# Patient Record
Sex: MALE | Race: WHITE | NOT HISPANIC OR LATINO | Employment: OTHER | URBAN - METROPOLITAN AREA
[De-identification: names, ages, dates, MRNs, and addresses within clinical notes are randomized per-mention and may not be internally consistent; named-entity substitution may affect disease eponyms.]

---

## 2017-05-11 ENCOUNTER — HOSPITAL ENCOUNTER (OUTPATIENT)
Dept: FAMILY MEDICINE CLINIC | Facility: CLINIC | Age: 72
Setting detail: SPECIMEN
Discharge: HOME | End: 2017-05-11
Attending: FAMILY MEDICINE | Admitting: FAMILY MEDICINE

## 2017-05-11 LAB
ALBUMIN SERPL-MCNC: 4.1 G/DL (ref 3.5–4.8)
ALBUMIN/GLOB SERPL: 1.3 {RATIO} (ref 1–1.7)
ALP SERPL-CCNC: 55 IU/L (ref 32–91)
ALT SERPL-CCNC: 20 IU/L (ref 17–63)
ANION GAP SERPL CALC-SCNC: 14.4 MMOL/L (ref 10–20)
AST SERPL-CCNC: 20 IU/L (ref 15–41)
BASOPHILS # BLD AUTO: 0 10*3/UL (ref 0–0.2)
BASOPHILS NFR BLD AUTO: 0 % (ref 0–2)
BILIRUB SERPL-MCNC: 0.8 MG/DL (ref 0.3–1.2)
BUN SERPL-MCNC: 23 MG/DL (ref 8–20)
BUN/CREAT SERPL: 15.3 (ref 6.2–20.3)
CALCIUM SERPL-MCNC: 9.8 MG/DL (ref 8.9–10.3)
CHLORIDE SERPL-SCNC: 101 MMOL/L (ref 101–111)
CHOLEST SERPL-MCNC: 112 MG/DL
CHOLEST/HDLC SERPL: 4.1 {RATIO}
CONV CO2: 26 MMOL/L (ref 22–32)
CONV LDL CHOLESTEROL DIRECT: 67 MG/DL (ref 0–100)
CONV TOTAL PROTEIN: 7.3 G/DL (ref 6.1–7.9)
CREAT UR-MCNC: 1.5 MG/DL (ref 0.7–1.2)
DIFFERENTIAL METHOD BLD: (no result)
EOSINOPHIL # BLD AUTO: 0.1 10*3/UL (ref 0–0.3)
EOSINOPHIL # BLD AUTO: 2 % (ref 0–3)
ERYTHROCYTE [DISTWIDTH] IN BLOOD BY AUTOMATED COUNT: 13.6 % (ref 11.5–14.5)
GLOBULIN UR ELPH-MCNC: 3.2 G/DL (ref 2.5–3.8)
GLUCOSE SERPL-MCNC: 66 MG/DL (ref 65–99)
HCT VFR BLD AUTO: 45.1 % (ref 40–54)
HDLC SERPL-MCNC: 27 MG/DL
HGB BLD-MCNC: 15.2 G/DL (ref 14–18)
LDLC/HDLC SERPL: 2.5 {RATIO}
LIPID INTERPRETATION: ABNORMAL
LYMPHOCYTES # BLD AUTO: 2.6 10*3/UL (ref 0.8–4.8)
LYMPHOCYTES NFR BLD AUTO: 37 % (ref 18–42)
MCH RBC QN AUTO: 29.7 PG (ref 26–32)
MCHC RBC AUTO-ENTMCNC: 33.7 G/DL (ref 32–36)
MCV RBC AUTO: 88.3 FL (ref 80–94)
MONOCYTES # BLD AUTO: 0.9 10*3/UL (ref 0.1–1.3)
MONOCYTES NFR BLD AUTO: 13 % (ref 2–11)
NEUTROPHILS # BLD AUTO: 3.4 10*3/UL (ref 2.3–8.6)
NEUTROPHILS NFR BLD AUTO: 48 % (ref 50–75)
NRBC BLD AUTO-RTO: 0 /100{WBCS}
NRBC/RBC NFR BLD MANUAL: 0 10*3/UL
PLATELET # BLD AUTO: 263 10*3/UL (ref 150–450)
PMV BLD AUTO: 9.1 FL (ref 7.4–10.4)
POTASSIUM SERPL-SCNC: 4.4 MMOL/L (ref 3.6–5.1)
RBC # BLD AUTO: 5.11 10*6/UL (ref 4.6–6)
SODIUM SERPL-SCNC: 137 MMOL/L (ref 136–144)
TRIGL SERPL-MCNC: 112 MG/DL
VLDLC SERPL CALC-MCNC: 18.1 MG/DL
WBC # BLD AUTO: 7.1 10*3/UL (ref 4.5–11.5)

## 2017-11-09 ENCOUNTER — HOSPITAL ENCOUNTER (OUTPATIENT)
Dept: FAMILY MEDICINE CLINIC | Facility: CLINIC | Age: 72
Setting detail: SPECIMEN
Discharge: HOME | End: 2017-11-09
Attending: FAMILY MEDICINE | Admitting: FAMILY MEDICINE

## 2017-11-09 LAB
ALBUMIN SERPL-MCNC: 4.3 G/DL (ref 3.5–4.8)
ALBUMIN/GLOB SERPL: 1.3 {RATIO} (ref 1–1.7)
ALP SERPL-CCNC: 65 IU/L (ref 32–91)
ALT SERPL-CCNC: 19 IU/L (ref 17–63)
AMPICILLIN SUSC ISLT: ABNORMAL
ANION GAP SERPL CALC-SCNC: 13.8 MMOL/L (ref 10–20)
AST SERPL-CCNC: 20 IU/L (ref 15–41)
BACTERIA ISLT: ABNORMAL
BACTERIA SPEC AEROBE CULT: ABNORMAL
BASOPHILS # BLD AUTO: 0 10*3/UL (ref 0–0.2)
BASOPHILS NFR BLD AUTO: 0 % (ref 0–2)
BILIRUB SERPL-MCNC: 1.2 MG/DL (ref 0.3–1.2)
BUN SERPL-MCNC: 25 MG/DL (ref 8–20)
BUN/CREAT SERPL: 17.9 (ref 6.2–20.3)
CALCIUM SERPL-MCNC: 9.7 MG/DL (ref 8.9–10.3)
CHLORIDE SERPL-SCNC: 99 MMOL/L (ref 101–111)
CHOLEST SERPL-MCNC: 127 MG/DL
CHOLEST/HDLC SERPL: 4.6 {RATIO}
COLONY COUNT: ABNORMAL
CONV CO2: 26 MMOL/L (ref 22–32)
CONV LDL CHOLESTEROL DIRECT: 78 MG/DL (ref 0–100)
CONV TOTAL PROTEIN: 7.7 G/DL (ref 6.1–7.9)
CREAT UR-MCNC: 1.4 MG/DL (ref 0.7–1.2)
DIFFERENTIAL METHOD BLD: (no result)
EOSINOPHIL # BLD AUTO: 0.1 10*3/UL (ref 0–0.3)
EOSINOPHIL # BLD AUTO: 2 % (ref 0–3)
ERYTHROCYTE [DISTWIDTH] IN BLOOD BY AUTOMATED COUNT: 13.8 % (ref 11.5–14.5)
GLOBULIN UR ELPH-MCNC: 3.4 G/DL (ref 2.5–3.8)
GLUCOSE SERPL-MCNC: 99 MG/DL (ref 65–99)
HCT VFR BLD AUTO: 45.9 % (ref 40–54)
HDLC SERPL-MCNC: 28 MG/DL
HGB BLD-MCNC: 15.5 G/DL (ref 14–18)
LDLC/HDLC SERPL: 2.8 {RATIO}
LEVOFLOXACIN SUSC ISLT: ABNORMAL
LIPID INTERPRETATION: ABNORMAL
LYMPHOCYTES # BLD AUTO: 2.2 10*3/UL (ref 0.8–4.8)
LYMPHOCYTES NFR BLD AUTO: 31 % (ref 18–42)
Lab: ABNORMAL
MCH RBC QN AUTO: 29.7 PG (ref 26–32)
MCHC RBC AUTO-ENTMCNC: 33.8 G/DL (ref 32–36)
MCV RBC AUTO: 87.6 FL (ref 80–94)
MICRO REPORT STATUS: ABNORMAL
MONOCYTES # BLD AUTO: 0.8 10*3/UL (ref 0.1–1.3)
MONOCYTES NFR BLD AUTO: 11 % (ref 2–11)
NEUTROPHILS # BLD AUTO: 4 10*3/UL (ref 2.3–8.6)
NEUTROPHILS NFR BLD AUTO: 56 % (ref 50–75)
NITROFURANTOIN SUSC ISLT: ABNORMAL
NRBC BLD AUTO-RTO: 0 /100{WBCS}
NRBC/RBC NFR BLD MANUAL: 0 10*3/UL
PLATELET # BLD AUTO: 284 10*3/UL (ref 150–450)
PMV BLD AUTO: 9.1 FL (ref 7.4–10.4)
POTASSIUM SERPL-SCNC: 3.8 MMOL/L (ref 3.6–5.1)
RBC # BLD AUTO: 5.23 10*6/UL (ref 4.6–6)
SODIUM SERPL-SCNC: 135 MMOL/L (ref 136–144)
SPECIMEN SOURCE: ABNORMAL
SUSC METH SPEC: ABNORMAL
T3FREE SERPL-MCNC: 4.55 PG/ML (ref 2.39–6.79)
T4 FREE SERPL-MCNC: 1.18 NG/DL (ref 0.58–1.64)
TETRACYCLINE SUSC ISLT: ABNORMAL
TRIGL SERPL-MCNC: 115 MG/DL
TSH SERPL-ACNC: 0.91 UIU/ML (ref 0.34–5.6)
VANCOMYCIN SUSC ISLT: ABNORMAL
VLDLC SERPL CALC-MCNC: 21.1 MG/DL
WBC # BLD AUTO: 7.1 10*3/UL (ref 4.5–11.5)

## 2018-05-10 ENCOUNTER — HOSPITAL ENCOUNTER (OUTPATIENT)
Dept: FAMILY MEDICINE CLINIC | Facility: CLINIC | Age: 73
Setting detail: SPECIMEN
Discharge: HOME | End: 2018-05-10
Attending: FAMILY MEDICINE | Admitting: FAMILY MEDICINE

## 2018-05-10 LAB
ALBUMIN SERPL-MCNC: 4.2 G/DL (ref 3.5–4.8)
ALBUMIN/GLOB SERPL: 1.2 {RATIO} (ref 1–1.7)
ALP SERPL-CCNC: 58 IU/L (ref 32–91)
ALT SERPL-CCNC: 22 IU/L (ref 17–63)
ANION GAP SERPL CALC-SCNC: 12.9 MMOL/L (ref 10–20)
AST SERPL-CCNC: 22 IU/L (ref 15–41)
BASOPHILS # BLD AUTO: 0 10*3/UL (ref 0–0.2)
BASOPHILS NFR BLD AUTO: 0 % (ref 0–2)
BILIRUB SERPL-MCNC: 0.8 MG/DL (ref 0.3–1.2)
BUN SERPL-MCNC: 23 MG/DL (ref 8–20)
BUN/CREAT SERPL: 16.4 (ref 6.2–20.3)
CALCIUM SERPL-MCNC: 9.6 MG/DL (ref 8.9–10.3)
CHLORIDE SERPL-SCNC: 103 MMOL/L (ref 101–111)
CHOLEST SERPL-MCNC: 118 MG/DL
CHOLEST/HDLC SERPL: 4.4 {RATIO}
CONV CO2: 27 MMOL/L (ref 22–32)
CONV LDL CHOLESTEROL DIRECT: 73 MG/DL (ref 0–100)
CONV TOTAL PROTEIN: 7.7 G/DL (ref 6.1–7.9)
CREAT UR-MCNC: 1.4 MG/DL (ref 0.7–1.2)
DIFFERENTIAL METHOD BLD: (no result)
EOSINOPHIL # BLD AUTO: 0.2 10*3/UL (ref 0–0.3)
EOSINOPHIL # BLD AUTO: 2 % (ref 0–3)
ERYTHROCYTE [DISTWIDTH] IN BLOOD BY AUTOMATED COUNT: 13.9 % (ref 11.5–14.5)
GLOBULIN UR ELPH-MCNC: 3.5 G/DL (ref 2.5–3.8)
GLUCOSE SERPL-MCNC: 87 MG/DL (ref 65–99)
HCT VFR BLD AUTO: 46.4 % (ref 40–54)
HDLC SERPL-MCNC: 27 MG/DL
HGB BLD-MCNC: 15.5 G/DL (ref 14–18)
LDLC/HDLC SERPL: 2.7 {RATIO}
LIPID INTERPRETATION: ABNORMAL
LYMPHOCYTES # BLD AUTO: 2.9 10*3/UL (ref 0.8–4.8)
LYMPHOCYTES NFR BLD AUTO: 39 % (ref 18–42)
MCH RBC QN AUTO: 28.9 PG (ref 26–32)
MCHC RBC AUTO-ENTMCNC: 33.4 G/DL (ref 32–36)
MCV RBC AUTO: 86.6 FL (ref 80–94)
MONOCYTES # BLD AUTO: 0.9 10*3/UL (ref 0.1–1.3)
MONOCYTES NFR BLD AUTO: 12 % (ref 2–11)
NEUTROPHILS # BLD AUTO: 3.4 10*3/UL (ref 2.3–8.6)
NEUTROPHILS NFR BLD AUTO: 47 % (ref 50–75)
NRBC BLD AUTO-RTO: 0 /100{WBCS}
NRBC/RBC NFR BLD MANUAL: 0 10*3/UL
PLATELET # BLD AUTO: 283 10*3/UL (ref 150–450)
PMV BLD AUTO: 9.1 FL (ref 7.4–10.4)
POTASSIUM SERPL-SCNC: 3.9 MMOL/L (ref 3.6–5.1)
RBC # BLD AUTO: 5.36 10*6/UL (ref 4.6–6)
SODIUM SERPL-SCNC: 139 MMOL/L (ref 136–144)
TRIGL SERPL-MCNC: 133 MG/DL
VLDLC SERPL CALC-MCNC: 17.8 MG/DL
WBC # BLD AUTO: 7.4 10*3/UL (ref 4.5–11.5)

## 2018-11-08 ENCOUNTER — HOSPITAL ENCOUNTER (OUTPATIENT)
Dept: FAMILY MEDICINE CLINIC | Facility: CLINIC | Age: 73
Setting detail: SPECIMEN
Discharge: HOME | End: 2018-11-08
Attending: FAMILY MEDICINE | Admitting: FAMILY MEDICINE

## 2018-11-08 LAB
ALBUMIN SERPL-MCNC: 4 G/DL (ref 3.5–4.8)
ALBUMIN/GLOB SERPL: 1.2 {RATIO} (ref 1–1.7)
ALP SERPL-CCNC: 67 IU/L (ref 32–91)
ALT SERPL-CCNC: 19 IU/L (ref 17–63)
ANION GAP SERPL CALC-SCNC: 14.2 MMOL/L (ref 10–20)
AST SERPL-CCNC: 19 IU/L (ref 15–41)
BASOPHILS # BLD AUTO: 0 10*3/UL (ref 0–0.2)
BASOPHILS NFR BLD AUTO: 0 % (ref 0–2)
BILIRUB SERPL-MCNC: 1 MG/DL (ref 0.3–1.2)
BUN SERPL-MCNC: 28 MG/DL (ref 8–20)
BUN/CREAT SERPL: 17.5 (ref 6.2–20.3)
CALCIUM SERPL-MCNC: 9.5 MG/DL (ref 8.9–10.3)
CHLORIDE SERPL-SCNC: 104 MMOL/L (ref 101–111)
CHOLEST SERPL-MCNC: 131 MG/DL
CHOLEST/HDLC SERPL: 4.2 {RATIO}
CONV CO2: 26 MMOL/L (ref 22–32)
CONV LDL CHOLESTEROL DIRECT: 87 MG/DL (ref 0–100)
CONV TOTAL PROTEIN: 7.3 G/DL (ref 6.1–7.9)
CREAT UR-MCNC: 1.6 MG/DL (ref 0.7–1.2)
DIFFERENTIAL METHOD BLD: (no result)
EOSINOPHIL # BLD AUTO: 0.2 10*3/UL (ref 0–0.3)
EOSINOPHIL # BLD AUTO: 3 % (ref 0–3)
ERYTHROCYTE [DISTWIDTH] IN BLOOD BY AUTOMATED COUNT: 14.3 % (ref 11.5–14.5)
GLOBULIN UR ELPH-MCNC: 3.3 G/DL (ref 2.5–3.8)
GLUCOSE SERPL-MCNC: 92 MG/DL (ref 65–99)
HCT VFR BLD AUTO: 46.3 % (ref 40–54)
HDLC SERPL-MCNC: 31 MG/DL
HGB BLD-MCNC: 15.6 G/DL (ref 14–18)
LDLC/HDLC SERPL: 2.8 {RATIO}
LIPID INTERPRETATION: ABNORMAL
LYMPHOCYTES # BLD AUTO: 2.9 10*3/UL (ref 0.8–4.8)
LYMPHOCYTES NFR BLD AUTO: 36 % (ref 18–42)
MCH RBC QN AUTO: 29.1 PG (ref 26–32)
MCHC RBC AUTO-ENTMCNC: 33.6 G/DL (ref 32–36)
MCV RBC AUTO: 86.6 FL (ref 80–94)
MONOCYTES # BLD AUTO: 0.9 10*3/UL (ref 0.1–1.3)
MONOCYTES NFR BLD AUTO: 12 % (ref 2–11)
NEUTROPHILS # BLD AUTO: 3.9 10*3/UL (ref 2.3–8.6)
NEUTROPHILS NFR BLD AUTO: 49 % (ref 50–75)
NRBC BLD AUTO-RTO: 0 /100{WBCS}
NRBC/RBC NFR BLD MANUAL: 0 10*3/UL
PLATELET # BLD AUTO: 270 10*3/UL (ref 150–450)
PMV BLD AUTO: 8.5 FL (ref 7.4–10.4)
POTASSIUM SERPL-SCNC: 4.2 MMOL/L (ref 3.6–5.1)
RBC # BLD AUTO: 5.35 10*6/UL (ref 4.6–6)
SODIUM SERPL-SCNC: 140 MMOL/L (ref 136–144)
TRIGL SERPL-MCNC: 114 MG/DL
VLDLC SERPL CALC-MCNC: 12.3 MG/DL
WBC # BLD AUTO: 7.9 10*3/UL (ref 4.5–11.5)

## 2019-01-23 ENCOUNTER — HOSPITAL ENCOUNTER (OUTPATIENT)
Dept: GENERAL RADIOLOGY | Facility: HOSPITAL | Age: 74
Discharge: HOME | End: 2019-01-23
Attending: FAMILY MEDICINE | Admitting: FAMILY MEDICINE

## 2019-05-09 ENCOUNTER — HOSPITAL ENCOUNTER (OUTPATIENT)
Dept: FAMILY MEDICINE CLINIC | Facility: CLINIC | Age: 74
Setting detail: SPECIMEN
Discharge: HOME | End: 2019-05-09
Attending: FAMILY MEDICINE | Admitting: FAMILY MEDICINE

## 2019-05-09 LAB
ALBUMIN SERPL-MCNC: 3.9 G/DL (ref 3.5–4.8)
ALBUMIN/GLOB SERPL: 1.2 {RATIO} (ref 1–1.7)
ALP SERPL-CCNC: 59 IU/L (ref 32–91)
ALT SERPL-CCNC: 20 IU/L (ref 17–63)
ANION GAP SERPL CALC-SCNC: 15 MMOL/L (ref 10–20)
AST SERPL-CCNC: 20 IU/L (ref 15–41)
BASOPHILS # BLD AUTO: 0.1 10*3/UL (ref 0–0.2)
BASOPHILS NFR BLD AUTO: 1 % (ref 0–2)
BILIRUB SERPL-MCNC: 0.9 MG/DL (ref 0.3–1.2)
BUN SERPL-MCNC: 22 MG/DL (ref 8–20)
BUN/CREAT SERPL: 18.3 (ref 6.2–20.3)
CALCIUM SERPL-MCNC: 9.6 MG/DL (ref 8.9–10.3)
CHLORIDE SERPL-SCNC: 104 MMOL/L (ref 101–111)
CHOLEST SERPL-MCNC: 114 MG/DL
CHOLEST/HDLC SERPL: 4 {RATIO}
CONV CO2: 25 MMOL/L (ref 22–32)
CONV LDL CHOLESTEROL DIRECT: 71 MG/DL (ref 0–100)
CONV TOTAL PROTEIN: 7.2 G/DL (ref 6.1–7.9)
CREAT UR-MCNC: 1.2 MG/DL (ref 0.7–1.2)
DIFFERENTIAL METHOD BLD: (no result)
EOSINOPHIL # BLD AUTO: 0.2 10*3/UL (ref 0–0.3)
EOSINOPHIL # BLD AUTO: 3 % (ref 0–3)
ERYTHROCYTE [DISTWIDTH] IN BLOOD BY AUTOMATED COUNT: 14.3 % (ref 11.5–14.5)
GLOBULIN UR ELPH-MCNC: 3.3 G/DL (ref 2.5–3.8)
GLUCOSE SERPL-MCNC: 64 MG/DL (ref 65–99)
HBA1C MFR BLD: 6.1 % (ref 0–5.6)
HCT VFR BLD AUTO: 43.6 % (ref 40–54)
HDLC SERPL-MCNC: 29 MG/DL
HGB BLD-MCNC: 14.7 G/DL (ref 14–18)
LDLC/HDLC SERPL: 2.5 {RATIO}
LIPID INTERPRETATION: ABNORMAL
LYMPHOCYTES # BLD AUTO: 2.7 10*3/UL (ref 0.8–4.8)
LYMPHOCYTES NFR BLD AUTO: 39 % (ref 18–42)
MCH RBC QN AUTO: 29.7 PG (ref 26–32)
MCHC RBC AUTO-ENTMCNC: 33.8 G/DL (ref 32–36)
MCV RBC AUTO: 88 FL (ref 80–94)
MONOCYTES # BLD AUTO: 0.8 10*3/UL (ref 0.1–1.3)
MONOCYTES NFR BLD AUTO: 11 % (ref 2–11)
NEUTROPHILS # BLD AUTO: 3.2 10*3/UL (ref 2.3–8.6)
NEUTROPHILS NFR BLD AUTO: 46 % (ref 50–75)
NRBC BLD AUTO-RTO: 0 /100{WBCS}
NRBC/RBC NFR BLD MANUAL: 0 10*3/UL
PLATELET # BLD AUTO: 276 10*3/UL (ref 150–450)
PMV BLD AUTO: 9.1 FL (ref 7.4–10.4)
POTASSIUM SERPL-SCNC: 4 MMOL/L (ref 3.6–5.1)
PSA SERPL-MCNC: 1.22 NG/ML (ref 0–4)
RBC # BLD AUTO: 4.95 10*6/UL (ref 4.6–6)
SODIUM SERPL-SCNC: 140 MMOL/L (ref 136–144)
TRIGL SERPL-MCNC: 113 MG/DL
VLDLC SERPL CALC-MCNC: 14.2 MG/DL
WBC # BLD AUTO: 7 10*3/UL (ref 4.5–11.5)

## 2019-08-01 RX ORDER — HYDROCHLOROTHIAZIDE 50 MG/1
TABLET ORAL
Qty: 90 TABLET | Refills: 1 | Status: SHIPPED | OUTPATIENT
Start: 2019-08-01 | End: 2019-11-03 | Stop reason: SDUPTHER

## 2019-08-01 RX ORDER — ATENOLOL 50 MG/1
TABLET ORAL
Qty: 180 TABLET | Refills: 1 | Status: SHIPPED | OUTPATIENT
Start: 2019-08-01 | End: 2019-11-03 | Stop reason: SDUPTHER

## 2019-08-01 RX ORDER — POTASSIUM CHLORIDE 20 MEQ/1
TABLET, EXTENDED RELEASE ORAL
Qty: 90 TABLET | Refills: 1 | Status: SHIPPED | OUTPATIENT
Start: 2019-08-01 | End: 2019-11-12 | Stop reason: SDUPTHER

## 2019-08-01 RX ORDER — ENALAPRIL MALEATE 20 MG/1
TABLET ORAL
Qty: 90 TABLET | Refills: 1 | Status: SHIPPED | OUTPATIENT
Start: 2019-08-01 | End: 2019-08-26 | Stop reason: SDUPTHER

## 2019-08-22 PROBLEM — I10 HYPERTENSION: Status: ACTIVE | Noted: 2019-08-22

## 2019-08-22 RX ORDER — BLOOD-GLUCOSE METER
EACH MISCELLANEOUS
COMMUNITY
Start: 2016-08-08 | End: 2022-07-06

## 2019-08-22 RX ORDER — SIMVASTATIN 20 MG
TABLET ORAL EVERY 24 HOURS
COMMUNITY
Start: 2017-11-10 | End: 2019-11-27 | Stop reason: SDUPTHER

## 2019-08-22 RX ORDER — NIACIN 500 MG
TABLET ORAL
COMMUNITY
Start: 2015-04-17 | End: 2022-07-06

## 2019-08-22 RX ORDER — GLIMEPIRIDE 2 MG/1
TABLET ORAL EVERY 12 HOURS
COMMUNITY
Start: 2018-09-17 | End: 2019-10-09 | Stop reason: SDUPTHER

## 2019-08-22 RX ORDER — AMLODIPINE BESYLATE 5 MG/1
TABLET ORAL EVERY 24 HOURS
COMMUNITY
Start: 2017-11-10 | End: 2019-10-09 | Stop reason: SDUPTHER

## 2019-08-22 RX ORDER — BETAMETHASONE DIPROPIONATE 0.5 MG/G
CREAM TOPICAL
COMMUNITY
Start: 2017-11-09 | End: 2021-06-28 | Stop reason: SDUPTHER

## 2019-08-26 RX ORDER — ENALAPRIL MALEATE 20 MG/1
TABLET ORAL
Qty: 180 TABLET | Refills: 1 | Status: SHIPPED | OUTPATIENT
Start: 2019-08-26 | End: 2020-02-13

## 2019-09-06 ENCOUNTER — OFFICE VISIT (OUTPATIENT)
Dept: CARDIOLOGY | Facility: CLINIC | Age: 74
End: 2019-09-06

## 2019-09-06 VITALS
HEART RATE: 60 BPM | BODY MASS INDEX: 29.65 KG/M2 | HEIGHT: 74 IN | OXYGEN SATURATION: 99 % | DIASTOLIC BLOOD PRESSURE: 76 MMHG | SYSTOLIC BLOOD PRESSURE: 124 MMHG | WEIGHT: 231 LBS

## 2019-09-06 DIAGNOSIS — E11.59 TYPE 2 DIABETES MELLITUS WITH OTHER CIRCULATORY COMPLICATION, WITHOUT LONG-TERM CURRENT USE OF INSULIN (HCC): ICD-10-CM

## 2019-09-06 DIAGNOSIS — E11.42 TYPE 2 DIABETES MELLITUS WITH PERIPHERAL NEUROPATHY (HCC): ICD-10-CM

## 2019-09-06 DIAGNOSIS — I10 HTN, GOAL BELOW 130/80: ICD-10-CM

## 2019-09-06 DIAGNOSIS — I25.10 CORONARY ARTERY DISEASE INVOLVING NATIVE CORONARY ARTERY OF NATIVE HEART WITHOUT ANGINA PECTORIS: Primary | ICD-10-CM

## 2019-09-06 DIAGNOSIS — E66.3 OVERWEIGHT (BMI 25.0-29.9): ICD-10-CM

## 2019-09-06 DIAGNOSIS — E78.5 DYSLIPIDEMIA: ICD-10-CM

## 2019-09-06 PROCEDURE — 99214 OFFICE O/P EST MOD 30 MIN: CPT | Performed by: INTERNAL MEDICINE

## 2019-09-06 PROCEDURE — 93000 ELECTROCARDIOGRAM COMPLETE: CPT | Performed by: INTERNAL MEDICINE

## 2019-09-06 NOTE — PROGRESS NOTES
Subjective:     Encounter Date:09/06/2019      Patient ID: Gabriel De Souza is a 74 y.o. male.    Chief Complaint:  1 yr follow up  CAD heart catheterization 2001. Overweight.  DM.  Dyslipidemia.  HTN. CRI .  Peripheral neuropathy.    No SOB or chest tightness with activity.  No orthopnea or PND.  Mild swelling lef leg.  No transient focal neurologic loss.  No discomfort in legs with walking.  Tingling discomfort in feet and felt odd in his mind while on pregabalin.    Past Medical History:     DM A1c 5.9 5/11/2017, A1c 6.0 11/09/2017      Peripheral neuropathy     HTN     Dyslipidemia  chol   114  HDL 29 LDL 71 ALT 25/9/2019     Past cigarette use     CRI creat 1.3  4/12/2016, 1.5 K 4.4 5/11/2017, 1.4 ( 0.7- 1.2)  K 3.9 05/10/2018, 1.2 (0.7-1.2) K 4.0 5/9/2019     CAD     Nuclear MPI with TMT 3/14/2008  Nuclear images OK EF 62%.     Nuclear MPI with TMT 12/4/2003      Nuclear MPI with TMT 2/23/2001     Heart Cath 2/23/2001  Unremarkable LV. 75-80% proximal narrowing 1st diagonal branch LAD.  75%  proximal narrowing  2nd diagonal branch LAD. 60% narrowing distal RCA.     Hx DVT LLE and anticoagulation     Goiter found on PE  7/7/2017     TSH   0.91 11/09/2017     Hgb15.5 05/10/2018, 14.7 5/9/2019        ECG 12 Lead  Date/Time: 9/6/2019 11:34 AM  Performed by: Karan Burgos MD  Authorized by: Karan Burgos MD   Comments:   Sinus rhythm  PAC  Otherwise unremarkable EKG  Compared to EKG 9/4/2018 PAC on present EKG and otherwise EKGs similar                 Objective:     Physical Exam   Constitutional:   Weight 231 pounds with 1 pound loss in the past year and 5 pound gain in the past 4 years  BMI 29  /74 RA= LA lying and standing  HR 60  O2 sat 99%   Neck:   Thyroid unremarkable to palpation   Cardiovascular:   No carotid bruits  No JVD  No palpable precordial impulses  No murmur, gallop, rub   Pulmonary/Chest:   Unremarkable to percussion and auscultation  No wheezing, rhonchi, rales   Abdominal:    Liver and spleen not palpable   Musculoskeletal:   No edema lower extremities  Right and left dorsalis pedis pulses +1 and posterior tibial pulses +2       Lab Review:     CBC and CMP and lipid panel 5/9/2019 reviewed and pertinent findings recorded PMH  Assessment:     No SOB or angina pectoris with present activity.  BP and fluid status satisfactory  Creatinine and K satisfactory    Plan:     Encouraged and discussed weight reduction and 3 g sodium restriction and regular mild exercise 30 minutes 5 days a week  Continue present medication  Blood work regularly through Dr. Johnson  Return 1 year with EKG

## 2019-09-27 ENCOUNTER — FLU SHOT (OUTPATIENT)
Dept: FAMILY MEDICINE CLINIC | Facility: CLINIC | Age: 74
End: 2019-09-27

## 2019-09-27 DIAGNOSIS — Z23 IMMUNIZATION DUE: Primary | ICD-10-CM

## 2019-09-27 PROCEDURE — 90674 CCIIV4 VAC NO PRSV 0.5 ML IM: CPT | Performed by: FAMILY MEDICINE

## 2019-09-27 PROCEDURE — G0008 ADMIN INFLUENZA VIRUS VAC: HCPCS | Performed by: FAMILY MEDICINE

## 2019-10-09 RX ORDER — AMLODIPINE BESYLATE 5 MG/1
TABLET ORAL
Qty: 90 TABLET | Refills: 1 | Status: SHIPPED | OUTPATIENT
Start: 2019-10-09 | End: 2020-02-13

## 2019-10-09 RX ORDER — GLIMEPIRIDE 2 MG/1
TABLET ORAL
Qty: 180 TABLET | Refills: 1 | Status: SHIPPED | OUTPATIENT
Start: 2019-10-09 | End: 2020-02-13

## 2019-11-03 RX ORDER — ATENOLOL 50 MG/1
TABLET ORAL
Qty: 180 TABLET | Refills: 1 | Status: SHIPPED | OUTPATIENT
Start: 2019-11-03 | End: 2020-03-19

## 2019-11-03 RX ORDER — HYDROCHLOROTHIAZIDE 50 MG/1
TABLET ORAL
Qty: 90 TABLET | Refills: 1 | Status: SHIPPED | OUTPATIENT
Start: 2019-11-03 | End: 2020-03-19

## 2019-11-06 ENCOUNTER — OFFICE VISIT (OUTPATIENT)
Dept: FAMILY MEDICINE CLINIC | Facility: CLINIC | Age: 74
End: 2019-11-06

## 2019-11-06 VITALS
TEMPERATURE: 97.6 F | WEIGHT: 234.6 LBS | OXYGEN SATURATION: 99 % | HEIGHT: 74 IN | DIASTOLIC BLOOD PRESSURE: 82 MMHG | SYSTOLIC BLOOD PRESSURE: 153 MMHG | HEART RATE: 61 BPM | BODY MASS INDEX: 30.11 KG/M2

## 2019-11-06 DIAGNOSIS — I10 ESSENTIAL HYPERTENSION: ICD-10-CM

## 2019-11-06 DIAGNOSIS — E11.9 CONTROLLED TYPE 2 DIABETES MELLITUS WITHOUT COMPLICATION, WITHOUT LONG-TERM CURRENT USE OF INSULIN (HCC): Primary | ICD-10-CM

## 2019-11-06 DIAGNOSIS — M25.551 HIP PAIN, RIGHT: ICD-10-CM

## 2019-11-06 LAB
ALBUMIN SERPL-MCNC: 4 G/DL (ref 3.5–5.2)
ALBUMIN/GLOB SERPL: 1.1 G/DL
ALP SERPL-CCNC: 57 U/L (ref 39–117)
ALT SERPL W P-5'-P-CCNC: 19 U/L (ref 1–41)
ANION GAP SERPL CALCULATED.3IONS-SCNC: 9 MMOL/L (ref 5–15)
AST SERPL-CCNC: 17 U/L (ref 1–40)
BASOPHILS # BLD AUTO: 0.02 10*3/MM3 (ref 0–0.2)
BASOPHILS NFR BLD AUTO: 0.3 % (ref 0–1.5)
BILIRUB SERPL-MCNC: 0.5 MG/DL (ref 0.2–1.2)
BUN BLD-MCNC: 25 MG/DL (ref 8–23)
BUN/CREAT SERPL: 17.5 (ref 7–25)
CALCIUM SPEC-SCNC: 9.8 MG/DL (ref 8.6–10.5)
CHLORIDE SERPL-SCNC: 101 MMOL/L (ref 98–107)
CHOLEST SERPL-MCNC: 125 MG/DL (ref 0–200)
CO2 SERPL-SCNC: 31 MMOL/L (ref 22–29)
CREAT BLD-MCNC: 1.43 MG/DL (ref 0.76–1.27)
DEPRECATED RDW RBC AUTO: 45 FL (ref 37–54)
EOSINOPHIL # BLD AUTO: 0.17 10*3/MM3 (ref 0–0.4)
EOSINOPHIL NFR BLD AUTO: 2.2 % (ref 0.3–6.2)
ERYTHROCYTE [DISTWIDTH] IN BLOOD BY AUTOMATED COUNT: 13.6 % (ref 12.3–15.4)
GFR SERPL CREATININE-BSD FRML MDRD: 48 ML/MIN/1.73
GLOBULIN UR ELPH-MCNC: 3.5 GM/DL
GLUCOSE BLD-MCNC: 70 MG/DL (ref 65–99)
HBA1C MFR BLD: 5.7 % (ref 3.5–5.6)
HCT VFR BLD AUTO: 43.9 % (ref 37.5–51)
HDLC SERPL-MCNC: 31 MG/DL (ref 40–60)
HGB BLD-MCNC: 14.7 G/DL (ref 13–17.7)
IMM GRANULOCYTES # BLD AUTO: 0.04 10*3/MM3 (ref 0–0.05)
IMM GRANULOCYTES NFR BLD AUTO: 0.5 % (ref 0–0.5)
LDLC SERPL CALC-MCNC: 69 MG/DL (ref 0–100)
LDLC/HDLC SERPL: 2.23 {RATIO}
LYMPHOCYTES # BLD AUTO: 2.38 10*3/MM3 (ref 0.7–3.1)
LYMPHOCYTES NFR BLD AUTO: 31.3 % (ref 19.6–45.3)
MCH RBC QN AUTO: 30.2 PG (ref 26.6–33)
MCHC RBC AUTO-ENTMCNC: 33.5 G/DL (ref 31.5–35.7)
MCV RBC AUTO: 90.1 FL (ref 79–97)
MONOCYTES # BLD AUTO: 0.93 10*3/MM3 (ref 0.1–0.9)
MONOCYTES NFR BLD AUTO: 12.2 % (ref 5–12)
NEUTROPHILS # BLD AUTO: 4.07 10*3/MM3 (ref 1.7–7)
NEUTROPHILS NFR BLD AUTO: 53.5 % (ref 42.7–76)
NRBC BLD AUTO-RTO: 0 /100 WBC (ref 0–0.2)
PLATELET # BLD AUTO: 275 10*3/MM3 (ref 140–450)
PMV BLD AUTO: 11 FL (ref 6–12)
POTASSIUM BLD-SCNC: 3.6 MMOL/L (ref 3.5–5.2)
PROT SERPL-MCNC: 7.5 G/DL (ref 6–8.5)
RBC # BLD AUTO: 4.87 10*6/MM3 (ref 4.14–5.8)
SODIUM BLD-SCNC: 141 MMOL/L (ref 136–145)
TRIGL SERPL-MCNC: 124 MG/DL (ref 0–150)
VLDLC SERPL-MCNC: 24.8 MG/DL (ref 5–40)
WBC NRBC COR # BLD: 7.61 10*3/MM3 (ref 3.4–10.8)

## 2019-11-06 PROCEDURE — 83036 HEMOGLOBIN GLYCOSYLATED A1C: CPT | Performed by: FAMILY MEDICINE

## 2019-11-06 PROCEDURE — 99214 OFFICE O/P EST MOD 30 MIN: CPT | Performed by: FAMILY MEDICINE

## 2019-11-06 PROCEDURE — 85025 COMPLETE CBC W/AUTO DIFF WBC: CPT | Performed by: FAMILY MEDICINE

## 2019-11-06 PROCEDURE — 80061 LIPID PANEL: CPT | Performed by: FAMILY MEDICINE

## 2019-11-06 PROCEDURE — 36415 COLL VENOUS BLD VENIPUNCTURE: CPT | Performed by: FAMILY MEDICINE

## 2019-11-06 PROCEDURE — 80053 COMPREHEN METABOLIC PANEL: CPT | Performed by: FAMILY MEDICINE

## 2019-11-06 RX ORDER — MELOXICAM 15 MG/1
15 TABLET ORAL DAILY
Qty: 30 TABLET | Refills: 1 | Status: SHIPPED | OUTPATIENT
Start: 2019-11-06 | End: 2021-09-17

## 2019-11-06 NOTE — PROGRESS NOTES
"   Subjective   Gabriel De Souza is a 74 y.o. male.     He is in for follow-up on his dm2 and high blood pressure, which has been trending higher of late. He has had some episodic discomfort in his R hip but it is not predictable , though mornings bother him more of late. He has not had any trauma. He has been able to do his ADLs but Tylenol has not helped his pain.           /82 (BP Location: Left arm, Patient Position: Sitting, Cuff Size: Adult)   Pulse 61   Temp 97.6 °F (36.4 °C) (Oral)   Ht 188 cm (74\")   Wt 106 kg (234 lb 9.6 oz)   SpO2 99%   BMI 30.12 kg/m²       Chief Complaint   Patient presents with   • Diabetes     six month f/u    • Hypertension           Current Outpatient Medications:   •  amLODIPine (NORVASC) 5 MG tablet, TAKE 1 TABLET BY MOUTH  DAILY, Disp: 90 tablet, Rfl: 1  •  aspirin 81 MG tablet, ASPIRIN 81 MG ORAL TABLET, Disp: , Rfl:   •  atenolol (TENORMIN) 50 MG tablet, TAKE 1 TABLET BY MOUTH  TWICE A DAY, Disp: 180 tablet, Rfl: 1  •  betamethasone dipropionate (DIPROLENE) 0.05 % cream, BETAMETHASONE DIPROPIONATE 0.05 % CREA, Disp: , Rfl:   •  Blood Glucose Monitoring Suppl (ONE TOUCH ULTRA 2) w/Device kit, ONETOUCH ULTRA 2 w/Device KIT, Disp: , Rfl:   •  enalapril (VASOTEC) 20 MG tablet, TAKE 1 TABLET BY MOUTH  TWICE A DAY, Disp: 180 tablet, Rfl: 1  •  glimepiride (AMARYL) 2 MG tablet, TAKE 1 TABLET BY MOUTH  TWICE A DAY, Disp: 180 tablet, Rfl: 1  •  glucose blood (ONE TOUCH ULTRA TEST) test strip, ONETOUCH ULTRA BLUE STRP, Disp: , Rfl:   •  hydroCHLOROthiazide (HYDRODIURIL) 50 MG tablet, TAKE 1 TABLET BY MOUTH  DAILY, Disp: 90 tablet, Rfl: 1  •  metFORMIN (GLUCOPHAGE) 500 MG tablet, TAKE 1 TABLET BY MOUTH 4  TIMES DAILY WITH MEALS, Disp: 360 tablet, Rfl: 1  •  niacin 500 MG tablet, NIACIN 500 MG TABS, Disp: , Rfl:   •  potassium chloride (K-DUR,KLOR-CON) 20 MEQ CR tablet, TAKE 1 TABLET BY MOUTH  DAILY, Disp: 90 tablet, Rfl: 1  •  simvastatin (ZOCOR) 20 MG tablet, Daily., Disp: , " Rfl:         The following portions of the patient's history were reviewed and updated as appropriate: allergies, current medications, past family history, past medical history, past social history, past surgical history and problem list.    Review of Systems   Constitutional: Negative for activity change, fatigue and fever.   HENT: Negative for congestion, sinus pressure, sinus pain, sore throat and trouble swallowing.    Eyes: Negative for visual disturbance.   Respiratory: Negative for chest tightness, shortness of breath and wheezing.    Cardiovascular: Negative for chest pain.   Gastrointestinal: Negative for abdominal distention, abdominal pain, constipation, diarrhea, nausea and vomiting.   Genitourinary: Negative for difficulty urinating, dysuria, frequency and urgency.   Musculoskeletal: Positive for arthralgias. Negative for back pain and neck pain.   Neurological: Negative for dizziness, weakness, light-headedness, numbness and headaches.   Psychiatric/Behavioral: Positive for sleep disturbance. Negative for agitation, hallucinations and suicidal ideas.       Objective   Physical Exam   Constitutional: He is oriented to person, place, and time. No distress.   HENT:   Nose: Nose normal.   Mouth/Throat: Oropharynx is clear and moist.   Neck: Normal range of motion. Neck supple. No thyromegaly present.   Cardiovascular: Normal rate, regular rhythm and normal heart sounds.   Pulmonary/Chest: Effort normal and breath sounds normal. He has no wheezes.   Abdominal: Soft. Bowel sounds are normal. There is no guarding.   Musculoskeletal:   Soreness in R hip but no defect noted    Gabriel had a diabetic foot exam performed today.   During the foot exam he had a monofilament test performed (mild numbness but no wounds or ulcers).  Lymphadenopathy:     He has no cervical adenopathy.   Neurological: He is alert and oriented to person, place, and time. He displays normal reflexes. A sensory deficit is present.   Nursing  note and vitals reviewed.        Assessment/Plan   Problems Addressed this Visit        Cardiovascular and Mediastinum    Hypertension       Endocrine    Diabetes mellitus type II, controlled (CMS/HCC) - Primary       Nervous and Auditory    Hip pain, right          I will try some meloxicam for the hip pain  I will update his labs  I advised he be more active  I will see back in 6 mos, sooner if the hip worsens or if new issues arise  I did authorize new diabetic shoes due to the numbness in his feet

## 2019-11-12 RX ORDER — POTASSIUM CHLORIDE 20 MEQ/1
TABLET, EXTENDED RELEASE ORAL
Qty: 90 TABLET | Refills: 1 | Status: SHIPPED | OUTPATIENT
Start: 2019-11-12 | End: 2020-06-10

## 2019-11-15 ENCOUNTER — TELEPHONE (OUTPATIENT)
Dept: FAMILY MEDICINE CLINIC | Facility: CLINIC | Age: 74
End: 2019-11-15

## 2019-11-15 NOTE — TELEPHONE ENCOUNTER
Patient's prescription insurance is not compatible with [facility].  Can you resend his test strips to [facility] in [place]?  Thank you.

## 2019-11-27 RX ORDER — SIMVASTATIN 20 MG
TABLET ORAL
Qty: 90 TABLET | Refills: 1 | Status: SHIPPED | OUTPATIENT
Start: 2019-11-27 | End: 2020-02-27

## 2020-01-14 ENCOUNTER — TELEPHONE (OUTPATIENT)
Dept: FAMILY MEDICINE CLINIC | Facility: CLINIC | Age: 75
End: 2020-01-14

## 2020-02-13 RX ORDER — ENALAPRIL MALEATE 20 MG/1
TABLET ORAL
Qty: 180 TABLET | Refills: 1 | Status: SHIPPED | OUTPATIENT
Start: 2020-02-13 | End: 2020-06-24

## 2020-02-13 RX ORDER — GLIMEPIRIDE 2 MG/1
TABLET ORAL
Qty: 180 TABLET | Refills: 1 | Status: SHIPPED | OUTPATIENT
Start: 2020-02-13 | End: 2020-06-24

## 2020-02-13 RX ORDER — AMLODIPINE BESYLATE 5 MG/1
TABLET ORAL
Qty: 90 TABLET | Refills: 1 | Status: SHIPPED | OUTPATIENT
Start: 2020-02-13 | End: 2020-06-24

## 2020-02-27 RX ORDER — SIMVASTATIN 20 MG
TABLET ORAL
Qty: 90 TABLET | Refills: 1 | Status: SHIPPED | OUTPATIENT
Start: 2020-02-27 | End: 2020-09-27

## 2020-03-19 RX ORDER — ATENOLOL 50 MG/1
TABLET ORAL
Qty: 180 TABLET | Refills: 1 | Status: SHIPPED | OUTPATIENT
Start: 2020-03-19 | End: 2020-10-11

## 2020-03-19 RX ORDER — HYDROCHLOROTHIAZIDE 50 MG/1
TABLET ORAL
Qty: 90 TABLET | Refills: 1 | Status: SHIPPED | OUTPATIENT
Start: 2020-03-19 | End: 2020-10-11

## 2020-05-05 ENCOUNTER — TELEPHONE (OUTPATIENT)
Dept: FAMILY MEDICINE CLINIC | Facility: CLINIC | Age: 75
End: 2020-05-05

## 2020-06-10 RX ORDER — POTASSIUM CHLORIDE 20 MEQ/1
TABLET, EXTENDED RELEASE ORAL
Qty: 90 TABLET | Refills: 1 | Status: SHIPPED | OUTPATIENT
Start: 2020-06-10 | End: 2020-11-23

## 2020-06-24 RX ORDER — ENALAPRIL MALEATE 20 MG/1
TABLET ORAL
Qty: 180 TABLET | Refills: 1 | Status: SHIPPED | OUTPATIENT
Start: 2020-06-24 | End: 2020-12-06

## 2020-06-24 RX ORDER — AMLODIPINE BESYLATE 5 MG/1
TABLET ORAL
Qty: 90 TABLET | Refills: 1 | Status: SHIPPED | OUTPATIENT
Start: 2020-06-24 | End: 2020-12-06

## 2020-06-24 RX ORDER — GLIMEPIRIDE 2 MG/1
TABLET ORAL
Qty: 180 TABLET | Refills: 1 | Status: SHIPPED | OUTPATIENT
Start: 2020-06-24 | End: 2020-12-06

## 2020-06-25 ENCOUNTER — LAB (OUTPATIENT)
Dept: FAMILY MEDICINE CLINIC | Facility: CLINIC | Age: 75
End: 2020-06-25

## 2020-06-25 ENCOUNTER — OFFICE VISIT (OUTPATIENT)
Dept: FAMILY MEDICINE CLINIC | Facility: CLINIC | Age: 75
End: 2020-06-25

## 2020-06-25 VITALS
SYSTOLIC BLOOD PRESSURE: 117 MMHG | OXYGEN SATURATION: 98 % | DIASTOLIC BLOOD PRESSURE: 72 MMHG | HEART RATE: 65 BPM | TEMPERATURE: 98 F | BODY MASS INDEX: 29.4 KG/M2 | WEIGHT: 229 LBS

## 2020-06-25 DIAGNOSIS — E11.9 CONTROLLED TYPE 2 DIABETES MELLITUS WITHOUT COMPLICATION, WITHOUT LONG-TERM CURRENT USE OF INSULIN (HCC): Primary | ICD-10-CM

## 2020-06-25 DIAGNOSIS — I10 ESSENTIAL HYPERTENSION: ICD-10-CM

## 2020-06-25 DIAGNOSIS — Z00.00 PREVENTATIVE HEALTH CARE: ICD-10-CM

## 2020-06-25 LAB
ALBUMIN SERPL-MCNC: 4.5 G/DL (ref 3.5–5.2)
ALBUMIN UR-MCNC: 2 MG/DL
ALBUMIN/GLOB SERPL: 1.4 G/DL
ALP SERPL-CCNC: 62 U/L (ref 39–117)
ALT SERPL W P-5'-P-CCNC: 14 U/L (ref 1–41)
ANION GAP SERPL CALCULATED.3IONS-SCNC: 13.3 MMOL/L (ref 5–15)
AST SERPL-CCNC: 12 U/L (ref 1–40)
BASOPHILS # BLD AUTO: 0.03 10*3/MM3 (ref 0–0.2)
BASOPHILS NFR BLD AUTO: 0.4 % (ref 0–1.5)
BILIRUB SERPL-MCNC: 0.6 MG/DL (ref 0.2–1.2)
BUN BLD-MCNC: 34 MG/DL (ref 8–23)
BUN/CREAT SERPL: 19.5 (ref 7–25)
CALCIUM SPEC-SCNC: 10 MG/DL (ref 8.6–10.5)
CHLORIDE SERPL-SCNC: 102 MMOL/L (ref 98–107)
CHOLEST SERPL-MCNC: 122 MG/DL (ref 0–200)
CO2 SERPL-SCNC: 24.7 MMOL/L (ref 22–29)
CREAT BLD-MCNC: 1.74 MG/DL (ref 0.76–1.27)
DEPRECATED RDW RBC AUTO: 40.9 FL (ref 37–54)
EOSINOPHIL # BLD AUTO: 0.2 10*3/MM3 (ref 0–0.4)
EOSINOPHIL NFR BLD AUTO: 2.9 % (ref 0.3–6.2)
ERYTHROCYTE [DISTWIDTH] IN BLOOD BY AUTOMATED COUNT: 12.9 % (ref 12.3–15.4)
GFR SERPL CREATININE-BSD FRML MDRD: 39 ML/MIN/1.73
GLOBULIN UR ELPH-MCNC: 3.3 GM/DL
GLUCOSE BLD-MCNC: 112 MG/DL (ref 65–99)
HCT VFR BLD AUTO: 42.2 % (ref 37.5–51)
HCV AB SER DONR QL: NORMAL
HDLC SERPL-MCNC: 29 MG/DL (ref 40–60)
HGB BLD-MCNC: 14.4 G/DL (ref 13–17.7)
IMM GRANULOCYTES # BLD AUTO: 0.03 10*3/MM3 (ref 0–0.05)
IMM GRANULOCYTES NFR BLD AUTO: 0.4 % (ref 0–0.5)
LDLC SERPL CALC-MCNC: 62 MG/DL (ref 0–100)
LDLC/HDLC SERPL: 2.15 {RATIO}
LYMPHOCYTES # BLD AUTO: 2.56 10*3/MM3 (ref 0.7–3.1)
LYMPHOCYTES NFR BLD AUTO: 37.1 % (ref 19.6–45.3)
MCH RBC QN AUTO: 29.8 PG (ref 26.6–33)
MCHC RBC AUTO-ENTMCNC: 34.1 G/DL (ref 31.5–35.7)
MCV RBC AUTO: 87.2 FL (ref 79–97)
MONOCYTES # BLD AUTO: 0.84 10*3/MM3 (ref 0.1–0.9)
MONOCYTES NFR BLD AUTO: 12.2 % (ref 5–12)
NEUTROPHILS # BLD AUTO: 3.24 10*3/MM3 (ref 1.7–7)
NEUTROPHILS NFR BLD AUTO: 47 % (ref 42.7–76)
NRBC BLD AUTO-RTO: 0 /100 WBC (ref 0–0.2)
PLATELET # BLD AUTO: 293 10*3/MM3 (ref 140–450)
PMV BLD AUTO: 10.4 FL (ref 6–12)
POTASSIUM BLD-SCNC: 4.5 MMOL/L (ref 3.5–5.2)
PROT SERPL-MCNC: 7.8 G/DL (ref 6–8.5)
RBC # BLD AUTO: 4.84 10*6/MM3 (ref 4.14–5.8)
SODIUM BLD-SCNC: 140 MMOL/L (ref 136–145)
TRIGL SERPL-MCNC: 153 MG/DL (ref 0–150)
VLDLC SERPL-MCNC: 30.6 MG/DL (ref 5–40)
WBC NRBC COR # BLD: 6.9 10*3/MM3 (ref 3.4–10.8)

## 2020-06-25 PROCEDURE — 80053 COMPREHEN METABOLIC PANEL: CPT | Performed by: FAMILY MEDICINE

## 2020-06-25 PROCEDURE — 86803 HEPATITIS C AB TEST: CPT | Performed by: FAMILY MEDICINE

## 2020-06-25 PROCEDURE — 83036 HEMOGLOBIN GLYCOSYLATED A1C: CPT | Performed by: FAMILY MEDICINE

## 2020-06-25 PROCEDURE — 85025 COMPLETE CBC W/AUTO DIFF WBC: CPT | Performed by: FAMILY MEDICINE

## 2020-06-25 PROCEDURE — 82043 UR ALBUMIN QUANTITATIVE: CPT | Performed by: FAMILY MEDICINE

## 2020-06-25 PROCEDURE — 99213 OFFICE O/P EST LOW 20 MIN: CPT | Performed by: FAMILY MEDICINE

## 2020-06-25 PROCEDURE — 80061 LIPID PANEL: CPT | Performed by: FAMILY MEDICINE

## 2020-06-25 PROCEDURE — 36415 COLL VENOUS BLD VENIPUNCTURE: CPT | Performed by: FAMILY MEDICINE

## 2020-06-25 NOTE — PROGRESS NOTES
Subjective   Gabriel De Souza is a 75 y.o. male.     He is in today for follow-up on his diabetes and high blood pressure.  He is fasting for labs.  He is asking for a new pair of diabetic foot wear.  He has had some numbness and tingling in his feet but has not seen any lesions.  He has had some low sugar readings, but not alarming, and he has not seen anything higher than 170-180.  He feels he has been good with diet and tries to stay very active with exercise.  He denies any chest pain or difficulty breathing.  He denies any issue with bowel or bladder function.  He does see urology for prostate issues.  He denies any dizziness or lightheadedness.  He denies any issue with vision or hearing, and he had a vision exam in March that showed mild retinopathy in the right eye but no retinopathy of the left eye.  He denies any issue with sleep or mood.  He denies any issue with fever or illness.    Diabetes   He has type 2 diabetes mellitus. No MedicAlert identification noted. The initial diagnosis of diabetes was made 30 years ago. Hypoglycemia symptoms include nervousness/anxiousness and sweats. Pertinent negatives for hypoglycemia include no confusion, dizziness, headaches, hunger, mood changes, pallor, seizures, sleepiness, speech difficulty or tremors. Associated symptoms include foot paresthesias. Pertinent negatives for diabetes include no blurred vision, no chest pain, no fatigue, no foot ulcerations, no polydipsia, no polyphagia, no polyuria, no visual change, no weakness and no weight loss. Hypoglycemia complications include nocturnal hypoglycemia. Pertinent negatives for hypoglycemia complications include no blackouts, no hospitalization, no required assistance and no required glucagon injection. Symptoms are stable. Diabetic complications include impotence and peripheral neuropathy. Pertinent negatives for diabetic complications include no CVA, heart disease, nephropathy, PVD or retinopathy. Risk factors for  coronary artery disease include dyslipidemia, family history and hypertension. Current diabetic treatment includes oral agent (dual therapy). He is compliant with treatment all of the time. His weight is stable. He is following a diabetic diet. Meal planning includes calorie counting. He has not had a previous visit with a dietitian. He participates in exercise daily. He monitors blood glucose at home 1-2 x per day. Blood glucose monitoring compliance is good. His home blood glucose trend is fluctuating minimally. His breakfast blood glucose is taken between 6-7 am. His breakfast blood glucose range is generally  mg/dl. His lunch blood glucose is taken after 3 pm. His lunch blood glucose range is generally 110-130 mg/dl. His dinner blood glucose is taken between 7-8 pm. His dinner blood glucose range is generally 130-140 mg/dl. His highest blood glucose is 130-140 mg/dl. His overall blood glucose range is  mg/dl. He does not see a podiatrist.Eye exam is current.          /72 (BP Location: Left arm, Patient Position: Sitting, Cuff Size: Adult)   Pulse 65   Temp 98 °F (36.7 °C) (Temporal)   Wt 104 kg (229 lb)   SpO2 98%   BMI 29.40 kg/m²       Chief Complaint   Patient presents with   • Diabetes     6 month f/u           Current Outpatient Medications:   •  amLODIPine (NORVASC) 5 MG tablet, TAKE 1 TABLET BY MOUTH  DAILY, Disp: 90 tablet, Rfl: 1  •  aspirin 81 MG tablet, ASPIRIN 81 MG ORAL TABLET, Disp: , Rfl:   •  atenolol (TENORMIN) 50 MG tablet, TAKE 1 TABLET BY MOUTH  TWICE A DAY, Disp: 180 tablet, Rfl: 1  •  betamethasone dipropionate (DIPROLENE) 0.05 % cream, BETAMETHASONE DIPROPIONATE 0.05 % CREA, Disp: , Rfl:   •  Blood Glucose Monitoring Suppl (ONE TOUCH ULTRA 2) w/Device kit, ONETOUCH ULTRA 2 w/Device KIT, Disp: , Rfl:   •  enalapril (VASOTEC) 20 MG tablet, TAKE 1 TABLET BY MOUTH  TWICE A DAY, Disp: 180 tablet, Rfl: 1  •  glimepiride (AMARYL) 2 MG tablet, TAKE 1 TABLET BY MOUTH  TWICE A  DAY, Disp: 180 tablet, Rfl: 1  •  glucose blood (ONE TOUCH ULTRA TEST) test strip, Test twice  daily E11.9, Disp: 300 each, Rfl: 3  •  hydroCHLOROthiazide (HYDRODIURIL) 50 MG tablet, TAKE 1 TABLET BY MOUTH  DAILY, Disp: 90 tablet, Rfl: 1  •  meloxicam (MOBIC) 15 MG tablet, Take 1 tablet by mouth Daily., Disp: 30 tablet, Rfl: 1  •  metFORMIN (GLUCOPHAGE) 500 MG tablet, TAKE 1 TABLET BY MOUTH 4  TIMES DAILY WITH MEALS, Disp: 360 tablet, Rfl: 1  •  niacin 500 MG tablet, NIACIN 500 MG TABS, Disp: , Rfl:   •  potassium chloride (K-DUR,KLOR-CON) 20 MEQ CR tablet, TAKE 1 TABLET BY MOUTH  DAILY, Disp: 90 tablet, Rfl: 1  •  simvastatin (ZOCOR) 20 MG tablet, TAKE 1 TABLET BY MOUTH  DAILY, Disp: 90 tablet, Rfl: 1    Lab Results   Component Value Date    HGBA1C 5.7 (H) 11/06/2019    HGBA1C 6.1 (H) 05/09/2019     Lab Results   Component Value Date    CREATININE 1.43 (H) 11/06/2019         Wt Readings from Last 3 Encounters:   06/25/20 104 kg (229 lb)   11/06/19 106 kg (234 lb 9.6 oz)   09/06/19 105 kg (231 lb)       The following portions of the patient's history were reviewed and updated as appropriate: allergies, current medications, past family history, past medical history, past social history, past surgical history and problem list.    Review of Systems   Constitutional: Negative for activity change, fatigue, fever and weight loss.   HENT: Negative for congestion, sinus pressure, sinus pain, sore throat and trouble swallowing.    Eyes: Negative for blurred vision and visual disturbance.   Respiratory: Negative for chest tightness, shortness of breath and wheezing.    Cardiovascular: Negative for chest pain.   Gastrointestinal: Negative for abdominal distention, abdominal pain, constipation, diarrhea, nausea and vomiting.   Endocrine: Negative for polydipsia, polyphagia and polyuria.   Genitourinary: Positive for impotence. Negative for difficulty urinating and dysuria.   Musculoskeletal: Negative for back pain and neck  pain.   Skin: Negative for pallor.   Neurological: Positive for numbness. Negative for dizziness, tremors, seizures, speech difficulty, weakness and headaches.   Psychiatric/Behavioral: Negative for agitation, confusion, hallucinations and suicidal ideas. The patient is nervous/anxious.        Objective   Physical Exam   Constitutional: He is oriented to person, place, and time. No distress.   Neck: No thyromegaly present.   Cardiovascular: Normal rate, regular rhythm and normal heart sounds.   No murmur heard.  Pulmonary/Chest: Effort normal and breath sounds normal. He has no wheezes.   Abdominal: Soft. Bowel sounds are normal. There is no guarding.   Musculoskeletal: He exhibits no edema or deformity.       Neurological Sensory Findings - Unaltered hot/cold right ankle/foot discrimination and unaltered hot/cold left ankle/foot discrimination. Altered sharp/dull right ankle/foot discrimination and altered sharp/dull left ankle/foot discrimination. No right ankle/foot altered proprioception and no left ankle/foot altered proprioception  Vascular Status -  His right foot exhibits normal foot vasculature  and no edema. His left foot exhibits normal foot vasculature  and no edema.  Lymphadenopathy:     He has no cervical adenopathy.   Neurological: He is alert and oriented to person, place, and time. He displays normal reflexes.   Skin: No rash noted.   Psychiatric: He has a normal mood and affect.   Nursing note and vitals reviewed.        Assessment/Plan   Problems Addressed this Visit        Cardiovascular and Mediastinum    Hypertension       Endocrine    Diabetes mellitus type II, controlled (CMS/HCC) - Primary    Relevant Orders    Comprehensive metabolic panel    Hemoglobin A1c    Lipid panel    CBC w AUTO Differential    MicroAlbumin, Urine, Random - Urine, Clean Catch      Other Visit Diagnoses     Preventative health care        Relevant Orders    Hepatitis C antibody        I will update appropriate labs  I  will send in a new order for diabetic foot wear  At this time I do not anticipate any medication changes but he is to let me know if the low blood sugars become worse  He is to contact me for follow-up in 6 months, sooner if needed

## 2020-06-26 LAB — HBA1C MFR BLD: 5.5 % (ref 3.5–5.6)

## 2020-06-29 DIAGNOSIS — N18.30 CKD (CHRONIC KIDNEY DISEASE) STAGE 3, GFR 30-59 ML/MIN (HCC): Primary | ICD-10-CM

## 2020-09-14 ENCOUNTER — OFFICE VISIT (OUTPATIENT)
Dept: CARDIOLOGY | Facility: CLINIC | Age: 75
End: 2020-09-14

## 2020-09-14 VITALS
SYSTOLIC BLOOD PRESSURE: 158 MMHG | HEART RATE: 67 BPM | DIASTOLIC BLOOD PRESSURE: 83 MMHG | WEIGHT: 229 LBS | BODY MASS INDEX: 29.39 KG/M2 | HEIGHT: 74 IN | OXYGEN SATURATION: 96 %

## 2020-09-14 DIAGNOSIS — E78.5 DYSLIPIDEMIA: ICD-10-CM

## 2020-09-14 DIAGNOSIS — I25.10 CHRONIC CORONARY ARTERY DISEASE: Primary | ICD-10-CM

## 2020-09-14 DIAGNOSIS — I10 ESSENTIAL HYPERTENSION: ICD-10-CM

## 2020-09-14 DIAGNOSIS — M25.551 HIP PAIN, RIGHT: ICD-10-CM

## 2020-09-14 DIAGNOSIS — E11.9 CONTROLLED TYPE 2 DIABETES MELLITUS WITHOUT COMPLICATION, WITHOUT LONG-TERM CURRENT USE OF INSULIN (HCC): ICD-10-CM

## 2020-09-14 PROCEDURE — 99214 OFFICE O/P EST MOD 30 MIN: CPT | Performed by: INTERNAL MEDICINE

## 2020-09-14 PROCEDURE — 93000 ELECTROCARDIOGRAM COMPLETE: CPT | Performed by: INTERNAL MEDICINE

## 2020-09-14 NOTE — PROGRESS NOTES
"    Subjective:     Encounter Date:09/14/2020      Patient ID: Gabriel De Souza is a 75 y.o. male.    Chief Complaint: CAD f/u w EKG, former [facility] pt    History of Present Illness     75-year-old white male patient with known history of coronary artery disease last cardiac cath 2001 showed diagonal artery disease nonobstructive LAD disease ,hypertension diabetes mellitus dyslipidemia chronic renal insufficiency comes to see me to establish his cardiac care  EKG today normal sinus rhythm early precordial RS transition no significant ST abnormalities borderline inferior axis no significant changes compared to the last EKG  Patient is having some hip pain which is increasing his blood pressure otherwise blood pressure runs 130 over 70s at home  Advised him to decrease the salt intake  Patient denies of any chest pain shortness of breath  The following portions of the patient's history were reviewed and updated as appropriate: Allergies current medications past family history past medical history past social history past surgical history problem list and review of systems  Past Medical History:   Diagnosis Date   • Coronary artery disease    • Diabetes mellitus (CMS/HCC)    • Dyslipidemia    • Hypertension      Past Surgical History:   Procedure Laterality Date   • BACK SURGERY     • BASAL CELL CARCINOMA EXCISION  01/2019   • CARDIAC CATHETERIZATION     • TURP / TRANSURETHRAL INCISION / DRAINAGE PROSTATE       /83 (BP Location: Left arm, Patient Position: Sitting, Cuff Size: Adult)   Pulse 67   Ht 188 cm (74\")   Wt 104 kg (229 lb)   SpO2 96%   BMI 29.40 kg/m²   Family History   Problem Relation Age of Onset   • Heart disease Mother    • Heart disease Brother    • Heart attack Maternal Grandfather        Current Outpatient Medications:   •  amLODIPine (NORVASC) 5 MG tablet, TAKE 1 TABLET BY MOUTH  DAILY, Disp: 90 tablet, Rfl: 1  •  aspirin 81 MG tablet, ASPIRIN 81 MG ORAL TABLET, Disp: , Rfl:   •  atenolol " (TENORMIN) 50 MG tablet, TAKE 1 TABLET BY MOUTH  TWICE A DAY, Disp: 180 tablet, Rfl: 1  •  betamethasone dipropionate (DIPROLENE) 0.05 % cream, BETAMETHASONE DIPROPIONATE 0.05 % CREA, Disp: , Rfl:   •  Blood Glucose Monitoring Suppl (ONE TOUCH ULTRA 2) w/Device kit, ONETOUCH ULTRA 2 w/Device KIT, Disp: , Rfl:   •  enalapril (VASOTEC) 20 MG tablet, TAKE 1 TABLET BY MOUTH  TWICE A DAY, Disp: 180 tablet, Rfl: 1  •  glimepiride (AMARYL) 2 MG tablet, TAKE 1 TABLET BY MOUTH  TWICE A DAY (Patient taking differently: Take 1.5 mg by mouth Daily.), Disp: 180 tablet, Rfl: 1  •  glucose blood (ONE TOUCH ULTRA TEST) test strip, Test twice  daily E11.9, Disp: 300 each, Rfl: 3  •  hydroCHLOROthiazide (HYDRODIURIL) 50 MG tablet, TAKE 1 TABLET BY MOUTH  DAILY, Disp: 90 tablet, Rfl: 1  •  metFORMIN (GLUCOPHAGE) 500 MG tablet, TAKE 1 TABLET BY MOUTH 4  TIMES DAILY WITH MEALS, Disp: 360 tablet, Rfl: 1  •  niacin 500 MG tablet, NIACIN 500 MG TABS, Disp: , Rfl:   •  potassium chloride (K-DUR,KLOR-CON) 20 MEQ CR tablet, TAKE 1 TABLET BY MOUTH  DAILY, Disp: 90 tablet, Rfl: 1  •  simvastatin (ZOCOR) 20 MG tablet, TAKE 1 TABLET BY MOUTH  DAILY, Disp: 90 tablet, Rfl: 1  •  meloxicam (MOBIC) 15 MG tablet, Take 1 tablet by mouth Daily., Disp: 30 tablet, Rfl: 1  Social History     Socioeconomic History   • Marital status:      Spouse name: Not on file   • Number of children: Not on file   • Years of education: Not on file   • Highest education level: Not on file   Tobacco Use   • Smoking status: Former Smoker     Quit date: 1967     Years since quittin.7   • Smokeless tobacco: Never Used   Substance and Sexual Activity   • Alcohol use: No     Frequency: Never   • Drug use: No   • Sexual activity: Defer     No Known Allergies  Review of Systems   Constitution: Negative for chills, fever and malaise/fatigue.   HENT: Negative for congestion and hearing loss.    Eyes: Negative for double vision and visual disturbance.   Cardiovascular:  Negative for chest pain, claudication, dyspnea on exertion, leg swelling, palpitations and syncope.   Respiratory: Negative for cough and shortness of breath.    Endocrine: Negative for cold intolerance.   Skin: Negative for color change and rash.   Musculoskeletal: Positive for joint pain. Negative for arthritis.   Gastrointestinal: Negative for abdominal pain and heartburn.   Genitourinary: Negative for hematuria.   Neurological: Negative for excessive daytime sleepiness, dizziness, light-headedness and numbness.   Psychiatric/Behavioral: Negative for depression. The patient is not nervous/anxious.    All other systems reviewed and are negative.             Objective:     Constitutional:       Appearance: Well-developed.   Eyes:      General: No scleral icterus.     Conjunctiva/sclera: Conjunctivae normal.   HENT:      Head: Normocephalic and atraumatic.    Mouth/Throat:      Mouth: No oral lesions.      Pharynx: Uvula midline.   Neck:      Musculoskeletal: Neck supple.      Thyroid: No thyromegaly.      Vascular: No carotid bruit or JVD.      Trachea: Trachea normal.   Pulmonary:      Effort: Pulmonary effort is normal.      Breath sounds: Normal breath sounds.   Cardiovascular:      Normal rate. Regular rhythm.      No gallop.   Pulses:     Intact distal pulses.   Abdominal:      General: Bowel sounds are normal.      Palpations: Abdomen is soft.   Musculoskeletal: Normal range of motion.   Skin:     General: Skin is warm. There is no cyanosis.   Neurological:      Mental Status: Alert and oriented to person, place, and time.      Comments: No focal deficits   Psychiatric:         Behavior: Behavior is cooperative.           ECG 12 Lead    Date/Time: 9/14/2020 8:47 AM  Performed by: Eliot Gama MD  Authorized by: Eliot Gama MD   Comments: EKG today normal sinus rhythm early precordial RS transition no significant ST abnormalities borderline inferior axis no significant changes  compared to the last EKG            Lab Review:       Assessment:          Diagnosis Plan   1. Chronic coronary artery disease     2. Essential hypertension     3. Controlled type 2 diabetes mellitus without complication, without long-term current use of insulin (CMS/[facility])     4. Dyslipidemia     5. Hip pain, right            Plan:       CAD on medical treatment stable  Needs aggressive control of diabetes dyslipidemia  Hypertension mildly elevated especially with hip pain patient is followed by PCP regarding the hip pain  Monitor blood pressure closely decrease the salt intake may adjust the medicines in the future if needed

## 2020-09-18 ENCOUNTER — LAB (OUTPATIENT)
Dept: LAB | Facility: HOSPITAL | Age: 75
End: 2020-09-18

## 2020-09-18 ENCOUNTER — TRANSCRIBE ORDERS (OUTPATIENT)
Dept: LAB | Facility: HOSPITAL | Age: 75
End: 2020-09-18

## 2020-09-18 DIAGNOSIS — N18.30 CHRONIC KIDNEY DISEASE, STAGE III (MODERATE) (HCC): Primary | ICD-10-CM

## 2020-09-18 DIAGNOSIS — N18.30 CHRONIC KIDNEY DISEASE, STAGE III (MODERATE) (HCC): ICD-10-CM

## 2020-09-18 LAB
ANION GAP SERPL CALCULATED.3IONS-SCNC: 9.1 MMOL/L (ref 5–15)
BACTERIA UR QL AUTO: NORMAL /HPF
BILIRUB UR QL STRIP: NEGATIVE
BUN SERPL-MCNC: 29 MG/DL (ref 8–23)
BUN/CREAT SERPL: 17.5 (ref 7–25)
CALCIUM SPEC-SCNC: 9.6 MG/DL (ref 8.6–10.5)
CHLORIDE SERPL-SCNC: 103 MMOL/L (ref 98–107)
CLARITY UR: CLEAR
CO2 SERPL-SCNC: 25.9 MMOL/L (ref 22–29)
COLOR UR: YELLOW
CREAT SERPL-MCNC: 1.66 MG/DL (ref 0.76–1.27)
CREAT UR-MCNC: 72 MG/DL
GFR SERPL CREATININE-BSD FRML MDRD: 41 ML/MIN/1.73
GLUCOSE SERPL-MCNC: 167 MG/DL (ref 65–99)
GLUCOSE UR STRIP-MCNC: NEGATIVE MG/DL
HGB UR QL STRIP.AUTO: NEGATIVE
HYALINE CASTS UR QL AUTO: NORMAL /LPF
KETONES UR QL STRIP: NEGATIVE
LEUKOCYTE ESTERASE UR QL STRIP.AUTO: NEGATIVE
NITRITE UR QL STRIP: NEGATIVE
PH UR STRIP.AUTO: 6 [PH] (ref 5–8)
POTASSIUM SERPL-SCNC: 4.6 MMOL/L (ref 3.5–5.2)
PROT UR QL STRIP: ABNORMAL
PROT UR-MCNC: 36 MG/DL
PROT/CREAT UR: 500 MG/G CREA (ref 0–200)
RBC # UR: NORMAL /HPF
REF LAB TEST METHOD: NORMAL
SODIUM SERPL-SCNC: 138 MMOL/L (ref 136–145)
SP GR UR STRIP: 1.01 (ref 1–1.03)
SQUAMOUS #/AREA URNS HPF: NORMAL /HPF
UROBILINOGEN UR QL STRIP: ABNORMAL
WBC UR QL AUTO: NORMAL /HPF

## 2020-09-18 PROCEDURE — 82784 ASSAY IGA/IGD/IGG/IGM EACH: CPT

## 2020-09-18 PROCEDURE — 36415 COLL VENOUS BLD VENIPUNCTURE: CPT

## 2020-09-18 PROCEDURE — 82570 ASSAY OF URINE CREATININE: CPT

## 2020-09-18 PROCEDURE — 84156 ASSAY OF PROTEIN URINE: CPT

## 2020-09-18 PROCEDURE — 81001 URINALYSIS AUTO W/SCOPE: CPT

## 2020-09-18 PROCEDURE — 86334 IMMUNOFIX E-PHORESIS SERUM: CPT

## 2020-09-18 PROCEDURE — 80048 BASIC METABOLIC PNL TOTAL CA: CPT

## 2020-09-21 ENCOUNTER — TRANSCRIBE ORDERS (OUTPATIENT)
Dept: ADMINISTRATIVE | Facility: HOSPITAL | Age: 75
End: 2020-09-21

## 2020-09-21 DIAGNOSIS — I10 ESSENTIAL HYPERTENSION: Primary | ICD-10-CM

## 2020-09-21 DIAGNOSIS — N18.30 CHRONIC KIDNEY DISEASE (CKD), STAGE III (MODERATE) (HCC): ICD-10-CM

## 2020-09-21 LAB
IGA SERPL-MCNC: 189 MG/DL (ref 61–437)
IGG SERPL-MCNC: 1238 MG/DL (ref 603–1613)
IGM SERPL-MCNC: 100 MG/DL (ref 15–143)
PROT PATTERN SERPL IFE-IMP: NORMAL

## 2020-09-25 ENCOUNTER — HOSPITAL ENCOUNTER (OUTPATIENT)
Dept: ULTRASOUND IMAGING | Facility: HOSPITAL | Age: 75
Discharge: HOME | End: 2020-09-25
Admitting: INTERNAL MEDICINE

## 2020-09-25 DIAGNOSIS — I10 ESSENTIAL HYPERTENSION: ICD-10-CM

## 2020-09-25 DIAGNOSIS — N18.30 CHRONIC KIDNEY DISEASE (CKD), STAGE III (MODERATE) (HCC): ICD-10-CM

## 2020-09-25 PROCEDURE — 76775 US EXAM ABDO BACK WALL LIM: CPT

## 2020-09-27 RX ORDER — SIMVASTATIN 20 MG
TABLET ORAL
Qty: 90 TABLET | Refills: 3 | Status: SHIPPED | OUTPATIENT
Start: 2020-09-27 | End: 2021-07-15

## 2020-10-11 RX ORDER — ATENOLOL 50 MG/1
TABLET ORAL
Qty: 180 TABLET | Refills: 3 | Status: SHIPPED | OUTPATIENT
Start: 2020-10-11 | End: 2021-07-15

## 2020-10-11 RX ORDER — HYDROCHLOROTHIAZIDE 50 MG/1
TABLET ORAL
Qty: 90 TABLET | Refills: 3 | Status: SHIPPED | OUTPATIENT
Start: 2020-10-11 | End: 2021-07-15

## 2020-10-12 ENCOUNTER — FLU SHOT (OUTPATIENT)
Dept: FAMILY MEDICINE CLINIC | Facility: CLINIC | Age: 75
End: 2020-10-12

## 2020-10-12 DIAGNOSIS — Z23 IMMUNIZATION DUE: Primary | ICD-10-CM

## 2020-10-12 PROCEDURE — 90694 VACC AIIV4 NO PRSRV 0.5ML IM: CPT | Performed by: FAMILY MEDICINE

## 2020-10-12 PROCEDURE — G0008 ADMIN INFLUENZA VIRUS VAC: HCPCS | Performed by: FAMILY MEDICINE

## 2020-11-23 RX ORDER — POTASSIUM CHLORIDE 20 MEQ/1
TABLET, EXTENDED RELEASE ORAL
Qty: 90 TABLET | Refills: 3 | Status: SHIPPED | OUTPATIENT
Start: 2020-11-23 | End: 2021-10-10

## 2020-12-06 RX ORDER — AMLODIPINE BESYLATE 5 MG/1
TABLET ORAL
Qty: 90 TABLET | Refills: 3 | Status: SHIPPED | OUTPATIENT
Start: 2020-12-06 | End: 2021-10-23

## 2020-12-06 RX ORDER — ENALAPRIL MALEATE 20 MG/1
TABLET ORAL
Qty: 180 TABLET | Refills: 3 | Status: SHIPPED | OUTPATIENT
Start: 2020-12-06 | End: 2021-10-23

## 2020-12-06 RX ORDER — GLIMEPIRIDE 2 MG/1
TABLET ORAL
Qty: 180 TABLET | Refills: 3 | Status: SHIPPED | OUTPATIENT
Start: 2020-12-06 | End: 2021-10-23

## 2020-12-21 ENCOUNTER — TRANSCRIBE ORDERS (OUTPATIENT)
Dept: LAB | Facility: HOSPITAL | Age: 75
End: 2020-12-21

## 2020-12-21 ENCOUNTER — LAB (OUTPATIENT)
Dept: LAB | Facility: HOSPITAL | Age: 75
End: 2020-12-21

## 2020-12-21 DIAGNOSIS — N18.30 STAGE 3 CHRONIC KIDNEY DISEASE, UNSPECIFIED WHETHER STAGE 3A OR 3B CKD (HCC): ICD-10-CM

## 2020-12-21 DIAGNOSIS — N18.30 STAGE 3 CHRONIC KIDNEY DISEASE, UNSPECIFIED WHETHER STAGE 3A OR 3B CKD (HCC): Primary | ICD-10-CM

## 2020-12-21 LAB
25(OH)D3 SERPL-MCNC: 28.3 NG/ML (ref 30–100)
ALBUMIN UR-MCNC: <1.2 MG/DL
ANION GAP SERPL CALCULATED.3IONS-SCNC: 13.4 MMOL/L (ref 5–15)
BILIRUB UR QL STRIP: NEGATIVE
BUN SERPL-MCNC: 28 MG/DL (ref 8–23)
BUN/CREAT SERPL: 16.9 (ref 7–25)
CALCIUM SPEC-SCNC: 9.8 MG/DL (ref 8.6–10.5)
CHLORIDE SERPL-SCNC: 102 MMOL/L (ref 98–107)
CLARITY UR: CLEAR
CO2 SERPL-SCNC: 26.6 MMOL/L (ref 22–29)
COLOR UR: YELLOW
CREAT SERPL-MCNC: 1.66 MG/DL (ref 0.76–1.27)
CREAT UR-MCNC: 65.3 MG/DL
GFR SERPL CREATININE-BSD FRML MDRD: 41 ML/MIN/1.73
GLUCOSE SERPL-MCNC: 77 MG/DL (ref 65–99)
GLUCOSE UR STRIP-MCNC: NEGATIVE MG/DL
HGB UR QL STRIP.AUTO: NEGATIVE
KETONES UR QL STRIP: NEGATIVE
LEUKOCYTE ESTERASE UR QL STRIP.AUTO: NEGATIVE
MICROALBUMIN/CREAT UR: NORMAL MG/G{CREAT}
NITRITE UR QL STRIP: NEGATIVE
PH UR STRIP.AUTO: 6.5 [PH] (ref 5–8)
POTASSIUM SERPL-SCNC: 4.4 MMOL/L (ref 3.5–5.2)
PROT UR QL STRIP: NEGATIVE
PTH-INTACT SERPL-MCNC: 57.7 PG/ML (ref 15–65)
SODIUM SERPL-SCNC: 142 MMOL/L (ref 136–145)
SP GR UR STRIP: 1.01 (ref 1–1.03)
UROBILINOGEN UR QL STRIP: NORMAL

## 2020-12-21 PROCEDURE — 82043 UR ALBUMIN QUANTITATIVE: CPT

## 2020-12-21 PROCEDURE — 83970 ASSAY OF PARATHORMONE: CPT

## 2020-12-21 PROCEDURE — 82570 ASSAY OF URINE CREATININE: CPT

## 2020-12-21 PROCEDURE — 81003 URINALYSIS AUTO W/O SCOPE: CPT

## 2020-12-21 PROCEDURE — 82306 VITAMIN D 25 HYDROXY: CPT

## 2020-12-21 PROCEDURE — 80048 BASIC METABOLIC PNL TOTAL CA: CPT

## 2020-12-21 PROCEDURE — 36415 COLL VENOUS BLD VENIPUNCTURE: CPT

## 2020-12-28 ENCOUNTER — LAB (OUTPATIENT)
Dept: FAMILY MEDICINE CLINIC | Facility: CLINIC | Age: 75
End: 2020-12-28

## 2020-12-28 ENCOUNTER — OFFICE VISIT (OUTPATIENT)
Dept: FAMILY MEDICINE CLINIC | Facility: CLINIC | Age: 75
End: 2020-12-28

## 2020-12-28 VITALS
TEMPERATURE: 96.4 F | BODY MASS INDEX: 29.02 KG/M2 | WEIGHT: 226 LBS | SYSTOLIC BLOOD PRESSURE: 144 MMHG | HEART RATE: 66 BPM | OXYGEN SATURATION: 99 % | DIASTOLIC BLOOD PRESSURE: 72 MMHG

## 2020-12-28 DIAGNOSIS — N18.30 STAGE 3 CHRONIC KIDNEY DISEASE, UNSPECIFIED WHETHER STAGE 3A OR 3B CKD (HCC): ICD-10-CM

## 2020-12-28 DIAGNOSIS — E11.9 CONTROLLED TYPE 2 DIABETES MELLITUS WITHOUT COMPLICATION, WITHOUT LONG-TERM CURRENT USE OF INSULIN (HCC): Primary | ICD-10-CM

## 2020-12-28 DIAGNOSIS — I10 ESSENTIAL HYPERTENSION: ICD-10-CM

## 2020-12-28 LAB
ALBUMIN SERPL-MCNC: 4.6 G/DL (ref 3.5–5.2)
ALBUMIN/GLOB SERPL: 1.5 G/DL
ALP SERPL-CCNC: 71 U/L (ref 39–117)
ALT SERPL W P-5'-P-CCNC: 14 U/L (ref 1–41)
ANION GAP SERPL CALCULATED.3IONS-SCNC: 14.2 MMOL/L (ref 5–15)
AST SERPL-CCNC: 16 U/L (ref 1–40)
BILIRUB SERPL-MCNC: 0.7 MG/DL (ref 0–1.2)
BUN SERPL-MCNC: 31 MG/DL (ref 8–23)
BUN/CREAT SERPL: 17.1 (ref 7–25)
CALCIUM SPEC-SCNC: 10.4 MG/DL (ref 8.6–10.5)
CHLORIDE SERPL-SCNC: 103 MMOL/L (ref 98–107)
CHOLEST SERPL-MCNC: 121 MG/DL (ref 0–200)
CO2 SERPL-SCNC: 24.8 MMOL/L (ref 22–29)
CREAT SERPL-MCNC: 1.81 MG/DL (ref 0.76–1.27)
GFR SERPL CREATININE-BSD FRML MDRD: 37 ML/MIN/1.73
GLOBULIN UR ELPH-MCNC: 3.1 GM/DL
GLUCOSE SERPL-MCNC: 96 MG/DL (ref 65–99)
HBA1C MFR BLD: 5.7 % (ref 3.5–5.6)
HDLC SERPL-MCNC: 30 MG/DL (ref 40–60)
LDLC SERPL CALC-MCNC: 66 MG/DL (ref 0–100)
LDLC/HDLC SERPL: 2.09 {RATIO}
POTASSIUM SERPL-SCNC: 4.6 MMOL/L (ref 3.5–5.2)
PROT SERPL-MCNC: 7.7 G/DL (ref 6–8.5)
SODIUM SERPL-SCNC: 142 MMOL/L (ref 136–145)
TRIGL SERPL-MCNC: 141 MG/DL (ref 0–150)
VLDLC SERPL-MCNC: 25 MG/DL (ref 5–40)

## 2020-12-28 PROCEDURE — 36415 COLL VENOUS BLD VENIPUNCTURE: CPT | Performed by: FAMILY MEDICINE

## 2020-12-28 PROCEDURE — 83036 HEMOGLOBIN GLYCOSYLATED A1C: CPT | Performed by: FAMILY MEDICINE

## 2020-12-28 PROCEDURE — 80061 LIPID PANEL: CPT | Performed by: FAMILY MEDICINE

## 2020-12-28 PROCEDURE — 80053 COMPREHEN METABOLIC PANEL: CPT | Performed by: FAMILY MEDICINE

## 2020-12-28 PROCEDURE — 99213 OFFICE O/P EST LOW 20 MIN: CPT | Performed by: FAMILY MEDICINE

## 2020-12-28 NOTE — PROGRESS NOTES
Subjective   Gabriel De Souza is a 75 y.o. male.     He is in today for follow-up on his diabetes and high blood pressure.  He also has chronic renal issues.  He had some labs done for cardiology last week that looked fine but they did not do a lipid or an A1c.  He says his blood sugar this morning was 85.  He denies any issue with bowel or bladder function of late.  He denies any dizziness or lightheadedness.  He denies any chest pain or difficulty breathing.  He denies any issue with sleep or mood.    Diabetes  He has type 2 diabetes mellitus. No MedicAlert identification noted. The initial diagnosis of diabetes was made 1970 days ago. Pertinent negatives for hypoglycemia include no confusion, dizziness, headaches, hunger, mood changes, nervousness/anxiousness, pallor, seizures, sleepiness, speech difficulty, sweats or tremors. Associated symptoms include foot paresthesias. Pertinent negatives for diabetes include no blurred vision, no chest pain, no fatigue, no foot ulcerations, no polydipsia, no polyphagia, no polyuria, no visual change, no weakness and no weight loss. Hypoglycemia complications include nocturnal hypoglycemia. Pertinent negatives for hypoglycemia complications include no blackouts, no hospitalization, no required assistance and no required glucagon injection. Symptoms are stable. Diabetic complications include impotence, nephropathy and peripheral neuropathy. Pertinent negatives for diabetic complications include no CVA, heart disease, PVD or retinopathy. Risk factors for coronary artery disease include dyslipidemia, family history, hypertension and obesity. Current diabetic treatment includes diet and oral agent (triple therapy). He is compliant with treatment all of the time. His weight is fluctuating minimally. He is following a generally healthy diet. Meal planning includes avoidance of concentrated sweets. He has not had a previous visit with a dietitian. He participates in exercise daily. He  monitors blood glucose at home 1-2 x per day. Blood glucose monitoring compliance is good. His home blood glucose trend is fluctuating minimally. His breakfast blood glucose is taken between 6-7 am. His breakfast blood glucose range is generally 70-90 mg/dl. His lunch blood glucose is taken between 1-2 pm. His lunch blood glucose range is generally  mg/dl. His dinner blood glucose is taken between 7-8 pm. His dinner blood glucose range is generally 110-130 mg/dl. His highest blood glucose is 130-140 mg/dl. His overall blood glucose range is  mg/dl. He does not see a podiatrist.Eye exam is current.          /72 (BP Location: Left arm, Patient Position: Sitting, Cuff Size: Large Adult)   Pulse 66   Temp 96.4 °F (35.8 °C) (Temporal)   Wt 103 kg (226 lb)   SpO2 99%   BMI 29.02 kg/m²       Chief Complaint   Patient presents with   • Diabetes     6 month f/u           Current Outpatient Medications:   •  amLODIPine (NORVASC) 5 MG tablet, TAKE 1 TABLET BY MOUTH  DAILY, Disp: 90 tablet, Rfl: 3  •  aspirin 81 MG tablet, ASPIRIN 81 MG ORAL TABLET, Disp: , Rfl:   •  atenolol (TENORMIN) 50 MG tablet, TAKE 1 TABLET BY MOUTH  TWICE DAILY, Disp: 180 tablet, Rfl: 3  •  betamethasone dipropionate (DIPROLENE) 0.05 % cream, BETAMETHASONE DIPROPIONATE 0.05 % CREA, Disp: , Rfl:   •  Blood Glucose Monitoring Suppl (ONE TOUCH ULTRA 2) w/Device kit, ONETOUCH ULTRA 2 w/Device KIT, Disp: , Rfl:   •  enalapril (VASOTEC) 20 MG tablet, TAKE 1 TABLET BY MOUTH  TWICE DAILY, Disp: 180 tablet, Rfl: 3  •  glimepiride (AMARYL) 2 MG tablet, TAKE 1 TABLET BY MOUTH  TWICE DAILY, Disp: 180 tablet, Rfl: 3  •  glucose blood (ONE TOUCH ULTRA TEST) test strip, Test twice  daily E11.9, Disp: 300 each, Rfl: 3  •  hydroCHLOROthiazide (HYDRODIURIL) 50 MG tablet, TAKE 1 TABLET BY MOUTH  DAILY, Disp: 90 tablet, Rfl: 3  •  meloxicam (MOBIC) 15 MG tablet, Take 1 tablet by mouth Daily., Disp: 30 tablet, Rfl: 1  •  metFORMIN (GLUCOPHAGE) 500 MG  tablet, TAKE 1 TABLET BY MOUTH 4  TIMES DAILY WITH MEALS, Disp: 360 tablet, Rfl: 3  •  niacin 500 MG tablet, NIACIN 500 MG TABS, Disp: , Rfl:   •  potassium chloride (K-DUR,KLOR-CON) 20 MEQ CR tablet, TAKE 1 TABLET BY MOUTH  DAILY, Disp: 90 tablet, Rfl: 3  •  simvastatin (ZOCOR) 20 MG tablet, TAKE 1 TABLET BY MOUTH  DAILY, Disp: 90 tablet, Rfl: 3    Lab Results   Component Value Date    HGBA1C 5.5 06/25/2020    HGBA1C 5.7 (H) 11/06/2019    HGBA1C 6.1 (H) 05/09/2019     Lab Results   Component Value Date    MICROALBUR <1.2 12/21/2020    CREATININE 1.66 (H) 12/21/2020         Wt Readings from Last 3 Encounters:   12/28/20 103 kg (226 lb)   09/14/20 104 kg (229 lb)   06/25/20 104 kg (229 lb)       The following portions of the patient's history were reviewed and updated as appropriate: allergies, current medications, past family history, past medical history, past social history, past surgical history and problem list.    Review of Systems   Constitutional: Negative for activity change, fatigue, fever and weight loss.   HENT: Negative for congestion, sinus pressure, sinus pain, sore throat and trouble swallowing.    Eyes: Negative for blurred vision and visual disturbance.   Respiratory: Negative for chest tightness, shortness of breath and wheezing.    Cardiovascular: Negative for chest pain.   Gastrointestinal: Negative for abdominal distention, abdominal pain, constipation, diarrhea, nausea and vomiting.   Endocrine: Negative for polydipsia, polyphagia and polyuria.   Genitourinary: Positive for impotence. Negative for difficulty urinating and dysuria.   Musculoskeletal: Negative for back pain and neck pain.   Skin: Negative for pallor.   Neurological: Negative for dizziness, tremors, seizures, speech difficulty, weakness and headaches.   Psychiatric/Behavioral: Negative for agitation, confusion, hallucinations and suicidal ideas. The patient is not nervous/anxious.        Objective   Physical Exam  Vitals signs and  nursing note reviewed.   HENT:      Right Ear: Tympanic membrane, ear canal and external ear normal.      Left Ear: Tympanic membrane, ear canal and external ear normal.   Eyes:      Conjunctiva/sclera: Conjunctivae normal.      Pupils: Pupils are equal, round, and reactive to light.   Neck:      Musculoskeletal: Neck supple.   Cardiovascular:      Rate and Rhythm: Normal rate and regular rhythm.      Heart sounds: Normal heart sounds. No murmur.   Pulmonary:      Effort: Pulmonary effort is normal.      Breath sounds: No wheezing or rales.   Abdominal:      General: Bowel sounds are normal.      Palpations: Abdomen is soft.      Tenderness: There is no abdominal tenderness. There is no guarding.   Musculoskeletal: Normal range of motion.      Right lower leg: No edema.      Left lower leg: No edema.   Lymphadenopathy:      Cervical: No cervical adenopathy.   Skin:     Findings: No rash.   Neurological:      General: No focal deficit present.      Mental Status: He is alert and oriented to person, place, and time.   Psychiatric:         Mood and Affect: Mood normal.         Thought Content: Thought content normal.           Assessment/Plan   Problems Addressed this Visit        Cardiovascular and Mediastinum    Hypertension       Endocrine    Diabetes mellitus type II, controlled (CMS/[facility]) - Primary    Relevant Orders    Hemoglobin A1c    Lipid panel    Comprehensive metabolic panel      Other Visit Diagnoses     Stage 3 chronic kidney disease, unspecified whether stage 3a or 3b CKD          Diagnoses       Codes Comments    Controlled type 2 diabetes mellitus without complication, without long-term current use of insulin (CMS/[facility])    -  Primary ICD-10-CM: E11.9  ICD-9-CM: 250.00     Essential hypertension     ICD-10-CM: I10  ICD-9-CM: 401.9     Stage 3 chronic kidney disease, unspecified whether stage 3a or 3b CKD     ICD-10-CM: N18.30  ICD-9-CM: 585.3         I will update appropriate labs today  I have encouraged  him to remain on current medication plan  I will see him back in 6 months  I will follow up on cardiology visit that is upcoming  I did encourage him to get the Covid vaccine when offered, hopefully in the next month or so           Answers for HPI/ROS submitted by the patient on 12/21/2020   Diabetes problem  What is the primary reason for your visit?: Diabetes

## 2021-02-24 RX ORDER — BLOOD SUGAR DIAGNOSTIC
STRIP MISCELLANEOUS
Qty: 300 EACH | Refills: 3 | Status: SHIPPED | OUTPATIENT
Start: 2021-02-24 | End: 2021-06-28

## 2021-04-30 ENCOUNTER — TELEPHONE (OUTPATIENT)
Dept: FAMILY MEDICINE CLINIC | Facility: CLINIC | Age: 76
End: 2021-04-30

## 2021-06-08 ENCOUNTER — LAB (OUTPATIENT)
Dept: LAB | Facility: HOSPITAL | Age: 76
End: 2021-06-08

## 2021-06-08 ENCOUNTER — TRANSCRIBE ORDERS (OUTPATIENT)
Dept: LAB | Facility: HOSPITAL | Age: 76
End: 2021-06-08

## 2021-06-08 DIAGNOSIS — N18.30 STAGE 3 CHRONIC KIDNEY DISEASE, UNSPECIFIED WHETHER STAGE 3A OR 3B CKD (HCC): Primary | ICD-10-CM

## 2021-06-08 DIAGNOSIS — N18.30 STAGE 3 CHRONIC KIDNEY DISEASE, UNSPECIFIED WHETHER STAGE 3A OR 3B CKD (HCC): ICD-10-CM

## 2021-06-08 LAB
25(OH)D3 SERPL-MCNC: 47.6 NG/ML
ALBUMIN UR-MCNC: <1.2 MG/DL
ANION GAP SERPL CALCULATED.3IONS-SCNC: 10.7 MMOL/L (ref 5–15)
BUN SERPL-MCNC: 33 MG/DL (ref 8–23)
BUN/CREAT SERPL: 16.6 (ref 7–25)
CALCIUM SPEC-SCNC: 9.6 MG/DL (ref 8.6–10.5)
CHLORIDE SERPL-SCNC: 103 MMOL/L (ref 98–107)
CO2 SERPL-SCNC: 25.3 MMOL/L (ref 22–29)
CREAT SERPL-MCNC: 1.99 MG/DL (ref 0.76–1.27)
CREAT UR-MCNC: 68.4 MG/DL
GFR SERPL CREATININE-BSD FRML MDRD: 33 ML/MIN/1.73
GLUCOSE SERPL-MCNC: 71 MG/DL (ref 65–99)
MICROALBUMIN/CREAT UR: NORMAL MG/G{CREAT}
POTASSIUM SERPL-SCNC: 4.5 MMOL/L (ref 3.5–5.2)
SODIUM SERPL-SCNC: 139 MMOL/L (ref 136–145)

## 2021-06-08 PROCEDURE — 82306 VITAMIN D 25 HYDROXY: CPT

## 2021-06-08 PROCEDURE — 82570 ASSAY OF URINE CREATININE: CPT

## 2021-06-08 PROCEDURE — 80048 BASIC METABOLIC PNL TOTAL CA: CPT

## 2021-06-08 PROCEDURE — 82043 UR ALBUMIN QUANTITATIVE: CPT

## 2021-06-08 PROCEDURE — 36415 COLL VENOUS BLD VENIPUNCTURE: CPT

## 2021-06-09 ENCOUNTER — TELEPHONE (OUTPATIENT)
Dept: FAMILY MEDICINE CLINIC | Facility: CLINIC | Age: 76
End: 2021-06-09

## 2021-06-09 NOTE — TELEPHONE ENCOUNTER
Diabetic shoe paperwork was filled out in May for [facility]. [facility] just called me colten isaac the face to face encounter for that paperwork. I told him that pt has not been seen in the office since dec 2020. He questioned why the paperwork was signed without one and I told him I could not answer that. He says the process has to start all over again since a face to face and exam is required to be same day. They are sending over blank paperwork. Pt is already scheduled to come in on 6.28.21 for a 6 month follow up, so I just added diabetic foot exam to schedule. Once the exam and paperwork is finished, please given to me and I will send it with the records.

## 2021-06-09 NOTE — TELEPHONE ENCOUNTER
Spoke to Gabriel and he uses [employer] for his DM shoes, he has never heard of [facility]. Forms are for [employer].    Called [facility] and they have no records of the patient.     Called [employer]- transferred to [facility]. He says foot exam has to be done same day paperwork is filled out. He is faxing over blank forms.

## 2021-06-22 ENCOUNTER — LAB (OUTPATIENT)
Dept: LAB | Facility: HOSPITAL | Age: 76
End: 2021-06-22

## 2021-06-22 ENCOUNTER — TRANSCRIBE ORDERS (OUTPATIENT)
Dept: LAB | Facility: HOSPITAL | Age: 76
End: 2021-06-22

## 2021-06-22 DIAGNOSIS — N18.30 STAGE 3 CHRONIC KIDNEY DISEASE, UNSPECIFIED WHETHER STAGE 3A OR 3B CKD (HCC): ICD-10-CM

## 2021-06-22 DIAGNOSIS — N18.30 STAGE 3 CHRONIC KIDNEY DISEASE, UNSPECIFIED WHETHER STAGE 3A OR 3B CKD (HCC): Primary | ICD-10-CM

## 2021-06-22 LAB
25(OH)D3 SERPL-MCNC: 51.4 NG/ML (ref 30–100)
ALBUMIN SERPL-MCNC: 4.2 G/DL (ref 3.5–5.2)
ALBUMIN/GLOB SERPL: 1.5 G/DL
ALP SERPL-CCNC: 66 U/L (ref 39–117)
ALT SERPL W P-5'-P-CCNC: 9 U/L (ref 1–41)
ANION GAP SERPL CALCULATED.3IONS-SCNC: 12.2 MMOL/L (ref 5–15)
AST SERPL-CCNC: 12 U/L (ref 1–40)
BILIRUB SERPL-MCNC: 0.4 MG/DL (ref 0–1.2)
BILIRUB UR QL STRIP: NEGATIVE
BUN SERPL-MCNC: 30 MG/DL (ref 8–23)
BUN/CREAT SERPL: 16.8 (ref 7–25)
CALCIUM SPEC-SCNC: 9.3 MG/DL (ref 8.6–10.5)
CHLORIDE SERPL-SCNC: 102 MMOL/L (ref 98–107)
CLARITY UR: CLEAR
CO2 SERPL-SCNC: 25.8 MMOL/L (ref 22–29)
COLOR UR: YELLOW
CREAT SERPL-MCNC: 1.79 MG/DL (ref 0.76–1.27)
CREAT UR-MCNC: 49.1 MG/DL
DEPRECATED RDW RBC AUTO: 43.4 FL (ref 37–54)
ERYTHROCYTE [DISTWIDTH] IN BLOOD BY AUTOMATED COUNT: 13.4 % (ref 12.3–15.4)
GFR SERPL CREATININE-BSD FRML MDRD: 37 ML/MIN/1.73
GLOBULIN UR ELPH-MCNC: 2.8 GM/DL
GLUCOSE SERPL-MCNC: 88 MG/DL (ref 65–99)
GLUCOSE UR STRIP-MCNC: NEGATIVE MG/DL
HCT VFR BLD AUTO: 42.8 % (ref 37.5–51)
HGB BLD-MCNC: 14.3 G/DL (ref 13–17.7)
HGB UR QL STRIP.AUTO: NEGATIVE
KETONES UR QL STRIP: NEGATIVE
LEUKOCYTE ESTERASE UR QL STRIP.AUTO: NEGATIVE
MCH RBC QN AUTO: 29.5 PG (ref 26.6–33)
MCHC RBC AUTO-ENTMCNC: 33.4 G/DL (ref 31.5–35.7)
MCV RBC AUTO: 88.4 FL (ref 79–97)
NITRITE UR QL STRIP: NEGATIVE
PH UR STRIP.AUTO: 6 [PH] (ref 5–8)
PHOSPHATE SERPL-MCNC: 3 MG/DL (ref 2.5–4.5)
PLATELET # BLD AUTO: 299 10*3/MM3 (ref 140–450)
PMV BLD AUTO: 10.4 FL (ref 6–12)
POTASSIUM SERPL-SCNC: 4.5 MMOL/L (ref 3.5–5.2)
PROT SERPL-MCNC: 7 G/DL (ref 6–8.5)
PROT UR QL STRIP: NEGATIVE
PROT UR-MCNC: 5 MG/DL
PROT/CREAT UR: 101.8 MG/G CREA (ref 0–200)
PTH-INTACT SERPL-MCNC: 71.9 PG/ML (ref 15–65)
RBC # BLD AUTO: 4.84 10*6/MM3 (ref 4.14–5.8)
SODIUM SERPL-SCNC: 140 MMOL/L (ref 136–145)
SP GR UR STRIP: 1.01 (ref 1–1.03)
UROBILINOGEN UR QL STRIP: NORMAL
WBC # BLD AUTO: 8.53 10*3/MM3 (ref 3.4–10.8)

## 2021-06-22 PROCEDURE — 84156 ASSAY OF PROTEIN URINE: CPT

## 2021-06-22 PROCEDURE — 83970 ASSAY OF PARATHORMONE: CPT

## 2021-06-22 PROCEDURE — 84100 ASSAY OF PHOSPHORUS: CPT

## 2021-06-22 PROCEDURE — 82306 VITAMIN D 25 HYDROXY: CPT

## 2021-06-22 PROCEDURE — 82570 ASSAY OF URINE CREATININE: CPT

## 2021-06-22 PROCEDURE — 81003 URINALYSIS AUTO W/O SCOPE: CPT

## 2021-06-22 PROCEDURE — 36415 COLL VENOUS BLD VENIPUNCTURE: CPT

## 2021-06-22 PROCEDURE — 80053 COMPREHEN METABOLIC PANEL: CPT

## 2021-06-22 PROCEDURE — 85027 COMPLETE CBC AUTOMATED: CPT

## 2021-06-24 ENCOUNTER — TRANSCRIBE ORDERS (OUTPATIENT)
Dept: ADMINISTRATIVE | Facility: HOSPITAL | Age: 76
End: 2021-06-24

## 2021-06-24 DIAGNOSIS — N28.1 RENAL CYST, LEFT: Primary | ICD-10-CM

## 2021-06-28 ENCOUNTER — PATIENT MESSAGE (OUTPATIENT)
Dept: FAMILY MEDICINE CLINIC | Facility: CLINIC | Age: 76
End: 2021-06-28

## 2021-06-28 ENCOUNTER — LAB (OUTPATIENT)
Dept: FAMILY MEDICINE CLINIC | Facility: CLINIC | Age: 76
End: 2021-06-28

## 2021-06-28 ENCOUNTER — OFFICE VISIT (OUTPATIENT)
Dept: FAMILY MEDICINE CLINIC | Facility: CLINIC | Age: 76
End: 2021-06-28

## 2021-06-28 VITALS
BODY MASS INDEX: 28.76 KG/M2 | WEIGHT: 224 LBS | DIASTOLIC BLOOD PRESSURE: 69 MMHG | HEART RATE: 67 BPM | OXYGEN SATURATION: 100 % | SYSTOLIC BLOOD PRESSURE: 128 MMHG | TEMPERATURE: 99.8 F

## 2021-06-28 DIAGNOSIS — N18.30 CONTROLLED TYPE 2 DIABETES MELLITUS WITH STAGE 3 CHRONIC KIDNEY DISEASE, WITHOUT LONG-TERM CURRENT USE OF INSULIN (HCC): Primary | ICD-10-CM

## 2021-06-28 DIAGNOSIS — E11.22 CONTROLLED TYPE 2 DIABETES MELLITUS WITH STAGE 3 CHRONIC KIDNEY DISEASE, WITHOUT LONG-TERM CURRENT USE OF INSULIN (HCC): Primary | ICD-10-CM

## 2021-06-28 LAB
CHOLEST SERPL-MCNC: 111 MG/DL (ref 0–200)
HBA1C MFR BLD: 5.6 % (ref 3.5–5.6)
HDLC SERPL-MCNC: 28 MG/DL (ref 40–60)
LDLC SERPL CALC-MCNC: 64 MG/DL (ref 0–100)
LDLC/HDLC SERPL: 2.26 {RATIO}
TRIGL SERPL-MCNC: 99 MG/DL (ref 0–150)
VLDLC SERPL-MCNC: 19 MG/DL (ref 5–40)

## 2021-06-28 PROCEDURE — 36415 COLL VENOUS BLD VENIPUNCTURE: CPT | Performed by: FAMILY MEDICINE

## 2021-06-28 PROCEDURE — 83036 HEMOGLOBIN GLYCOSYLATED A1C: CPT | Performed by: FAMILY MEDICINE

## 2021-06-28 PROCEDURE — 99213 OFFICE O/P EST LOW 20 MIN: CPT | Performed by: FAMILY MEDICINE

## 2021-06-28 PROCEDURE — 80061 LIPID PANEL: CPT | Performed by: FAMILY MEDICINE

## 2021-06-28 RX ORDER — BETAMETHASONE DIPROPIONATE 0.5 MG/G
CREAM TOPICAL 2 TIMES DAILY
Qty: 45 G | Refills: 1 | Status: SHIPPED | OUTPATIENT
Start: 2021-06-28 | End: 2022-07-06

## 2021-06-28 NOTE — PROGRESS NOTES
Subjective   Gabriel De Souza is a 76 y.o. male.     Gabriel De Souza is in for follow up on his diabetes and he is due for labs. There is no history of chest pain or dyspnea of late. There is no history of issue with bowel or bladder function. There is no history of vision or hearing problems. There is no history of dizziness or lightheadedness. There is no history of issue with sleep or mood. As for checking blood sugar readings, he has not been checking recently. There is no issue with medication compliance. Diet and exercise compliance has been fair.    Diabetes  He has type 2 diabetes mellitus. No MedicAlert identification noted. The initial diagnosis of diabetes was made 1970 years ago. Pertinent negatives for hypoglycemia include no confusion, dizziness, headaches, hunger, mood changes, nervousness/anxiousness, pallor, seizures, sleepiness, speech difficulty, sweats or tremors. Associated symptoms include foot paresthesias. Pertinent negatives for diabetes include no blurred vision, no chest pain, no fatigue, no foot ulcerations, no polydipsia, no polyphagia, no polyuria, no visual change, no weakness and no weight loss. Hypoglycemia complications include nocturnal hypoglycemia. Pertinent negatives for hypoglycemia complications include no blackouts, no hospitalization, no required assistance and no required glucagon injection. Symptoms are stable. Diabetic complications include impotence, nephropathy and peripheral neuropathy. Pertinent negatives for diabetic complications include no CVA, heart disease, PVD or retinopathy. Risk factors for coronary artery disease include dyslipidemia, family history and hypertension. Current diabetic treatment includes oral agent (dual therapy). He is compliant with treatment all of the time. His weight is stable. He is following a generally healthy diet. When asked about meal planning, he reported none. He has not had a previous visit with a dietitian. He participates in  exercise daily. He monitors blood glucose at home 1-2 x per day. Blood glucose monitoring compliance is excellent. There is no change in his home blood glucose trend. His breakfast blood glucose is taken between 6-7 am. His breakfast blood glucose range is generally 70-90 mg/dl. His highest blood glucose is 130-140 mg/dl. His overall blood glucose range is  mg/dl. He does not see a podiatrist.Eye exam is current.          /69 (BP Location: Left arm, Patient Position: Sitting, Cuff Size: Large Adult)   Pulse 67   Temp 99.8 °F (37.7 °C) (Temporal)   Wt 102 kg (224 lb)   SpO2 100%   BMI 28.76 kg/m²       Chief Complaint   Patient presents with   • Diabetes     6 month f/u    • Med Refill     betamethasone valerate cream 0.1%           Current Outpatient Medications:   •  amLODIPine (NORVASC) 5 MG tablet, TAKE 1 TABLET BY MOUTH  DAILY, Disp: 90 tablet, Rfl: 3  •  aspirin 81 MG tablet, ASPIRIN 81 MG ORAL TABLET, Disp: , Rfl:   •  atenolol (TENORMIN) 50 MG tablet, TAKE 1 TABLET BY MOUTH  TWICE DAILY, Disp: 180 tablet, Rfl: 3  •  betamethasone dipropionate 0.05 % cream, Apply  topically to the appropriate area as directed 2 (Two) Times a Day., Disp: 45 g, Rfl: 1  •  Blood Glucose Monitoring Suppl (ONE TOUCH ULTRA 2) w/Device kit, ONETOUCH ULTRA 2 w/Device KIT, Disp: , Rfl:   •  enalapril (VASOTEC) 20 MG tablet, TAKE 1 TABLET BY MOUTH  TWICE DAILY, Disp: 180 tablet, Rfl: 3  •  glimepiride (AMARYL) 2 MG tablet, TAKE 1 TABLET BY MOUTH  TWICE DAILY, Disp: 180 tablet, Rfl: 3  •  glucose blood (ONE TOUCH ULTRA TEST) test strip, Test daily E11.9, Disp: 200 each, Rfl: 3  •  hydroCHLOROthiazide (HYDRODIURIL) 50 MG tablet, TAKE 1 TABLET BY MOUTH  DAILY, Disp: 90 tablet, Rfl: 3  •  metFORMIN (GLUCOPHAGE) 500 MG tablet, TAKE 1 TABLET BY MOUTH 4  TIMES DAILY WITH MEALS, Disp: 360 tablet, Rfl: 3  •  niacin 500 MG tablet, NIACIN 500 MG TABS, Disp: , Rfl:   •  potassium chloride (K-DUR,KLOR-CON) 20 MEQ CR tablet, TAKE 1  TABLET BY MOUTH  DAILY, Disp: 90 tablet, Rfl: 3  •  simvastatin (ZOCOR) 20 MG tablet, TAKE 1 TABLET BY MOUTH  DAILY, Disp: 90 tablet, Rfl: 3  •  glucose blood (ONE TOUCH ULTRA TEST) test strip, Test twice  daily E11.9, Disp: 300 each, Rfl: 3  •  glucose blood (OneTouch Ultra) test strip, Test twice daily dxE11.9, Disp: 300 each, Rfl: 3  •  meloxicam (MOBIC) 15 MG tablet, Take 1 tablet by mouth Daily., Disp: 30 tablet, Rfl: 1    Lab Results   Component Value Date    HGBA1C 5.7 (H) 12/28/2020    HGBA1C 5.5 06/25/2020    HGBA1C 5.7 (H) 11/06/2019     Lab Results   Component Value Date    MICROALBUR <1.2 06/08/2021    CREATININE 1.79 (H) 06/22/2021         Wt Readings from Last 3 Encounters:   06/28/21 102 kg (224 lb)   12/28/20 103 kg (226 lb)   09/14/20 104 kg (229 lb)       The following portions of the patient's history were reviewed and updated as appropriate: allergies, current medications, past family history, past medical history, past social history, past surgical history, and problem list.    Review of Systems   Constitutional: Negative for activity change, fatigue, fever and weight loss.   HENT: Negative for congestion, sinus pressure, sinus pain, sore throat and trouble swallowing.    Eyes: Negative for blurred vision and visual disturbance.   Respiratory: Negative for chest tightness, shortness of breath and wheezing.    Cardiovascular: Negative for chest pain.   Gastrointestinal: Negative for abdominal distention, abdominal pain, constipation, diarrhea, nausea and vomiting.   Endocrine: Negative for polydipsia, polyphagia and polyuria.   Genitourinary: Positive for impotence. Negative for difficulty urinating and dysuria.   Musculoskeletal: Negative for back pain and neck pain.   Skin: Negative for pallor.   Neurological: Negative for dizziness, tremors, seizures, speech difficulty, weakness and headaches.   Psychiatric/Behavioral: Negative for agitation, confusion, hallucinations and suicidal ideas. The  patient is not nervous/anxious.        Objective   Physical Exam  Vitals and nursing note reviewed.   HENT:      Right Ear: Tympanic membrane, ear canal and external ear normal.      Left Ear: Tympanic membrane, ear canal and external ear normal.   Eyes:      Conjunctiva/sclera: Conjunctivae normal.      Pupils: Pupils are equal, round, and reactive to light.   Cardiovascular:      Rate and Rhythm: Normal rate and regular rhythm.      Heart sounds: Normal heart sounds. No murmur heard.     Pulmonary:      Effort: Pulmonary effort is normal.      Breath sounds: No wheezing or rales.   Abdominal:      General: Bowel sounds are normal.      Palpations: Abdomen is soft.      Tenderness: There is no abdominal tenderness. There is no guarding.   Musculoskeletal:         General: Normal range of motion.      Cervical back: Neck supple.      Right lower leg: No edema.      Left lower leg: No edema.   Feet:      Comments: Diabetic Foot Exam Performed     Burning in both feet but no ulcerations or skin breakdown  Lymphadenopathy:      Cervical: No cervical adenopathy.   Skin:     Findings: No rash.   Neurological:      General: No focal deficit present.      Mental Status: He is alert and oriented to person, place, and time.   Psychiatric:         Mood and Affect: Mood normal.         Thought Content: Thought content normal.           Assessment/Plan   Problems Addressed this Visit        Endocrine and Metabolic    Diabetes mellitus type II, controlled (CMS/[facility]) - Primary    Relevant Orders    Hemoglobin A1c    Lipid panel      Diagnoses       Codes Comments    Controlled type 2 diabetes mellitus with stage 3 chronic kidney disease, without long-term current use of insulin (CMS/[facility])    -  Primary ICD-10-CM: E11.22, N18.30  ICD-9-CM: 250.40, 585.3         I will monitor his labs along with nephrology  I will continue his current medication plan for now since GFR is still above 30  I will check an A1c and a lipid panel  today  His other labs were just done last week and were reasonable other than the elevated creatinine and GFR of 37  I have asked him to continue hydrating well and remaining active  He has completed his Covid series  I will see him back in 6 months, sooner if needed         Answers for HPI/ROS submitted by the patient on 6/21/2021  What is the primary reason for your visit?: Diabetes

## 2021-07-15 RX ORDER — ATENOLOL 50 MG/1
TABLET ORAL
Qty: 180 TABLET | Refills: 3 | Status: SHIPPED | OUTPATIENT
Start: 2021-07-15 | End: 2022-07-11

## 2021-07-15 RX ORDER — SIMVASTATIN 20 MG
TABLET ORAL
Qty: 90 TABLET | Refills: 3 | Status: SHIPPED | OUTPATIENT
Start: 2021-07-15 | End: 2022-05-13

## 2021-07-15 RX ORDER — HYDROCHLOROTHIAZIDE 50 MG/1
TABLET ORAL
Qty: 90 TABLET | Refills: 3 | Status: SHIPPED | OUTPATIENT
Start: 2021-07-15 | End: 2022-07-06

## 2021-07-19 ENCOUNTER — HOSPITAL ENCOUNTER (OUTPATIENT)
Dept: ULTRASOUND IMAGING | Facility: HOSPITAL | Age: 76
Discharge: HOME | End: 2021-07-19
Admitting: INTERNAL MEDICINE

## 2021-07-19 DIAGNOSIS — N28.1 RENAL CYST, LEFT: ICD-10-CM

## 2021-07-19 PROCEDURE — 76775 US EXAM ABDO BACK WALL LIM: CPT

## 2021-09-07 ENCOUNTER — LAB (OUTPATIENT)
Dept: LAB | Facility: HOSPITAL | Age: 76
End: 2021-09-07

## 2021-09-07 ENCOUNTER — TRANSCRIBE ORDERS (OUTPATIENT)
Dept: ADMINISTRATIVE | Facility: HOSPITAL | Age: 76
End: 2021-09-07

## 2021-09-07 DIAGNOSIS — N18.30 MALIGNANT HYPERTENSIVE HEART AND CHRONIC KIDNEY DISEASE STAGE III (HCC): ICD-10-CM

## 2021-09-07 DIAGNOSIS — N18.30 MALIGNANT HYPERTENSIVE HEART AND CHRONIC KIDNEY DISEASE STAGE III (HCC): Primary | ICD-10-CM

## 2021-09-07 DIAGNOSIS — I13.10 MALIGNANT HYPERTENSIVE HEART AND CHRONIC KIDNEY DISEASE STAGE III (HCC): ICD-10-CM

## 2021-09-07 DIAGNOSIS — I13.10 MALIGNANT HYPERTENSIVE HEART AND CHRONIC KIDNEY DISEASE STAGE III (HCC): Primary | ICD-10-CM

## 2021-09-07 LAB
25(OH)D3 SERPL-MCNC: 39.9 NG/ML (ref 30–100)
ALBUMIN SERPL-MCNC: 4.3 G/DL (ref 3.5–5.2)
ALBUMIN/GLOB SERPL: 1.5 G/DL
ALP SERPL-CCNC: 63 U/L (ref 39–117)
ALT SERPL W P-5'-P-CCNC: 12 U/L (ref 1–41)
ANION GAP SERPL CALCULATED.3IONS-SCNC: 12.6 MMOL/L (ref 5–15)
AST SERPL-CCNC: 14 U/L (ref 1–40)
BACTERIA UR QL AUTO: NORMAL /HPF
BILIRUB SERPL-MCNC: 0.4 MG/DL (ref 0–1.2)
BILIRUB UR QL STRIP: NEGATIVE
BUN SERPL-MCNC: 22 MG/DL (ref 8–23)
BUN/CREAT SERPL: 11.7 (ref 7–25)
CALCIUM SPEC-SCNC: 9.7 MG/DL (ref 8.6–10.5)
CHLORIDE SERPL-SCNC: 105 MMOL/L (ref 98–107)
CLARITY UR: CLEAR
CO2 SERPL-SCNC: 25.4 MMOL/L (ref 22–29)
COLOR UR: YELLOW
CREAT SERPL-MCNC: 1.88 MG/DL (ref 0.76–1.27)
CREAT UR-MCNC: 62.1 MG/DL
DEPRECATED RDW RBC AUTO: 41.1 FL (ref 37–54)
ERYTHROCYTE [DISTWIDTH] IN BLOOD BY AUTOMATED COUNT: 13.3 % (ref 12.3–15.4)
GFR SERPL CREATININE-BSD FRML MDRD: 35 ML/MIN/1.73
GLOBULIN UR ELPH-MCNC: 2.9 GM/DL
GLUCOSE SERPL-MCNC: 83 MG/DL (ref 65–99)
GLUCOSE UR STRIP-MCNC: NEGATIVE MG/DL
HCT VFR BLD AUTO: 40.2 % (ref 37.5–51)
HGB BLD-MCNC: 13.6 G/DL (ref 13–17.7)
HGB UR QL STRIP.AUTO: NEGATIVE
HYALINE CASTS UR QL AUTO: NORMAL /LPF
KETONES UR QL STRIP: NEGATIVE
LEUKOCYTE ESTERASE UR QL STRIP.AUTO: ABNORMAL
MCH RBC QN AUTO: 28.8 PG (ref 26.6–33)
MCHC RBC AUTO-ENTMCNC: 33.8 G/DL (ref 31.5–35.7)
MCV RBC AUTO: 85.2 FL (ref 79–97)
NITRITE UR QL STRIP: NEGATIVE
PH UR STRIP.AUTO: 7 [PH] (ref 5–8)
PHOSPHATE SERPL-MCNC: 2.5 MG/DL (ref 2.5–4.5)
PLATELET # BLD AUTO: 300 10*3/MM3 (ref 140–450)
PMV BLD AUTO: 10.3 FL (ref 6–12)
POTASSIUM SERPL-SCNC: 4.6 MMOL/L (ref 3.5–5.2)
PROT SERPL-MCNC: 7.2 G/DL (ref 6–8.5)
PROT UR QL STRIP: ABNORMAL
PROT UR-MCNC: 13.5 MG/DL
PROT/CREAT UR: 217.4 MG/G CREA (ref 0–200)
PTH-INTACT SERPL-MCNC: 37.3 PG/ML (ref 15–65)
RBC # BLD AUTO: 4.72 10*6/MM3 (ref 4.14–5.8)
RBC # UR: NORMAL /HPF
REF LAB TEST METHOD: NORMAL
SODIUM SERPL-SCNC: 143 MMOL/L (ref 136–145)
SP GR UR STRIP: 1.01 (ref 1–1.03)
SQUAMOUS #/AREA URNS HPF: NORMAL /HPF
UROBILINOGEN UR QL STRIP: ABNORMAL
WBC # BLD AUTO: 7.25 10*3/MM3 (ref 3.4–10.8)
WBC UR QL AUTO: NORMAL /HPF

## 2021-09-07 PROCEDURE — 84100 ASSAY OF PHOSPHORUS: CPT

## 2021-09-07 PROCEDURE — 83970 ASSAY OF PARATHORMONE: CPT

## 2021-09-07 PROCEDURE — 82306 VITAMIN D 25 HYDROXY: CPT

## 2021-09-07 PROCEDURE — 80053 COMPREHEN METABOLIC PANEL: CPT

## 2021-09-07 PROCEDURE — 36415 COLL VENOUS BLD VENIPUNCTURE: CPT

## 2021-09-07 PROCEDURE — 84156 ASSAY OF PROTEIN URINE: CPT

## 2021-09-07 PROCEDURE — 82570 ASSAY OF URINE CREATININE: CPT

## 2021-09-07 PROCEDURE — 81001 URINALYSIS AUTO W/SCOPE: CPT

## 2021-09-07 PROCEDURE — 85027 COMPLETE CBC AUTOMATED: CPT

## 2021-09-17 ENCOUNTER — OFFICE VISIT (OUTPATIENT)
Dept: CARDIOLOGY | Facility: CLINIC | Age: 76
End: 2021-09-17

## 2021-09-17 VITALS
BODY MASS INDEX: 29.42 KG/M2 | HEIGHT: 73 IN | SYSTOLIC BLOOD PRESSURE: 143 MMHG | HEART RATE: 67 BPM | WEIGHT: 222 LBS | OXYGEN SATURATION: 97 % | DIASTOLIC BLOOD PRESSURE: 78 MMHG

## 2021-09-17 DIAGNOSIS — I10 ESSENTIAL HYPERTENSION: ICD-10-CM

## 2021-09-17 DIAGNOSIS — N18.30 CONTROLLED TYPE 2 DIABETES MELLITUS WITH STAGE 3 CHRONIC KIDNEY DISEASE, WITHOUT LONG-TERM CURRENT USE OF INSULIN (HCC): ICD-10-CM

## 2021-09-17 DIAGNOSIS — I20.9 ANGINA PECTORIS (HCC): Primary | ICD-10-CM

## 2021-09-17 DIAGNOSIS — E78.5 DYSLIPIDEMIA: ICD-10-CM

## 2021-09-17 DIAGNOSIS — E11.22 CONTROLLED TYPE 2 DIABETES MELLITUS WITH STAGE 3 CHRONIC KIDNEY DISEASE, WITHOUT LONG-TERM CURRENT USE OF INSULIN (HCC): ICD-10-CM

## 2021-09-17 DIAGNOSIS — I25.10 CHRONIC CORONARY ARTERY DISEASE: ICD-10-CM

## 2021-09-17 PROCEDURE — 99213 OFFICE O/P EST LOW 20 MIN: CPT | Performed by: INTERNAL MEDICINE

## 2021-09-17 PROCEDURE — 93000 ELECTROCARDIOGRAM COMPLETE: CPT | Performed by: INTERNAL MEDICINE

## 2021-09-17 NOTE — PROGRESS NOTES
"    Subjective:     Encounter Date:09/17/2021      Patient ID: Gabriel De Souza is a 76 y.o. male.    Chief Complaint:  History of Present Illness     76-year-old white male patient with known history of coronary artery disease last cardiac cath 2001 showed diagonal artery disease nonobstructive LAD disease ,hypertension diabetes mellitus dyslipidemia chronic renal insufficiency comes to see me to establish his cardiac care  EKG today normal sinus rhythm early precordial RS transition no significant ST abnormalities borderline inferior axis no significant changes compared to the last EKG  Patient is having some hip pain which is increasing his blood pressure otherwise blood pressure runs 130 over 70s at home  Advised him to decrease the salt intake  Patient denies of any chest pain shortness of breath    Patient comes back for follow-up denies of any symptoms of chest pain shortness of breath recent creatinine 1.88 so advised him to follow-up with PCP/nephrology      The following portions of the patient's history were reviewed and updated as appropriate: Allergies current medications past family history past medical history past social history past surgical history problem list and review of systems  Past Medical History:   Diagnosis Date   • Cancer (CMS/HCC) january 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   • Coronary artery disease    • Deep vein thrombosis (CMS/HCC) april 1990   • Diabetes mellitus (CMS/HCC)    • Dyslipidemia    • Hypertension    • Renal insufficiency 2020     Past Surgical History:   Procedure Laterality Date   • BACK SURGERY     • BASAL CELL CARCINOMA EXCISION  01/2019   • CARDIAC CATHETERIZATION     • TURP / TRANSURETHRAL INCISION / DRAINAGE PROSTATE       /78 (BP Location: Left arm, Patient Position: Sitting)   Pulse 67   Ht 185.4 cm (73\")   Wt 101 kg (222 lb)   SpO2 97%   BMI 29.29 kg/m²   Family History   Problem Relation Age of Onset   • Heart disease Mother    • Heart disease Brother    • Heart " attack Maternal Grandfather        Current Outpatient Medications:   •  amLODIPine (NORVASC) 5 MG tablet, TAKE 1 TABLET BY MOUTH  DAILY, Disp: 90 tablet, Rfl: 3  •  aspirin 81 MG tablet, ASPIRIN 81 MG ORAL TABLET, Disp: , Rfl:   •  atenolol (TENORMIN) 50 MG tablet, TAKE 1 TABLET BY MOUTH  TWICE DAILY, Disp: 180 tablet, Rfl: 3  •  betamethasone dipropionate 0.05 % cream, Apply  topically to the appropriate area as directed 2 (Two) Times a Day., Disp: 45 g, Rfl: 1  •  Blood Glucose Monitoring Suppl (ONE TOUCH ULTRA 2) w/Device kit, ONETOUCH ULTRA 2 w/Device KIT, Disp: , Rfl:   •  enalapril (VASOTEC) 20 MG tablet, TAKE 1 TABLET BY MOUTH  TWICE DAILY, Disp: 180 tablet, Rfl: 3  •  glimepiride (AMARYL) 2 MG tablet, TAKE 1 TABLET BY MOUTH  TWICE DAILY, Disp: 180 tablet, Rfl: 3  •  glucose blood (ONE TOUCH ULTRA TEST) test strip, Test daily E11.9, Disp: 200 each, Rfl: 3  •  hydroCHLOROthiazide (HYDRODIURIL) 50 MG tablet, TAKE 1 TABLET BY MOUTH  DAILY, Disp: 90 tablet, Rfl: 3  •  metFORMIN (GLUCOPHAGE) 500 MG tablet, TAKE 1 TABLET BY MOUTH 4  TIMES DAILY WITH MEALS, Disp: 360 tablet, Rfl: 3  •  niacin 500 MG tablet, NIACIN 500 MG TABS, Disp: , Rfl:   •  potassium chloride (K-DUR,KLOR-CON) 20 MEQ CR tablet, TAKE 1 TABLET BY MOUTH  DAILY, Disp: 90 tablet, Rfl: 3  •  simvastatin (ZOCOR) 20 MG tablet, TAKE 1 TABLET BY MOUTH  DAILY, Disp: 90 tablet, Rfl: 3  Social History     Socioeconomic History   • Marital status:      Spouse name: Not on file   • Number of children: Not on file   • Years of education: Not on file   • Highest education level: Not on file   Tobacco Use   • Smoking status: Former Smoker     Quit date:      Years since quittin.7   • Smokeless tobacco: Never Used   Substance and Sexual Activity   • Alcohol use: No   • Drug use: No   • Sexual activity: Not Currently     Partners: Female     Birth control/protection: None     No Known Allergies  Review of Systems   Constitutional: Negative for fever  and malaise/fatigue.   Cardiovascular: Negative for chest pain, dyspnea on exertion and palpitations.   Respiratory: Negative for cough and shortness of breath.    Skin: Negative for rash.   Gastrointestinal: Negative for abdominal pain, nausea and vomiting.   Neurological: Negative for focal weakness and headaches.   All other systems reviewed and are negative.             Objective:     Constitutional:       Appearance: Well-developed.   Eyes:      General: No scleral icterus.     Conjunctiva/sclera: Conjunctivae normal.   HENT:      Head: Normocephalic and atraumatic.    Mouth/Throat:      Mouth: No oral lesions.      Pharynx: Uvula midline.   Neck:      Thyroid: No thyromegaly.      Vascular: No carotid bruit or JVD.      Trachea: Trachea normal.   Pulmonary:      Effort: Pulmonary effort is normal.      Breath sounds: Normal breath sounds.   Cardiovascular:      Normal rate. Regular rhythm.      No gallop.   Pulses:     Intact distal pulses.   Abdominal:      General: Bowel sounds are normal.      Palpations: Abdomen is soft.   Musculoskeletal: Normal range of motion.      Cervical back: Neck supple. Skin:     General: Skin is warm. There is no cyanosis.   Neurological:      Mental Status: Alert and oriented to person, place, and time.      Comments: No focal deficits   Psychiatric:         Behavior: Behavior is cooperative.           ECG 12 Lead    Date/Time: 9/17/2021 5:26 PM  Performed by: Eliot Gama MD  Authorized by: Eliot Gama MD   Comments: EKG normal sinus rhythm early precordial RS transition noted normal axis no changes compared to the last EKG            Lab Review:       Assessment:          Diagnosis Plan   1. Angina pectoris (CMS/HCC)  ECG 12 Lead   2. Chronic coronary artery disease  ECG 12 Lead   3. Dyslipidemia  ECG 12 Lead   4. Essential hypertension  ECG 12 Lead   5. Controlled type 2 diabetes mellitus with stage 3 chronic kidney disease, without long-term  current use of insulin (CMS/[facility])  ECG 12 Lead          Plan:       MDM

## 2021-09-20 RX ORDER — BLOOD SUGAR DIAGNOSTIC
STRIP MISCELLANEOUS
Qty: 200 EACH | Refills: 12 | Status: SHIPPED | OUTPATIENT
Start: 2021-09-20 | End: 2021-10-20 | Stop reason: SDUPTHER

## 2021-10-10 RX ORDER — POTASSIUM CHLORIDE 20 MEQ/1
TABLET, EXTENDED RELEASE ORAL
Qty: 90 TABLET | Refills: 3 | Status: SHIPPED | OUTPATIENT
Start: 2021-10-10 | End: 2022-07-06

## 2021-10-20 RX ORDER — BLOOD SUGAR DIAGNOSTIC
STRIP MISCELLANEOUS
Qty: 200 EACH | Refills: 5 | Status: SHIPPED | OUTPATIENT
Start: 2021-10-20 | End: 2021-10-26 | Stop reason: SDUPTHER

## 2021-10-23 RX ORDER — ENALAPRIL MALEATE 20 MG/1
TABLET ORAL
Qty: 180 TABLET | Refills: 3 | Status: SHIPPED | OUTPATIENT
Start: 2021-10-23 | End: 2022-07-06

## 2021-10-23 RX ORDER — AMLODIPINE BESYLATE 5 MG/1
TABLET ORAL
Qty: 90 TABLET | Refills: 3 | Status: SHIPPED | OUTPATIENT
Start: 2021-10-23 | End: 2022-07-06 | Stop reason: SDUPTHER

## 2021-10-23 RX ORDER — GLIMEPIRIDE 2 MG/1
TABLET ORAL
Qty: 180 TABLET | Refills: 3 | Status: SHIPPED | OUTPATIENT
Start: 2021-10-23 | End: 2022-09-12

## 2021-10-26 RX ORDER — BLOOD SUGAR DIAGNOSTIC
STRIP MISCELLANEOUS
Qty: 200 EACH | Refills: 5 | Status: SHIPPED | OUTPATIENT
Start: 2021-10-26

## 2021-12-28 ENCOUNTER — LAB (OUTPATIENT)
Dept: LAB | Facility: HOSPITAL | Age: 76
End: 2021-12-28

## 2021-12-28 ENCOUNTER — OFFICE VISIT (OUTPATIENT)
Dept: FAMILY MEDICINE CLINIC | Facility: CLINIC | Age: 76
End: 2021-12-28

## 2021-12-28 VITALS
SYSTOLIC BLOOD PRESSURE: 160 MMHG | WEIGHT: 219 LBS | TEMPERATURE: 97.1 F | OXYGEN SATURATION: 99 % | DIASTOLIC BLOOD PRESSURE: 71 MMHG | BODY MASS INDEX: 28.89 KG/M2 | HEART RATE: 67 BPM

## 2021-12-28 DIAGNOSIS — E11.22 CONTROLLED TYPE 2 DIABETES MELLITUS WITH STAGE 3 CHRONIC KIDNEY DISEASE, WITHOUT LONG-TERM CURRENT USE OF INSULIN (HCC): ICD-10-CM

## 2021-12-28 DIAGNOSIS — N18.30 STAGE 3 CHRONIC KIDNEY DISEASE, UNSPECIFIED WHETHER STAGE 3A OR 3B CKD (HCC): ICD-10-CM

## 2021-12-28 DIAGNOSIS — Z00.00 MEDICARE ANNUAL WELLNESS VISIT, SUBSEQUENT: Primary | ICD-10-CM

## 2021-12-28 DIAGNOSIS — I10 ESSENTIAL HYPERTENSION: ICD-10-CM

## 2021-12-28 DIAGNOSIS — N18.30 CONTROLLED TYPE 2 DIABETES MELLITUS WITH STAGE 3 CHRONIC KIDNEY DISEASE, WITHOUT LONG-TERM CURRENT USE OF INSULIN (HCC): ICD-10-CM

## 2021-12-28 LAB
ALBUMIN SERPL-MCNC: 4.4 G/DL (ref 3.5–5.2)
ALBUMIN/GLOB SERPL: 1.3 G/DL
ALP SERPL-CCNC: 81 U/L (ref 39–117)
ALT SERPL W P-5'-P-CCNC: 6 U/L (ref 1–41)
ANION GAP SERPL CALCULATED.3IONS-SCNC: 11.9 MMOL/L (ref 5–15)
AST SERPL-CCNC: 12 U/L (ref 1–40)
BASOPHILS # BLD AUTO: 0.03 10*3/MM3 (ref 0–0.2)
BASOPHILS NFR BLD AUTO: 0.3 % (ref 0–1.5)
BILIRUB SERPL-MCNC: 0.4 MG/DL (ref 0–1.2)
BUN SERPL-MCNC: 51 MG/DL (ref 8–23)
BUN/CREAT SERPL: 13.2 (ref 7–25)
CALCIUM SPEC-SCNC: 9.7 MG/DL (ref 8.6–10.5)
CHLORIDE SERPL-SCNC: 107 MMOL/L (ref 98–107)
CHOLEST SERPL-MCNC: 121 MG/DL (ref 0–200)
CO2 SERPL-SCNC: 25.1 MMOL/L (ref 22–29)
CREAT SERPL-MCNC: 3.86 MG/DL (ref 0.76–1.27)
DEPRECATED RDW RBC AUTO: 47.3 FL (ref 37–54)
EOSINOPHIL # BLD AUTO: 0.21 10*3/MM3 (ref 0–0.4)
EOSINOPHIL NFR BLD AUTO: 2.4 % (ref 0.3–6.2)
ERYTHROCYTE [DISTWIDTH] IN BLOOD BY AUTOMATED COUNT: 14.5 % (ref 12.3–15.4)
GFR SERPL CREATININE-BSD FRML MDRD: 15 ML/MIN/1.73
GLOBULIN UR ELPH-MCNC: 3.3 GM/DL
GLUCOSE SERPL-MCNC: 88 MG/DL (ref 65–99)
HBA1C MFR BLD: 5.8 % (ref 3.5–5.6)
HCT VFR BLD AUTO: 35.9 % (ref 37.5–51)
HDLC SERPL-MCNC: 31 MG/DL (ref 40–60)
HGB BLD-MCNC: 11.9 G/DL (ref 13–17.7)
IMM GRANULOCYTES # BLD AUTO: 0.05 10*3/MM3 (ref 0–0.05)
IMM GRANULOCYTES NFR BLD AUTO: 0.6 % (ref 0–0.5)
LDLC SERPL CALC-MCNC: 68 MG/DL (ref 0–100)
LDLC/HDLC SERPL: 2.11 {RATIO}
LYMPHOCYTES # BLD AUTO: 2.98 10*3/MM3 (ref 0.7–3.1)
LYMPHOCYTES NFR BLD AUTO: 34 % (ref 19.6–45.3)
MCH RBC QN AUTO: 29.8 PG (ref 26.6–33)
MCHC RBC AUTO-ENTMCNC: 33.1 G/DL (ref 31.5–35.7)
MCV RBC AUTO: 90 FL (ref 79–97)
MONOCYTES # BLD AUTO: 0.97 10*3/MM3 (ref 0.1–0.9)
MONOCYTES NFR BLD AUTO: 11.1 % (ref 5–12)
NEUTROPHILS NFR BLD AUTO: 4.53 10*3/MM3 (ref 1.7–7)
NEUTROPHILS NFR BLD AUTO: 51.6 % (ref 42.7–76)
NRBC BLD AUTO-RTO: 0 /100 WBC (ref 0–0.2)
PLATELET # BLD AUTO: 316 10*3/MM3 (ref 140–450)
PMV BLD AUTO: 10.3 FL (ref 6–12)
POTASSIUM SERPL-SCNC: 4.7 MMOL/L (ref 3.5–5.2)
PROT SERPL-MCNC: 7.7 G/DL (ref 6–8.5)
RBC # BLD AUTO: 3.99 10*6/MM3 (ref 4.14–5.8)
SODIUM SERPL-SCNC: 144 MMOL/L (ref 136–145)
TRIGL SERPL-MCNC: 123 MG/DL (ref 0–150)
VLDLC SERPL-MCNC: 22 MG/DL (ref 5–40)
WBC NRBC COR # BLD: 8.77 10*3/MM3 (ref 3.4–10.8)

## 2021-12-28 PROCEDURE — G0439 PPPS, SUBSEQ VISIT: HCPCS | Performed by: FAMILY MEDICINE

## 2021-12-28 PROCEDURE — 80053 COMPREHEN METABOLIC PANEL: CPT | Performed by: FAMILY MEDICINE

## 2021-12-28 PROCEDURE — 85025 COMPLETE CBC W/AUTO DIFF WBC: CPT | Performed by: FAMILY MEDICINE

## 2021-12-28 PROCEDURE — 36415 COLL VENOUS BLD VENIPUNCTURE: CPT | Performed by: FAMILY MEDICINE

## 2021-12-28 PROCEDURE — 83036 HEMOGLOBIN GLYCOSYLATED A1C: CPT | Performed by: FAMILY MEDICINE

## 2021-12-28 PROCEDURE — 1160F RVW MEDS BY RX/DR IN RCRD: CPT | Performed by: FAMILY MEDICINE

## 2021-12-28 PROCEDURE — 99213 OFFICE O/P EST LOW 20 MIN: CPT | Performed by: FAMILY MEDICINE

## 2021-12-28 PROCEDURE — 80061 LIPID PANEL: CPT | Performed by: FAMILY MEDICINE

## 2021-12-28 PROCEDURE — 1170F FXNL STATUS ASSESSED: CPT | Performed by: FAMILY MEDICINE

## 2021-12-28 NOTE — PROGRESS NOTES
Subjective   Gabriel De Souza is a 76 y.o. male.     Gabriel De Souza is in for follow up on his diabetes and high blood pressure.  He also has chronic renal failure and he is due for his Medicare wellness this year. There is no history of chest pain or dyspnea of late. There is no history of issue with bowel or bladder function. There is no history of vision or hearing problems. There is no history of dizziness or lightheadedness. There is no history of issue with sleep or mood. As for checking blood sugar readings, he says his morning readings generally are between 90 and 100. There is no issue with medication compliance. Diet and exercise compliance has been mixed with relatively good diet compliance, but he is terrible at getting any exercise despite having equipment at home.         /71 (BP Location: Left arm, Patient Position: Sitting, Cuff Size: Large Adult)   Pulse 67   Temp 97.1 °F (36.2 °C) (Temporal)   Wt 99.3 kg (219 lb)   SpO2 99%   BMI 28.89 kg/m²       Chief Complaint   Patient presents with   • Medicare Wellness-subsequent     has all 3covid injecitons            Current Outpatient Medications:   •  amLODIPine (NORVASC) 5 MG tablet, TAKE 1 TABLET BY MOUTH  DAILY, Disp: 90 tablet, Rfl: 3  •  aspirin 81 MG tablet, ASPIRIN 81 MG ORAL TABLET, Disp: , Rfl:   •  atenolol (TENORMIN) 50 MG tablet, TAKE 1 TABLET BY MOUTH  TWICE DAILY, Disp: 180 tablet, Rfl: 3  •  betamethasone dipropionate 0.05 % cream, Apply  topically to the appropriate area as directed 2 (Two) Times a Day., Disp: 45 g, Rfl: 1  •  Blood Glucose Monitoring Suppl (ONE TOUCH ULTRA 2) w/Device kit, ONETOUCH ULTRA 2 w/Device KIT, Disp: , Rfl:   •  enalapril (VASOTEC) 20 MG tablet, TAKE 1 TABLET BY MOUTH  TWICE DAILY, Disp: 180 tablet, Rfl: 3  •  glimepiride (AMARYL) 2 MG tablet, TAKE 1 TABLET BY MOUTH  TWICE DAILY, Disp: 180 tablet, Rfl: 3  •  glucose blood (OneTouch Ultra) test strip, Test once daily DX:  e11.65, Disp: 200 each, Rfl:  5  •  hydroCHLOROthiazide (HYDRODIURIL) 50 MG tablet, TAKE 1 TABLET BY MOUTH  DAILY, Disp: 90 tablet, Rfl: 3  •  metFORMIN (GLUCOPHAGE) 500 MG tablet, TAKE 1 TABLET BY MOUTH 4  TIMES DAILY WITH MEALS, Disp: 360 tablet, Rfl: 3  •  niacin 500 MG tablet, NIACIN 500 MG TABS, Disp: , Rfl:   •  potassium chloride (K-DUR,KLOR-CON) 20 MEQ CR tablet, TAKE 1 TABLET BY MOUTH  DAILY, Disp: 90 tablet, Rfl: 3  •  simvastatin (ZOCOR) 20 MG tablet, TAKE 1 TABLET BY MOUTH  DAILY, Disp: 90 tablet, Rfl: 3    Lab Results   Component Value Date    HGBA1C 5.6 06/28/2021    HGBA1C 5.7 (H) 12/28/2020    HGBA1C 5.5 06/25/2020     Lab Results   Component Value Date    MICROALBUR <1.2 06/08/2021    CREATININE 1.88 (H) 09/07/2021         Wt Readings from Last 3 Encounters:   12/28/21 99.3 kg (219 lb)   09/17/21 101 kg (222 lb)   06/28/21 102 kg (224 lb)       The following portions of the patient's history were reviewed and updated as appropriate: allergies, current medications, past family history, past medical history, past social history, past surgical history, and problem list.    Review of Systems   Constitutional: Negative for activity change, fatigue and fever.   HENT: Negative for congestion, sinus pressure, sinus pain, sore throat and trouble swallowing.    Eyes: Negative for visual disturbance.   Respiratory: Negative for chest tightness, shortness of breath and wheezing.    Cardiovascular: Negative for chest pain.   Gastrointestinal: Negative for abdominal distention, abdominal pain, constipation, diarrhea, nausea and vomiting.   Endocrine: Negative for polydipsia, polyphagia and polyuria.   Genitourinary: Negative for difficulty urinating and dysuria.   Musculoskeletal: Negative for back pain and neck pain.   Skin: Negative for pallor.   Neurological: Negative for dizziness, tremors, seizures, speech difficulty, weakness and headaches.   Psychiatric/Behavioral: Negative for agitation, confusion, hallucinations and suicidal ideas.  The patient is not nervous/anxious.        Objective   Physical Exam  Vitals reviewed.   Constitutional:       Appearance: Normal appearance.   Eyes:      Conjunctiva/sclera: Conjunctivae normal.      Pupils: Pupils are equal, round, and reactive to light.   Cardiovascular:      Rate and Rhythm: Normal rate and regular rhythm.      Heart sounds: Normal heart sounds. No murmur heard.      Pulmonary:      Effort: Pulmonary effort is normal.      Breath sounds: No wheezing or rales.   Abdominal:      General: Bowel sounds are normal.      Palpations: Abdomen is soft.      Tenderness: There is no abdominal tenderness. There is no guarding.   Musculoskeletal:      Cervical back: Neck supple.      Right lower leg: No edema.      Left lower leg: No edema.   Feet:      Comments: Diabetic Foot Exam Performed  He does have some sensory loss in both feet but that is not new  No ulcerations or calluses of note on either foot  Pulses are present bilaterally  Lymphadenopathy:      Cervical: No cervical adenopathy.   Skin:     Findings: No bruising or rash.   Neurological:      General: No focal deficit present.      Mental Status: He is alert and oriented to person, place, and time.   Psychiatric:         Mood and Affect: Mood normal.           Assessment/Plan   Problems Addressed this Visit        Endocrine and Metabolic    Diabetes mellitus type II, controlled ([facility])    Relevant Orders    Comprehensive metabolic panel    Hemoglobin A1c    Lipid panel    CBC w AUTO Differential      Other Visit Diagnoses     Medicare annual wellness visit, subsequent    -  Primary    Essential hypertension        Stage 3 chronic kidney disease, unspecified whether stage 3a or 3b CKD ([facility])          Diagnoses       Codes Comments    Medicare annual wellness visit, subsequent    -  Primary ICD-10-CM: Z00.00  ICD-9-CM: V70.0     Controlled type 2 diabetes mellitus with stage 3 chronic kidney disease, without long-term current use of insulin ([facility])      ICD-10-CM: E11.22, N18.30  ICD-9-CM: 250.40, 585.3     Essential hypertension     ICD-10-CM: I10  ICD-9-CM: 401.9     Stage 3 chronic kidney disease, unspecified whether stage 3a or 3b CKD (HCC)     ICD-10-CM: N18.30  ICD-9-CM: 585.3         I am not sure why his blood pressure is a little higher of late, given the weight loss and likely good diabetes control  I will check some labs and see if we can identify the source and I will adjust his medication plan as indicated  I will see him back no later than 6 months  He is to call for new concerns in the interim  He has taken all 3 shots for Covid and was advised he is safe to exercise publicly if he so chooses  We also did discuss salt reduction in his diet, though he is not a huge salt user

## 2021-12-28 NOTE — PROGRESS NOTES
The ABCs of the Annual Wellness Visit  Subsequent Medicare Wellness Visit    Chief Complaint   Patient presents with   • Medicare Wellness-subsequent     has all 3covid injecitons       Subjective    History of Present Illness:  Gabriel De Souza is a 76 y.o. male who presents for a Subsequent Medicare Wellness Visit.    The following portions of the patient's history were reviewed and   updated as appropriate: allergies, current medications, past family history, past medical history, past social history, past surgical history and problem list.    Compared to one year ago, the patient feels his physical   health is the same.    Compared to one year ago, the patient feels his mental   health is the same.    Recent Hospitalizations:  He was not admitted to the hospital during the last year.       Current Medical Providers:  Patient Care Team:  Waylon Johnson MD as PCP - General  ChemchiriLali monson MD as Consulting Physician (Cardiology)    Outpatient Medications Prior to Visit   Medication Sig Dispense Refill   • amLODIPine (NORVASC) 5 MG tablet TAKE 1 TABLET BY MOUTH  DAILY 90 tablet 3   • aspirin 81 MG tablet ASPIRIN 81 MG ORAL TABLET     • atenolol (TENORMIN) 50 MG tablet TAKE 1 TABLET BY MOUTH  TWICE DAILY 180 tablet 3   • betamethasone dipropionate 0.05 % cream Apply  topically to the appropriate area as directed 2 (Two) Times a Day. 45 g 1   • Blood Glucose Monitoring Suppl (ONE TOUCH ULTRA 2) w/Device kit ONETOUCH ULTRA 2 w/Device KIT     • enalapril (VASOTEC) 20 MG tablet TAKE 1 TABLET BY MOUTH  TWICE DAILY 180 tablet 3   • glimepiride (AMARYL) 2 MG tablet TAKE 1 TABLET BY MOUTH  TWICE DAILY 180 tablet 3   • glucose blood (OneTouch Ultra) test strip Test once daily DX:  e11.65 200 each 5   • hydroCHLOROthiazide (HYDRODIURIL) 50 MG tablet TAKE 1 TABLET BY MOUTH  DAILY 90 tablet 3   • metFORMIN (GLUCOPHAGE) 500 MG tablet TAKE 1 TABLET BY MOUTH 4  TIMES DAILY WITH MEALS 360 tablet 3   • niacin 500 MG  tablet NIACIN 500 MG TABS     • potassium chloride (K-DUR,KLOR-CON) 20 MEQ CR tablet TAKE 1 TABLET BY MOUTH  DAILY 90 tablet 3   • simvastatin (ZOCOR) 20 MG tablet TAKE 1 TABLET BY MOUTH  DAILY 90 tablet 3     No facility-administered medications prior to visit.       No opioid medication identified on active medication list. I have reviewed chart for other potential  high risk medication/s and harmful drug interactions in the elderly.          Aspirin is on active medication list. Aspirin use is indicated based on review of current medical condition/s. Pros and cons of this therapy have been discussed today. Benefits of this medication outweigh potential harm.  Patient has been encouraged to continue taking this medication.  .      Patient Active Problem List   Diagnosis   • Angina pectoris (HCC)   • Chronic coronary artery disease   • Dyslipidemia   • Hypertension   • Sinus bradycardia   • Diabetes mellitus type II, controlled (HCC)   • Encounter for screening for malignant neoplasm of prostate   • Hip pain, right     Advance Care Planning  Advance Directive is on file.  ACP discussion was held with the patient during this visit. Patient has an advance directive in EMR which is still valid.           Objective    Vitals:    21 0822   BP: 160/71   BP Location: Left arm   Patient Position: Sitting   Cuff Size: Large Adult   Pulse: 67   Temp: 97.1 °F (36.2 °C)   TempSrc: Temporal   SpO2: 99%   Weight: 99.3 kg (219 lb)     BMI Readings from Last 1 Encounters:   21 28.89 kg/m²   BMI is above normal parameters. Recommendations include: exercise counseling and nutrition counseling    Does the patient have evidence of cognitive impairment? No    Physical Exam            HEALTH RISK ASSESSMENT    Smoking Status:  Social History     Tobacco Use   Smoking Status Former Smoker   • Quit date:    • Years since quittin.0   Smokeless Tobacco Never Used     Alcohol Consumption:  Social History     Substance  and Sexual Activity   Alcohol Use No     Fall Risk Screen:    ANUJ Fall Risk Assessment has not been completed.    Depression Screening:  PHQ-2/PHQ-9 Depression Screening 12/28/2021   Little interest or pleasure in doing things 0   Feeling down, depressed, or hopeless 0   Total Score 0       Health Habits and Functional and Cognitive Screening:  Functional & Cognitive Status 12/28/2021   Do you have difficulty preparing food and eating? No   Do you have difficulty bathing yourself, getting dressed or grooming yourself? No   Do you have difficulty using the toilet? No   Do you have difficulty moving around from place to place? No   Do you have trouble with steps or getting out of a bed or a chair? No   Current Diet Well Balanced Diet   Dental Exam Not up to date   Eye Exam Not up to date   Exercise (times per week) 7 times per week   Current Exercises Include Weightlifting;Home Fitness Gym   Do you need help using the phone?  No   Are you deaf or do you have serious difficulty hearing?  Yes   Do you need help with transportation? No   Do you need help shopping? No   Do you need help preparing meals?  No   Do you need help with housework?  No   Do you need help with laundry? No   Do you need help taking your medications? No   Do you need help managing money? No   Do you ever drive or ride in a car without wearing a seat belt? No   Have you felt unusual stress, anger or loneliness in the last month? No   Who do you live with? Spouse   If you need help, do you have trouble finding someone available to you? No   Have you been bothered in the last four weeks by sexual problems? No   Do you have difficulty concentrating, remembering or making decisions? No       Age-appropriate Screening Schedule:  Refer to the list below for future screening recommendations based on patient's age, sex and/or medical conditions. Orders for these recommended tests are listed in the plan section. The patient has been provided with a  written plan.    Health Maintenance   Topic Date Due   • TDAP/TD VACCINES (1 - Tdap) Never done   • ZOSTER VACCINE (1 of 2) Never done   • HEMOGLOBIN A1C  12/28/2021   • DIABETIC EYE EXAM  03/23/2022   • DIABETIC FOOT EXAM  06/28/2022   • URINE MICROALBUMIN  09/07/2022   • INFLUENZA VACCINE  Completed              Assessment/Plan   CMS Preventative Services Quick Reference  Risk Factors Identified During Encounter  Cardiovascular Disease  Inadequate Social Support, Isolation, Loneliness, Lack of Transportation, Financial Difficulties, or Caregiver Stress   Inactivity/Sedentary  The above risks/problems have been discussed with the patient.  Follow up actions/plans if indicated are seen below in the Assessment/Plan Section.  Pertinent information has been shared with the patient in the After Visit Summary.    Diagnoses and all orders for this visit:    1. Medicare annual wellness visit, subsequent (Primary)    2. Controlled type 2 diabetes mellitus with stage 3 chronic kidney disease, without long-term current use of insulin (HCC)    3. Essential hypertension    4. Stage 3 chronic kidney disease, unspecified whether stage 3a or 3b CKD (HCC)        Follow Up:   No follow-ups on file.     An After Visit Summary and PPPS were made available to the patient.        I spent 40 minutes caring for Gabriel on this date of service. This time includes time spent by me in the following activities:obtaining and/or reviewing a separately obtained history, performing a medically appropriate examination and/or evaluation , counseling and educating the patient/family/caregiver, ordering medications, tests, or procedures, documenting information in the medical record and independently interpreting results and communicating that information with the patient/family/caregiver

## 2022-02-18 ENCOUNTER — LAB (OUTPATIENT)
Dept: LAB | Facility: HOSPITAL | Age: 77
End: 2022-02-18

## 2022-02-18 ENCOUNTER — TRANSCRIBE ORDERS (OUTPATIENT)
Dept: ADMINISTRATIVE | Facility: HOSPITAL | Age: 77
End: 2022-02-18

## 2022-02-18 DIAGNOSIS — N18.32 CHRONIC KIDNEY DISEASE (CKD) STAGE G3B/A1, MODERATELY DECREASED GLOMERULAR FILTRATION RATE (GFR) BETWEEN 30-44 ML/MIN/1.73 SQUARE METER AND ALBUMINURIA CREATININE RATIO LESS THAN 30 MG/G (CMS/H*: Primary | ICD-10-CM

## 2022-02-18 DIAGNOSIS — N18.32 CHRONIC KIDNEY DISEASE (CKD) STAGE G3B/A1, MODERATELY DECREASED GLOMERULAR FILTRATION RATE (GFR) BETWEEN 30-44 ML/MIN/1.73 SQUARE METER AND ALBUMINURIA CREATININE RATIO LESS THAN 30 MG/G (CMS/H*: ICD-10-CM

## 2022-02-18 LAB
ALBUMIN UR-MCNC: 5.4 MG/DL
ANION GAP SERPL CALCULATED.3IONS-SCNC: 14.2 MMOL/L (ref 5–15)
BUN SERPL-MCNC: 50 MG/DL (ref 8–23)
BUN/CREAT SERPL: 15.7 (ref 7–25)
CALCIUM SPEC-SCNC: 9.6 MG/DL (ref 8.6–10.5)
CHLORIDE SERPL-SCNC: 103 MMOL/L (ref 98–107)
CO2 SERPL-SCNC: 22.8 MMOL/L (ref 22–29)
CREAT SERPL-MCNC: 3.18 MG/DL (ref 0.76–1.27)
CREAT UR-MCNC: 52.5 MG/DL
GFR SERPL CREATININE-BSD FRML MDRD: 19 ML/MIN/1.73
GLUCOSE SERPL-MCNC: 89 MG/DL (ref 65–99)
MICROALBUMIN/CREAT UR: 102.9 MG/G
POTASSIUM SERPL-SCNC: 4.1 MMOL/L (ref 3.5–5.2)
SODIUM SERPL-SCNC: 140 MMOL/L (ref 136–145)

## 2022-02-18 PROCEDURE — 82043 UR ALBUMIN QUANTITATIVE: CPT

## 2022-02-18 PROCEDURE — 36415 COLL VENOUS BLD VENIPUNCTURE: CPT

## 2022-02-18 PROCEDURE — 80048 BASIC METABOLIC PNL TOTAL CA: CPT

## 2022-02-18 PROCEDURE — 82570 ASSAY OF URINE CREATININE: CPT

## 2022-03-08 ENCOUNTER — TRANSCRIBE ORDERS (OUTPATIENT)
Dept: ADMINISTRATIVE | Facility: HOSPITAL | Age: 77
End: 2022-03-08

## 2022-03-08 DIAGNOSIS — N18.32 CHRONIC KIDNEY DISEASE (CKD) STAGE G3B/A1, MODERATELY DECREASED GLOMERULAR FILTRATION RATE (GFR) BETWEEN 30-44 ML/MIN/1.73 SQUARE METER AND ALBUMINURIA CREATININE RATIO LESS THAN 30 MG/G (CMS/H*: Primary | ICD-10-CM

## 2022-03-21 ENCOUNTER — HOSPITAL ENCOUNTER (OUTPATIENT)
Dept: ULTRASOUND IMAGING | Facility: HOSPITAL | Age: 77
Discharge: HOME | End: 2022-03-21

## 2022-03-21 ENCOUNTER — TRANSCRIBE ORDERS (OUTPATIENT)
Dept: ADMINISTRATIVE | Facility: HOSPITAL | Age: 77
End: 2022-03-21

## 2022-03-21 ENCOUNTER — LAB (OUTPATIENT)
Dept: LAB | Facility: HOSPITAL | Age: 77
End: 2022-03-21

## 2022-03-21 DIAGNOSIS — N17.8 ACUTE RENAL FAILURE WITH PATHOLOGICAL LESION IN KIDNEY: Primary | ICD-10-CM

## 2022-03-21 DIAGNOSIS — N18.32 CHRONIC KIDNEY DISEASE (CKD) STAGE G3B/A1, MODERATELY DECREASED GLOMERULAR FILTRATION RATE (GFR) BETWEEN 30-44 ML/MIN/1.73 SQUARE METER AND ALBUMINURIA CREATININE RATIO LESS THAN 30 MG/G (CMS/H*: ICD-10-CM

## 2022-03-21 DIAGNOSIS — N17.8 ACUTE RENAL FAILURE WITH PATHOLOGICAL LESION IN KIDNEY: ICD-10-CM

## 2022-03-21 LAB
ANION GAP SERPL CALCULATED.3IONS-SCNC: 15.1 MMOL/L (ref 5–15)
BACTERIA UR QL AUTO: NORMAL /HPF
BASOPHILS # BLD AUTO: 0.04 10*3/MM3 (ref 0–0.2)
BASOPHILS NFR BLD AUTO: 0.4 % (ref 0–1.5)
BILIRUB UR QL STRIP: NEGATIVE
BUN SERPL-MCNC: 44 MG/DL (ref 8–23)
BUN/CREAT SERPL: 12.4 (ref 7–25)
CALCIUM SPEC-SCNC: 9.9 MG/DL (ref 8.6–10.5)
CHLORIDE SERPL-SCNC: 103 MMOL/L (ref 98–107)
CLARITY UR: CLEAR
CO2 SERPL-SCNC: 22.9 MMOL/L (ref 22–29)
COLOR UR: YELLOW
CREAT SERPL-MCNC: 3.54 MG/DL (ref 0.76–1.27)
CREAT UR-MCNC: 51.9 MG/DL
DEPRECATED RDW RBC AUTO: 43.9 FL (ref 37–54)
EGFRCR SERPLBLD CKD-EPI 2021: 17.2 ML/MIN/1.73
EOSINOPHIL # BLD AUTO: 0.19 10*3/MM3 (ref 0–0.4)
EOSINOPHIL NFR BLD AUTO: 2 % (ref 0.3–6.2)
ERYTHROCYTE [DISTWIDTH] IN BLOOD BY AUTOMATED COUNT: 13.1 % (ref 12.3–15.4)
GLUCOSE SERPL-MCNC: 82 MG/DL (ref 65–99)
GLUCOSE UR STRIP-MCNC: NEGATIVE MG/DL
HCT VFR BLD AUTO: 34.2 % (ref 37.5–51)
HGB BLD-MCNC: 11.6 G/DL (ref 13–17.7)
HGB UR QL STRIP.AUTO: NEGATIVE
HYALINE CASTS UR QL AUTO: NORMAL /LPF
IMM GRANULOCYTES # BLD AUTO: 0.03 10*3/MM3 (ref 0–0.05)
IMM GRANULOCYTES NFR BLD AUTO: 0.3 % (ref 0–0.5)
KETONES UR QL STRIP: NEGATIVE
LEUKOCYTE ESTERASE UR QL STRIP.AUTO: NEGATIVE
LYMPHOCYTES # BLD AUTO: 3.03 10*3/MM3 (ref 0.7–3.1)
LYMPHOCYTES NFR BLD AUTO: 31.3 % (ref 19.6–45.3)
MCH RBC QN AUTO: 31.2 PG (ref 26.6–33)
MCHC RBC AUTO-ENTMCNC: 33.9 G/DL (ref 31.5–35.7)
MCV RBC AUTO: 91.9 FL (ref 79–97)
MONOCYTES # BLD AUTO: 1.19 10*3/MM3 (ref 0.1–0.9)
MONOCYTES NFR BLD AUTO: 12.3 % (ref 5–12)
NEUTROPHILS NFR BLD AUTO: 5.21 10*3/MM3 (ref 1.7–7)
NEUTROPHILS NFR BLD AUTO: 53.7 % (ref 42.7–76)
NITRITE UR QL STRIP: NEGATIVE
NRBC BLD AUTO-RTO: 0 /100 WBC (ref 0–0.2)
PH UR STRIP.AUTO: 6.5 [PH] (ref 5–8)
PLATELET # BLD AUTO: 334 10*3/MM3 (ref 140–450)
PMV BLD AUTO: 10.4 FL (ref 6–12)
POTASSIUM SERPL-SCNC: 4.5 MMOL/L (ref 3.5–5.2)
PROT ?TM UR-MCNC: 25.9 MG/DL
PROT UR QL STRIP: ABNORMAL
PROT/CREAT UR: 499 MG/G CREA (ref 0–200)
RBC # BLD AUTO: 3.72 10*6/MM3 (ref 4.14–5.8)
RBC # UR STRIP: NORMAL /HPF
REF LAB TEST METHOD: NORMAL
SODIUM SERPL-SCNC: 141 MMOL/L (ref 136–145)
SP GR UR STRIP: 1.01 (ref 1–1.03)
SQUAMOUS #/AREA URNS HPF: NORMAL /HPF
UROBILINOGEN UR QL STRIP: ABNORMAL
WBC # UR STRIP: NORMAL /HPF
WBC NRBC COR # BLD: 9.69 10*3/MM3 (ref 3.4–10.8)

## 2022-03-21 PROCEDURE — 80048 BASIC METABOLIC PNL TOTAL CA: CPT

## 2022-03-21 PROCEDURE — 36415 COLL VENOUS BLD VENIPUNCTURE: CPT

## 2022-03-21 PROCEDURE — 81001 URINALYSIS AUTO W/SCOPE: CPT

## 2022-03-21 PROCEDURE — 84155 ASSAY OF PROTEIN SERUM: CPT

## 2022-03-21 PROCEDURE — 84156 ASSAY OF PROTEIN URINE: CPT

## 2022-03-21 PROCEDURE — 86334 IMMUNOFIX E-PHORESIS SERUM: CPT

## 2022-03-21 PROCEDURE — 76775 US EXAM ABDO BACK WALL LIM: CPT

## 2022-03-21 PROCEDURE — 82570 ASSAY OF URINE CREATININE: CPT

## 2022-03-21 PROCEDURE — 85025 COMPLETE CBC W/AUTO DIFF WBC: CPT

## 2022-03-21 PROCEDURE — 84165 PROTEIN E-PHORESIS SERUM: CPT

## 2022-03-21 PROCEDURE — 82784 ASSAY IGA/IGD/IGG/IGM EACH: CPT

## 2022-03-22 LAB
ALBUMIN SERPL ELPH-MCNC: 3.6 G/DL (ref 2.9–4.4)
ALBUMIN/GLOB SERPL: 1 {RATIO} (ref 0.7–1.7)
ALPHA1 GLOB SERPL ELPH-MCNC: 0.2 G/DL (ref 0–0.4)
ALPHA2 GLOB SERPL ELPH-MCNC: 0.9 G/DL (ref 0.4–1)
B-GLOBULIN SERPL ELPH-MCNC: 1.1 G/DL (ref 0.7–1.3)
GAMMA GLOB SERPL ELPH-MCNC: 1.3 G/DL (ref 0.4–1.8)
GLOBULIN SER CALC-MCNC: 3.5 G/DL (ref 2.2–3.9)
IGA SERPL-MCNC: 202 MG/DL (ref 61–437)
IGG SERPL-MCNC: 1253 MG/DL (ref 603–1613)
IGM SERPL-MCNC: 99 MG/DL (ref 15–143)
LABORATORY COMMENT REPORT: NORMAL
M PROTEIN SERPL ELPH-MCNC: NORMAL G/DL
PROT PATTERN SERPL IFE-IMP: NORMAL
PROT SERPL-MCNC: 7.1 G/DL (ref 6–8.5)

## 2022-04-26 ENCOUNTER — TRANSCRIBE ORDERS (OUTPATIENT)
Dept: ADMINISTRATIVE | Facility: HOSPITAL | Age: 77
End: 2022-04-26

## 2022-04-26 ENCOUNTER — LAB (OUTPATIENT)
Dept: LAB | Facility: HOSPITAL | Age: 77
End: 2022-04-26

## 2022-04-26 DIAGNOSIS — N18.4 CHRONIC KIDNEY DISEASE, STAGE IV (SEVERE): ICD-10-CM

## 2022-04-26 DIAGNOSIS — R80.9 PROTEINURIA, UNSPECIFIED TYPE: ICD-10-CM

## 2022-04-26 DIAGNOSIS — D63.1 ANEMIA SECONDARY TO RENAL FAILURE: ICD-10-CM

## 2022-04-26 DIAGNOSIS — E11.29 TYPE II DIABETES MELLITUS WITH RENAL MANIFESTATIONS NOT AT GOAL: ICD-10-CM

## 2022-04-26 DIAGNOSIS — I12.9 HYPERTENSIVE NEPHROPATHY: ICD-10-CM

## 2022-04-26 DIAGNOSIS — N18.9 ANEMIA SECONDARY TO RENAL FAILURE: ICD-10-CM

## 2022-04-26 DIAGNOSIS — N18.4 CHRONIC KIDNEY DISEASE, STAGE IV (SEVERE): Primary | ICD-10-CM

## 2022-04-26 LAB
25(OH)D3 SERPL-MCNC: 29.8 NG/ML (ref 30–100)
ALBUMIN SERPL-MCNC: 4.1 G/DL (ref 3.5–5.2)
ANION GAP SERPL CALCULATED.3IONS-SCNC: 16.9 MMOL/L (ref 5–15)
BUN SERPL-MCNC: 49 MG/DL (ref 8–23)
BUN/CREAT SERPL: 12.2 (ref 7–25)
CALCIUM SPEC-SCNC: 9.6 MG/DL (ref 8.6–10.5)
CHLORIDE SERPL-SCNC: 100 MMOL/L (ref 98–107)
CO2 SERPL-SCNC: 21.1 MMOL/L (ref 22–29)
CREAT SERPL-MCNC: 4.02 MG/DL (ref 0.76–1.27)
CREAT UR-MCNC: 37.7 MG/DL
DEPRECATED RDW RBC AUTO: 40.6 FL (ref 37–54)
EGFRCR SERPLBLD CKD-EPI 2021: 14.8 ML/MIN/1.73
ERYTHROCYTE [DISTWIDTH] IN BLOOD BY AUTOMATED COUNT: 12.1 % (ref 12.3–15.4)
GLUCOSE SERPL-MCNC: 120 MG/DL (ref 65–99)
HCT VFR BLD AUTO: 33.7 % (ref 37.5–51)
HGB BLD-MCNC: 11.2 G/DL (ref 13–17.7)
MCH RBC QN AUTO: 30.5 PG (ref 26.6–33)
MCHC RBC AUTO-ENTMCNC: 33.2 G/DL (ref 31.5–35.7)
MCV RBC AUTO: 91.8 FL (ref 79–97)
PHOSPHATE SERPL-MCNC: 5 MG/DL (ref 2.5–4.5)
PLATELET # BLD AUTO: 290 10*3/MM3 (ref 140–450)
PMV BLD AUTO: 10.5 FL (ref 6–12)
POTASSIUM SERPL-SCNC: 4.4 MMOL/L (ref 3.5–5.2)
PROT ?TM UR-MCNC: 18.7 MG/DL
PROT/CREAT UR: 496 MG/G CREA (ref 0–200)
PTH-INTACT SERPL-MCNC: 167 PG/ML (ref 15–65)
RBC # BLD AUTO: 3.67 10*6/MM3 (ref 4.14–5.8)
SODIUM SERPL-SCNC: 138 MMOL/L (ref 136–145)
WBC NRBC COR # BLD: 10 10*3/MM3 (ref 3.4–10.8)

## 2022-04-26 PROCEDURE — 80069 RENAL FUNCTION PANEL: CPT

## 2022-04-26 PROCEDURE — 83970 ASSAY OF PARATHORMONE: CPT

## 2022-04-26 PROCEDURE — 82570 ASSAY OF URINE CREATININE: CPT

## 2022-04-26 PROCEDURE — 85027 COMPLETE CBC AUTOMATED: CPT

## 2022-04-26 PROCEDURE — 36415 COLL VENOUS BLD VENIPUNCTURE: CPT

## 2022-04-26 PROCEDURE — 82306 VITAMIN D 25 HYDROXY: CPT

## 2022-04-26 PROCEDURE — 84156 ASSAY OF PROTEIN URINE: CPT

## 2022-05-13 RX ORDER — SIMVASTATIN 20 MG
TABLET ORAL
Qty: 90 TABLET | Refills: 3 | Status: SHIPPED | OUTPATIENT
Start: 2022-05-13

## 2022-05-31 ENCOUNTER — LAB (OUTPATIENT)
Dept: LAB | Facility: HOSPITAL | Age: 77
End: 2022-05-31

## 2022-05-31 ENCOUNTER — TRANSCRIBE ORDERS (OUTPATIENT)
Dept: ADMINISTRATIVE | Facility: HOSPITAL | Age: 77
End: 2022-05-31

## 2022-05-31 DIAGNOSIS — D63.1 ANEMIA IN END-STAGE RENAL DISEASE: ICD-10-CM

## 2022-05-31 DIAGNOSIS — N18.6 ANEMIA IN END-STAGE RENAL DISEASE: ICD-10-CM

## 2022-05-31 DIAGNOSIS — N25.81 SECONDARY HYPERPARATHYROIDISM OF RENAL ORIGIN: ICD-10-CM

## 2022-05-31 DIAGNOSIS — I10 BENIGN HYPERTENSION: ICD-10-CM

## 2022-05-31 DIAGNOSIS — N18.4 CHRONIC KIDNEY DISEASE, STAGE IV (SEVERE): Primary | ICD-10-CM

## 2022-05-31 DIAGNOSIS — N18.4 CHRONIC KIDNEY DISEASE, STAGE IV (SEVERE): ICD-10-CM

## 2022-05-31 LAB
ALBUMIN SERPL-MCNC: 4.5 G/DL (ref 3.5–5.2)
ALBUMIN UR-MCNC: 2.3 MG/DL
ANION GAP SERPL CALCULATED.3IONS-SCNC: 14.6 MMOL/L (ref 5–15)
BUN SERPL-MCNC: 59 MG/DL (ref 8–23)
BUN/CREAT SERPL: 14.6 (ref 7–25)
CALCIUM SPEC-SCNC: 10 MG/DL (ref 8.6–10.5)
CHLORIDE SERPL-SCNC: 102 MMOL/L (ref 98–107)
CO2 SERPL-SCNC: 24.4 MMOL/L (ref 22–29)
CREAT SERPL-MCNC: 4.03 MG/DL (ref 0.76–1.27)
CREAT UR-MCNC: 30 MG/DL
DEPRECATED RDW RBC AUTO: 37.3 FL (ref 37–54)
EGFRCR SERPLBLD CKD-EPI 2021: 14.7 ML/MIN/1.73
ERYTHROCYTE [DISTWIDTH] IN BLOOD BY AUTOMATED COUNT: 11.7 % (ref 12.3–15.4)
GLUCOSE SERPL-MCNC: 119 MG/DL (ref 65–99)
HCT VFR BLD AUTO: 36 % (ref 37.5–51)
HGB BLD-MCNC: 12 G/DL (ref 13–17.7)
MCH RBC QN AUTO: 29.2 PG (ref 26.6–33)
MCHC RBC AUTO-ENTMCNC: 33.3 G/DL (ref 31.5–35.7)
MCV RBC AUTO: 87.6 FL (ref 79–97)
MICROALBUMIN/CREAT UR: 76.7 MG/G
PHOSPHATE SERPL-MCNC: 5.1 MG/DL (ref 2.5–4.5)
PLATELET # BLD AUTO: 388 10*3/MM3 (ref 140–450)
PMV BLD AUTO: 10.4 FL (ref 6–12)
POTASSIUM SERPL-SCNC: 3.9 MMOL/L (ref 3.5–5.2)
RBC # BLD AUTO: 4.11 10*6/MM3 (ref 4.14–5.8)
SODIUM SERPL-SCNC: 141 MMOL/L (ref 136–145)
WBC NRBC COR # BLD: 9.46 10*3/MM3 (ref 3.4–10.8)

## 2022-05-31 PROCEDURE — 85027 COMPLETE CBC AUTOMATED: CPT

## 2022-05-31 PROCEDURE — 82570 ASSAY OF URINE CREATININE: CPT

## 2022-05-31 PROCEDURE — 80069 RENAL FUNCTION PANEL: CPT

## 2022-05-31 PROCEDURE — 82043 UR ALBUMIN QUANTITATIVE: CPT

## 2022-05-31 PROCEDURE — 36415 COLL VENOUS BLD VENIPUNCTURE: CPT

## 2022-07-06 ENCOUNTER — LAB (OUTPATIENT)
Dept: FAMILY MEDICINE CLINIC | Facility: CLINIC | Age: 77
End: 2022-07-06

## 2022-07-06 ENCOUNTER — OFFICE VISIT (OUTPATIENT)
Dept: FAMILY MEDICINE CLINIC | Facility: CLINIC | Age: 77
End: 2022-07-06

## 2022-07-06 VITALS
WEIGHT: 223 LBS | RESPIRATION RATE: 16 BRPM | HEART RATE: 93 BPM | BODY MASS INDEX: 29.55 KG/M2 | TEMPERATURE: 98 F | DIASTOLIC BLOOD PRESSURE: 65 MMHG | OXYGEN SATURATION: 99 % | SYSTOLIC BLOOD PRESSURE: 126 MMHG | HEIGHT: 73 IN

## 2022-07-06 DIAGNOSIS — E11.22 CONTROLLED TYPE 2 DIABETES MELLITUS WITH STAGE 3 CHRONIC KIDNEY DISEASE, WITHOUT LONG-TERM CURRENT USE OF INSULIN: Primary | ICD-10-CM

## 2022-07-06 DIAGNOSIS — N18.30 CONTROLLED TYPE 2 DIABETES MELLITUS WITH STAGE 3 CHRONIC KIDNEY DISEASE, WITHOUT LONG-TERM CURRENT USE OF INSULIN: Primary | ICD-10-CM

## 2022-07-06 DIAGNOSIS — N18.30 STAGE 3 CHRONIC KIDNEY DISEASE, UNSPECIFIED WHETHER STAGE 3A OR 3B CKD: ICD-10-CM

## 2022-07-06 DIAGNOSIS — I10 ESSENTIAL HYPERTENSION: ICD-10-CM

## 2022-07-06 LAB
ALBUMIN SERPL-MCNC: 4.4 G/DL (ref 3.5–5.2)
ALBUMIN/GLOB SERPL: 1.3 G/DL
ALP SERPL-CCNC: 103 U/L (ref 39–117)
ALT SERPL W P-5'-P-CCNC: 9 U/L (ref 1–41)
ANION GAP SERPL CALCULATED.3IONS-SCNC: 14.6 MMOL/L (ref 5–15)
AST SERPL-CCNC: 9 U/L (ref 1–40)
BASOPHILS # BLD AUTO: 0.03 10*3/MM3 (ref 0–0.2)
BASOPHILS NFR BLD AUTO: 0.3 % (ref 0–1.5)
BILIRUB SERPL-MCNC: 0.4 MG/DL (ref 0–1.2)
BUN SERPL-MCNC: 51 MG/DL (ref 8–23)
BUN/CREAT SERPL: 13 (ref 7–25)
CALCIUM SPEC-SCNC: 10.6 MG/DL (ref 8.6–10.5)
CHLORIDE SERPL-SCNC: 102 MMOL/L (ref 98–107)
CHOLEST SERPL-MCNC: 124 MG/DL (ref 0–200)
CO2 SERPL-SCNC: 25.4 MMOL/L (ref 22–29)
CREAT SERPL-MCNC: 3.92 MG/DL (ref 0.76–1.27)
DEPRECATED RDW RBC AUTO: 38.9 FL (ref 37–54)
EGFRCR SERPLBLD CKD-EPI 2021: 15.1 ML/MIN/1.73
EOSINOPHIL # BLD AUTO: 0.21 10*3/MM3 (ref 0–0.4)
EOSINOPHIL NFR BLD AUTO: 1.9 % (ref 0.3–6.2)
ERYTHROCYTE [DISTWIDTH] IN BLOOD BY AUTOMATED COUNT: 12.2 % (ref 12.3–15.4)
GLOBULIN UR ELPH-MCNC: 3.5 GM/DL
GLUCOSE SERPL-MCNC: 73 MG/DL (ref 65–99)
HCT VFR BLD AUTO: 35.7 % (ref 37.5–51)
HDLC SERPL-MCNC: 28 MG/DL (ref 40–60)
HGB BLD-MCNC: 11.6 G/DL (ref 13–17.7)
IMM GRANULOCYTES # BLD AUTO: 0.03 10*3/MM3 (ref 0–0.05)
IMM GRANULOCYTES NFR BLD AUTO: 0.3 % (ref 0–0.5)
LDLC SERPL CALC-MCNC: 77 MG/DL (ref 0–100)
LDLC/HDLC SERPL: 2.71 {RATIO}
LYMPHOCYTES # BLD AUTO: 3.33 10*3/MM3 (ref 0.7–3.1)
LYMPHOCYTES NFR BLD AUTO: 30.1 % (ref 19.6–45.3)
MCH RBC QN AUTO: 28.5 PG (ref 26.6–33)
MCHC RBC AUTO-ENTMCNC: 32.5 G/DL (ref 31.5–35.7)
MCV RBC AUTO: 87.7 FL (ref 79–97)
MONOCYTES # BLD AUTO: 1.02 10*3/MM3 (ref 0.1–0.9)
MONOCYTES NFR BLD AUTO: 9.2 % (ref 5–12)
NEUTROPHILS NFR BLD AUTO: 58.2 % (ref 42.7–76)
NEUTROPHILS NFR BLD AUTO: 6.45 10*3/MM3 (ref 1.7–7)
NRBC BLD AUTO-RTO: 0 /100 WBC (ref 0–0.2)
PLATELET # BLD AUTO: 309 10*3/MM3 (ref 140–450)
PMV BLD AUTO: 10.3 FL (ref 6–12)
POTASSIUM SERPL-SCNC: 4.1 MMOL/L (ref 3.5–5.2)
PROT SERPL-MCNC: 7.9 G/DL (ref 6–8.5)
RBC # BLD AUTO: 4.07 10*6/MM3 (ref 4.14–5.8)
SODIUM SERPL-SCNC: 142 MMOL/L (ref 136–145)
TRIGL SERPL-MCNC: 100 MG/DL (ref 0–150)
VLDLC SERPL-MCNC: 19 MG/DL (ref 5–40)
WBC NRBC COR # BLD: 11.07 10*3/MM3 (ref 3.4–10.8)

## 2022-07-06 PROCEDURE — 36415 COLL VENOUS BLD VENIPUNCTURE: CPT | Performed by: FAMILY MEDICINE

## 2022-07-06 PROCEDURE — 85025 COMPLETE CBC W/AUTO DIFF WBC: CPT | Performed by: FAMILY MEDICINE

## 2022-07-06 PROCEDURE — 80053 COMPREHEN METABOLIC PANEL: CPT | Performed by: FAMILY MEDICINE

## 2022-07-06 PROCEDURE — 99214 OFFICE O/P EST MOD 30 MIN: CPT | Performed by: FAMILY MEDICINE

## 2022-07-06 PROCEDURE — 83036 HEMOGLOBIN GLYCOSYLATED A1C: CPT | Performed by: FAMILY MEDICINE

## 2022-07-06 PROCEDURE — 80061 LIPID PANEL: CPT | Performed by: FAMILY MEDICINE

## 2022-07-06 RX ORDER — AMLODIPINE BESYLATE 10 MG/1
TABLET ORAL
COMMUNITY
Start: 2022-06-21

## 2022-07-06 RX ORDER — DULOXETIN HYDROCHLORIDE 30 MG/1
CAPSULE, DELAYED RELEASE ORAL
COMMUNITY
Start: 2022-06-27 | End: 2022-07-06

## 2022-07-06 RX ORDER — TORSEMIDE 20 MG/1
20 TABLET ORAL DAILY
COMMUNITY
Start: 2022-05-05

## 2022-07-06 RX ORDER — CALCITRIOL 0.25 UG/1
0.25 CAPSULE, LIQUID FILLED ORAL EVERY OTHER DAY
COMMUNITY
Start: 2022-05-03

## 2022-07-06 RX ORDER — NIACIN 1000 MG
1 TABLET, EXTENDED RELEASE ORAL DAILY
COMMUNITY

## 2022-07-06 RX ORDER — ASPIRIN 81 MG/1
1 TABLET ORAL DAILY
COMMUNITY
End: 2022-07-06 | Stop reason: SDUPTHER

## 2022-07-06 NOTE — PROGRESS NOTES
"Subjective   Gabriel De Souza is a 77 y.o. male.     Gabriel De Souza is in for follow up on his chronic issues with diabetes and high blood pressure and his chronic renal issues which are getting worse.  He is going to be operated on to create a dialysis fistula soon.. There is no history of chest pain or dyspnea of late. There is no history of issue with bowel or bladder function. There is no history of vision or hearing problems. There is no history of dizziness or lightheadedness. There is no history of issue with sleep or mood. As for checking blood sugar readings, he does see some low readings when he takes the glimepiride twice a day. There is no issue with medication compliance. Diet and exercise compliance has been terrible because he only eats once a day and he does not exercise because of some left ankle pain right now.    Hypertension  This is a chronic problem. The current episode started more than 1 year ago. The problem is unchanged. The problem is controlled. Associated symptoms include peripheral edema. Pertinent negatives include no blurred vision, chest pain, headaches, malaise/fatigue, neck pain, orthopnea, palpitations, PND, shortness of breath or sweats. There are no associated agents to hypertension. Risk factors for coronary artery disease include diabetes mellitus, dyslipidemia and family history. There are no compliance problems.           /65 (BP Location: Left arm, Patient Position: Sitting, Cuff Size: Adult)   Pulse 93   Temp 98 °F (36.7 °C) (Temporal)   Resp 16   Ht 185.4 cm (73\")   Wt 101 kg (223 lb)   SpO2 99%   BMI 29.42 kg/m²       Chief Complaint   Patient presents with   • Diabetes     FOLLOW UP           Current Outpatient Medications:   •  amLODIPine (NORVASC) 10 MG tablet, , Disp: , Rfl:   •  aspirin 81 MG tablet, ASPIRIN 81 MG ORAL TABLET, Disp: , Rfl:   •  atenolol (TENORMIN) 50 MG tablet, TAKE 1 TABLET BY MOUTH  TWICE DAILY, Disp: 180 tablet, Rfl: 3  •  calcitriol " (ROCALTROL) 0.25 MCG capsule, Take 0.25 mcg by mouth Every Other Day., Disp: , Rfl:   •  glimepiride (AMARYL) 2 MG tablet, TAKE 1 TABLET BY MOUTH  TWICE DAILY, Disp: 180 tablet, Rfl: 3  •  glucose blood (OneTouch Ultra) test strip, Test once daily DX:  e11.65, Disp: 200 each, Rfl: 5  •  Niacin ER 1000 MG tablet controlled-release, Take 1 tablet by mouth Daily., Disp: , Rfl:   •  simvastatin (ZOCOR) 20 MG tablet, TAKE 1 TABLET BY MOUTH  DAILY, Disp: 90 tablet, Rfl: 3  •  torsemide (DEMADEX) 20 MG tablet, Take 20 mg by mouth Daily., Disp: , Rfl:     Lab Results   Component Value Date    HGBA1C 5.8 (H) 12/28/2021    HGBA1C 5.6 06/28/2021    HGBA1C 5.7 (H) 12/28/2020     Lab Results   Component Value Date    MICROALBUR 2.3 05/31/2022    CREATININE 4.03 (H) 05/31/2022         Wt Readings from Last 3 Encounters:   07/06/22 101 kg (223 lb)   12/28/21 99.3 kg (219 lb)   09/17/21 101 kg (222 lb)       The following portions of the patient's history were reviewed and updated as appropriate: allergies, current medications, past family history, past medical history, past social history, past surgical history, and problem list.    Review of Systems   Constitutional: Positive for fatigue. Negative for activity change, fever and malaise/fatigue.   HENT: Negative for congestion, sinus pressure, sinus pain, sore throat and trouble swallowing.    Eyes: Negative for blurred vision and visual disturbance.   Respiratory: Negative for chest tightness, shortness of breath and wheezing.    Cardiovascular: Negative for chest pain, palpitations, orthopnea and PND.   Gastrointestinal: Negative for abdominal distention, abdominal pain, constipation, diarrhea, nausea and vomiting.   Endocrine: Negative for polydipsia, polyphagia and polyuria.   Genitourinary: Negative for difficulty urinating and dysuria.   Musculoskeletal: Negative for back pain and neck pain.   Skin: Negative for pallor.   Neurological: Negative for dizziness, tremors,  seizures, speech difficulty, weakness and headaches.   Psychiatric/Behavioral: Negative for agitation, confusion, hallucinations and suicidal ideas. The patient is not nervous/anxious.        Objective   Physical Exam  Vitals and nursing note reviewed.   Constitutional:       Appearance: Normal appearance.   Eyes:      Conjunctiva/sclera: Conjunctivae normal.      Pupils: Pupils are equal, round, and reactive to light.   Cardiovascular:      Rate and Rhythm: Normal rate and regular rhythm.      Heart sounds: Normal heart sounds. No murmur heard.  Pulmonary:      Effort: Pulmonary effort is normal.      Breath sounds: No wheezing or rales.   Abdominal:      General: Bowel sounds are normal. There is no distension.      Tenderness: There is no abdominal tenderness. There is no guarding.   Musculoskeletal:      Cervical back: Neck supple.      Right lower leg: No edema.      Left lower leg: No edema.      Comments: Left ankle pain from recent injury   Lymphadenopathy:      Cervical: No cervical adenopathy.   Neurological:      General: No focal deficit present.      Mental Status: He is alert and oriented to person, place, and time.   Psychiatric:         Mood and Affect: Mood normal.           Assessment & Plan   Problems Addressed this Visit        Endocrine and Metabolic    Diabetes mellitus type II, controlled ([facility]) - Primary    Relevant Orders    Comprehensive metabolic panel    Hemoglobin A1c    Lipid panel    CBC w AUTO Differential      Other Visit Diagnoses     Essential hypertension        Relevant Medications    amLODIPine (NORVASC) 10 MG tablet    torsemide (DEMADEX) 20 MG tablet    Stage 3 chronic kidney disease, unspecified whether stage 3a or 3b CKD (HCC)        Relevant Medications    torsemide (DEMADEX) 20 MG tablet      Diagnoses       Codes Comments    Controlled type 2 diabetes mellitus with stage 3 chronic kidney disease, without long-term current use of insulin (HCC)    -  Primary ICD-10-CM:  E11.22, N18.30  ICD-9-CM: 250.40, 585.3     Essential hypertension     ICD-10-CM: I10  ICD-9-CM: 401.9     Stage 3 chronic kidney disease, unspecified whether stage 3a or 3b CKD (HCC)     ICD-10-CM: N18.30  ICD-9-CM: 585.3           I will update some labs   I will ask him to modify his diet , avoid eating only once per day  I will see him back later in the year, after he sees renal again, as he may be moving toward dialysis         Answers for HPI/ROS submitted by the patient on 6/29/2022  What is the primary reason for your visit?: High Blood Pressure

## 2022-07-07 LAB — HBA1C MFR BLD: 6.6 % (ref 3.5–5.6)

## 2022-07-08 ENCOUNTER — TRANSCRIBE ORDERS (OUTPATIENT)
Dept: ADMINISTRATIVE | Facility: HOSPITAL | Age: 77
End: 2022-07-08

## 2022-07-08 DIAGNOSIS — N18.6 END STAGE RENAL DISEASE: Primary | ICD-10-CM

## 2022-07-09 ENCOUNTER — PATIENT MESSAGE (OUTPATIENT)
Dept: FAMILY MEDICINE CLINIC | Facility: CLINIC | Age: 77
End: 2022-07-09

## 2022-07-11 RX ORDER — HYDROCHLOROTHIAZIDE 50 MG/1
TABLET ORAL
Qty: 90 TABLET | Refills: 3 | OUTPATIENT
Start: 2022-07-11

## 2022-07-11 RX ORDER — ATENOLOL 50 MG/1
TABLET ORAL
Qty: 180 TABLET | Refills: 3 | Status: SHIPPED | OUTPATIENT
Start: 2022-07-11

## 2022-07-12 ENCOUNTER — APPOINTMENT (OUTPATIENT)
Dept: VASCULAR SURGERY | Facility: HOSPITAL | Age: 77
End: 2022-07-12

## 2022-07-14 ENCOUNTER — APPOINTMENT (OUTPATIENT)
Dept: CARDIOLOGY | Facility: HOSPITAL | Age: 77
End: 2022-07-14

## 2022-08-09 ENCOUNTER — APPOINTMENT (OUTPATIENT)
Dept: VASCULAR SURGERY | Facility: HOSPITAL | Age: 77
End: 2022-08-09

## 2022-08-09 ENCOUNTER — APPOINTMENT (OUTPATIENT)
Dept: CARDIOLOGY | Facility: HOSPITAL | Age: 77
End: 2022-08-09

## 2022-08-29 ENCOUNTER — APPOINTMENT (OUTPATIENT)
Dept: VASCULAR SURGERY | Facility: HOSPITAL | Age: 77
End: 2022-08-29

## 2022-08-29 ENCOUNTER — HOSPITAL ENCOUNTER (OUTPATIENT)
Dept: CARDIOLOGY | Facility: HOSPITAL | Age: 77
Discharge: HOME | End: 2022-08-29

## 2022-08-29 DIAGNOSIS — N18.6 END STAGE RENAL DISEASE: ICD-10-CM

## 2022-08-29 LAB
BH CV VAS MEAS BASILIC ANTECUBITAL FOSSA LEFT: 0.27 CM
BH CV VAS MEAS BASILIC ANTECUBITAL FOSSA RIGHT: 0.23 CM
BH CV VAS MEAS BASILIC FOREARM LEFT - DIST: 0.13 CM
BH CV VAS MEAS BASILIC FOREARM LEFT - MID: 0.14 CM
BH CV VAS MEAS BASILIC FOREARM LEFT - PROX: 0.17 CM
BH CV VAS MEAS BASILIC FOREARM RIGHT - DIST: 0.13 CM
BH CV VAS MEAS BASILIC FOREARM RIGHT - MID: 0.15 CM
BH CV VAS MEAS BASILIC FOREARM RIGHT - PROX: 0.15 CM
BH CV VAS MEAS BASILIC UPPER ARM LEFT - DIST: 0.4 CM
BH CV VAS MEAS BASILIC UPPER ARM LEFT - MID: 0.34 CM
BH CV VAS MEAS BASILIC UPPER ARM LEFT - PROX: 0.48 CM
BH CV VAS MEAS BASILIC UPPER ARM RIGHT - DIST: 0.45 CM
BH CV VAS MEAS BASILIC UPPER ARM RIGHT - MID: 0.42 CM
BH CV VAS MEAS BASILIC UPPER ARM RIGHT - PROX: 0.54 CM
BH CV VAS MEAS CEPHALIC ANTECUBITAL FOSSA LEFT: 0.39 CM
BH CV VAS MEAS CEPHALIC ANTECUBITAL FOSSA RIGHT: 0.22 CM
BH CV VAS MEAS CEPHALIC FOREARM LEFT - DIST: 0.25 CM
BH CV VAS MEAS CEPHALIC FOREARM LEFT - MID: 0.25 CM
BH CV VAS MEAS CEPHALIC FOREARM LEFT - PROX: 0.19 CM
BH CV VAS MEAS CEPHALIC FOREARM RIGHT - DIST: 0.2 CM
BH CV VAS MEAS CEPHALIC FOREARM RIGHT - MID: 0.22 CM
BH CV VAS MEAS CEPHALIC FOREARM RIGHT - PROX: 0.16 CM
BH CV VAS MEAS CEPHALIC UPPER ARM LEFT - DIST: 0.42 CM
BH CV VAS MEAS CEPHALIC UPPER ARM LEFT - MID: 0.41 CM
BH CV VAS MEAS CEPHALIC UPPER ARM LEFT - PROX: 0.29 CM
BH CV VAS MEAS CEPHALIC UPPER ARM RIGHT - DIST: 0.2 CM
BH CV VAS MEAS CEPHALIC UPPER ARM RIGHT - MID: 0.24 CM
BH CV VAS MEAS CEPHALIC UPPER ARM RIGHT - PROX: 0.22 CM
BH CV VAS MEAS RADIAL UPPER ARM LEFT - DIST: 0.28 CM
BH CV VAS MEAS RADIAL UPPER ARM LEFT - MID: 0.3 CM
BH CV VAS MEAS RADIAL UPPER ARM LEFT - PROX: 0.33 CM
BH CV VAS MEAS RADIAL UPPER ARM RIGHT - DIST: 0.27 CM
BH CV VAS MEAS RADIAL UPPER ARM RIGHT - MID: 0.32 CM
BH CV VAS MEAS RADIAL UPPER ARM RIGHT - PROX: 0.33 CM
MAXIMAL PREDICTED HEART RATE: 144 BPM
STRESS TARGET HR: 122 BPM
UPPER ARTERIAL LEFT ARM BRACHIAL LENGTH: 0.6 CM
UPPER ARTERIAL RIGHT ARM BRACHIAL LENGTH: 0.64 CM

## 2022-08-29 PROCEDURE — 93985 DUP-SCAN HEMO COMPL BI STD: CPT

## 2022-08-29 PROCEDURE — G0463 HOSPITAL OUTPT CLINIC VISIT: HCPCS

## 2022-09-12 RX ORDER — ENALAPRIL MALEATE 20 MG/1
TABLET ORAL
Qty: 180 TABLET | Refills: 3 | OUTPATIENT
Start: 2022-09-12

## 2022-09-12 RX ORDER — AMLODIPINE BESYLATE 10 MG/1
10 TABLET ORAL DAILY
Qty: 90 TABLET | Refills: 3 | Status: SHIPPED | OUTPATIENT
Start: 2022-09-12 | End: 2022-09-20

## 2022-09-12 RX ORDER — GLIMEPIRIDE 2 MG/1
TABLET ORAL
Qty: 180 TABLET | Refills: 3 | Status: SHIPPED | OUTPATIENT
Start: 2022-09-12 | End: 2023-01-13

## 2022-09-14 ENCOUNTER — LAB (OUTPATIENT)
Dept: LAB | Facility: HOSPITAL | Age: 77
End: 2022-09-14

## 2022-09-14 ENCOUNTER — HOSPITAL ENCOUNTER (OUTPATIENT)
Dept: CARDIOLOGY | Facility: HOSPITAL | Age: 77
Discharge: HOME | End: 2022-09-14

## 2022-09-14 LAB
ANION GAP SERPL CALCULATED.3IONS-SCNC: 14 MMOL/L (ref 5–15)
BUN SERPL-MCNC: 53 MG/DL (ref 8–23)
BUN/CREAT SERPL: 13.3 (ref 7–25)
CALCIUM SPEC-SCNC: 9.7 MG/DL (ref 8.6–10.5)
CHLORIDE SERPL-SCNC: 104 MMOL/L (ref 98–107)
CO2 SERPL-SCNC: 24 MMOL/L (ref 22–29)
CREAT SERPL-MCNC: 3.98 MG/DL (ref 0.76–1.27)
DEPRECATED RDW RBC AUTO: 43 FL (ref 37–54)
EGFRCR SERPLBLD CKD-EPI 2021: 14.9 ML/MIN/1.73
ERYTHROCYTE [DISTWIDTH] IN BLOOD BY AUTOMATED COUNT: 14 % (ref 12.3–15.4)
GLUCOSE SERPL-MCNC: 103 MG/DL (ref 65–99)
HCT VFR BLD AUTO: 34 % (ref 37.5–51)
HGB BLD-MCNC: 11.6 G/DL (ref 13–17.7)
MCH RBC QN AUTO: 28.5 PG (ref 26.6–33)
MCHC RBC AUTO-ENTMCNC: 34.1 G/DL (ref 31.5–35.7)
MCV RBC AUTO: 83.5 FL (ref 79–97)
PLATELET # BLD AUTO: 286 10*3/MM3 (ref 140–450)
PMV BLD AUTO: 10.5 FL (ref 6–12)
POTASSIUM SERPL-SCNC: 4.3 MMOL/L (ref 3.5–5.2)
RBC # BLD AUTO: 4.07 10*6/MM3 (ref 4.14–5.8)
SODIUM SERPL-SCNC: 142 MMOL/L (ref 136–145)
WBC NRBC COR # BLD: 9.9 10*3/MM3 (ref 3.4–10.8)

## 2022-09-14 PROCEDURE — 80048 BASIC METABOLIC PNL TOTAL CA: CPT

## 2022-09-14 PROCEDURE — 93010 ELECTROCARDIOGRAM REPORT: CPT | Performed by: INTERNAL MEDICINE

## 2022-09-14 PROCEDURE — 85027 COMPLETE CBC AUTOMATED: CPT

## 2022-09-14 PROCEDURE — 93005 ELECTROCARDIOGRAM TRACING: CPT | Performed by: SURGERY

## 2022-09-17 LAB — QT INTERVAL: 465 MS

## 2022-09-20 ENCOUNTER — OFFICE VISIT (OUTPATIENT)
Dept: CARDIOLOGY | Facility: CLINIC | Age: 77
End: 2022-09-20

## 2022-09-20 VITALS
SYSTOLIC BLOOD PRESSURE: 134 MMHG | BODY MASS INDEX: 30.35 KG/M2 | HEART RATE: 56 BPM | HEIGHT: 73 IN | DIASTOLIC BLOOD PRESSURE: 68 MMHG | OXYGEN SATURATION: 95 % | WEIGHT: 229 LBS

## 2022-09-20 DIAGNOSIS — I25.10 CHRONIC CORONARY ARTERY DISEASE: Primary | ICD-10-CM

## 2022-09-20 DIAGNOSIS — I10 PRIMARY HYPERTENSION: ICD-10-CM

## 2022-09-20 PROCEDURE — 99213 OFFICE O/P EST LOW 20 MIN: CPT | Performed by: INTERNAL MEDICINE

## 2022-09-20 NOTE — PROGRESS NOTES
HP      Name: Gabriel De Souza ADMIT: (Not on file)   : 1945  PCP: Waylon Johnson MD    MRN: [id number] LOS: 0 days   AGE/SEX: 77 y.o. male  ROOM: Room/bed info not found     Chief Complaint   Patient presents with   • Coronary Artery Disease       Subjective        History of present illness  Gabriel De Souza is a 77-year-old male patient who has history of nonobstructive coronary artery disease on heart cath in , hypertension, diabetes, chronic renal insufficiency, is here today for a yearly cardiac follow-up.  Patient is doing well denies having any chest pain or shortness of breath, no palpitations no lower extremity edema no syncopal episodes, no recent hospitalizations.  His kidney function is getting worse and he is going to have a fistula placed for potential dialysis in the future.    Past Medical History:   Diagnosis Date   • Cancer (HCC) 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   • Coronary artery disease    • Deep vein thrombosis (HCC) 1990   • Diabetes mellitus (HCC)    • Dyslipidemia    • HL (hearing loss) 7 year old    right   • Hyperlipidemia    • Hypertension    • Neuropathy    • Renal insufficiency    • Stage 4 chronic kidney disease (HCC)     3-4     Past Surgical History:   Procedure Laterality Date   • BACK SURGERY     • BASAL CELL CARCINOMA EXCISION  2019   • CARDIAC CATHETERIZATION     • TURP / TRANSURETHRAL INCISION / DRAINAGE PROSTATE       Family History   Problem Relation Age of Onset   • Heart disease Mother    • Heart disease Brother    • Heart attack Maternal Grandfather    • Hypertension Father      Social History     Tobacco Use   • Smoking status: Former Smoker     Quit date:      Years since quittin.7   • Smokeless tobacco: Never Used   Vaping Use   • Vaping Use: Never used   Substance Use Topics   • Alcohol use: No   • Drug use: No     (Not in a hospital admission)    Allergies:  Patient has no known allergies.    Review of systems    Constitutional:  Negative.    Respiratory and cardiovascular: As detailed in HPI section.  Gastrointestinal: Negative for constipation, nausea and vomiting negative for abdominal distention, abdominal pain and diarrhea.   Genitourinary: Negative for difficulty urinating and flank pain.   Musculoskeletal: Negative for arthralgias, joint swelling and myalgias.   Skin: Negative for color change, rash and wound.   Neurological: Negative for dizziness, syncope, weakness and headaches.   Hematological: Negative for adenopathy.   Psychiatric/Behavioral: Negative for confusion.   All other systems reviewed and are negative.    Physical Exam  VITALS REVIEWED    General:      well developed, in no acute distress.    Head:      normocephalic and atraumatic.    Eyes:      PERRL/EOM intact, conjunctiva and sclera clear with out nystagmus.    Neck:      no masses, thyromegaly,  trachea central with normal respiratory effort and PMI displaced laterally  Lungs:      Clear to auscultation bilaterally  Heart:       Regular rate and rhythm  Msk:      no deformity or scoliosis noted of thoracic or lumbar spine.    Pulses:      pulses normal in all 4 extremities.    Extremities:       No lower extremity edema  Neurologic:      no focal deficits.   alert oriented x3  Skin:      intact without lesions or rashes.    Psych:      alert and cooperative; normal mood and affect; normal attention span and concentration.      Result Review :               Pertinent cardiac workup    1. EKG 9/14/2022 sinus rhythm, first-degree AV block      Procedures        Assessment and Plan      Gabriel De Souza is a 77-year-old male patient who has history of diabetes, hypertension, chronic renal insufficiency, nonobstructive CAD, here today for cardiac follow-up.  Patient is doing well denies any anginal symptoms or CHF symptoms, his blood pressure is well controlled.  His kidney function is getting worse and he is getting ready to receive a fistula for possible dialysis.  We  will continue yearly follow-ups.    Diagnoses and all orders for this visit:    1. Chronic coronary artery disease (Primary)    2. Primary hypertension           Return in about 1 year (around 9/20/2023).  Patient was given instructions and counseling regarding his condition or for health maintenance advice. Please see specific information pulled into the AVS if appropriate.

## 2022-09-21 ENCOUNTER — ANESTHESIA EVENT (OUTPATIENT)
Dept: PERIOP | Facility: HOSPITAL | Age: 77
End: 2022-09-21

## 2022-09-21 NOTE — ANESTHESIA PREPROCEDURE EVALUATION
Anesthesia Evaluation     Patient summary reviewed and Nursing notes reviewed   no history of anesthetic complications:  NPO Solid Status: > 8 hours  NPO Liquid Status: > 2 hours           Airway   Mallampati: II  TM distance: >3 FB  Neck ROM: full  No difficulty expected  Dental - normal exam     Pulmonary - negative pulmonary ROS and normal exam   Cardiovascular - normal exam    ECG reviewed    (+) hypertension, CAD, angina, DVT resolved, hyperlipidemia,     ROS comment: Sees Dr. Ocasio.  Stable and doing well from cardiac standpoint.  Yearly follow-up last week and no new issues of s/s of heart failure.    Neuro/Psych- negative ROS  GI/Hepatic/Renal/Endo    (+) obesity,   renal disease CRI and ESRD, diabetes mellitus type 2 poorly controlled,     Musculoskeletal     Abdominal  - normal exam   Substance History      OB/GYN negative ob/gyn ROS         Other   arthritis, blood dyscrasia anemia,   history of cancer remission                  Anesthesia Plan    ASA 3     general with block   total IV anesthesia  intravenous induction     Anesthetic plan, risks, benefits, and alternatives have been provided, discussed and informed consent has been obtained with: patient.  Pre-procedure education provided  Use of blood products discussed with patient  Consented to blood products.       CODE STATUS:

## 2022-09-22 ENCOUNTER — HOSPITAL ENCOUNTER (OUTPATIENT)
Facility: HOSPITAL | Age: 77
Setting detail: HOSPITAL OUTPATIENT SURGERY
Discharge: HOME | End: 2022-09-22
Attending: SURGERY | Admitting: SURGERY

## 2022-09-22 ENCOUNTER — ANESTHESIA (OUTPATIENT)
Dept: PERIOP | Facility: HOSPITAL | Age: 77
End: 2022-09-22

## 2022-09-22 VITALS
OXYGEN SATURATION: 93 % | DIASTOLIC BLOOD PRESSURE: 64 MMHG | RESPIRATION RATE: 18 BRPM | HEIGHT: 73 IN | BODY MASS INDEX: 29.53 KG/M2 | TEMPERATURE: 96.7 F | WEIGHT: 222.8 LBS | SYSTOLIC BLOOD PRESSURE: 109 MMHG | HEART RATE: 60 BPM

## 2022-09-22 DIAGNOSIS — N18.4 CKD (CHRONIC KIDNEY DISEASE) STAGE 4, GFR 15-29 ML/MIN: Primary | ICD-10-CM

## 2022-09-22 LAB
GLUCOSE BLDC GLUCOMTR-MCNC: 107 MG/DL (ref 70–105)
GLUCOSE BLDC GLUCOMTR-MCNC: 56 MG/DL (ref 70–105)
GLUCOSE BLDC GLUCOMTR-MCNC: 66 MG/DL (ref 70–105)
GLUCOSE BLDC GLUCOMTR-MCNC: 85 MG/DL (ref 70–105)

## 2022-09-22 PROCEDURE — 25010000002 DEXAMETHASONE PER 1 MG: Performed by: STUDENT IN AN ORGANIZED HEALTH CARE EDUCATION/TRAINING PROGRAM

## 2022-09-22 PROCEDURE — 76942 ECHO GUIDE FOR BIOPSY: CPT | Performed by: SURGERY

## 2022-09-22 PROCEDURE — 25010000002 PROTAMINE SULFATE PER 10 MG: Performed by: STUDENT IN AN ORGANIZED HEALTH CARE EDUCATION/TRAINING PROGRAM

## 2022-09-22 PROCEDURE — 25010000002 PROPOFOL 1000 MG/100ML EMULSION: Performed by: STUDENT IN AN ORGANIZED HEALTH CARE EDUCATION/TRAINING PROGRAM

## 2022-09-22 PROCEDURE — 25010000002 CEFAZOLIN PER 500 MG: Performed by: SURGERY

## 2022-09-22 PROCEDURE — 25010000002 MIDAZOLAM PER 1 MG: Performed by: STUDENT IN AN ORGANIZED HEALTH CARE EDUCATION/TRAINING PROGRAM

## 2022-09-22 PROCEDURE — 25010000002 HEPARIN (PORCINE) PER 1000 UNITS: Performed by: SURGERY

## 2022-09-22 PROCEDURE — 82962 GLUCOSE BLOOD TEST: CPT

## 2022-09-22 PROCEDURE — 25010000002 HEPARIN (PORCINE) PER 1000 UNITS: Performed by: STUDENT IN AN ORGANIZED HEALTH CARE EDUCATION/TRAINING PROGRAM

## 2022-09-22 PROCEDURE — 25010000002 ROPIVACAINE PER 1 MG: Performed by: STUDENT IN AN ORGANIZED HEALTH CARE EDUCATION/TRAINING PROGRAM

## 2022-09-22 DEVICE — LIGACLIP MCA MULTIPLE CLIP APPLIERS, 20 SMALL CLIPS
Type: IMPLANTABLE DEVICE | Site: ARM | Status: FUNCTIONAL
Brand: LIGACLIP

## 2022-09-22 RX ORDER — PROPOFOL 10 MG/ML
INJECTION, EMULSION INTRAVENOUS CONTINUOUS PRN
Status: DISCONTINUED | OUTPATIENT
Start: 2022-09-22 | End: 2022-09-22 | Stop reason: SURG

## 2022-09-22 RX ORDER — DEXTROSE MONOHYDRATE 25 G/50ML
25 INJECTION, SOLUTION INTRAVENOUS ONCE
Status: COMPLETED | OUTPATIENT
Start: 2022-09-22 | End: 2022-09-22

## 2022-09-22 RX ORDER — ROPIVACAINE HYDROCHLORIDE 5 MG/ML
INJECTION, SOLUTION EPIDURAL; INFILTRATION; PERINEURAL
Status: COMPLETED | OUTPATIENT
Start: 2022-09-22 | End: 2022-09-22

## 2022-09-22 RX ORDER — EPHEDRINE SULFATE 5 MG/ML
INJECTION INTRAVENOUS AS NEEDED
Status: DISCONTINUED | OUTPATIENT
Start: 2022-09-22 | End: 2022-09-22 | Stop reason: SURG

## 2022-09-22 RX ORDER — SODIUM CHLORIDE 0.9 % (FLUSH) 0.9 %
10 SYRINGE (ML) INJECTION EVERY 12 HOURS SCHEDULED
Status: DISCONTINUED | OUTPATIENT
Start: 2022-09-22 | End: 2022-09-22 | Stop reason: HOSPADM

## 2022-09-22 RX ORDER — SODIUM CHLORIDE, SODIUM LACTATE, POTASSIUM CHLORIDE, CALCIUM CHLORIDE 600; 310; 30; 20 MG/100ML; MG/100ML; MG/100ML; MG/100ML
9 INJECTION, SOLUTION INTRAVENOUS CONTINUOUS PRN
Status: DISCONTINUED | OUTPATIENT
Start: 2022-09-22 | End: 2022-09-22

## 2022-09-22 RX ORDER — MIDAZOLAM HYDROCHLORIDE 1 MG/ML
INJECTION INTRAMUSCULAR; INTRAVENOUS
Status: COMPLETED | OUTPATIENT
Start: 2022-09-22 | End: 2022-09-22

## 2022-09-22 RX ORDER — PROTAMINE SULFATE 10 MG/ML
INJECTION, SOLUTION INTRAVENOUS AS NEEDED
Status: DISCONTINUED | OUTPATIENT
Start: 2022-09-22 | End: 2022-09-22 | Stop reason: SURG

## 2022-09-22 RX ORDER — MIDAZOLAM HYDROCHLORIDE 1 MG/ML
0.5 INJECTION INTRAMUSCULAR; INTRAVENOUS
Status: DISCONTINUED | OUTPATIENT
Start: 2022-09-22 | End: 2022-09-22 | Stop reason: HOSPADM

## 2022-09-22 RX ORDER — HYDROCODONE BITARTRATE AND ACETAMINOPHEN 5; 325 MG/1; MG/1
1 TABLET ORAL EVERY 8 HOURS PRN
Qty: 8 TABLET | Refills: 0 | Status: SHIPPED | OUTPATIENT
Start: 2022-09-22 | End: 2022-10-06

## 2022-09-22 RX ORDER — SODIUM CHLORIDE 0.9 % (FLUSH) 0.9 %
10 SYRINGE (ML) INJECTION AS NEEDED
Status: DISCONTINUED | OUTPATIENT
Start: 2022-09-22 | End: 2022-09-22 | Stop reason: HOSPADM

## 2022-09-22 RX ORDER — HEPARIN SODIUM 1000 [USP'U]/ML
INJECTION, SOLUTION INTRAVENOUS; SUBCUTANEOUS AS NEEDED
Status: DISCONTINUED | OUTPATIENT
Start: 2022-09-22 | End: 2022-09-22 | Stop reason: SURG

## 2022-09-22 RX ORDER — DEXAMETHASONE SODIUM PHOSPHATE 4 MG/ML
INJECTION, SOLUTION INTRA-ARTICULAR; INTRALESIONAL; INTRAMUSCULAR; INTRAVENOUS; SOFT TISSUE
Status: COMPLETED | OUTPATIENT
Start: 2022-09-22 | End: 2022-09-22

## 2022-09-22 RX ORDER — SODIUM CHLORIDE, SODIUM LACTATE, POTASSIUM CHLORIDE, CALCIUM CHLORIDE 600; 310; 30; 20 MG/100ML; MG/100ML; MG/100ML; MG/100ML
INJECTION, SOLUTION INTRAVENOUS CONTINUOUS PRN
Status: DISCONTINUED | OUTPATIENT
Start: 2022-09-22 | End: 2022-09-22 | Stop reason: SURG

## 2022-09-22 RX ORDER — SODIUM CHLORIDE 9 MG/ML
9 INJECTION, SOLUTION INTRAVENOUS CONTINUOUS PRN
Status: DISCONTINUED | OUTPATIENT
Start: 2022-09-22 | End: 2022-09-22 | Stop reason: HOSPADM

## 2022-09-22 RX ADMIN — HEPARIN SODIUM 5000 UNITS: 1000 INJECTION INTRAVENOUS; SUBCUTANEOUS at 08:14

## 2022-09-22 RX ADMIN — PROPOFOL INJECTABLE EMULSION 100 MCG/KG/MIN: 10 INJECTION, EMULSION INTRAVENOUS at 07:49

## 2022-09-22 RX ADMIN — SODIUM CHLORIDE 9 ML/HR: 9 INJECTION, SOLUTION INTRAVENOUS at 06:35

## 2022-09-22 RX ADMIN — SODIUM CHLORIDE, SODIUM LACTATE, POTASSIUM CHLORIDE, AND CALCIUM CHLORIDE: .6; .31; .03; .02 INJECTION, SOLUTION INTRAVENOUS at 07:46

## 2022-09-22 RX ADMIN — EPHEDRINE SULFATE 5 MG: 5 INJECTION INTRAVENOUS at 08:51

## 2022-09-22 RX ADMIN — ROPIVACAINE HYDROCHLORIDE 30 ML: 5 INJECTION EPIDURAL; INFILTRATION; PERINEURAL at 07:26

## 2022-09-22 RX ADMIN — DEXTROSE MONOHYDRATE 25 ML: 25 INJECTION, SOLUTION INTRAVENOUS at 07:07

## 2022-09-22 RX ADMIN — PROTAMINE SULFATE 30 MG: 10 INJECTION, SOLUTION INTRAVENOUS at 08:40

## 2022-09-22 RX ADMIN — MIDAZOLAM 2 MG: 1 INJECTION INTRAMUSCULAR; INTRAVENOUS at 07:26

## 2022-09-22 RX ADMIN — DEXAMETHASONE SODIUM PHOSPHATE 4 MG: 4 INJECTION, SOLUTION INTRAMUSCULAR; INTRAVENOUS at 07:26

## 2022-09-22 RX ADMIN — EPHEDRINE SULFATE 5 MG: 5 INJECTION INTRAVENOUS at 08:27

## 2022-09-22 RX ADMIN — CEFAZOLIN 2 G: 2 INJECTION, POWDER, FOR SOLUTION INTRAMUSCULAR; INTRAVENOUS at 07:50

## 2022-09-22 NOTE — DISCHARGE INSTRUCTIONS
[facility]  Desean Welsh, Shalonda Jacobsen Scherrer ,[place], [place]  [address], [address]  [phone]    Post-Operative Instructions for AV Fistula / Graft   Diet: Regular Diet    Medications: Take your regularly scheduled medications on the day of your surgery, unless your doctor has directed you otherwise. You may be sent home with a prescription for pain medication, follow the directions as prescribed.    Activity Restrictions / Driving: Avoid lifting more than 15 pounds or other activities that stress or compress the access area. No driving for the remainder of the day after surgery. You may drive when you no longer are taking narcotic pain medications. If a nerve block was done to numb your arm for surgery, you will be placed in an arm sling.  This numbness and inability to move the arm can last for as little as 6 hours but as many as 18.  The sling should be used during this time but can be removed when sensation and movement of your arm is normal and does not need to be used after that. Use of the arm is encouraged after the surgery.    Incision Care: Some bruising is normal. If you have drainage from the incision please notify the office. Dressing should be removed in 48 hours. After dressing is removed, it is OK to shower. Do not submerge incision until cleared by your surgeon (bath or swimming).    Bathing and Showering: You may shower after you remove your dressing.    Follow-up Appointments: You will need to return to the office for a follow-up visit within 1-3 weeks after your surgery. Please make sure you have your appointment scheduled, call [phone].    The patient (you) should:  1. Avoid wearing tight constrictive clothing over that arm.  2. Avoid wearing jewelry that is tight, such as a watch on the access arm.  3. Avoid carrying heavy objects.  4. Avoid purse straps over the fistula.  5. Avoid sleeping on the arm or keeping it bent for extended periods of time.  6.  "Each day, using your opposite hand, feel over the fistula for the \"thrill\" or vibration that is normally present.    Fistula Information / Care:   It is normal to have swelling in the surgical area. To help control this swelling, you should elevate your arm on a pillow.   Wiggle your fingers and clinch your fist 10 times every hour, while awake, for the first 5-7 days. Also, bend and straighten at the elbow to regain normal range of motion. These exercises are designed to promote circulation in the fingers and aid in draining away the excess fluid accumulation in the immediate area.  No blood pressures or needle sticks in the arm with your access.    Call the office for the followin. Fever greater than 101.0  2. Uncontrolled pain. This is on a scale of 1-10 (10 being the worst pain imaginable) your pain is a level 7 or above.  3. It is important that you notify our office if you are having numbness and significant pain in the extremity in which you have just had surgery!  4. Decreased or absent thrill.  5. Nausea, diarrhea, and/or vomiting that continue for 12-24 hours.  6. Signs of an infection: redness, increased swelling, drainage, fever and/or chills.  7. Chest pain or difficulty breathing.    The fistula or graft CAN NOT be used until the MD has given written approval. Generally, a graft will be ready to use in 2 weeks, and a fistula will be ready to use in 6-8 weeks.     If you have further questions after reading this handout, the office is open from 8:30am to 5:00pm Monday through Friday. Call [phone].    "

## 2022-09-22 NOTE — ANESTHESIA PROCEDURE NOTES
Peripheral Block    Pre-sedation assessment completed: 9/22/2022 7:18 AM    Patient reassessed immediately prior to procedure    Patient location during procedure: pre-op  Stop time: 9/22/2022 7:26 AM  Reason for block: at surgeon's request and post-op pain management  Performed by  Anesthesiologist: Ant Dos Santos MD  Assisted by: Juan C Mandel RN  Preanesthetic Checklist  Completed: patient identified, IV checked, site marked, risks and benefits discussed, surgical consent, monitors and equipment checked, pre-op evaluation and timeout performed  Prep:  Pt Position: supine  Sterile barriers:alcohol skin prep, cap, gloves, gown, mask and washed/disinfected hands  Prep: ChloraPrep  Patient monitoring: blood pressure monitoring, continuous pulse oximetry and EKG  Procedure    Sedation: yes  Performed under: MAC  Guidance:ultrasound guided    ULTRASOUND INTERPRETATION.  Using ultrasound guidance a 22 G gauge needle was placed in close proximity to the brachial plexus nerve, at which point, under ultrasound guidance anesthetic was injected in the area of the nerve and spread of the anesthesia was seen on ultrasound in close proximity thereto.  There were no abnormalities seen on ultrasound; a digital image was taken; and the patient tolerated the procedure with no complications. Images:still images obtained, printed/placed on chart    Laterality:left  Block Type:supraclavicular  Injection Technique:single-shot  Needle Type:echogenic  Needle Gauge:18 G  Resistance on Injection: none  Sedation medications used: midazolam (VERSED) injection, 2 mg  Medications Used: dexamethasone (DECADRON) injection, 4 mg  ropivacaine (NAROPIN) 0.5 % injection, 30 mL  Med administered at 9/22/2022 7:26 AM      Medications  Comment:22cc supravlavicular  8cc intercostal brachial    Post Assessment  Injection Assessment: negative aspiration for heme, no paresthesia on injection and incremental injection  Patient Tolerance:comfortable  throughout block  Complications:no

## 2022-09-22 NOTE — OP NOTE
Date of Admission:  9/22/2022  Today's Date:  09/22/22  Edy Vega MD  [facility]      Preoperative Diagnosis: Chronic Kidney Disease Stage IV    Postoperative Diagnosis: same as above    Procedure Performed: Left brachial-cephalic fistula creation    CPT:  94443    Surgeon: Edy Vega MD    Assistant:  Dariela Kunz RN    Anesthesia: General with block    Staff:   Circulator: Geni Prince, SELMA; Miranda Ledezma RN; Margi Khoury RN  Scrub Person: Giselle Robert  Assistant: Dariela Kunz    Estimated Blood Loss: minimal    Findings:     Vein quality:  Good   Artery quality:  Good   Post operative thrill quality:  Good   Post distal perfusion: Multiphasic radial signal at case completion.    I needed to make a secondary incision to ligate the lateral branch.    Implants:    Implant Name Type Inv. Item Serial No.  Lot No. LRB No. Used Action   CLIPAPPLR M/ ENDO LIGACLIP 9 3/8IN SM - [id number] Implant CLIPAPPLR M/ ENDO LIGACLIP 9 3/8IN   [employer]  DIV [employer] J AND J [id number] Left 1 Implanted        Specimen: none    Complications: none    Dispo: to PACU    Indication for procedure: 77 y.o. male with chronic renal insufficiency.  He submits today for left arm fistula creation.  Risk benefits alternatives discussed.  Informed consent obtained.    Description of procedure:   The patient was taken to the operating room and placed in the supine position.  The arm was extended on an arm board and then prepped and draped in the usual sterile fashion.  Preoperative antibiotics were given.  A full surgical timeout was done.       A longitudinal incision was made under local anesthesia and the cephalic vein was dissected out for several centimeters.  Next the deep fascia was opened and the brachial artery was dissected out. The patient was systemically heparinized. The vein was marked for orientation, divided and tied off distally with silk ties.  The vein was flushed with  heparinized saline and a coronary dilator was passed proximally to ensure no venous webs.  After the heparin had been allowed to circulate for 5 minutes clamps were applied to the brachial artery.  It was opened longitudinally with an 11 blade and then Cantor scissors.  An anastomosis was created using 6-0 running Prolene between the artery and the vein.  Appropriate backbleeding and flushing maneuvers were done.  Cephalic vein branches in the juxta anastomotic segment were ligated.      Flow was then restored.  The field was irrigated with sterile saline.  Hemostasis was obtained.  Incision was closed with multiple layers, the subcutaneous layers using 3-0 Vicryl.  The skin subcuticular layer using 4-0 Vicryl.  The incision was clean and dry and covered in Dermabond.  The patient tolerated procedure well, counts are correct.      Edy Vega MD  09/22/22    There are no hospital problems to display for this patient.

## 2022-09-22 NOTE — ANESTHESIA POSTPROCEDURE EVALUATION
Patient: Gabriel De Souza    Procedure Summary     Date: 09/22/22 Room / Location: [facility] OR  / [facility] MAIN OR    Anesthesia Start: 0745 Anesthesia Stop: 0902    Procedure: BRACHIAL CEPHALIC FISTULA FORMATION (Left Arm Upper) Diagnosis:       End stage renal disease (HCC)      (End stage renal disease (HCC) [N18.6])    Surgeons: Edy Vega MD Provider: Ant Dos Santos MD    Anesthesia Type: general with block ASA Status: 3          Anesthesia Type: general with block    Vitals  Vitals Value Taken Time   /61 09/22/22 1007   Temp 96.9 °F (36.1 °C) 09/22/22 0900   Pulse 54 09/22/22 1008   Resp 17 09/22/22 1007   SpO2 96 % 09/22/22 1008   Vitals shown include unvalidated device data.        Post Anesthesia Care and Evaluation    Patient location during evaluation: PACU  Patient participation: complete - patient participated  Level of consciousness: awake  Pain score: 0  Pain management: adequate    Airway patency: patent  Anesthetic complications: No anesthetic complications  PONV Status: none  Cardiovascular status: acceptable  Respiratory status: acceptable  Hydration status: acceptable    Comments: Patient seen and examined postoperatively; vital signs stable; SpO2 greater than or equal to 90%; cardiopulmonary status stable; nausea/vomiting adequately controlled; pain adequately controlled; no apparent anesthesia complications; patient discharged from anesthesia care when discharge criteria were met

## 2022-10-06 ENCOUNTER — OFFICE VISIT (OUTPATIENT)
Dept: FAMILY MEDICINE CLINIC | Facility: CLINIC | Age: 77
End: 2022-10-06

## 2022-10-06 VITALS
SYSTOLIC BLOOD PRESSURE: 127 MMHG | BODY MASS INDEX: 30.61 KG/M2 | HEART RATE: 57 BPM | TEMPERATURE: 97.8 F | WEIGHT: 232 LBS | DIASTOLIC BLOOD PRESSURE: 66 MMHG | OXYGEN SATURATION: 100 %

## 2022-10-06 DIAGNOSIS — Z23 NEED FOR IMMUNIZATION AGAINST INFLUENZA: Primary | ICD-10-CM

## 2022-10-06 DIAGNOSIS — N18.4 CONTROLLED TYPE 2 DIABETES MELLITUS WITH STAGE 4 CHRONIC KIDNEY DISEASE, WITHOUT LONG-TERM CURRENT USE OF INSULIN: ICD-10-CM

## 2022-10-06 DIAGNOSIS — E78.5 DYSLIPIDEMIA: ICD-10-CM

## 2022-10-06 DIAGNOSIS — I10 ESSENTIAL HYPERTENSION: ICD-10-CM

## 2022-10-06 DIAGNOSIS — E11.22 CONTROLLED TYPE 2 DIABETES MELLITUS WITH STAGE 4 CHRONIC KIDNEY DISEASE, WITHOUT LONG-TERM CURRENT USE OF INSULIN: ICD-10-CM

## 2022-10-06 PROCEDURE — G0008 ADMIN INFLUENZA VIRUS VAC: HCPCS | Performed by: FAMILY MEDICINE

## 2022-10-06 PROCEDURE — 99213 OFFICE O/P EST LOW 20 MIN: CPT | Performed by: FAMILY MEDICINE

## 2022-10-06 PROCEDURE — 90662 IIV NO PRSV INCREASED AG IM: CPT | Performed by: FAMILY MEDICINE

## 2022-10-06 NOTE — PROGRESS NOTES
Subjective   Gabriel De Souza is a 77 y.o. male.     History of Present Illness  Gabriel De Souza is in for follow up on his chronic diseases, diabetes and high cholesterol and chronic renal failure which is now reaching end-stage. There is no history of chest pain or dyspnea of late. There is no history of issue with bowel or bladder function. There is no history of vision or hearing problems. There is no history of dizziness or lightheadedness. There is no history of issue with sleep or mood. As for checking blood sugar readings, he saw 85 this morning but he is prone to hypoglycemia due to medication. There is no issue with medication compliance. Exercise compliance has been limited due to recent shunt procedure but diet is fair.  Diabetes  He has type 2 diabetes mellitus. MedicAlert identification noted. The initial diagnosis of diabetes was made 30 years ago. Pertinent negatives for hypoglycemia include no confusion, dizziness, headaches, hunger, mood changes, nervousness/anxiousness, pallor, seizures, sleepiness, speech difficulty, sweats or tremors. Associated symptoms include foot paresthesias. Pertinent negatives for diabetes include no blurred vision, no chest pain, no fatigue, no foot ulcerations, no polydipsia, no polyphagia, no polyuria, no visual change, no weakness and no weight loss. Pertinent negatives for hypoglycemia complications include no blackouts, no hospitalization, no nocturnal hypoglycemia, no required assistance and no required glucagon injection. Symptoms are stable. Diabetic complications include impotence, nephropathy and peripheral neuropathy. Pertinent negatives for diabetic complications include no CVA, heart disease, PVD or retinopathy. Risk factors for coronary artery disease include dyslipidemia, family history and hypertension. Current diabetic treatment includes oral agent (monotherapy). He is compliant with treatment all of the time. His weight is stable. He is following a  generally healthy diet. Meal planning includes carbohydrate counting. He has not had a previous visit with a dietitian. He participates in exercise daily. He monitors blood glucose at home 1-2 x per day. Blood glucose monitoring compliance is excellent. There is no change in his home blood glucose trend. His breakfast blood glucose is taken between 6-7 am. His breakfast blood glucose range is generally 70-90 mg/dl. His highest blood glucose is 70-90 mg/dl. His overall blood glucose range is  mg/dl. He sees a podiatrist.Eye exam is current.          /66 (BP Location: Right arm, Patient Position: Sitting, Cuff Size: Adult)   Pulse 57   Temp 97.8 °F (36.6 °C) (Tympanic)   Wt 105 kg (232 lb)   SpO2 100%   BMI 30.61 kg/m²       Chief Complaint   Patient presents with   • Hypertension     3 month follow up   • Hyperlipidemia   • Diabetes           Current Outpatient Medications:   •  amLODIPine (NORVASC) 10 MG tablet, , Disp: , Rfl:   •  aspirin 81 MG tablet, Take 81 mg by mouth Daily. HOLD DOS, Disp: , Rfl:   •  atenolol (TENORMIN) 50 MG tablet, TAKE 1 TABLET BY MOUTH  TWICE DAILY (Patient taking differently: Take 50 mg by mouth Daily.), Disp: 180 tablet, Rfl: 3  •  calcitriol (ROCALTROL) 0.25 MCG capsule, Take 0.25 mcg by mouth Every Other Day., Disp: , Rfl:   •  glimepiride (AMARYL) 2 MG tablet, TAKE 1 TABLET BY MOUTH  TWICE DAILY (Patient taking differently: Take 2 mg by mouth 2 (Two) Times a Day. HOLD DOS), Disp: 180 tablet, Rfl: 3  •  glucose blood (OneTouch Ultra) test strip, Test once daily DX:  e11.65, Disp: 200 each, Rfl: 5  •  Niacin ER 1000 MG tablet controlled-release, Take 1 tablet by mouth Daily., Disp: , Rfl:   •  simvastatin (ZOCOR) 20 MG tablet, TAKE 1 TABLET BY MOUTH  DAILY (Patient taking differently: Take 20 mg by mouth Every Night.), Disp: 90 tablet, Rfl: 3  •  torsemide (DEMADEX) 20 MG tablet, Take 20 mg by mouth Daily. HOLD DOS, Disp: , Rfl:     Lab Results   Component Value Date     HGBA1C 6.6 (H) 07/06/2022    HGBA1C 5.8 (H) 12/28/2021    HGBA1C 5.6 06/28/2021     Lab Results   Component Value Date    MICROALBUR 2.3 05/31/2022    CREATININE 3.98 (H) 09/14/2022         Wt Readings from Last 3 Encounters:   10/06/22 105 kg (232 lb)   09/22/22 101 kg (222 lb 12.8 oz)   09/20/22 104 kg (229 lb)       The following portions of the patient's history were reviewed and updated as appropriate: allergies, current medications, past family history, past medical history, past social history, past surgical history, and problem list.    Review of Systems   Constitutional: Negative for activity change, fatigue, fever and weight loss.   HENT: Negative for congestion, sinus pressure, sinus pain, sore throat and trouble swallowing.    Eyes: Negative for blurred vision and visual disturbance.   Respiratory: Negative for chest tightness, shortness of breath and wheezing.    Cardiovascular: Negative for chest pain and palpitations.   Gastrointestinal: Negative for abdominal distention, abdominal pain, constipation, diarrhea, nausea and vomiting.   Endocrine: Negative for polydipsia, polyphagia and polyuria.   Genitourinary: Positive for impotence. Negative for difficulty urinating and dysuria.   Musculoskeletal: Negative for back pain and neck pain.   Skin: Negative for pallor.   Neurological: Negative for dizziness, tremors, seizures, speech difficulty, weakness and headaches.   Psychiatric/Behavioral: Negative for agitation, confusion, hallucinations and suicidal ideas. The patient is not nervous/anxious.        Objective   Physical Exam  Vitals and nursing note reviewed.   Constitutional:       Appearance: Normal appearance.   Cardiovascular:      Rate and Rhythm: Normal rate and regular rhythm.      Heart sounds: Normal heart sounds. No murmur heard.  Pulmonary:      Effort: Pulmonary effort is normal.      Breath sounds: No wheezing or rales.   Abdominal:      General: Bowel sounds are normal.       Palpations: Abdomen is soft.      Tenderness: There is no abdominal tenderness. There is no guarding.   Musculoskeletal:      Cervical back: Neck supple.      Right lower leg: No edema.      Left lower leg: No edema.      Comments: Strong palpable thrill in the recently created AV shunt in the left arm   Lymphadenopathy:      Cervical: No cervical adenopathy.   Neurological:      Mental Status: He is alert and oriented to person, place, and time. Mental status is at baseline.   Psychiatric:         Mood and Affect: Mood normal.           Assessment & Plan   Problems Addressed this Visit        Cardiac and Vasculature    Dyslipidemia       Endocrine and Metabolic    Diabetes mellitus type II, controlled (HCC)      Other Visit Diagnoses     Need for immunization against influenza    -  Primary    Relevant Orders    Fluzone High-Dose 65+yrs (3763-1733)    Essential hypertension          Diagnoses       Codes Comments    Need for immunization against influenza    -  Primary ICD-10-CM: Z23  ICD-9-CM: V04.81     Controlled type 2 diabetes mellitus with stage 4 chronic kidney disease, without long-term current use of insulin ([facility])     ICD-10-CM: E11.22, N18.4  ICD-9-CM: 250.40, 585.4     Essential hypertension     ICD-10-CM: I10  ICD-9-CM: 401.9     Dyslipidemia     ICD-10-CM: E78.5  ICD-9-CM: 272.4         I will follow along with nephrology and he will go back to vascular soon to have the access point evaluated again  I will give him his flu vaccine today and I encouraged him to get the updated COVID-vaccine  I have asked him to let me know if he continues to see some hypoglycemia as I really do like alternatives to glimepiride, however financially it is the thing he can afford  I do have other medications I could explore with him if absolutely necessary  I will see him again in 6 months, sooner if needed         Answers for HPI/ROS submitted by the patient on 9/29/2022  What is the primary reason for your visit?:  Diabetes

## 2022-10-17 ENCOUNTER — APPOINTMENT (OUTPATIENT)
Dept: VASCULAR SURGERY | Facility: HOSPITAL | Age: 77
End: 2022-10-17

## 2022-10-17 PROCEDURE — G0463 HOSPITAL OUTPT CLINIC VISIT: HCPCS

## 2022-10-18 ENCOUNTER — TRANSCRIBE ORDERS (OUTPATIENT)
Dept: ADMINISTRATIVE | Facility: HOSPITAL | Age: 77
End: 2022-10-18

## 2022-10-18 DIAGNOSIS — Z99.2 ENCOUNTER FOR EXTRACORPOREAL DIALYSIS: ICD-10-CM

## 2022-10-18 DIAGNOSIS — N18.6 END STAGE RENAL DISEASE: Primary | ICD-10-CM

## 2022-12-27 ENCOUNTER — TELEPHONE (OUTPATIENT)
Dept: FAMILY MEDICINE CLINIC | Facility: CLINIC | Age: 77
End: 2022-12-27

## 2022-12-27 NOTE — TELEPHONE ENCOUNTER
Caller: Gabriel De Souza    Relationship: Self    Best call back number: [phone]    What medication are you requesting: WHATEVER IS RECOMMENDED    What are your current symptoms: WHEEZING, SORE THROAT, HEAD CONGESTION    How long have you been experiencing symptoms: 4 DAYS    Have you had these symptoms before:    [x] Yes  [] No    Have you been treated for these symptoms before:   [x] Yes  [] No    If a prescription is needed, what is your preferred pharmacy and phone number: [facility] [place], [place] - [address]  - [phone] [facility] [phone] FX     Additional notes:

## 2022-12-27 NOTE — TELEPHONE ENCOUNTER
Left pt detailed vm hub can relay message to be tested for flu/covid. No apts available this week. Advised the mucinex and flonse for congestion and push fluid intake.

## 2022-12-27 NOTE — TELEPHONE ENCOUNTER
Pt needs to test for flu or covid as this is likely viral. I would recommend flonase and mucinex over the counter to help with congestion. Push fluids.

## 2023-01-03 ENCOUNTER — LAB (OUTPATIENT)
Dept: FAMILY MEDICINE CLINIC | Facility: CLINIC | Age: 78
End: 2023-01-03
Payer: MEDICARE

## 2023-01-03 ENCOUNTER — OFFICE VISIT (OUTPATIENT)
Dept: FAMILY MEDICINE CLINIC | Facility: CLINIC | Age: 78
End: 2023-01-03
Payer: MEDICARE

## 2023-01-03 ENCOUNTER — HOSPITAL ENCOUNTER (OUTPATIENT)
Dept: GENERAL RADIOLOGY | Facility: HOSPITAL | Age: 78
Discharge: HOME | End: 2023-01-03
Admitting: FAMILY MEDICINE
Payer: MEDICARE

## 2023-01-03 ENCOUNTER — PATIENT MESSAGE (OUTPATIENT)
Dept: FAMILY MEDICINE CLINIC | Facility: CLINIC | Age: 78
End: 2023-01-03

## 2023-01-03 VITALS
TEMPERATURE: 97.5 F | BODY MASS INDEX: 30.62 KG/M2 | DIASTOLIC BLOOD PRESSURE: 68 MMHG | SYSTOLIC BLOOD PRESSURE: 139 MMHG | RESPIRATION RATE: 16 BRPM | HEART RATE: 63 BPM | WEIGHT: 231 LBS | HEIGHT: 73 IN | OXYGEN SATURATION: 95 %

## 2023-01-03 DIAGNOSIS — R05.1 ACUTE COUGH: Primary | ICD-10-CM

## 2023-01-03 DIAGNOSIS — R31.9 URINARY TRACT INFECTION WITH HEMATURIA, SITE UNSPECIFIED: ICD-10-CM

## 2023-01-03 DIAGNOSIS — N39.0 URINARY TRACT INFECTION WITH HEMATURIA, SITE UNSPECIFIED: ICD-10-CM

## 2023-01-03 LAB
ALBUMIN SERPL-MCNC: 4 G/DL (ref 3.5–5.2)
ALBUMIN/GLOB SERPL: 1 G/DL
ALP SERPL-CCNC: 141 U/L (ref 39–117)
ALT SERPL W P-5'-P-CCNC: 15 U/L (ref 1–41)
ANION GAP SERPL CALCULATED.3IONS-SCNC: 13 MMOL/L (ref 5–15)
AST SERPL-CCNC: 16 U/L (ref 1–40)
BASOPHILS # BLD AUTO: 0.04 10*3/MM3 (ref 0–0.2)
BASOPHILS NFR BLD AUTO: 0.3 % (ref 0–1.5)
BILIRUB BLD-MCNC: NEGATIVE MG/DL
BILIRUB SERPL-MCNC: 0.4 MG/DL (ref 0–1.2)
BUN SERPL-MCNC: 73 MG/DL (ref 8–23)
BUN/CREAT SERPL: 16 (ref 7–25)
CALCIUM SPEC-SCNC: 10.1 MG/DL (ref 8.6–10.5)
CHLORIDE SERPL-SCNC: 107 MMOL/L (ref 98–107)
CLARITY, POC: ABNORMAL
CO2 SERPL-SCNC: 23 MMOL/L (ref 22–29)
COLOR UR: YELLOW
CREAT SERPL-MCNC: 4.57 MG/DL (ref 0.76–1.27)
DEPRECATED RDW RBC AUTO: 43.9 FL (ref 37–54)
EGFRCR SERPLBLD CKD-EPI 2021: 12.6 ML/MIN/1.73
EOSINOPHIL # BLD AUTO: 0.21 10*3/MM3 (ref 0–0.4)
EOSINOPHIL NFR BLD AUTO: 1.5 % (ref 0.3–6.2)
ERYTHROCYTE [DISTWIDTH] IN BLOOD BY AUTOMATED COUNT: 14.2 % (ref 12.3–15.4)
EXPIRATION DATE: ABNORMAL
EXPIRATION DATE: ABNORMAL
FLUAV AG UPPER RESP QL IA.RAPID: DETECTED
FLUBV AG UPPER RESP QL IA.RAPID: NOT DETECTED
GLOBULIN UR ELPH-MCNC: 4.2 GM/DL
GLUCOSE SERPL-MCNC: 93 MG/DL (ref 65–99)
GLUCOSE UR STRIP-MCNC: NEGATIVE MG/DL
HCT VFR BLD AUTO: 37.2 % (ref 37.5–51)
HGB BLD-MCNC: 12.1 G/DL (ref 13–17.7)
IMM GRANULOCYTES # BLD AUTO: 0.14 10*3/MM3 (ref 0–0.05)
IMM GRANULOCYTES NFR BLD AUTO: 1 % (ref 0–0.5)
INTERNAL CONTROL: ABNORMAL
KETONES UR QL: NEGATIVE
LEUKOCYTE EST, POC: ABNORMAL
LYMPHOCYTES # BLD AUTO: 3.05 10*3/MM3 (ref 0.7–3.1)
LYMPHOCYTES NFR BLD AUTO: 21.8 % (ref 19.6–45.3)
Lab: ABNORMAL
Lab: ABNORMAL
MCH RBC QN AUTO: 27.8 PG (ref 26.6–33)
MCHC RBC AUTO-ENTMCNC: 32.5 G/DL (ref 31.5–35.7)
MCV RBC AUTO: 85.3 FL (ref 79–97)
MONOCYTES # BLD AUTO: 1.9 10*3/MM3 (ref 0.1–0.9)
MONOCYTES NFR BLD AUTO: 13.6 % (ref 5–12)
NEUTROPHILS NFR BLD AUTO: 61.8 % (ref 42.7–76)
NEUTROPHILS NFR BLD AUTO: 8.68 10*3/MM3 (ref 1.7–7)
NITRITE UR-MCNC: NEGATIVE MG/ML
NRBC BLD AUTO-RTO: 0 /100 WBC (ref 0–0.2)
PH UR: 5 [PH] (ref 5–8)
PLATELET # BLD AUTO: 407 10*3/MM3 (ref 140–450)
PMV BLD AUTO: 10.1 FL (ref 6–12)
POTASSIUM SERPL-SCNC: 4.4 MMOL/L (ref 3.5–5.2)
PROT SERPL-MCNC: 8.2 G/DL (ref 6–8.5)
PROT UR STRIP-MCNC: ABNORMAL MG/DL
RBC # BLD AUTO: 4.36 10*6/MM3 (ref 4.14–5.8)
RBC # UR STRIP: ABNORMAL /UL
SARS-COV-2 AG UPPER RESP QL IA.RAPID: NOT DETECTED
SODIUM SERPL-SCNC: 143 MMOL/L (ref 136–145)
SP GR UR: 1.01 (ref 1–1.03)
UROBILINOGEN UR QL: ABNORMAL
WBC NRBC COR # BLD: 14.02 10*3/MM3 (ref 3.4–10.8)

## 2023-01-03 PROCEDURE — 99213 OFFICE O/P EST LOW 20 MIN: CPT | Performed by: FAMILY MEDICINE

## 2023-01-03 PROCEDURE — 71046 X-RAY EXAM CHEST 2 VIEWS: CPT

## 2023-01-03 PROCEDURE — 36415 COLL VENOUS BLD VENIPUNCTURE: CPT | Performed by: FAMILY MEDICINE

## 2023-01-03 PROCEDURE — 1159F MED LIST DOCD IN RCRD: CPT | Performed by: FAMILY MEDICINE

## 2023-01-03 PROCEDURE — 81003 URINALYSIS AUTO W/O SCOPE: CPT | Performed by: FAMILY MEDICINE

## 2023-01-03 PROCEDURE — 1160F RVW MEDS BY RX/DR IN RCRD: CPT | Performed by: FAMILY MEDICINE

## 2023-01-03 PROCEDURE — 80053 COMPREHEN METABOLIC PANEL: CPT | Performed by: FAMILY MEDICINE

## 2023-01-03 PROCEDURE — 85025 COMPLETE CBC W/AUTO DIFF WBC: CPT | Performed by: FAMILY MEDICINE

## 2023-01-03 PROCEDURE — 87428 SARSCOV & INF VIR A&B AG IA: CPT | Performed by: FAMILY MEDICINE

## 2023-01-03 RX ORDER — BENZONATATE 200 MG/1
200 CAPSULE ORAL 3 TIMES DAILY PRN
Qty: 30 CAPSULE | Refills: 1 | Status: SHIPPED | OUTPATIENT
Start: 2023-01-03 | End: 2023-04-05

## 2023-01-03 RX ORDER — LEVOFLOXACIN 500 MG/1
500 TABLET, FILM COATED ORAL DAILY
Qty: 10 TABLET | Refills: 0 | Status: SHIPPED | OUTPATIENT
Start: 2023-01-03 | End: 2023-04-05

## 2023-01-03 NOTE — PROGRESS NOTES
Subjective   Gabriel De Souza is a 77 y.o. male.     History of Present Illness  Gabriel De Souza is in for illness that has lasted nearly 2 weeks. He has had a cough, though it has not been productive. He denies any fever. He denies any head congestion. He has not had any loss of appetite. He has had some fatigue but no nausea. . There is no history of chest pain or dyspnea. There is no history of issue with bowel dysfunction, but he has had some urinary frequency of late. There is no history of dizziness or lightheadedness. There is no history of issue with sleep or mood. There is no history of issue with present medication.  Urinary Tract Infection   Associated symptoms include frequency.   Cough  Associated symptoms include postnasal drip and rhinorrhea. Pertinent negatives include no chest pain, headaches, myalgias, sore throat or shortness of breath.   URI   Associated symptoms include congestion, coughing and rhinorrhea. Pertinent negatives include no chest pain, diarrhea, headaches or sore throat.          /68 (BP Location: Right arm, Patient Position: Sitting, Cuff Size: Large Adult)   Pulse 63   Temp 97.5 °F (36.4 °C) (Temporal)   Resp 16   Ht 185.4 cm (73\")   Wt 105 kg (231 lb)   SpO2 95%   BMI 30.48 kg/m²       Chief Complaint   Patient presents with   • Urinary Tract Infection   • Cough   • URI           Current Outpatient Medications:   •  amLODIPine (NORVASC) 10 MG tablet, , Disp: , Rfl:   •  aspirin 81 MG tablet, Take 81 mg by mouth Daily. HOLD DOS, Disp: , Rfl:   •  atenolol (TENORMIN) 50 MG tablet, TAKE 1 TABLET BY MOUTH  TWICE DAILY (Patient taking differently: Take 50 mg by mouth Daily.), Disp: 180 tablet, Rfl: 3  •  calcitriol (ROCALTROL) 0.25 MCG capsule, Take 0.25 mcg by mouth Every Other Day., Disp: , Rfl:   •  glimepiride (AMARYL) 2 MG tablet, TAKE 1 TABLET BY MOUTH  TWICE DAILY (Patient taking differently: Take 2 mg by mouth 2 (Two) Times a Day. HOLD DOS), Disp: 180 tablet, Rfl:  3  •  Niacin ER 1000 MG tablet controlled-release, Take 1 tablet by mouth Daily., Disp: , Rfl:   •  simvastatin (ZOCOR) 20 MG tablet, TAKE 1 TABLET BY MOUTH  DAILY (Patient taking differently: Take 20 mg by mouth Every Night.), Disp: 90 tablet, Rfl: 3  •  torsemide (DEMADEX) 20 MG tablet, Take 20 mg by mouth Daily. HOLD DOS, Disp: , Rfl:   •  benzonatate (TESSALON) 200 MG capsule, Take 1 capsule by mouth 3 (Three) Times a Day As Needed for Cough., Disp: 30 capsule, Rfl: 1  •  glucose blood (OneTouch Ultra) test strip, Test once daily DX:  e11.65, Disp: 200 each, Rfl: 5  •  levoFLOXacin (Levaquin) 500 MG tablet, Take 1 tablet by mouth Daily., Disp: 10 tablet, Rfl: 0        The following portions of the patient's history were reviewed and updated as appropriate: allergies, current medications, past family history, past medical history, past social history, past surgical history, and problem list.    Review of Systems   Constitutional: Positive for fatigue. Negative for activity change and unexpected weight change.   HENT: Positive for congestion, postnasal drip and rhinorrhea. Negative for hearing loss and sore throat.    Eyes: Negative for visual disturbance.   Respiratory: Positive for cough. Negative for chest tightness and shortness of breath.    Cardiovascular: Negative for chest pain.   Gastrointestinal: Negative for constipation and diarrhea.   Genitourinary: Positive for frequency. Negative for difficulty urinating.   Musculoskeletal: Negative for back pain and myalgias.   Neurological: Negative for dizziness, weakness and headaches.       Objective   Physical Exam  Vitals and nursing note reviewed.   Constitutional:       Appearance: He is ill-appearing.   HENT:      Right Ear: Tympanic membrane and ear canal normal.      Left Ear: Tympanic membrane and ear canal normal.      Nose: Congestion present. No rhinorrhea.      Mouth/Throat:      Pharynx: No oropharyngeal exudate or posterior oropharyngeal erythema.    Cardiovascular:      Rate and Rhythm: Normal rate and regular rhythm.      Heart sounds: Normal heart sounds.   Pulmonary:      Breath sounds: Wheezing and rhonchi present.   Abdominal:      General: Bowel sounds are normal.      Palpations: Abdomen is soft.      Tenderness: There is no abdominal tenderness. There is no guarding.   Musculoskeletal:      Cervical back: Neck supple.      Right lower leg: No edema.      Left lower leg: No edema.   Lymphadenopathy:      Cervical: No cervical adenopathy.   Neurological:      General: No focal deficit present.      Mental Status: He is alert and oriented to person, place, and time.   Psychiatric:         Mood and Affect: Mood normal.           Assessment & Plan   Problems Addressed this Visit    None  Visit Diagnoses     Acute cough    -  Primary    Relevant Orders    POCT SARS-CoV-2 Antigen JOSE + Flu (Completed)    Comprehensive metabolic panel (Completed)    CBC w AUTO Differential (Completed)    XR Chest PA & Lateral (Completed)    Urinary tract infection with hematuria, site unspecified        Relevant Medications    levoFLOXacin (Levaquin) 500 MG tablet    Other Relevant Orders    POCT urinalysis dipstick, automated (Completed)      Diagnoses       Codes Comments    Acute cough    -  Primary ICD-10-CM: R05.1  ICD-9-CM: 786.2     Urinary tract infection with hematuria, site unspecified     ICD-10-CM: N39.0, R31.9  ICD-9-CM: 599.0, 599.70         I will send for labs and x-ray as I suspect pneumonia clinically  I advised him to go to the ER for labored breathing or fever  I will send treatment and see back for follow up as indicated by findings  He or his wife is to call for any new issues

## 2023-01-04 ENCOUNTER — APPOINTMENT (OUTPATIENT)
Dept: VASCULAR SURGERY | Facility: HOSPITAL | Age: 78
End: 2023-01-04
Payer: MEDICARE

## 2023-01-04 ENCOUNTER — APPOINTMENT (OUTPATIENT)
Dept: CARDIOLOGY | Facility: HOSPITAL | Age: 78
End: 2023-01-04
Payer: MEDICARE

## 2023-01-12 ENCOUNTER — TELEPHONE (OUTPATIENT)
Dept: FAMILY MEDICINE CLINIC | Facility: CLINIC | Age: 78
End: 2023-01-12
Payer: MEDICARE

## 2023-01-12 NOTE — TELEPHONE ENCOUNTER
Caller: Gabriel De Souza    Relationship: Self    Best call back number: [phone]    What is the best time to reach you: ANY    Who are you requesting to speak with (clinical staff, provider,  specific staff member): CLINICAL    Do you know the name of the person who called: GABRIEL    What was the call regardin/4/22: 12PM 169 4PM 231 8PM 315  23:  7:40AM 71  4:15PM 184   23:  8:25 76       1PM 110    8:30PM    236  23:   7:50  78     2PM  199      9PM       143  /      8AM  87       2PM 126       8PM     257  23  8AM   97      2PM   146     9PM      244  1/10/23  9                         9PM  202  23 8AM 105     2PM 147       9PM    240  22 8AM  111      Do you require a callback: PLEASE CALL AND ADVISE

## 2023-01-13 RX ORDER — PEN NEEDLE, DIABETIC 32 GX 1/4"
NEEDLE, DISPOSABLE MISCELLANEOUS
Qty: 100 EACH | Refills: 1 | Status: SHIPPED | OUTPATIENT
Start: 2023-01-13

## 2023-01-13 RX ORDER — INSULIN GLARGINE AND LIXISENATIDE 100; 33 U/ML; UG/ML
20 INJECTION, SOLUTION SUBCUTANEOUS DAILY
Qty: 15 ML | Refills: 1 | Status: SHIPPED | OUTPATIENT
Start: 2023-01-13 | End: 2023-01-24 | Stop reason: SDUPTHER

## 2023-01-24 RX ORDER — INSULIN GLARGINE AND LIXISENATIDE 100; 33 U/ML; UG/ML
20 INJECTION, SOLUTION SUBCUTANEOUS DAILY
Qty: 15 ML | Refills: 1 | Status: SHIPPED | OUTPATIENT
Start: 2023-01-24

## 2023-03-01 ENCOUNTER — HOSPITAL ENCOUNTER (OUTPATIENT)
Dept: CARDIOLOGY | Facility: HOSPITAL | Age: 78
Discharge: HOME | End: 2023-03-01
Payer: MEDICARE

## 2023-03-01 ENCOUNTER — APPOINTMENT (OUTPATIENT)
Dept: VASCULAR SURGERY | Facility: HOSPITAL | Age: 78
End: 2023-03-01
Payer: MEDICARE

## 2023-03-01 DIAGNOSIS — Z99.2 ENCOUNTER FOR EXTRACORPOREAL DIALYSIS: ICD-10-CM

## 2023-03-01 DIAGNOSIS — N18.6 END STAGE RENAL DISEASE: ICD-10-CM

## 2023-03-01 LAB
BH CV VAS DIALYSIS ARTERIAL ANASTOMOSIS DIAMETER: 0.46 CM
BH CV VAS DIALYSIS ARTERIAL ANASTOMOSIS EDV: 114 CM/SEC
BH CV VAS DIALYSIS ARTERIAL ANASTOMOSIS PSV: 214 CM/SEC
BH CV VAS DIALYSIS CONDUIT DIST DEPTH: 0.69 CM
BH CV VAS DIALYSIS CONDUIT DIST DIAMETER: 0.78 CM
BH CV VAS DIALYSIS CONDUIT DIST EDV: 71 CM/SEC
BH CV VAS DIALYSIS CONDUIT DIST FLOW VOL: 1099 ML/MIN
BH CV VAS DIALYSIS CONDUIT DIST PSV: 143 CM/SEC
BH CV VAS DIALYSIS CONDUIT MID DEPTH: 0.29 CM
BH CV VAS DIALYSIS CONDUIT MID DIAMETER: 0.83 CM
BH CV VAS DIALYSIS CONDUIT MID EDV: 64 CM/SEC
BH CV VAS DIALYSIS CONDUIT MID FLOW VOL: 1379 ML/MIN
BH CV VAS DIALYSIS CONDUIT MID PSV: 113 CM/SEC
BH CV VAS DIALYSIS CONDUIT MID/DIST DEPTH: 0.39 CM
BH CV VAS DIALYSIS CONDUIT MID/DIST DIAMETER: 0.66 CM
BH CV VAS DIALYSIS CONDUIT MID/DIST EDV: 245 CM/SEC
BH CV VAS DIALYSIS CONDUIT MID/DIST FLOW VOL: 1716 ML/MIN
BH CV VAS DIALYSIS CONDUIT MID/DIST PSV: 458 CM/SEC
BH CV VAS DIALYSIS CONDUIT PROX DEPTH: 0.33 CM
BH CV VAS DIALYSIS CONDUIT PROX DIAMETER: 0.23 CM
BH CV VAS DIALYSIS CONDUIT PROX EDV: 413 CM/SEC
BH CV VAS DIALYSIS CONDUIT PROX FLOW VOL: 1692 ML/MIN
BH CV VAS DIALYSIS CONDUIT PROX PSV: 710 CM/SEC
BH CV VAS DIALYSIS CONDUIT PROX/MID DEPTH: 0.33 CM
BH CV VAS DIALYSIS CONDUIT PROX/MID DIAMETER: 0.85 CM
BH CV VAS DIALYSIS CONDUIT PROX/MID EDV: 79 CM/SEC
BH CV VAS DIALYSIS CONDUIT PROX/MID FLOW VOL: 1393 ML/MIN
BH CV VAS DIALYSIS CONDUIT PROX/MID PSV: 148 CM/SEC
BH CV VAS DIALYSIS LEFT BRANCH 1 DIAMETER: 0.31 CM
BH CV VAS DIALYSIS LEFT BRANCH 2 DIAMETER: 0.24 CM
BH CV VAS DIALYSIS LEFT BRANCH 3 DIAMETER: 0.35 CM
BH CV VAS DIALYSIS PRE-INFLOW BRACHIAL DIAMETER: 0.66 CM
BH CV VAS DIALYSIS PRE-INFLOW BRACHIAL EDV: 47 CM/SEC
BH CV VAS DIALYSIS PRE-INFLOW BRACHIAL FLOW VOL: 617 ML/MIN
BH CV VAS DIALYSIS PRE-INFLOW BRACHIAL PSV: 92 CM/SEC
BH CV VAS DIALYSIS VENOUS OUTFLOW CEPHALIC ARCH DIAMETE: 0.7 CM
BH CV VAS DIALYSIS VENOUS OUTFLOW CEPHALIC ARCH EDV: 107 CM/SEC
BH CV VAS DIALYSIS VENOUS OUTFLOW CEPHALIC ARCH PSV: 204 CM/SEC
BH CV VAS DIALYSIS VENOUS OUTFLOW SUBCLAVIAN DIAMETER: 0.78 CM
BH CV VAS DIALYSIS VENOUS OUTFLOW SUBCLAVIAN EDV: 45 CM/SEC
BH CV VAS DIALYSIS VENOUS OUTFLOW SUBCLAVIAN PSV: 109 CM/SEC
MAXIMAL PREDICTED HEART RATE: 144 BPM
STRESS TARGET HR: 122 BPM

## 2023-03-01 PROCEDURE — 93990 DOPPLER FLOW TESTING: CPT

## 2023-03-01 PROCEDURE — G0463 HOSPITAL OUTPT CLINIC VISIT: HCPCS

## 2023-03-02 ENCOUNTER — TRANSCRIBE ORDERS (OUTPATIENT)
Dept: ADMINISTRATIVE | Facility: HOSPITAL | Age: 78
End: 2023-03-02
Payer: MEDICARE

## 2023-03-02 DIAGNOSIS — Z99.2 ENCOUNTER FOR EXTRACORPOREAL DIALYSIS: ICD-10-CM

## 2023-03-02 DIAGNOSIS — N18.6 END STAGE RENAL DISEASE: Primary | ICD-10-CM

## 2023-03-10 RX ORDER — ENALAPRIL MALEATE 20 MG/1
TABLET ORAL
Qty: 180 TABLET | Refills: 3 | OUTPATIENT
Start: 2023-03-10

## 2023-04-05 PROBLEM — N18.9 CHRONIC RENAL INSUFFICIENCY: Status: ACTIVE | Noted: 2017-07-07

## 2023-04-05 PROBLEM — N18.32 STAGE 3B CHRONIC KIDNEY DISEASE: Status: ACTIVE | Noted: 2022-02-25

## 2023-04-05 PROBLEM — N28.1 RENAL CYST: Status: ACTIVE | Noted: 2022-02-25

## 2023-04-05 PROBLEM — N18.9 ANEMIA IN CHRONIC KIDNEY DISEASE: Status: ACTIVE | Noted: 2022-04-01

## 2023-04-05 PROBLEM — J40 BRONCHITIS: Status: ACTIVE | Noted: 2019-01-17

## 2023-04-05 PROBLEM — R80.9 PROTEINURIA: Status: ACTIVE | Noted: 2022-02-25

## 2023-04-05 PROBLEM — E55.9 VITAMIN D DEFICIENCY: Status: ACTIVE | Noted: 2022-02-25

## 2023-04-05 PROBLEM — D63.1 ANEMIA IN CHRONIC KIDNEY DISEASE: Status: ACTIVE | Noted: 2022-04-01

## 2023-04-05 PROBLEM — N25.81 SECONDARY HYPERPARATHYROIDISM OF RENAL ORIGIN: Status: ACTIVE | Noted: 2022-05-03

## 2023-04-05 PROBLEM — H91.91 DEAFNESS IN RIGHT EAR: Status: ACTIVE | Noted: 2023-04-05

## 2023-04-05 PROBLEM — E78.00 HYPERCHOLESTEROLEMIA: Status: ACTIVE | Noted: 2023-04-05

## 2023-04-05 PROBLEM — E04.9 GOITER: Status: ACTIVE | Noted: 2017-07-07

## 2023-04-05 PROBLEM — Z86.718 HISTORY OF THROMBOSIS: Status: ACTIVE | Noted: 2023-04-05

## 2023-04-06 ENCOUNTER — OFFICE VISIT (OUTPATIENT)
Dept: FAMILY MEDICINE CLINIC | Facility: CLINIC | Age: 78
End: 2023-04-06
Payer: MEDICARE

## 2023-04-06 ENCOUNTER — LAB (OUTPATIENT)
Dept: FAMILY MEDICINE CLINIC | Facility: CLINIC | Age: 78
End: 2023-04-06
Payer: MEDICARE

## 2023-04-06 VITALS
DIASTOLIC BLOOD PRESSURE: 71 MMHG | WEIGHT: 218.4 LBS | TEMPERATURE: 97.7 F | OXYGEN SATURATION: 99 % | BODY MASS INDEX: 28.81 KG/M2 | SYSTOLIC BLOOD PRESSURE: 138 MMHG | HEART RATE: 60 BPM

## 2023-04-06 DIAGNOSIS — N18.30 STAGE 3 CHRONIC KIDNEY DISEASE, UNSPECIFIED WHETHER STAGE 3A OR 3B CKD: ICD-10-CM

## 2023-04-06 DIAGNOSIS — E11.22 CONTROLLED TYPE 2 DIABETES MELLITUS WITH STAGE 4 CHRONIC KIDNEY DISEASE, WITHOUT LONG-TERM CURRENT USE OF INSULIN: Primary | ICD-10-CM

## 2023-04-06 DIAGNOSIS — I10 ESSENTIAL HYPERTENSION: ICD-10-CM

## 2023-04-06 DIAGNOSIS — E78.5 DYSLIPIDEMIA: ICD-10-CM

## 2023-04-06 DIAGNOSIS — N18.4 CONTROLLED TYPE 2 DIABETES MELLITUS WITH STAGE 4 CHRONIC KIDNEY DISEASE, WITHOUT LONG-TERM CURRENT USE OF INSULIN: Primary | ICD-10-CM

## 2023-04-06 DIAGNOSIS — E11.42 DIABETIC PERIPHERAL NEUROPATHY ASSOCIATED WITH TYPE 2 DIABETES MELLITUS: ICD-10-CM

## 2023-04-06 LAB
ALBUMIN UR-MCNC: 1.6 MG/DL
BASOPHILS # BLD AUTO: 0.02 10*3/MM3 (ref 0–0.2)
BASOPHILS NFR BLD AUTO: 0.2 % (ref 0–1.5)
BILIRUB UR QL STRIP: NEGATIVE
CHOLEST SERPL-MCNC: 109 MG/DL (ref 0–200)
CLARITY UR: CLEAR
COLOR UR: YELLOW
CREAT UR-MCNC: 24.6 MG/DL
DEPRECATED RDW RBC AUTO: 50.2 FL (ref 37–54)
EOSINOPHIL # BLD AUTO: 0.2 10*3/MM3 (ref 0–0.4)
EOSINOPHIL NFR BLD AUTO: 2 % (ref 0.3–6.2)
ERYTHROCYTE [DISTWIDTH] IN BLOOD BY AUTOMATED COUNT: 15.2 % (ref 12.3–15.4)
GLUCOSE UR STRIP-MCNC: NEGATIVE MG/DL
HBA1C MFR BLD: 6.3 % (ref 4.8–5.6)
HCT VFR BLD AUTO: 38.3 % (ref 37.5–51)
HDLC SERPL-MCNC: 30 MG/DL (ref 40–60)
HGB BLD-MCNC: 12.5 G/DL (ref 13–17.7)
HGB UR QL STRIP.AUTO: NEGATIVE
IMM GRANULOCYTES # BLD AUTO: 0.02 10*3/MM3 (ref 0–0.05)
IMM GRANULOCYTES NFR BLD AUTO: 0.2 % (ref 0–0.5)
KETONES UR QL STRIP: NEGATIVE
LDLC SERPL CALC-MCNC: 61 MG/DL (ref 0–100)
LDLC/HDLC SERPL: 2.03 {RATIO}
LEUKOCYTE ESTERASE UR QL STRIP.AUTO: NEGATIVE
LYMPHOCYTES # BLD AUTO: 3.55 10*3/MM3 (ref 0.7–3.1)
LYMPHOCYTES NFR BLD AUTO: 36.4 % (ref 19.6–45.3)
MCH RBC QN AUTO: 29 PG (ref 26.6–33)
MCHC RBC AUTO-ENTMCNC: 32.6 G/DL (ref 31.5–35.7)
MCV RBC AUTO: 88.9 FL (ref 79–97)
MICROALBUMIN/CREAT UR: 65 MG/G
MONOCYTES # BLD AUTO: 1.09 10*3/MM3 (ref 0.1–0.9)
MONOCYTES NFR BLD AUTO: 11.2 % (ref 5–12)
NEUTROPHILS NFR BLD AUTO: 4.88 10*3/MM3 (ref 1.7–7)
NEUTROPHILS NFR BLD AUTO: 50 % (ref 42.7–76)
NITRITE UR QL STRIP: NEGATIVE
NRBC BLD AUTO-RTO: 0 /100 WBC (ref 0–0.2)
PH UR STRIP.AUTO: 6 [PH] (ref 5–8)
PLATELET # BLD AUTO: 283 10*3/MM3 (ref 140–450)
PMV BLD AUTO: 10 FL (ref 6–12)
PROT UR QL STRIP: NEGATIVE
RBC # BLD AUTO: 4.31 10*6/MM3 (ref 4.14–5.8)
SP GR UR STRIP: 1.01 (ref 1–1.03)
TRIGL SERPL-MCNC: 91 MG/DL (ref 0–150)
UROBILINOGEN UR QL STRIP: NORMAL
VLDLC SERPL-MCNC: 18 MG/DL (ref 5–40)
WBC NRBC COR # BLD: 9.76 10*3/MM3 (ref 3.4–10.8)

## 2023-04-06 PROCEDURE — 81003 URINALYSIS AUTO W/O SCOPE: CPT | Performed by: FAMILY MEDICINE

## 2023-04-06 PROCEDURE — 85025 COMPLETE CBC W/AUTO DIFF WBC: CPT | Performed by: FAMILY MEDICINE

## 2023-04-06 PROCEDURE — 82043 UR ALBUMIN QUANTITATIVE: CPT | Performed by: FAMILY MEDICINE

## 2023-04-06 PROCEDURE — 82570 ASSAY OF URINE CREATININE: CPT | Performed by: FAMILY MEDICINE

## 2023-04-06 PROCEDURE — 80061 LIPID PANEL: CPT | Performed by: FAMILY MEDICINE

## 2023-04-06 PROCEDURE — 36415 COLL VENOUS BLD VENIPUNCTURE: CPT | Performed by: FAMILY MEDICINE

## 2023-04-06 PROCEDURE — 83036 HEMOGLOBIN GLYCOSYLATED A1C: CPT | Performed by: FAMILY MEDICINE

## 2023-04-06 RX ORDER — PROCHLORPERAZINE 25 MG/1
SUPPOSITORY RECTAL
Qty: 3 EACH | Refills: 5 | Status: SHIPPED | OUTPATIENT
Start: 2023-04-06

## 2023-04-06 RX ORDER — PROCHLORPERAZINE 25 MG/1
1 SUPPOSITORY RECTAL CONTINUOUS
Qty: 1 EACH | Refills: 1 | Status: SHIPPED | OUTPATIENT
Start: 2023-04-06

## 2023-04-06 NOTE — PROGRESS NOTES
Subjective   Gabriel De Souza is a 77 y.o. male.     History of Present Illness  Gabriel De Souza is in for follow up on his diabetes, high blood pressure, high cholesterol. He also has neuropathy from the diabetes. He has CKD and it worsened earlier in the year when he had pneumonia. There is no history of chest pain or dyspnea of late. There is no history of issue with bowel or bladder function. There is no history of vision or hearing problems. There is no history of dizziness or lightheadedness. There is no history of issue with sleep or mood. As for checking blood sugar readings, his mornings are good, mostly around 100. There is no issue with medication compliance. Diet and exercise compliance has been improving.  Diabetes  He has type 2 diabetes mellitus. No MedicAlert identification noted. The initial diagnosis of diabetes was made 50 Years ago. Hypoglycemia symptoms include nervousness/anxiousness. Pertinent negatives for hypoglycemia include no confusion, dizziness, headaches, hunger, mood changes, pallor, seizures, sleepiness, speech difficulty, sweats or tremors. Associated symptoms include blurred vision, fatigue, foot paresthesias, visual change, weakness and weight loss. Pertinent negatives for diabetes include no chest pain, no foot ulcerations, no polydipsia, no polyphagia and no polyuria. Hypoglycemia complications include nocturnal hypoglycemia. Pertinent negatives for hypoglycemia complications include no blackouts, no hospitalization, no required assistance and no required glucagon injection. Diabetic complications include impotence, nephropathy, peripheral neuropathy and retinopathy. Pertinent negatives for diabetic complications include no CVA, heart disease or PVD. Risk factors for coronary artery disease include dyslipidemia, family history and hypertension. He is compliant with treatment all of the time. He is currently taking insulin pre-breakfast. Insulin injections are given by patient.  Rotation sites for injection include the abdominal wall. His weight is stable. He is following a diabetic, generally healthy, low fat/cholesterol and low salt diet. Meal planning includes avoidance of concentrated sweets and carbohydrate counting. He has not had a previous visit with a dietitian. He participates in exercise daily. He monitors blood glucose at home 3-4 x per day. He monitors urine at home <1 x per month. Blood glucose monitoring compliance is good. His home blood glucose trend is fluctuating minimally. His breakfast blood glucose range is generally  mg/dl. His lunch blood glucose is taken between 2-3 pm. His lunch blood glucose range is generally 110-130 mg/dl. His dinner blood glucose is taken after 8 pm. His dinner blood glucose range is generally 110-130 mg/dl. His highest blood glucose is 130-140 mg/dl. His overall blood glucose range is 110-130 mg/dl. He sees a podiatrist.Eye exam is current.          /71 (BP Location: Right arm, Patient Position: Sitting, Cuff Size: Large Adult)   Pulse 60   Temp 97.7 °F (36.5 °C) (Temporal)   Wt 99.1 kg (218 lb 6.4 oz)   SpO2 99%   BMI 28.81 kg/m²       Chief Complaint   Patient presents with   • Diabetes     6 month f/u & next visit [employer] [facility] & fasting for labs            Current Outpatient Medications:   •  amLODIPine (NORVASC) 10 MG tablet, , Disp: , Rfl:   •  aspirin 81 MG tablet, Take 1 tablet by mouth Daily. HOLD DOS, Disp: , Rfl:   •  atenolol (TENORMIN) 50 MG tablet, TAKE 1 TABLET BY MOUTH  TWICE DAILY (Patient taking differently: Take 1 tablet by mouth 2 (Two) Times a Day.), Disp: 180 tablet, Rfl: 3  •  calcitriol (ROCALTROL) 0.25 MCG capsule, Take 1 capsule by mouth Every Other Day., Disp: , Rfl:   •  glucose blood (OneTouch Ultra) test strip, Test once daily DX:  e11.65, Disp: 200 each, Rfl: 5  •  Insulin Glargine-Lixisenatide (Soliqua) 100-33 UNT-MCG/ML solution pen-injector injection, Inject 20 Units under the skin into  the appropriate area as directed Daily., Disp: 15 mL, Rfl: 1  •  Insulin Pen Needle (Novofine Pen Needle) 32G X 6 MM misc, Uses 1 shot daily E11.65, Disp: 100 each, Rfl: 1  •  Niacin ER 1000 MG tablet controlled-release, Take 1 tablet by mouth Daily., Disp: , Rfl:   •  simvastatin (ZOCOR) 20 MG tablet, TAKE 1 TABLET BY MOUTH  DAILY (Patient taking differently: Take 1 tablet by mouth Every Night.), Disp: 90 tablet, Rfl: 3  •  torsemide (DEMADEX) 20 MG tablet, Take 1 tablet by mouth Daily. HOLD DOS, Disp: , Rfl:     Lab Results   Component Value Date    HGBA1C 6.6 (H) 07/06/2022    HGBA1C 5.8 (H) 12/28/2021    HGBA1C 5.6 06/28/2021     Lab Results   Component Value Date    MICROALBUR 2.3 05/31/2022    CREATININE 4.57 (H) 01/03/2023         Wt Readings from Last 3 Encounters:   04/06/23 99.1 kg (218 lb 6.4 oz)   01/03/23 105 kg (231 lb)   10/06/22 105 kg (232 lb)       The following portions of the patient's history were reviewed and updated as appropriate: allergies, current medications, past family history, past medical history, past social history, past surgical history, and problem list.    Review of Systems   Constitutional: Positive for fatigue and weight loss. Negative for activity change and fever.   HENT: Negative for congestion, sinus pressure, sinus pain, sore throat and trouble swallowing.    Eyes: Positive for blurred vision. Negative for visual disturbance.   Respiratory: Negative for chest tightness, shortness of breath and wheezing.    Cardiovascular: Negative for chest pain.   Gastrointestinal: Negative for abdominal distention, abdominal pain, constipation, diarrhea, nausea and vomiting.   Endocrine: Negative for polydipsia, polyphagia and polyuria.   Genitourinary: Positive for impotence. Negative for difficulty urinating and dysuria.   Musculoskeletal: Negative for back pain and neck pain.   Skin: Negative for pallor.   Neurological: Positive for weakness and numbness. Negative for dizziness,  tremors, seizures, speech difficulty and headaches.   Psychiatric/Behavioral: Negative for agitation, confusion, hallucinations and suicidal ideas. The patient is nervous/anxious.        Objective   Physical Exam  Vitals and nursing note reviewed.   Constitutional:       Appearance: Normal appearance.   HENT:      Right Ear: Tympanic membrane and ear canal normal.      Left Ear: Tympanic membrane and ear canal normal.   Cardiovascular:      Rate and Rhythm: Normal rate and regular rhythm.      Heart sounds: Normal heart sounds.   Pulmonary:      Effort: Pulmonary effort is normal.      Breath sounds: No wheezing or rales.   Abdominal:      General: Bowel sounds are normal.      Palpations: Abdomen is soft.      Tenderness: There is no abdominal tenderness. There is no guarding.   Musculoskeletal:      Right lower leg: No edema.      Left lower leg: No edema.   Neurological:      General: No focal deficit present.      Mental Status: He is alert and oriented to person, place, and time.   Psychiatric:         Mood and Affect: Mood normal.           Assessment & Plan   Problems Addressed this Visit        Cardiac and Vasculature    Dyslipidemia       Neuro    Diabetic peripheral neuropathy associated with type 2 diabetes mellitus   Other Visit Diagnoses     Controlled type 2 diabetes mellitus with stage 4 chronic kidney disease, without long-term current use of insulin    -  Primary    Relevant Orders    Hemoglobin A1c    Lipid panel    CBC w AUTO Differential    Microalbumin / Creatinine Urine Ratio - Urine, Clean Catch    Urinalysis With Culture If Indicated -    Essential hypertension        Stage 3 chronic kidney disease, unspecified whether stage 3a or 3b CKD          Diagnoses       Codes Comments    Controlled type 2 diabetes mellitus with stage 4 chronic kidney disease, without long-term current use of insulin    -  Primary ICD-10-CM: E11.22, N18.4  ICD-9-CM: 250.40, 585.4     Essential hypertension      ICD-10-CM: I10  ICD-9-CM: 401.9     Diabetic peripheral neuropathy associated with type 2 diabetes mellitus     ICD-10-CM: E11.42  ICD-9-CM: 250.60, 357.2     Dyslipidemia     ICD-10-CM: E78.5  ICD-9-CM: 272.4     Stage 3 chronic kidney disease, unspecified whether stage 3a or 3b CKD     ICD-10-CM: N18.30  ICD-9-CM: 585.3         I will attempt to get the labs he had done for nephrology yesterday  I will get some labs for myself here today to make sure his diabetes is in good control  I have asked him to continue refining the diet but I am pleased with the progress he is making  I have asked him to remain active, actually be more active than he is at the moment  I have prescribed Dexcom to see if we can get even better glucose control  I will see him back no later than 6 months, sooner if he has labs that indicate I should         Answers for HPI/ROS submitted by the patient on 3/30/2023  What is the primary reason for your visit?: Diabetes

## 2023-04-07 ENCOUNTER — PATIENT MESSAGE (OUTPATIENT)
Dept: FAMILY MEDICINE CLINIC | Facility: CLINIC | Age: 78
End: 2023-04-07

## 2023-04-30 RX ORDER — INSULIN GLARGINE AND LIXISENATIDE 100; 33 U/ML; UG/ML
INJECTION, SOLUTION SUBCUTANEOUS
Qty: 30 ML | Refills: 3 | Status: SHIPPED | OUTPATIENT
Start: 2023-04-30

## 2023-04-30 RX ORDER — SIMVASTATIN 20 MG
TABLET ORAL
Qty: 90 TABLET | Refills: 3 | Status: SHIPPED | OUTPATIENT
Start: 2023-04-30

## 2023-04-30 RX ORDER — ENALAPRIL MALEATE 20 MG/1
TABLET ORAL
Qty: 180 TABLET | Refills: 3 | OUTPATIENT
Start: 2023-04-30

## 2023-04-30 RX ORDER — ATENOLOL 50 MG/1
TABLET ORAL
Qty: 180 TABLET | Refills: 3 | Status: SHIPPED | OUTPATIENT
Start: 2023-04-30

## 2023-06-07 ENCOUNTER — TRANSCRIBE ORDERS (OUTPATIENT)
Dept: ADMINISTRATIVE | Facility: HOSPITAL | Age: 78
End: 2023-06-07
Payer: MEDICARE

## 2023-06-07 ENCOUNTER — APPOINTMENT (OUTPATIENT)
Dept: VASCULAR SURGERY | Facility: HOSPITAL | Age: 78
End: 2023-06-07
Payer: MEDICARE

## 2023-06-07 ENCOUNTER — HOSPITAL ENCOUNTER (OUTPATIENT)
Dept: CARDIOLOGY | Facility: HOSPITAL | Age: 78
Discharge: HOME | End: 2023-06-07
Payer: MEDICARE

## 2023-06-07 DIAGNOSIS — N18.6 END STAGE RENAL DISEASE: ICD-10-CM

## 2023-06-07 DIAGNOSIS — Z99.2 ENCOUNTER FOR EXTRACORPOREAL DIALYSIS: ICD-10-CM

## 2023-06-07 DIAGNOSIS — N18.6 END STAGE RENAL DISEASE: Primary | ICD-10-CM

## 2023-06-07 LAB
BH CV VAS DIALYSIS ARTERIAL ANASTOMOSIS DIAMETER: 0.63 CM
BH CV VAS DIALYSIS ARTERIAL ANASTOMOSIS EDV: 197 CM/SEC
BH CV VAS DIALYSIS ARTERIAL ANASTOMOSIS PSV: 419 CM/SEC
BH CV VAS DIALYSIS CONDUIT DIST DEPTH: 0.62 CM
BH CV VAS DIALYSIS CONDUIT DIST DIAMETER: 0.8 CM
BH CV VAS DIALYSIS CONDUIT DIST EDV: 69 CM/SEC
BH CV VAS DIALYSIS CONDUIT DIST FLOW VOL: 1415 ML/MIN
BH CV VAS DIALYSIS CONDUIT DIST PSV: 142 CM/SEC
BH CV VAS DIALYSIS CONDUIT MID DEPTH: 0.28 CM
BH CV VAS DIALYSIS CONDUIT MID DIAMETER: 0.83 CM
BH CV VAS DIALYSIS CONDUIT MID EDV: 65 CM/SEC
BH CV VAS DIALYSIS CONDUIT MID FLOW VOL: 1701 ML/MIN
BH CV VAS DIALYSIS CONDUIT MID PSV: 116 CM/SEC
BH CV VAS DIALYSIS CONDUIT PROX DEPTH: 0.19 CM
BH CV VAS DIALYSIS CONDUIT PROX DIAMETER: 1.35 CM
BH CV VAS DIALYSIS CONDUIT PROX EDV: 34 CM/SEC
BH CV VAS DIALYSIS CONDUIT PROX FLOW VOL: 1701 ML/MIN
BH CV VAS DIALYSIS CONDUIT PROX PSV: 102 CM/SEC
BH CV VAS DIALYSIS LEFT BRANCH 1 DIAMETER: 0.35 CM
BH CV VAS DIALYSIS LEFT BRANCH 2 DIAMETER: 0.26 CM
BH CV VAS DIALYSIS PRE-INFLOW BRACHIAL DIAMETER: 0.8 CM
BH CV VAS DIALYSIS PRE-INFLOW BRACHIAL EDV: 64 CM/SEC
BH CV VAS DIALYSIS PRE-INFLOW BRACHIAL FLOW VOL: 1706 ML/MIN
BH CV VAS DIALYSIS PRE-INFLOW BRACHIAL PSV: 145 CM/SEC
BH CV VAS DIALYSIS VENOUS OUTFLOW CEPHALIC ARCH DIAMETE: 0.68 CM
BH CV VAS DIALYSIS VENOUS OUTFLOW CEPHALIC ARCH EDV: 112 CM/SEC
BH CV VAS DIALYSIS VENOUS OUTFLOW CEPHALIC ARCH PSV: 211 CM/SEC
BH CV VAS DIALYSIS VENOUS OUTFLOW SUBCLAVIAN DIAMETER: 0.58 CM
BH CV VAS DIALYSIS VENOUS OUTFLOW SUBCLAVIAN EDV: 271 CM/SEC
BH CV VAS DIALYSIS VENOUS OUTFLOW SUBCLAVIAN PSV: 471 CM/SEC

## 2023-06-07 PROCEDURE — G0463 HOSPITAL OUTPT CLINIC VISIT: HCPCS

## 2023-06-07 PROCEDURE — 93990 DOPPLER FLOW TESTING: CPT

## 2023-08-28 ENCOUNTER — TELEPHONE (OUTPATIENT)
Dept: FAMILY MEDICINE CLINIC | Facility: CLINIC | Age: 78
End: 2023-08-28
Payer: MEDICARE

## 2023-08-28 RX ORDER — LEVOFLOXACIN 500 MG/1
500 TABLET, FILM COATED ORAL DAILY
Qty: 7 TABLET | Refills: 0 | Status: SHIPPED | OUTPATIENT
Start: 2023-08-28 | End: 2023-09-01

## 2023-08-28 RX ORDER — CHLORCYCLIZINE HYDROCHLORIDE AND PSEUDOEPHEDRINE HYDROCHLORIDE 25; 60 MG/1; MG/1
0.5 TABLET ORAL 2 TIMES DAILY PRN
Qty: 30 TABLET | Refills: 1 | Status: SHIPPED | OUTPATIENT
Start: 2023-08-28

## 2023-08-28 NOTE — TELEPHONE ENCOUNTER
Caller: Gabriel De Souza    Relationship: Self    Best call back number: [phone]     What medication are you requesting: ANTIBIOTIC    What are your current symptoms: HEADACHE/RUNNY NOSE/COUGH AND ADDITIONALLY HURTS WHEN HE URINATES    How long have you been experiencing symptoms: 8/27    Have you had these symptoms before:    [x] Yes  [] No    Have you been treated for these symptoms before:   [x] Yes  [] No    If a prescription is needed, what is your preferred pharmacy and phone number: [facility] - [place], [place] - [address]  - [phone] [facility] [phone]      Additional notes: PATIENT IS WANTING SOMETHING SENT IN TO PHARMACY. PLEASE CALL AND ADVISE

## 2023-09-01 ENCOUNTER — OFFICE VISIT (OUTPATIENT)
Dept: FAMILY MEDICINE CLINIC | Facility: CLINIC | Age: 78
End: 2023-09-01
Payer: MEDICARE

## 2023-09-01 ENCOUNTER — TELEPHONE (OUTPATIENT)
Dept: FAMILY MEDICINE CLINIC | Facility: CLINIC | Age: 78
End: 2023-09-01

## 2023-09-01 VITALS
HEIGHT: 73 IN | DIASTOLIC BLOOD PRESSURE: 69 MMHG | TEMPERATURE: 98.2 F | SYSTOLIC BLOOD PRESSURE: 143 MMHG | HEART RATE: 73 BPM | BODY MASS INDEX: 28.89 KG/M2 | OXYGEN SATURATION: 96 % | WEIGHT: 218 LBS

## 2023-09-01 DIAGNOSIS — J22 LOWER RESPIRATORY INFECTION: Primary | ICD-10-CM

## 2023-09-01 PROBLEM — N18.9 HYPERPHOSPHATEMIA DUE TO CHRONIC KIDNEY DISEASE: Status: ACTIVE | Noted: 2023-07-27

## 2023-09-01 PROBLEM — E83.39 HYPERPHOSPHATEMIA DUE TO CHRONIC KIDNEY DISEASE: Status: ACTIVE | Noted: 2023-07-27

## 2023-09-01 PROCEDURE — 99213 OFFICE O/P EST LOW 20 MIN: CPT | Performed by: NURSE PRACTITIONER

## 2023-09-01 PROCEDURE — 3077F SYST BP >= 140 MM HG: CPT | Performed by: NURSE PRACTITIONER

## 2023-09-01 PROCEDURE — 3078F DIAST BP <80 MM HG: CPT | Performed by: NURSE PRACTITIONER

## 2023-09-01 RX ORDER — PREDNISONE 10 MG/1
10 TABLET ORAL DAILY
Qty: 7 TABLET | Refills: 0 | Status: SHIPPED | OUTPATIENT
Start: 2023-09-01

## 2023-09-01 RX ORDER — HYDROCODONE BITARTRATE AND ACETAMINOPHEN 5; 325 MG/1; MG/1
TABLET ORAL
COMMUNITY
Start: 2023-05-09

## 2023-09-01 RX ORDER — PREDNISONE 10 MG/1
TABLET ORAL
COMMUNITY
Start: 2023-08-03 | End: 2023-09-01 | Stop reason: SDUPTHER

## 2023-09-01 RX ORDER — GLIMEPIRIDE 2 MG/1
TABLET ORAL
COMMUNITY
Start: 2023-06-24

## 2023-09-01 RX ORDER — DOXYCYCLINE HYCLATE 100 MG/1
100 CAPSULE ORAL 2 TIMES DAILY
Qty: 14 CAPSULE | Refills: 0 | Status: SHIPPED | OUTPATIENT
Start: 2023-09-01

## 2023-09-01 NOTE — PROGRESS NOTES
Chief Complaint  Cough (Last Friday, dr owen gave him some med Monday , he saids he only wheezes when laying down )    Subjective        Gabriel De Souza presents to [facility]  History of Present Illness  Gabriel is a 78-year-old male presenting today with complaints of cough.  His symptoms started Monday.  He was prescribed Stahist and Levaquin.  He has 2 more days of the antibiotic.  He reports continued coughing and increased wheezing.  He says the wheezing was really bad last night when he was laying down.  He had pneumonia in January and is concerned that he may have it again.  He denies any fever or chills.  He reports shortness of breath.    The following portions of the patient's history were reviewed and updated as appropriate: allergies, current medications, past family history, past medical history, past social history, past surgical history and problem list.    Allergies   Allergen Reactions    Tylenol With Codeine #3 [Acetaminophen-Codeine] Nausea Only and GI Intolerance       Patient Active Problem List   Diagnosis    Angina pectoris    Arteriosclerosis    Dyslipidemia    Hypertension    Sinus bradycardia    Type 2 diabetes mellitus    Encounter for screening for malignant neoplasm of prostate    Hip pain, right    Anemia in chronic kidney disease    Benign prostatic hyperplasia    Bronchitis    Chronic renal insufficiency    Deafness in right ear    Diabetic peripheral neuropathy associated with type 2 diabetes mellitus    Goiter    History of thrombosis    Hypercholesterolemia    Hypogonadism    Low back pain    Peripheral neuropathy    Proteinuria    Secondary hyperparathyroidism of renal origin    Renal cyst    Stage 3b chronic kidney disease    Vitamin D deficiency    Hyperphosphatemia due to chronic kidney disease       Current Outpatient Medications   Medication Instructions    amLODIPine (NORVASC) 10 MG tablet TAKE 1 TABLET BY MOUTH  DAILY    aspirin 81 mg, Oral,  "Daily, HOLD DOS    atenolol (TENORMIN) 50 MG tablet TAKE 1 TABLET BY MOUTH  TWICE DAILY    calcitriol (ROCALTROL) 0.25 mcg, Oral, Every Other Day    Chlorcyclizine-Pseudoephed (Stahist AD) 25-60 MG tablet 0.5 tablets, Oral, 2 Times Daily PRN    Continuous Blood Gluc Sensor (Dexcom G6 Sensor) Does not apply, Every 10 Days    Continuous Blood Gluc Transmit (Dexcom G6 Transmitter) misc 1 Device, Does not apply, Continuous    doxycycline (VIBRAMYCIN) 100 mg, Oral, 2 Times Daily    glimepiride (AMARYL) 2 MG tablet     glucose blood (OneTouch Ultra) test strip Test once daily DX:  e11.65    HYDROcodone-acetaminophen (NORCO) 5-325 MG per tablet take 1 tablet by mouth every 4 to 6 hours as needed    Insulin Pen Needle (Novofine Pen Needle) 32G X 6 MM misc Uses 1 shot daily E11.65    Niacin ER 1000 MG tablet controlled-release 1 tablet, Daily    predniSONE (DELTASONE) 10 mg, Oral, Daily    simvastatin (ZOCOR) 20 MG tablet TAKE 1 TABLET BY MOUTH  DAILY    Soliqua 100-33 UNT-MCG/ML solution pen-injector injection INJECT 20 UNITS SUBCUTANEOUSLY  INTO THE APPROPRIATE AREA AS  DIRECTED DAILY    torsemide (DEMADEX) 20 mg, Oral, Daily, HOLD DOS          Objective   Vital Signs:  /69 (BP Location: Right arm, Patient Position: Sitting, Cuff Size: Adult)   Pulse 73   Temp 98.2 øF (36.8 øC) (Temporal)   Ht 185.4 cm (73\")   Wt 98.9 kg (218 lb)   SpO2 96%   BMI 28.76 kg/mý   Estimated body mass index is 28.76 kg/mý as calculated from the following:    Height as of this encounter: 185.4 cm (73\").    Weight as of this encounter: 98.9 kg (218 lb).             Review of Systems   Constitutional:  Negative for appetite change, chills, fatigue and fever.   HENT:  Negative for congestion, ear pain, postnasal drip, rhinorrhea, sinus pressure, sinus pain, sneezing, sore throat and tinnitus.    Eyes:  Negative for redness.   Respiratory:  Positive for cough and wheezing. Negative for chest tightness and shortness of breath.  "   Cardiovascular:  Negative for chest pain.   Gastrointestinal:  Negative for nausea and vomiting.   Musculoskeletal:  Negative for myalgias.   Allergic/Immunologic: Negative for environmental allergies.   Neurological:  Negative for dizziness, syncope, weakness, light-headedness and headaches.      Physical Exam  Constitutional:       Appearance: Normal appearance. He is normal weight.   HENT:      Head: Normocephalic and atraumatic.      Nose: Nose normal.      Mouth/Throat:      Mouth: Mucous membranes are moist.      Pharynx: Oropharynx is clear.   Cardiovascular:      Rate and Rhythm: Normal rate and regular rhythm.      Pulses: Normal pulses.      Heart sounds: Murmur heard.   Pulmonary:      Effort: Pulmonary effort is normal.      Breath sounds: Decreased air movement present. Examination of the right-upper field reveals rhonchi. Examination of the left-upper field reveals rhonchi. Examination of the right-lower field reveals rhonchi. Examination of the left-lower field reveals rhonchi. Rhonchi present.   Musculoskeletal:      Cervical back: Neck supple.   Skin:     General: Skin is warm and dry.   Neurological:      Mental Status: He is alert.   Psychiatric:         Mood and Affect: Mood normal.         Behavior: Behavior normal.         Thought Content: Thought content normal.         Judgment: Judgment normal.      Result Review :                   Assessment and Plan   Diagnoses and all orders for this visit:    1. Lower respiratory infection (Primary)  Comments:  Finish Levaquin.  Start doxycycline and prednisone.  We will consider x-ray if symptoms persist.  Orders:  -     doxycycline (VIBRAMYCIN) 100 MG capsule; Take 1 capsule by mouth 2 (Two) Times a Day.  Dispense: 14 capsule; Refill: 0  -     predniSONE (DELTASONE) 10 MG tablet; Take 1 tablet by mouth Daily.  Dispense: 7 tablet; Refill: 0             Follow Up   No follow-ups on file.  Patient was given instructions and counseling regarding his  condition or for health maintenance advice. Please see specific information pulled into the AVS if appropriate.

## 2023-09-01 NOTE — TELEPHONE ENCOUNTER
"  Caller: Gabriel De Souza    Relationship: Self    Best call back number:     [phone] (Mobile)       What is the best time to reach you: ANY     Who are you requesting to speak with (clinical staff, provider,  specific staff member): CLINICAL     What was the call regarding: PATIENT STATES THAT LAST FRIDAY HE STARTED FEELING BAD AND HE IS ON THE MEDICATION PRESCRIBED BUT STATES THAT AS OF WED. HIS LUNGS ARE \"TALKING TO HIM\" AND HE'S COUGHING A LOT. PLEASE ADVISE.     Is it okay if the provider responds through MyChart: YES   "

## 2023-10-04 ENCOUNTER — OFFICE VISIT (OUTPATIENT)
Dept: CARDIOLOGY | Facility: CLINIC | Age: 78
End: 2023-10-04
Payer: MEDICARE

## 2023-10-04 VITALS
OXYGEN SATURATION: 97 % | HEART RATE: 58 BPM | HEIGHT: 73 IN | WEIGHT: 203 LBS | BODY MASS INDEX: 26.9 KG/M2 | DIASTOLIC BLOOD PRESSURE: 72 MMHG | SYSTOLIC BLOOD PRESSURE: 135 MMHG

## 2023-10-04 DIAGNOSIS — I10 PRIMARY HYPERTENSION: ICD-10-CM

## 2023-10-04 DIAGNOSIS — R01.1 HEART MURMUR: Primary | ICD-10-CM

## 2023-10-04 PROCEDURE — 1159F MED LIST DOCD IN RCRD: CPT | Performed by: INTERNAL MEDICINE

## 2023-10-04 PROCEDURE — 3075F SYST BP GE 130 - 139MM HG: CPT | Performed by: INTERNAL MEDICINE

## 2023-10-04 PROCEDURE — 99213 OFFICE O/P EST LOW 20 MIN: CPT | Performed by: INTERNAL MEDICINE

## 2023-10-04 PROCEDURE — 93000 ELECTROCARDIOGRAM COMPLETE: CPT | Performed by: INTERNAL MEDICINE

## 2023-10-04 PROCEDURE — 3078F DIAST BP <80 MM HG: CPT | Performed by: INTERNAL MEDICINE

## 2023-10-04 PROCEDURE — 1160F RVW MEDS BY RX/DR IN RCRD: CPT | Performed by: INTERNAL MEDICINE

## 2023-10-04 RX ORDER — SEVELAMER CARBONATE 800 MG/1
800 TABLET, FILM COATED ORAL
COMMUNITY
Start: 2023-09-11 | End: 2024-09-10

## 2023-10-04 NOTE — PROGRESS NOTES
Progress note      Name: Gabriel De Souza ADMIT: (Not on file)   : 1945  PCP: Waylon Johnson MD    MRN: [id number] LOS: 0 days   AGE/SEX: 78 y.o. male  ROOM: Room/bed info not found     Chief Complaint   Patient presents with    Follow-up     1 yr f/u with ekg       Subjective       History of present illness  Gabriel De Souzais a 78-year-old male patient who has history of nonobstructive coronary artery disease on heart cath in , hypertension, diabetes, chronic renal insufficiency, is here today for a yearly cardiac follow-up. Patient is doing well denies having any chest pain or shortness of breath, no palpitations no lower extremity edema no syncopal episodes, no recent hospitalizations.  He does have a AV fistula but has not started dialysis yet.    Past Medical History:   Diagnosis Date    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    Coronary artery disease     Deep vein thrombosis 1990    Diabetes mellitus     Dyslipidemia     Erectile dysfunction 51 years old    Heart murmur 10/4/2023    HL (hearing loss) 7 year old    right    Hyperlipidemia 1970    Hypertension     Neuropathy     Renal insufficiency     Stage 4 chronic kidney disease     3-4     Past Surgical History:   Procedure Laterality Date    ARTERIOVENOUS FISTULA/SHUNT SURGERY Left 2022    Procedure: BRACHIAL CEPHALIC FISTULA FORMATION;  Surgeon: Edy Vega MD;  Location: [facility];  Service: Vascular;  Laterality: Left;    BACK SURGERY      BASAL CELL CARCINOMA EXCISION  2019    CARDIAC CATHETERIZATION      TURP / TRANSURETHRAL INCISION / DRAINAGE PROSTATE       Family History   Problem Relation Age of Onset    Heart disease Mother     Heart disease Brother     Heart attack Maternal Grandfather     Hypertension Father     Hearing loss Father      Social History     Tobacco Use    Smoking status: Former     Types: Cigarettes     Quit date:      Years since quittin.7     Passive exposure: Past    Smokeless  tobacco: Never   Vaping Use    Vaping Use: Never used   Substance Use Topics    Alcohol use: No    Drug use: No       Current Outpatient Medications:     amLODIPine (NORVASC) 10 MG tablet, TAKE 1 TABLET BY MOUTH  DAILY, Disp: 90 tablet, Rfl: 3    aspirin 81 MG tablet, Take 1 tablet by mouth Daily. HOLD DOS, Disp: , Rfl:     atenolol (TENORMIN) 50 MG tablet, TAKE 1 TABLET BY MOUTH  TWICE DAILY, Disp: 180 tablet, Rfl: 3    calcitriol (ROCALTROL) 0.25 MCG capsule, Take 1 capsule by mouth Every Other Day., Disp: , Rfl:     Continuous Blood Gluc Sensor (Dexcom G6 Sensor), Every 10 (Ten) Days., Disp: 3 each, Rfl: 5    Continuous Blood Gluc Transmit (Dexcom G6 Transmitter) misc, 1 Device Continuous., Disp: 1 each, Rfl: 1    glucose blood (OneTouch Ultra) test strip, Test once daily DX:  e11.65, Disp: 200 each, Rfl: 5    Insulin Pen Needle (Novofine Pen Needle) 32G X 6 MM misc, Uses 1 shot daily E11.65, Disp: 100 each, Rfl: 1    sevelamer (RENVELA) 800 MG tablet, Take 1 tablet by mouth., Disp: , Rfl:     simvastatin (ZOCOR) 20 MG tablet, TAKE 1 TABLET BY MOUTH  DAILY, Disp: 90 tablet, Rfl: 3    Soliqua 100-33 UNT-MCG/ML solution pen-injector injection, INJECT 20 UNITS SUBCUTANEOUSLY  INTO THE APPROPRIATE AREA AS  DIRECTED DAILY, Disp: 30 mL, Rfl: 3    torsemide (DEMADEX) 20 MG tablet, Take 1 tablet by mouth Daily. HOLD DOS, Disp: , Rfl:   Allergies:  Tylenol with codeine #3 [acetaminophen-codeine]      Physical Exam  VITALS REVIEWED    General:      well developed, in no acute distress.    Head:      normocephalic and atraumatic.    Eyes:      PERRL/EOM intact, conjunctiva and sclera clear with out nystagmus.    Neck:      no masses, thyromegaly,  trachea central with normal respiratory effort and PMI displaced laterally  Lungs:      Clear to auscultation bilaterally  Heart:       Regular rate and rhythm, systolic murmur  Msk:      no deformity or scoliosis noted of thoracic or lumbar spine.    Pulses:      pulses normal in  all 4 extremities.    Extremities:       No lower extremity edema  Neurologic:      no focal deficits.   alert oriented x3  Skin:      intact without lesions or rashes.    Psych:      alert and cooperative; normal mood and affect; normal attention span and concentration.      Result Review :               Pertinent cardiac workup            ECG 12 Lead    Date/Time: 10/4/2023 11:23 AM  Performed by: Lali Borja MD  Authorized by: Lali Borja MD   Comparison: compared with previous ECG   Similar to previous ECG  Rhythm: sinus rhythm  Rate: normal  BPM: 58  Conduction: 1st degree AV block  ST Segments: ST segments normal  QRS axis: normal  Other findings: non-specific ST-T wave changes            Assessment and Plan      Gabriel De Souza is a 78-year-old male patient was history of diabetes, hypertension, chronic renal insufficiency, nonobstructive CAD, here today for follow-up.  He denies any anginal symptoms or CHF symptoms, his blood pressure is well controlled.  He does have an AV fistula but has not started dialysis yet.  His physical exam revealed a murmur in the left parasternal border.  I would like to get an echocardiogram with his next visit in 6 months to assess his cardiac valves.    Diagnoses and all orders for this visit:    1. Heart murmur (Primary)  -     Adult Transthoracic Echo Complete W/ Cont if Necessary Per Protocol; Future    2. Primary hypertension           Return in about 6 months (around 4/4/2024), or with Echo.  Patient was given instructions and counseling regarding his condition or for health maintenance advice. Please see specific information pulled into the AVS if appropriate.

## 2023-10-10 ENCOUNTER — LAB (OUTPATIENT)
Dept: FAMILY MEDICINE CLINIC | Facility: CLINIC | Age: 78
End: 2023-10-10
Payer: MEDICARE

## 2023-10-10 ENCOUNTER — OFFICE VISIT (OUTPATIENT)
Dept: FAMILY MEDICINE CLINIC | Facility: CLINIC | Age: 78
End: 2023-10-10
Payer: MEDICARE

## 2023-10-10 VITALS
HEART RATE: 59 BPM | TEMPERATURE: 97.1 F | WEIGHT: 203.5 LBS | OXYGEN SATURATION: 99 % | BODY MASS INDEX: 26.85 KG/M2 | DIASTOLIC BLOOD PRESSURE: 79 MMHG | SYSTOLIC BLOOD PRESSURE: 137 MMHG

## 2023-10-10 DIAGNOSIS — I10 ESSENTIAL HYPERTENSION: ICD-10-CM

## 2023-10-10 DIAGNOSIS — E11.22 CONTROLLED TYPE 2 DIABETES MELLITUS WITH STAGE 4 CHRONIC KIDNEY DISEASE, WITHOUT LONG-TERM CURRENT USE OF INSULIN: ICD-10-CM

## 2023-10-10 DIAGNOSIS — Z23 IMMUNIZATION DUE: ICD-10-CM

## 2023-10-10 DIAGNOSIS — Z00.00 MEDICARE ANNUAL WELLNESS VISIT, SUBSEQUENT: Primary | ICD-10-CM

## 2023-10-10 DIAGNOSIS — N18.4 CONTROLLED TYPE 2 DIABETES MELLITUS WITH STAGE 4 CHRONIC KIDNEY DISEASE, WITHOUT LONG-TERM CURRENT USE OF INSULIN: ICD-10-CM

## 2023-10-10 DIAGNOSIS — H91.93 BILATERAL HEARING LOSS, UNSPECIFIED HEARING LOSS TYPE: ICD-10-CM

## 2023-10-10 DIAGNOSIS — E11.42 DIABETIC PERIPHERAL NEUROPATHY ASSOCIATED WITH TYPE 2 DIABETES MELLITUS: ICD-10-CM

## 2023-10-10 LAB
25(OH)D3 SERPL-MCNC: 26.5 NG/ML (ref 30–100)
ALBUMIN SERPL-MCNC: 4.3 G/DL (ref 3.5–5.2)
ALBUMIN UR-MCNC: 3.3 MG/DL
ALBUMIN/GLOB SERPL: 1.3 G/DL
ALP SERPL-CCNC: 74 U/L (ref 39–117)
ALT SERPL W P-5'-P-CCNC: 7 U/L (ref 1–41)
ANION GAP SERPL CALCULATED.3IONS-SCNC: 10 MMOL/L (ref 5–15)
AST SERPL-CCNC: 11 U/L (ref 1–40)
BILIRUB SERPL-MCNC: 0.4 MG/DL (ref 0–1.2)
BUN SERPL-MCNC: 63 MG/DL (ref 8–23)
BUN/CREAT SERPL: 13.8 (ref 7–25)
CALCIUM SPEC-SCNC: 11 MG/DL (ref 8.6–10.5)
CHLORIDE SERPL-SCNC: 107 MMOL/L (ref 98–107)
CHOLEST SERPL-MCNC: 109 MG/DL (ref 0–200)
CO2 SERPL-SCNC: 26 MMOL/L (ref 22–29)
CREAT SERPL-MCNC: 4.55 MG/DL (ref 0.76–1.27)
EGFRCR SERPLBLD CKD-EPI 2021: 12.6 ML/MIN/1.73
GLOBULIN UR ELPH-MCNC: 3.3 GM/DL
GLUCOSE SERPL-MCNC: 92 MG/DL (ref 65–99)
HDLC SERPL-MCNC: 35 MG/DL (ref 40–60)
LDLC SERPL CALC-MCNC: 58 MG/DL (ref 0–100)
LDLC/HDLC SERPL: 1.65 {RATIO}
MAGNESIUM SERPL-MCNC: 2.4 MG/DL (ref 1.6–2.4)
POTASSIUM SERPL-SCNC: 5 MMOL/L (ref 3.5–5.2)
PROT SERPL-MCNC: 7.6 G/DL (ref 6–8.5)
SODIUM SERPL-SCNC: 143 MMOL/L (ref 136–145)
TRIGL SERPL-MCNC: 81 MG/DL (ref 0–150)
VLDLC SERPL-MCNC: 16 MG/DL (ref 5–40)

## 2023-10-10 PROCEDURE — 83735 ASSAY OF MAGNESIUM: CPT | Performed by: FAMILY MEDICINE

## 2023-10-10 PROCEDURE — 80053 COMPREHEN METABOLIC PANEL: CPT | Performed by: FAMILY MEDICINE

## 2023-10-10 PROCEDURE — 82306 VITAMIN D 25 HYDROXY: CPT | Performed by: FAMILY MEDICINE

## 2023-10-10 PROCEDURE — 36415 COLL VENOUS BLD VENIPUNCTURE: CPT | Performed by: FAMILY MEDICINE

## 2023-10-10 PROCEDURE — 83036 HEMOGLOBIN GLYCOSYLATED A1C: CPT | Performed by: FAMILY MEDICINE

## 2023-10-10 PROCEDURE — 80061 LIPID PANEL: CPT | Performed by: FAMILY MEDICINE

## 2023-10-10 PROCEDURE — 85025 COMPLETE CBC W/AUTO DIFF WBC: CPT | Performed by: FAMILY MEDICINE

## 2023-10-10 PROCEDURE — 82043 UR ALBUMIN QUANTITATIVE: CPT | Performed by: FAMILY MEDICINE

## 2023-10-10 RX ORDER — ACYCLOVIR 400 MG/1
1 TABLET ORAL
Qty: 3 EACH | Refills: 3 | Status: SHIPPED | OUTPATIENT
Start: 2023-10-10

## 2023-10-10 RX ORDER — ACYCLOVIR 400 MG/1
1 TABLET ORAL CONTINUOUS
Qty: 1 EACH | Refills: 0 | Status: SHIPPED | OUTPATIENT
Start: 2023-10-10

## 2023-10-10 RX ORDER — ATENOLOL 50 MG/1
25 TABLET ORAL 2 TIMES DAILY
COMMUNITY
Start: 2023-10-10

## 2023-10-10 RX ORDER — AMLODIPINE BESYLATE 5 MG/1
5 TABLET ORAL DAILY
Qty: 90 TABLET | Refills: 1 | Status: SHIPPED | OUTPATIENT
Start: 2023-10-10

## 2023-10-10 RX ORDER — ZOSTER VACCINE RECOMBINANT, ADJUVANTED 50 MCG/0.5
0.5 KIT INTRAMUSCULAR
Qty: 1 EACH | Refills: 1 | Status: SHIPPED | OUTPATIENT
Start: 2023-10-10 | End: 2024-01-09

## 2023-10-10 NOTE — PROGRESS NOTES
The ABCs of the Annual Wellness Visit  Subsequent Medicare Wellness Visit    Subjective    Gabriel De Souza is a 78 y.o. male who presents for a Subsequent Medicare Wellness Visit.    The following portions of the patient's history were reviewed and   updated as appropriate: allergies, current medications, past family history, past medical history, past social history, past surgical history, and problem list.    Compared to one year ago, the patient feels his physical   health is the same.    Compared to one year ago, the patient feels his mental   health is the same.    Recent Hospitalizations:  He was not admitted to the hospital during the last year.       Current Medical Providers:  Patient Care Team:  Waylon Johnson MD as PCP - General  ChemchirianLali MD as Consulting Physician (Cardiology)    Outpatient Medications Prior to Visit   Medication Sig Dispense Refill    amLODIPine (NORVASC) 10 MG tablet TAKE 1 TABLET BY MOUTH  DAILY 90 tablet 3    aspirin 81 MG tablet Take 1 tablet by mouth Daily. HOLD DOS      atenolol (TENORMIN) 50 MG tablet TAKE 1 TABLET BY MOUTH  TWICE DAILY 180 tablet 3    calcitriol (ROCALTROL) 0.25 MCG capsule Take 1 capsule by mouth Every Other Day.      Continuous Blood Gluc Sensor (Dexcom G6 Sensor) Every 10 (Ten) Days. 3 each 5    Continuous Blood Gluc Transmit (Dexcom G6 Transmitter) misc 1 Device Continuous. 1 each 1    glucose blood (OneTouch Ultra) test strip Test once daily DX:  e11.65 200 each 5    Insulin Pen Needle (Novofine Pen Needle) 32G X 6 MM misc Uses 1 shot daily E11.65 100 each 1    sevelamer (RENVELA) 800 MG tablet Take 1 tablet by mouth.      simvastatin (ZOCOR) 20 MG tablet TAKE 1 TABLET BY MOUTH  DAILY 90 tablet 3    Soliqua 100-33 UNT-MCG/ML solution pen-injector injection INJECT 20 UNITS SUBCUTANEOUSLY  INTO THE APPROPRIATE AREA AS  DIRECTED DAILY 30 mL 3    torsemide (DEMADEX) 20 MG tablet Take 1 tablet by mouth Daily. HOLD DOS       No  "facility-administered medications prior to visit.       No opioid medication identified on active medication list. I have reviewed chart for other potential  high risk medication/s and harmful drug interactions in the elderly.        Aspirin is on active medication list. Aspirin use is indicated based on review of current medical condition/s. Pros and cons of this therapy have been discussed today. Benefits of this medication outweigh potential harm.  Patient has been encouraged to continue taking this medication.  .      Patient Active Problem List   Diagnosis    Angina pectoris    Arteriosclerosis    Dyslipidemia    Hypertension    Sinus bradycardia    Type 2 diabetes mellitus    Encounter for screening for malignant neoplasm of prostate    Hip pain, right    Anemia in chronic kidney disease    Benign prostatic hyperplasia    Bronchitis    Chronic renal insufficiency    Deafness in right ear    Diabetic peripheral neuropathy associated with type 2 diabetes mellitus    Goiter    History of thrombosis    Hypercholesterolemia    Hypogonadism    Low back pain    Peripheral neuropathy    Proteinuria    Secondary hyperparathyroidism of renal origin    Renal cyst    Stage 3b chronic kidney disease    Vitamin D deficiency    Hyperphosphatemia due to chronic kidney disease    Heart murmur     Advance Care Planning   Advance Care Planning     Advance Directive is on file.  ACP discussion was held with the patient during this visit. Patient has an advance directive in EMR which is still valid.      Objective    Vitals:    10/10/23 0941   BP: 137/79   BP Location: Right arm   Patient Position: Sitting   Cuff Size: Large Adult   Pulse: 59   Temp: 97.1 øF (36.2 øC)   TempSrc: Temporal   SpO2: 99%   Weight: 92.3 kg (203 lb 8 oz)     Estimated body mass index is 26.85 kg/mý as calculated from the following:    Height as of 10/4/23: 185.4 cm (73\").    Weight as of this encounter: 92.3 kg (203 lb 8 oz).    BMI is >= 25 and <30. " (Overweight) The following options were offered after discussion;: exercise counseling/recommendations and nutrition counseling/recommendations      Does the patient have evidence of cognitive impairment? No          HEALTH RISK ASSESSMENT    Smoking Status:  Social History     Tobacco Use   Smoking Status Former    Types: Cigarettes    Quit date: 1967    Years since quittin.8    Passive exposure: Past   Smokeless Tobacco Never     Alcohol Consumption:  Social History     Substance and Sexual Activity   Alcohol Use No     Fall Risk Screen:    ANUJ Fall Risk Assessment was completed, and patient is at LOW risk for falls.Assessment completed on:10/10/2023    Depression Screening:      10/10/2023     9:40 AM   PHQ-2/PHQ-9 Depression Screening   Little Interest or Pleasure in Doing Things 0-->not at all   Feeling Down, Depressed or Hopeless 0-->not at all   PHQ-9: Brief Depression Severity Measure Score 0       Health Habits and Functional and Cognitive Screening:      10/10/2023     9:47 AM   Functional & Cognitive Status   Do you have difficulty preparing food and eating? No   Do you have difficulty bathing yourself, getting dressed or grooming yourself? No   Do you have difficulty using the toilet? No   Do you have difficulty moving around from place to place? No   Do you have trouble with steps or getting out of a bed or a chair? No   Current Diet Well Balanced Diet   Dental Exam Up to date   Eye Exam Up to date   Exercise (times per week) 4 times per week   Current Exercises Include Weightlifting;Walking   Do you need help using the phone?  No   Are you deaf or do you have serious difficulty hearing?  Yes   Do you need help to go to places out of walking distance? No   Do you need help shopping? No   Do you need help preparing meals?  No   Do you need help with housework?  No   Do you need help with laundry? No   Do you need help taking your medications? No   Do you need help managing money? No   Do you ever  drive or ride in a car without wearing a seat belt? No   Have you felt unusual stress, anger or loneliness in the last month? No   Who do you live with? Spouse   If you need help, do you have trouble finding someone available to you? No   Have you been bothered in the last four weeks by sexual problems? No   Do you have difficulty concentrating, remembering or making decisions? No       Age-appropriate Screening Schedule:  Refer to the list below for future screening recommendations based on patient's age, sex and/or medical conditions. Orders for these recommended tests are listed in the plan section. The patient has been provided with a written plan.    Health Maintenance   Topic Date Due    BMI FOLLOWUP  Never done    TDAP/TD VACCINES (1 - Tdap) Never done    ZOSTER VACCINE (1 of 2) Never done    Hepatitis B (1 of 3 - Risk 3-dose series) Never done    ANNUAL WELLNESS VISIT  12/28/2022    INFLUENZA VACCINE  08/01/2023    COVID-19 Vaccine (5 - 2023-24 season) 09/01/2023    HEMOGLOBIN A1C  10/06/2023    DIABETIC EYE EXAM  04/04/2024    LIPID PANEL  04/06/2024    URINE MICROALBUMIN  04/06/2024    HEPATITIS C SCREENING  Completed    Pneumococcal Vaccine 65+  Completed    COLORECTAL CANCER SCREENING  Discontinued                  CMS Preventative Services Quick Reference  Risk Factors Identified During Encounter  Immunizations Discussed/Encouraged: Influenza, COVID19, and RSV  Dental Screening Recommended  Vision Screening Recommended  The above risks/problems have been discussed with the patient.  Pertinent information has been shared with the patient in the After Visit Summary.  An After Visit Summary and PPPS were made available to the patient.    Follow Up:   Next Medicare Wellness visit to be scheduled in 1 year.       Additional E&M Note during same encounter follows:  Patient has multiple medical problems which are significant and separately identifiable that require additional work above and beyond the Medicare  Wellness Visit.      Chief Complaint  Medicare Wellness-subsequent and Diabetes (Decom never got or heard anything about it - send again? )    Subjective        HPI  Gabriel De Souza is also being seen today for follow-up on his issues with diabetes and high blood pressure.  As a result of the diabetes he suffers with stage IV chronic kidney disease and some diabetic neuropathy. He sees cardiology for issues and is getting an echo soon. He has not had any issues with blood sugar but blood pressure was running low, so he cut his amlodipine and atenolol in half, and he has seen better readings as a result.     Review of Systems   Constitutional:  Negative for activity change and fatigue.   HENT:  Negative for congestion, facial swelling, nosebleeds, postnasal drip, rhinorrhea, tinnitus and trouble swallowing.    Eyes:  Negative for visual disturbance.   Respiratory:  Negative for cough, shortness of breath and wheezing.    Cardiovascular:  Negative for chest pain and leg swelling.   Gastrointestinal:  Negative for abdominal pain, constipation, diarrhea, nausea and vomiting.   Genitourinary:  Negative for difficulty urinating, frequency and urgency.   Musculoskeletal:  Positive for arthralgias. Negative for back pain and neck pain.   Skin:  Negative for rash.   Neurological:  Negative for dizziness, syncope, weakness, light-headedness, numbness and confusion.   Hematological:  Does not bruise/bleed easily.   Psychiatric/Behavioral:  Negative for decreased concentration, sleep disturbance and suicidal ideas. The patient is not nervous/anxious.        Objective   Vital Signs:  /79 (BP Location: Right arm, Patient Position: Sitting, Cuff Size: Large Adult)   Pulse 59   Temp 97.1 øF (36.2 øC) (Temporal)   Wt 92.3 kg (203 lb 8 oz)   SpO2 99%   BMI 26.85 kg/mý     Physical Exam  Vitals and nursing note reviewed.   HENT:      Right Ear: Tympanic membrane and ear canal normal.      Left Ear: Tympanic membrane and ear  canal normal.   Cardiovascular:      Rate and Rhythm: Normal rate and regular rhythm.      Heart sounds: Normal heart sounds.   Pulmonary:      Effort: Pulmonary effort is normal.      Breath sounds: No wheezing or rales.   Abdominal:      General: Bowel sounds are normal.      Palpations: Abdomen is soft.      Tenderness: There is no abdominal tenderness. There is no guarding.   Musculoskeletal:      Cervical back: Neck supple.      Right lower leg: No edema.      Left lower leg: No edema.   Lymphadenopathy:      Cervical: No cervical adenopathy.   Neurological:      General: No focal deficit present.      Mental Status: He is alert and oriented to person, place, and time.   Psychiatric:         Mood and Affect: Mood normal.          The following data was reviewed by: Waylon Johnson MD on 10/10/2023:  Common labs          1/3/2023    13:34 4/6/2023    08:54   Common Labs   Glucose 93     BUN 73     Creatinine 4.57     Sodium 143     Potassium 4.4     Chloride 107     Calcium 10.1     Albumin 4.0     Total Bilirubin 0.4     Alkaline Phosphatase 141     AST (SGOT) 16     ALT (SGPT) 15     WBC 14.02  9.76    Hemoglobin 12.1  12.5    Hematocrit 37.2  38.3    Platelets 407  283    Total Cholesterol  109    Triglycerides  91    HDL Cholesterol  30    LDL Cholesterol   61    Hemoglobin A1C  6.30    Microalbumin, Urine  1.6      Data reviewed : recent cardiology notes           Assessment and Plan   There are no diagnoses linked to this encounter.       I spent 30 minutes caring for Gabriel on this date of service. This time includes time spent by me in the following activities:preparing for the visit, obtaining and/or reviewing a separately obtained history, performing a medically appropriate examination and/or evaluation , counseling and educating the patient/family/caregiver, ordering medications, tests, or procedures, and documenting information in the medical record  Follow Up   No follow-ups on  file.  Patient was given instructions and counseling regarding his condition or for health maintenance advice. Please see specific information pulled into the AVS if appropriate.     I have asked him to maintain some activity and hobbies and passions in his life  I have asked him to continue monitoring his weight although he has made good progress over the course of the year  I did encourage him to update vaccinations and he was given his flu shot today and advised to get a COVID-vaccine  I ordered his RSV and shingles vaccines  I ordered his CGM to see if we can get that approved because of his fluctuating blood sugars and the effects that has on his chronic renal disease  I plan to see him back in a few months for follow-up, sooner if needed

## 2023-10-11 LAB
BASOPHILS # BLD AUTO: 0.03 10*3/MM3 (ref 0–0.2)
BASOPHILS NFR BLD AUTO: 0.4 % (ref 0–1.5)
DEPRECATED RDW RBC AUTO: 44.3 FL (ref 37–54)
EOSINOPHIL # BLD AUTO: 0.16 10*3/MM3 (ref 0–0.4)
EOSINOPHIL NFR BLD AUTO: 1.9 % (ref 0.3–6.2)
ERYTHROCYTE [DISTWIDTH] IN BLOOD BY AUTOMATED COUNT: 13.9 % (ref 12.3–15.4)
HBA1C MFR BLD: 5.8 % (ref 4.8–5.6)
HCT VFR BLD AUTO: 35.6 % (ref 37.5–51)
HGB BLD-MCNC: 11.9 G/DL (ref 13–17.7)
IMM GRANULOCYTES # BLD AUTO: 0.03 10*3/MM3 (ref 0–0.05)
IMM GRANULOCYTES NFR BLD AUTO: 0.4 % (ref 0–0.5)
LYMPHOCYTES # BLD AUTO: 2.85 10*3/MM3 (ref 0.7–3.1)
LYMPHOCYTES NFR BLD AUTO: 33.8 % (ref 19.6–45.3)
MCH RBC QN AUTO: 29.5 PG (ref 26.6–33)
MCHC RBC AUTO-ENTMCNC: 33.4 G/DL (ref 31.5–35.7)
MCV RBC AUTO: 88.1 FL (ref 79–97)
MONOCYTES # BLD AUTO: 0.88 10*3/MM3 (ref 0.1–0.9)
MONOCYTES NFR BLD AUTO: 10.4 % (ref 5–12)
NEUTROPHILS NFR BLD AUTO: 4.48 10*3/MM3 (ref 1.7–7)
NEUTROPHILS NFR BLD AUTO: 53.1 % (ref 42.7–76)
NRBC BLD AUTO-RTO: 0 /100 WBC (ref 0–0.2)
PLATELET # BLD AUTO: 291 10*3/MM3 (ref 140–450)
PMV BLD AUTO: 10.1 FL (ref 6–12)
RBC # BLD AUTO: 4.04 10*6/MM3 (ref 4.14–5.8)
WBC NRBC COR # BLD: 8.43 10*3/MM3 (ref 3.4–10.8)

## 2024-01-05 ENCOUNTER — TELEPHONE (OUTPATIENT)
Dept: FAMILY MEDICINE CLINIC | Facility: CLINIC | Age: 79
End: 2024-01-05
Payer: MEDICARE

## 2024-01-05 RX ORDER — DOXYCYCLINE HYCLATE 100 MG/1
100 CAPSULE ORAL 2 TIMES DAILY
Qty: 20 CAPSULE | Refills: 0 | Status: SHIPPED | OUTPATIENT
Start: 2024-01-05 | End: 2024-01-15

## 2024-01-05 NOTE — TELEPHONE ENCOUNTER
Caller: Gabrile De Souza    Relationship: Self    Best call back number: [phone]     What medication are you requesting: MEDICATION TO TREAT SYMPTOMS    What are your current symptoms:  CHEST CONGESTION, LOW GRADE FEVER    How long have you been experiencing symptoms: 1 WEEK     If a prescription is needed, what is your preferred pharmacy and phone number: [id number]  [place], [place] - [address]   - [phone]  - [phone] FX     Additional notes: PATIENT STATED HE IS HAVING SOME CHEST CONGESTION AND HIS LUNGS SOMETIMES WILL HURT. PATIENT IS REQUESTING MEDICATION FOR THIS    PLEASE ADVISE

## 2024-01-08 ENCOUNTER — OFFICE VISIT (OUTPATIENT)
Dept: FAMILY MEDICINE CLINIC | Facility: CLINIC | Age: 79
End: 2024-01-08
Payer: MEDICARE

## 2024-01-08 VITALS
HEIGHT: 73 IN | OXYGEN SATURATION: 100 % | SYSTOLIC BLOOD PRESSURE: 132 MMHG | TEMPERATURE: 99.1 F | HEART RATE: 56 BPM | DIASTOLIC BLOOD PRESSURE: 64 MMHG | BODY MASS INDEX: 28.52 KG/M2 | WEIGHT: 215.2 LBS

## 2024-01-08 DIAGNOSIS — J34.89 RHINORRHEA: Primary | ICD-10-CM

## 2024-01-08 DIAGNOSIS — R10.9 LEFT FLANK PAIN: ICD-10-CM

## 2024-01-08 LAB
BILIRUB BLD-MCNC: NEGATIVE MG/DL
CLARITY, POC: ABNORMAL
COLOR UR: YELLOW
EXPIRATION DATE: ABNORMAL
EXPIRATION DATE: NORMAL
EXPIRATION DATE: NORMAL
FLUAV AG UPPER RESP QL IA.RAPID: NOT DETECTED
FLUBV AG UPPER RESP QL IA.RAPID: NOT DETECTED
GLUCOSE UR STRIP-MCNC: NEGATIVE MG/DL
INTERNAL CONTROL: NORMAL
INTERNAL CONTROL: NORMAL
KETONES UR QL: NEGATIVE
LEUKOCYTE EST, POC: ABNORMAL
Lab: ABNORMAL
Lab: NORMAL
Lab: NORMAL
NITRITE UR-MCNC: NEGATIVE MG/ML
PH UR: 6.5 [PH] (ref 5–8)
PROT UR STRIP-MCNC: NEGATIVE MG/DL
RBC # UR STRIP: ABNORMAL /UL
RSV AG SPEC QL: NORMAL
SARS-COV-2 AG UPPER RESP QL IA.RAPID: NOT DETECTED
SP GR UR: 1.02 (ref 1–1.03)
UROBILINOGEN UR QL: NORMAL

## 2024-01-08 PROCEDURE — 3078F DIAST BP <80 MM HG: CPT | Performed by: NURSE PRACTITIONER

## 2024-01-08 PROCEDURE — 87186 SC STD MICRODIL/AGAR DIL: CPT | Performed by: NURSE PRACTITIONER

## 2024-01-08 PROCEDURE — 99214 OFFICE O/P EST MOD 30 MIN: CPT | Performed by: NURSE PRACTITIONER

## 2024-01-08 PROCEDURE — 81003 URINALYSIS AUTO W/O SCOPE: CPT | Performed by: NURSE PRACTITIONER

## 2024-01-08 PROCEDURE — 87807 RSV ASSAY W/OPTIC: CPT | Performed by: NURSE PRACTITIONER

## 2024-01-08 PROCEDURE — 87086 URINE CULTURE/COLONY COUNT: CPT | Performed by: NURSE PRACTITIONER

## 2024-01-08 PROCEDURE — 87428 SARSCOV & INF VIR A&B AG IA: CPT | Performed by: NURSE PRACTITIONER

## 2024-01-08 PROCEDURE — 87077 CULTURE AEROBIC IDENTIFY: CPT | Performed by: NURSE PRACTITIONER

## 2024-01-08 PROCEDURE — 3075F SYST BP GE 130 - 139MM HG: CPT | Performed by: NURSE PRACTITIONER

## 2024-01-08 RX ORDER — CIPROFLOXACIN 250 MG/1
250 TABLET, FILM COATED ORAL DAILY
Qty: 10 TABLET | Refills: 0 | Status: SHIPPED | OUTPATIENT
Start: 2024-01-08 | End: 2024-01-15

## 2024-01-08 NOTE — PROGRESS NOTES
Chief Complaint  URI, Abdominal Pain, and Nausea    Subjective        Gabriel De Souza presents to [facility]  History of Present Illness  Gabriel is a 78 year old male presenting today with complaints of nausea, runny nose, loss of appetite, and low-grade fever.  Tmax 99.  His symptoms started December 27.  He denies any cough.  He started having pleuritic pain, but that is resolved and he now has left flank pain.  He denies any dysuria or increased frequency.  He reports weakness.  He started doxycycline on January 5 and is still taking it.      The following portions of the patient's history were reviewed and updated as appropriate: allergies, current medications, past family history, past medical history, past social history, past surgical history and problem list.    Allergies   Allergen Reactions    Tylenol With Codeine #3 [Acetaminophen-Codeine] Nausea Only and GI Intolerance       Patient Active Problem List   Diagnosis    Angina pectoris    Arteriosclerosis    Dyslipidemia    Hypertension    Sinus bradycardia    Type 2 diabetes mellitus    Encounter for screening for malignant neoplasm of prostate    Hip pain, right    Anemia in chronic kidney disease    Benign prostatic hyperplasia    Bronchitis    Chronic renal insufficiency    Deafness in right ear    Diabetic peripheral neuropathy associated with type 2 diabetes mellitus    Goiter    History of thrombosis    Hypercholesterolemia    Hypogonadism    Low back pain    Peripheral neuropathy    Proteinuria    Secondary hyperparathyroidism of renal origin    Renal cyst    Stage 3b chronic kidney disease    Vitamin D deficiency    Hyperphosphatemia due to chronic kidney disease    Heart murmur       Current Outpatient Medications   Medication Instructions    amLODIPine (NORVASC) 5 mg, Oral, Daily    aspirin 81 mg, Oral, Daily, HOLD DOS    atenolol (TENORMIN) 25 mg, Oral, 2 Times Daily    calcitriol (ROCALTROL) 0.25 mcg, Oral,  "Every Other Day    ciprofloxacin (CIPRO) 250 mg, Oral, Daily    Continuous Blood Gluc  (Dexcom G7 ) device 1 Device, Does not apply, Continuous    Continuous Blood Gluc Sensor (Dexcom G7 Sensor) misc 1 Device, Does not apply, Every 10 Days, E11.22    doxycycline (VIBRAMYCIN) 100 mg, Oral, 2 Times Daily    glucose blood (OneTouch Ultra) test strip Test once daily DX:  e11.65    Insulin Pen Needle (Novofine Pen Needle) 32G X 6 MM misc Uses 1 shot daily E11.65    sevelamer (RENVELA) 800 mg, Oral    simvastatin (ZOCOR) 20 MG tablet TAKE 1 TABLET BY MOUTH  DAILY    Soliqua 100-33 UNT-MCG/ML solution pen-injector injection INJECT 20 UNITS SUBCUTANEOUSLY  INTO THE APPROPRIATE AREA AS  DIRECTED DAILY    torsemide (DEMADEX) 20 mg, Oral, Daily, HOLD DOS    Zoster Vac Recomb Adjuvanted (Shingrix) 50 MCG/0.5ML reconstituted suspension 0.5 mL, Intramuscular, Every 3 Months          Objective   Vital Signs:  /64 (BP Location: Right arm, Patient Position: Sitting, Cuff Size: Adult)   Pulse 56   Temp 99.1 °F (37.3 °C) (Temporal)   Ht 185.4 cm (73\")   Wt 97.6 kg (215 lb 3.2 oz)   SpO2 100%   BMI 28.39 kg/m²   Estimated body mass index is 28.39 kg/m² as calculated from the following:    Height as of this encounter: 185.4 cm (73\").    Weight as of this encounter: 97.6 kg (215 lb 3.2 oz).               Review of Systems   Constitutional:  Positive for appetite change, chills, fatigue and fever.   HENT:  Positive for sinus pressure.    Respiratory:  Negative for cough, chest tightness and shortness of breath.    Gastrointestinal:  Positive for nausea. Negative for vomiting.   Genitourinary:  Positive for flank pain. Negative for decreased urine volume, difficulty urinating, dysuria, frequency, hematuria and urgency.   Neurological:  Negative for weakness.        Physical Exam  Constitutional:       Appearance: Normal appearance.   HENT:      Head: Normocephalic and atraumatic.      Right Ear: Tympanic " membrane, ear canal and external ear normal.      Left Ear: Tympanic membrane, ear canal and external ear normal.      Nose: Nose normal.      Mouth/Throat:      Mouth: Mucous membranes are moist.      Pharynx: Oropharynx is clear.   Eyes:      Extraocular Movements: Extraocular movements intact.      Conjunctiva/sclera: Conjunctivae normal.      Pupils: Pupils are equal, round, and reactive to light.   Cardiovascular:      Rate and Rhythm: Normal rate and regular rhythm.   Pulmonary:      Effort: Pulmonary effort is normal.      Breath sounds: Normal breath sounds.   Abdominal:      General: Abdomen is flat. Bowel sounds are normal.      Palpations: Abdomen is soft.      Tenderness: There is left CVA tenderness.   Musculoskeletal:      Cervical back: Normal range of motion and neck supple.   Skin:     General: Skin is warm and dry.   Neurological:      Mental Status: He is alert and oriented to person, place, and time.   Psychiatric:         Mood and Affect: Mood normal.         Behavior: Behavior normal.         Thought Content: Thought content normal.         Judgment: Judgment normal.        Result Review :                   Assessment and Plan   Diagnoses and all orders for this visit:    1. Rhinorrhea (Primary)  Comments:  RSV, Covid, Flu neg  Cont doxy  call if no improvement  Orders:  -     POCT SARS-CoV-2 + Flu Antigen JOSE  -     POCT RSV    2. Left flank pain  Comments:  UA pos leuk and blood  will send cx  start cipro 250mg daily x 10 days. adjusted dose due to kidney fx  call if no improvement  Orders:  -     POCT urinalysis dipstick, automated  -     Cancel: XR Abdomen KUB  -     Cancel: Urine Culture - Urine, Urine, Clean Catch; Future  -     Urine Culture - Urine, Urine, Clean Catch    Other orders  -     ciprofloxacin (Cipro) 250 MG tablet; Take 1 tablet by mouth Daily.  Dispense: 10 tablet; Refill: 0             Follow Up   No follow-ups on file.  Patient was given instructions and counseling  regarding his condition or for health maintenance advice. Please see specific information pulled into the AVS if appropriate.

## 2024-01-11 LAB — BACTERIA SPEC AEROBE CULT: ABNORMAL

## 2024-01-15 ENCOUNTER — HOSPITAL ENCOUNTER (INPATIENT)
Facility: HOSPITAL | Age: 79
LOS: 10 days | Discharge: NURSING, REHAB OR LONG-TERM CARE | End: 2024-01-25
Attending: EMERGENCY MEDICINE | Admitting: HOSPITALIST
Payer: MEDICARE

## 2024-01-15 ENCOUNTER — ANESTHESIA EVENT (OUTPATIENT)
Dept: PERIOP | Facility: HOSPITAL | Age: 79
End: 2024-01-15
Payer: MEDICARE

## 2024-01-15 ENCOUNTER — APPOINTMENT (OUTPATIENT)
Dept: GENERAL RADIOLOGY | Facility: HOSPITAL | Age: 79
End: 2024-01-15
Payer: MEDICARE

## 2024-01-15 ENCOUNTER — APPOINTMENT (OUTPATIENT)
Dept: CT IMAGING | Facility: HOSPITAL | Age: 79
End: 2024-01-15
Payer: MEDICARE

## 2024-01-15 DIAGNOSIS — T82.590A MECHANICAL COMPLICATION OF ARTERIOVENOUS FISTULA SURGICALLY CREATED, INITIAL ENCOUNTER: ICD-10-CM

## 2024-01-15 DIAGNOSIS — K80.50 CHOLEDOCHOLITHIASIS: ICD-10-CM

## 2024-01-15 DIAGNOSIS — N20.1 URETEROLITHIASIS: ICD-10-CM

## 2024-01-15 DIAGNOSIS — N17.9 ACUTE RENAL FAILURE, UNSPECIFIED ACUTE RENAL FAILURE TYPE: Primary | ICD-10-CM

## 2024-01-15 DIAGNOSIS — K85.90 ACUTE PANCREATITIS, UNSPECIFIED COMPLICATION STATUS, UNSPECIFIED PANCREATITIS TYPE: ICD-10-CM

## 2024-01-15 DIAGNOSIS — E87.5 HYPERKALEMIA: ICD-10-CM

## 2024-01-15 DIAGNOSIS — R53.1 WEAKNESS: ICD-10-CM

## 2024-01-15 LAB
ALBUMIN SERPL-MCNC: 3.2 G/DL (ref 3.5–5.2)
ALBUMIN/GLOB SERPL: 0.9 G/DL
ALP SERPL-CCNC: 102 U/L (ref 39–117)
ALT SERPL W P-5'-P-CCNC: 17 U/L (ref 1–41)
ANION GAP SERPL CALCULATED.3IONS-SCNC: 30 MMOL/L (ref 5–15)
ANION GAP SERPL CALCULATED.3IONS-SCNC: 31 MMOL/L (ref 5–15)
AST SERPL-CCNC: 18 U/L (ref 1–40)
BACTERIA UR QL AUTO: ABNORMAL /HPF
BASOPHILS # BLD AUTO: 0.1 10*3/MM3 (ref 0–0.2)
BASOPHILS NFR BLD AUTO: 0.2 % (ref 0–1.5)
BILIRUB SERPL-MCNC: 0.2 MG/DL (ref 0–1.2)
BILIRUB UR QL STRIP: NEGATIVE
BUN SERPL-MCNC: 250 MG/DL (ref 8–23)
BUN SERPL-MCNC: 256 MG/DL (ref 8–23)
BUN/CREAT SERPL: 15.6 (ref 7–25)
BUN/CREAT SERPL: 15.9 (ref 7–25)
CALCIUM SPEC-SCNC: 9.1 MG/DL (ref 8.6–10.5)
CALCIUM SPEC-SCNC: 9.4 MG/DL (ref 8.6–10.5)
CHLORIDE SERPL-SCNC: 100 MMOL/L (ref 98–107)
CHLORIDE SERPL-SCNC: 102 MMOL/L (ref 98–107)
CK SERPL-CCNC: 44 U/L (ref 20–200)
CLARITY UR: CLEAR
CO2 SERPL-SCNC: 8 MMOL/L (ref 22–29)
CO2 SERPL-SCNC: 9 MMOL/L (ref 22–29)
COLOR UR: YELLOW
CREAT SERPL-MCNC: 15.98 MG/DL (ref 0.76–1.27)
CREAT SERPL-MCNC: 16.11 MG/DL (ref 0.76–1.27)
D-LACTATE SERPL-SCNC: 1.9 MMOL/L (ref 0.3–2)
DEPRECATED RDW RBC AUTO: 53.8 FL (ref 37–54)
EGFRCR SERPLBLD CKD-EPI 2021: 2.8 ML/MIN/1.73
EGFRCR SERPLBLD CKD-EPI 2021: 2.8 ML/MIN/1.73
EOSINOPHIL # BLD AUTO: 0 10*3/MM3 (ref 0–0.4)
EOSINOPHIL NFR BLD AUTO: 0 % (ref 0.3–6.2)
ERYTHROCYTE [DISTWIDTH] IN BLOOD BY AUTOMATED COUNT: 16.4 % (ref 12.3–15.4)
FLUAV SUBTYP SPEC NAA+PROBE: NOT DETECTED
FLUBV RNA ISLT QL NAA+PROBE: NOT DETECTED
GLOBULIN UR ELPH-MCNC: 3.7 GM/DL
GLUCOSE BLDC GLUCOMTR-MCNC: 119 MG/DL (ref 70–105)
GLUCOSE BLDC GLUCOMTR-MCNC: 147 MG/DL (ref 70–105)
GLUCOSE BLDC GLUCOMTR-MCNC: 75 MG/DL (ref 70–105)
GLUCOSE SERPL-MCNC: 111 MG/DL (ref 65–99)
GLUCOSE SERPL-MCNC: 87 MG/DL (ref 65–99)
GLUCOSE UR STRIP-MCNC: NEGATIVE MG/DL
HBV SURFACE AG SERPL QL IA: NORMAL
HCT VFR BLD AUTO: 32.4 % (ref 37.5–51)
HGB BLD-MCNC: 10.4 G/DL (ref 13–17.7)
HGB UR QL STRIP.AUTO: ABNORMAL
HOLD SPECIMEN: NORMAL
HOLD SPECIMEN: NORMAL
HYALINE CASTS UR QL AUTO: ABNORMAL /LPF
KETONES UR QL STRIP: NEGATIVE
LEUKOCYTE ESTERASE UR QL STRIP.AUTO: ABNORMAL
LIPASE SERPL-CCNC: >3000 U/L (ref 13–60)
LYMPHOCYTES # BLD AUTO: 1.4 10*3/MM3 (ref 0.7–3.1)
LYMPHOCYTES NFR BLD AUTO: 6.1 % (ref 19.6–45.3)
MAGNESIUM SERPL-MCNC: 2.9 MG/DL (ref 1.6–2.4)
MCH RBC QN AUTO: 28.2 PG (ref 26.6–33)
MCHC RBC AUTO-ENTMCNC: 32.2 G/DL (ref 31.5–35.7)
MCV RBC AUTO: 87.7 FL (ref 79–97)
MONOCYTES # BLD AUTO: 2.2 10*3/MM3 (ref 0.1–0.9)
MONOCYTES NFR BLD AUTO: 9.3 % (ref 5–12)
NEUTROPHILS NFR BLD AUTO: 19.5 10*3/MM3 (ref 1.7–7)
NEUTROPHILS NFR BLD AUTO: 84.4 % (ref 42.7–76)
NITRITE UR QL STRIP: NEGATIVE
NRBC BLD AUTO-RTO: 0.2 /100 WBC (ref 0–0.2)
PH UR STRIP.AUTO: <=5 [PH] (ref 5–8)
PLATELET # BLD AUTO: 274 10*3/MM3 (ref 140–450)
PMV BLD AUTO: 8.4 FL (ref 6–12)
POTASSIUM SERPL-SCNC: 4.1 MMOL/L (ref 3.5–5.2)
POTASSIUM SERPL-SCNC: 6.7 MMOL/L (ref 3.5–5.2)
POTASSIUM SERPL-SCNC: 7.3 MMOL/L (ref 3.5–5.2)
PROT SERPL-MCNC: 6.9 G/DL (ref 6–8.5)
PROT UR QL STRIP: ABNORMAL
QT INTERVAL: 612 MS
QTC INTERVAL: 516 MS
RBC # BLD AUTO: 3.69 10*6/MM3 (ref 4.14–5.8)
RBC # UR STRIP: ABNORMAL /HPF
REF LAB TEST METHOD: ABNORMAL
SARS-COV-2 RNA RESP QL NAA+PROBE: NOT DETECTED
SODIUM SERPL-SCNC: 140 MMOL/L (ref 136–145)
SODIUM SERPL-SCNC: 140 MMOL/L (ref 136–145)
SP GR UR STRIP: 1.02 (ref 1–1.03)
SQUAMOUS #/AREA URNS HPF: ABNORMAL /HPF
TSH SERPL DL<=0.05 MIU/L-ACNC: 1.27 UIU/ML (ref 0.27–4.2)
UROBILINOGEN UR QL STRIP: ABNORMAL
WBC # UR STRIP: ABNORMAL /HPF
WBC NRBC COR # BLD AUTO: 23.1 10*3/MM3 (ref 3.4–10.8)
WHOLE BLOOD HOLD COAG: NORMAL

## 2024-01-15 PROCEDURE — 87086 URINE CULTURE/COLONY COUNT: CPT | Performed by: HOSPITALIST

## 2024-01-15 PROCEDURE — 25010000002 CEFTRIAXONE PER 250 MG: Performed by: EMERGENCY MEDICINE

## 2024-01-15 PROCEDURE — 87040 BLOOD CULTURE FOR BACTERIA: CPT | Performed by: EMERGENCY MEDICINE

## 2024-01-15 PROCEDURE — 36415 COLL VENOUS BLD VENIPUNCTURE: CPT

## 2024-01-15 PROCEDURE — 83690 ASSAY OF LIPASE: CPT | Performed by: EMERGENCY MEDICINE

## 2024-01-15 PROCEDURE — 87636 SARSCOV2 & INF A&B AMP PRB: CPT | Performed by: EMERGENCY MEDICINE

## 2024-01-15 PROCEDURE — 0 DEXTROSE 5 % SOLUTION 1,000 ML FLEX CONT: Performed by: INTERNAL MEDICINE

## 2024-01-15 PROCEDURE — 82550 ASSAY OF CK (CPK): CPT | Performed by: EMERGENCY MEDICINE

## 2024-01-15 PROCEDURE — 93005 ELECTROCARDIOGRAM TRACING: CPT | Performed by: EMERGENCY MEDICINE

## 2024-01-15 PROCEDURE — 71045 X-RAY EXAM CHEST 1 VIEW: CPT

## 2024-01-15 PROCEDURE — 99291 CRITICAL CARE FIRST HOUR: CPT

## 2024-01-15 PROCEDURE — 87077 CULTURE AEROBIC IDENTIFY: CPT | Performed by: HOSPITALIST

## 2024-01-15 PROCEDURE — 25810000003 LACTATED RINGERS SOLUTION: Performed by: EMERGENCY MEDICINE

## 2024-01-15 PROCEDURE — 25010000002 CALCIUM GLUCONATE-NACL 1-0.675 GM/50ML-% SOLUTION: Performed by: INTERNAL MEDICINE

## 2024-01-15 PROCEDURE — 25010000002 METRONIDAZOLE 500 MG/100ML SOLUTION: Performed by: EMERGENCY MEDICINE

## 2024-01-15 PROCEDURE — 84443 ASSAY THYROID STIM HORMONE: CPT | Performed by: EMERGENCY MEDICINE

## 2024-01-15 PROCEDURE — 87186 SC STD MICRODIL/AGAR DIL: CPT | Performed by: HOSPITALIST

## 2024-01-15 PROCEDURE — 81001 URINALYSIS AUTO W/SCOPE: CPT | Performed by: EMERGENCY MEDICINE

## 2024-01-15 PROCEDURE — 85025 COMPLETE CBC W/AUTO DIFF WBC: CPT | Performed by: EMERGENCY MEDICINE

## 2024-01-15 PROCEDURE — 63710000001 INSULIN REGULAR HUMAN PER 5 UNITS: Performed by: EMERGENCY MEDICINE

## 2024-01-15 PROCEDURE — 87340 HEPATITIS B SURFACE AG IA: CPT | Performed by: INTERNAL MEDICINE

## 2024-01-15 PROCEDURE — 83735 ASSAY OF MAGNESIUM: CPT | Performed by: EMERGENCY MEDICINE

## 2024-01-15 PROCEDURE — 25010000002 CALCIUM GLUCONATE-NACL 1-0.675 GM/50ML-% SOLUTION: Performed by: EMERGENCY MEDICINE

## 2024-01-15 PROCEDURE — 83605 ASSAY OF LACTIC ACID: CPT

## 2024-01-15 PROCEDURE — 84132 ASSAY OF SERUM POTASSIUM: CPT | Performed by: HOSPITALIST

## 2024-01-15 PROCEDURE — 74176 CT ABD & PELVIS W/O CONTRAST: CPT

## 2024-01-15 PROCEDURE — 82948 REAGENT STRIP/BLOOD GLUCOSE: CPT

## 2024-01-15 PROCEDURE — 80053 COMPREHEN METABOLIC PANEL: CPT | Performed by: EMERGENCY MEDICINE

## 2024-01-15 RX ORDER — DEXTROSE MONOHYDRATE 25 G/50ML
25 INJECTION, SOLUTION INTRAVENOUS ONCE
Status: COMPLETED | OUTPATIENT
Start: 2024-01-15 | End: 2024-01-15

## 2024-01-15 RX ORDER — AMLODIPINE BESYLATE 5 MG/1
5 TABLET ORAL DAILY
COMMUNITY
End: 2024-01-25 | Stop reason: HOSPADM

## 2024-01-15 RX ORDER — CALCIUM GLUCONATE 20 MG/ML
1000 INJECTION, SOLUTION INTRAVENOUS ONCE
Status: COMPLETED | OUTPATIENT
Start: 2024-01-15 | End: 2024-01-15

## 2024-01-15 RX ORDER — ATENOLOL 50 MG/1
50 TABLET ORAL DAILY
COMMUNITY
End: 2024-01-25 | Stop reason: HOSPADM

## 2024-01-15 RX ORDER — SIMVASTATIN 20 MG
20 TABLET ORAL NIGHTLY
COMMUNITY

## 2024-01-15 RX ORDER — ACETAMINOPHEN 650 MG/1
650 SUPPOSITORY RECTAL EVERY 6 HOURS PRN
Status: DISCONTINUED | OUTPATIENT
Start: 2024-01-15 | End: 2024-01-25 | Stop reason: HOSPADM

## 2024-01-15 RX ORDER — TORSEMIDE 20 MG/1
20 TABLET ORAL DAILY
COMMUNITY
End: 2024-01-25 | Stop reason: HOSPADM

## 2024-01-15 RX ORDER — METRONIDAZOLE 500 MG/100ML
500 INJECTION, SOLUTION INTRAVENOUS ONCE
Status: COMPLETED | OUTPATIENT
Start: 2024-01-15 | End: 2024-01-15

## 2024-01-15 RX ORDER — SODIUM CHLORIDE 0.9 % (FLUSH) 0.9 %
10 SYRINGE (ML) INJECTION AS NEEDED
Status: DISCONTINUED | OUTPATIENT
Start: 2024-01-15 | End: 2024-01-25 | Stop reason: HOSPADM

## 2024-01-15 RX ORDER — ACETAMINOPHEN 325 MG/1
650 TABLET ORAL EVERY 6 HOURS PRN
Status: DISCONTINUED | OUTPATIENT
Start: 2024-01-15 | End: 2024-01-15

## 2024-01-15 RX ORDER — CIPROFLOXACIN 250 MG/1
250 TABLET, FILM COATED ORAL 2 TIMES DAILY
COMMUNITY
Start: 2024-01-08 | End: 2024-01-25 | Stop reason: HOSPADM

## 2024-01-15 RX ADMIN — INSULIN HUMAN 5 UNITS: 100 INJECTION, SOLUTION PARENTERAL at 14:15

## 2024-01-15 RX ADMIN — CEFTRIAXONE 2000 MG: 2 INJECTION, POWDER, FOR SOLUTION INTRAMUSCULAR; INTRAVENOUS at 13:33

## 2024-01-15 RX ADMIN — SODIUM CHLORIDE, POTASSIUM CHLORIDE, SODIUM LACTATE AND CALCIUM CHLORIDE 1000 ML: 600; 310; 30; 20 INJECTION, SOLUTION INTRAVENOUS at 15:38

## 2024-01-15 RX ADMIN — SODIUM BICARBONATE 150 MEQ: 84 INJECTION, SOLUTION INTRAVENOUS at 18:20

## 2024-01-15 RX ADMIN — DEXTROSE MONOHYDRATE 25 G: 25 INJECTION, SOLUTION INTRAVENOUS at 14:16

## 2024-01-15 RX ADMIN — SODIUM BICARBONATE 50 MEQ: 84 INJECTION INTRAVENOUS at 17:57

## 2024-01-15 RX ADMIN — METRONIDAZOLE 500 MG: 500 INJECTION, SOLUTION INTRAVENOUS at 14:08

## 2024-01-15 RX ADMIN — SODIUM CHLORIDE, POTASSIUM CHLORIDE, SODIUM LACTATE AND CALCIUM CHLORIDE 500 ML: 600; 310; 30; 20 INJECTION, SOLUTION INTRAVENOUS at 13:07

## 2024-01-15 RX ADMIN — ACETAMINOPHEN 650 MG: 650 SUPPOSITORY RECTAL at 22:50

## 2024-01-15 RX ADMIN — CALCIUM GLUCONATE 1000 MG: 20 INJECTION, SOLUTION INTRAVENOUS at 14:16

## 2024-01-15 RX ADMIN — SODIUM CHLORIDE, POTASSIUM CHLORIDE, SODIUM LACTATE AND CALCIUM CHLORIDE 500 ML: 600; 310; 30; 20 INJECTION, SOLUTION INTRAVENOUS at 14:36

## 2024-01-15 RX ADMIN — CALCIUM GLUCONATE 1000 MG: 20 INJECTION, SOLUTION INTRAVENOUS at 17:57

## 2024-01-15 RX ADMIN — SODIUM CHLORIDE, POTASSIUM CHLORIDE, SODIUM LACTATE AND CALCIUM CHLORIDE 1000 ML: 600; 310; 30; 20 INJECTION, SOLUTION INTRAVENOUS at 17:22

## 2024-01-15 NOTE — Clinical Note
Hemostasis started on the left brachial vein. Manual pressure applied to vessel. Manual pressure was held for 5 min. Hemostasis achieved successfully. Closure device additional comment: 2,o prolene suture to access site

## 2024-01-15 NOTE — H&P
[facility]  History & Physical    Patient Name: Gabriel De Souza  : 1945  MRN: [id number]  Primary Care Physician:  Waylon Johnson MD  Date of admission: 1/15/2024  Date and Time of Service: 1/15/2024     Subjective      Chief Complaint: Hypotension and low blood sugar    History of Present Illness: Gabriel De Souza is a 78 y.o. male with past medical history of diabetes mellitus dyslipidemia hypertension presented to the hospital with complaints of low blood pressure and low blood sugar.  Patient has history of CKD and was progressively worsening to the point that he had a fistula placed for possible dialysis.  He has been feeling bad since Chari and went to see his PCP a week back when he was given antibiotics for UTI however did not really help much.  He has been nauseated been vomiting now and then, has been having some abdominal discomfort, which progressively became worse.  Today the wife took his blood pressure which was running low also blood sugar was low so they went to nephrology office and his heart rate was also found to be low in the office so he was referred to ER for further evaluation.  Patient denies any new symptoms, no chest pain lightheadedness or dizziness but does have mild abdominal pain and nausea as mentioned above.  Upon presentation to the ER, patient was found to have a potassium of 7.3, systolic blood pressure of 80, heart rate around 44.  EKG showed sinus bradycardia.  BUN up to 56, creatinine of 16.11. .  Patient is currently a full code.  CT scan of the abdomen pelvis also came back later that showed moderate bilateral hydronephrosis secondary to obstructing right medical calculus measuring 9 mm left mid ureteral calculus measuring 8 mm.  3.1 cm complex axis the lower lobe of the right kidney containing either mural nodularity or small volume hemorrhage.  Patient also does have an elevated white count of 23.10.  Blood cultures are in  process.  UA showed small leukocytes, also moderate amount of blood        Review of Systems  14 point ROS is negative other than what is stated positive above  Personal History     Past Medical History:   Diagnosis Date    Cancer 01/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    Coronary artery disease     Deep vein thrombosis 04/1990    Diabetes mellitus     Dyslipidemia     Erectile dysfunction 51 years old    Heart murmur 10/4/2023    HL (hearing loss) 7 year old    right    Hyperlipidemia 1970    Hypertension     Neuropathy     Renal insufficiency 2020    Stage 4 chronic kidney disease     3-4       Past Surgical History:   Procedure Laterality Date    ARTERIOVENOUS FISTULA/SHUNT SURGERY Left 9/22/2022    Procedure: BRACHIAL CEPHALIC FISTULA FORMATION;  Surgeon: Edy Vega MD;  Location: [facility] MA[facility] OR;  Service: Vascular;  Laterality: Left;    BACK SURGERY      BASAL CELL CARCINOMA EXCISION  01/2019    CARDIAC CATHETERIZATION      TURP / TRANSURETHRAL INCISION / DRAINAGE PROSTATE         Family History: family history includes Hearing loss in his father; Heart attack in his maternal grandfather; Heart disease in his brother and mother; Hypertension in his father. Otherwise pertinent FHx was reviewed and not pertinent to current issue.    Social History:  reports that he quit smoking about 57 years ago. His smoking use included cigarettes. He has been exposed to tobacco smoke. He has never used smokeless tobacco. He reports that he does not drink alcohol and does not use drugs.    Home Medications:  Prior to Admission Medications       Prescriptions Last Dose Informant Patient Reported? Taking?    amLODIPine (NORVASC) 5 MG tablet 1/14/2024  Yes Yes    Take 1 tablet by mouth Daily.    aspirin 81 MG tablet 1/14/2024  Yes Yes    Take 1 tablet by mouth Daily.    atenolol (TENORMIN) 50 MG tablet 1/14/2024  Yes Yes    Take 1 tablet by mouth Daily.    calcitriol (ROCALTROL) 0.25 MCG capsule 1/14/2024  Yes Yes    Take 2 capsules by  mouth Daily.    ciprofloxacin (CIPRO) 250 MG tablet 1/14/2024  Yes Yes    Take 1 tablet by mouth 2 (Two) Times a Day.    Insulin Glargine-Lixisenatide (SOLIQUA) 100-33 UNT-MCG/ML solution pen-injector injection   Yes Yes    Inject 20 Units under the skin into the appropriate area as directed Daily.    sevelamer (RENVELA) 800 MG tablet 1/14/2024  Yes Yes    Take 1 tablet by mouth 3 (Three) Times a Day As Needed.    simvastatin (ZOCOR) 20 MG tablet   Yes Yes    Take 1 tablet by mouth Every Night.    torsemide (DEMADEX) 20 MG tablet   Yes Yes    Take 1 tablet by mouth Daily.              Allergies:  Allergies   Allergen Reactions    Tylenol With Codeine #3 [Acetaminophen-Codeine] Nausea Only and GI Intolerance       Objective      Vitals:   Temp:  [98 °F (36.7 °C)] 98 °F (36.7 °C)  Heart Rate:  [40-44] 41  Resp:  [12-20] 14  BP: ()/(53-61) 94/53  Body mass index is 28.37 kg/m².  Physical Exam  Exam, awake and alert, hard of hearing  HEENT normocephalic atraumatic  Chest clear to auscultation bilaterally  CVs S1-S2 normal, regular rhythm, bradycardia  Abdomen soft nontender nondistended bowel sounds positive  Extremities warm to touch 2+ pulses no edema  Neuro AOx3, grossly normal      Diagnostic Data:  Lab Results (last 24 hours)       Procedure Component Value Units Date/Time    COVID-19 and FLU A/B PCR, 1 HR TAT - Swab, Nasopharynx [[id number]] Collected: 01/15/24 1602    Specimen: Swab from Nasopharynx Updated: 01/15/24 1602    POC Glucose Once [[id number]]  (Abnormal) Collected: 01/15/24 1519    Specimen: Blood Updated: 01/15/24 1521     Glucose 147 mg/dL      Comment: Serial Number: [id number]:  [id number]       Hepatitis B Surface Antigen [[id number]] Collected: 01/15/24 1125    Specimen: Blood Updated: 01/15/24 1430    POC Lactate [[id number]]  (Normal) Collected: 01/15/24 1328    Specimen: Blood Updated: 01/15/24 1339     Lactate 1.9 mmol/L      Comment: Serial Number: [id number]:  [id number]        Comprehensive Metabolic Panel [[id number]]  (Abnormal) Collected: 01/15/24 1223    Specimen: Blood from Arm, Right Updated: 01/15/24 1304     Glucose 111 mg/dL       mg/dL      Creatinine 16.11 mg/dL      Sodium 140 mmol/L      Potassium 7.3 mmol/L      Chloride 100 mmol/L      CO2 9.0 mmol/L      Calcium 9.4 mg/dL      Total Protein 6.9 g/dL      Albumin 3.2 g/dL      ALT (SGPT) 17 U/L      AST (SGOT) 18 U/L      Alkaline Phosphatase 102 U/L      Total Bilirubin 0.2 mg/dL      Globulin 3.7 gm/dL      A/G Ratio 0.9 g/dL      BUN/Creatinine Ratio 15.9     Anion Gap 31.0 mmol/L      eGFR 2.8 mL/min/1.73      Comment: <15 Indicative of kidney failure       Narrative:      GFR Normal >60  Chronic Kidney Disease <60  Kidney Failure <15    The GFR formula is only valid for adults with stable renal function between ages 18 and 70.    Lipase [[id number]]  (Abnormal) Collected: 01/15/24 1223    Specimen: Blood from Arm, Right Updated: 01/15/24 1304     Lipase >3,000 U/L     Magnesium [[id number]]  (Abnormal) Collected: 01/15/24 1223    Specimen: Blood from Arm, Right Updated: 01/15/24 1304     Magnesium 2.9 mg/dL     CK [[id number]]  (Normal) Collected: 01/15/24 1223    Specimen: Blood from Arm, Right Updated: 01/15/24 1252     Creatine Kinase 44 U/L     Extra Tubes [[id number]] Collected: 01/15/24 1125    Specimen: Blood, Venous Line Updated: 01/15/24 1230    Narrative:      The following orders were created for panel order Extra Tubes.  Procedure                               Abnormality         Status                     ---------                               -----------         ------                     Gold Top - SST[[id number]]                                   Final result               Green Top (Gel)[[id number]]                                  Final result               Light Blue Top[[id number]]                                   Final result                 Please view results for these tests on the individual  orders.    Gold Top - SST [[id number]] Collected: 01/15/24 1125    Specimen: Blood Updated: 01/15/24 1230     Extra Tube Hold for add-ons.     Comment: Auto resulted.       Green Top (Gel) [[id number]] Collected: 01/15/24 1125    Specimen: Blood Updated: 01/15/24 1230     Extra Tube Hold for add-ons.     Comment: Auto resulted.       Light Blue Top [[id number]] Collected: 01/15/24 1125    Specimen: Blood from Arm, Right Updated: 01/15/24 1230     Extra Tube Hold for add-ons.     Comment: Auto resulted       Urinalysis, Microscopic Only - Urine, Clean Catch [[id number]]  (Abnormal) Collected: 01/15/24 1145    Specimen: Urine, Clean Catch Updated: 01/15/24 1209     RBC, UA None Seen /HPF      WBC, UA 0-2 /HPF      Bacteria, UA Trace /HPF      Squamous Epithelial Cells, UA 0-2 /HPF      Hyaline Casts, UA None Seen /LPF      Methodology Manual Light Microscopy    TSH [[id number]]  (Normal) Collected: 01/15/24 1125    Specimen: Blood from Arm, Right Updated: 01/15/24 1205     TSH 1.270 uIU/mL     Urinalysis With Microscopic If Indicated (No Culture) - Urine, Clean Catch [[id number]]  (Abnormal) Collected: 01/15/24 1145    Specimen: Urine, Clean Catch Updated: 01/15/24 1153     Color, UA Yellow     Appearance, UA Clear     pH, UA <=5.0     Specific Gravity, UA 1.020     Glucose, UA Negative     Ketones, UA Negative     Bilirubin, UA Negative     Blood, UA Moderate (2+)     Protein,  mg/dL (2+)     Leuk Esterase, UA Small (1+)     Nitrite, UA Negative     Urobilinogen, UA 0.2 E.U./dL    CBC & Differential [[id number]]  (Abnormal) Collected: 01/15/24 1125    Specimen: Blood from Arm, Right Updated: 01/15/24 1138    Narrative:      The following orders were created for panel order CBC & Differential.  Procedure                               Abnormality         Status                     ---------                               -----------         ------                     CBC Auto Differential[[id number]]        Abnormal             Final result                 Please view results for these tests on the individual orders.    CBC Auto Differential [[id number]]  (Abnormal) Collected: 01/15/24 1125    Specimen: Blood from Arm, Right Updated: 01/15/24 1138     WBC 23.10 10*3/mm3      RBC 3.69 10*6/mm3      Hemoglobin 10.4 g/dL      Hematocrit 32.4 %      MCV 87.7 fL      MCH 28.2 pg      MCHC 32.2 g/dL      RDW 16.4 %      RDW-SD 53.8 fl      MPV 8.4 fL      Platelets 274 10*3/mm3      Neutrophil % 84.4 %      Lymphocyte % 6.1 %      Monocyte % 9.3 %      Eosinophil % 0.0 %      Basophil % 0.2 %      Neutrophils, Absolute 19.50 10*3/mm3      Lymphocytes, Absolute 1.40 10*3/mm3      Monocytes, Absolute 2.20 10*3/mm3      Eosinophils, Absolute 0.00 10*3/mm3      Basophils, Absolute 0.10 10*3/mm3      nRBC 0.2 /100 WBC     Blood Culture - Blood, Arm, Left [[id number]] Collected: 01/15/24 1125    Specimen: Blood from Arm, Left Updated: 01/15/24 1130    Blood Culture - Blood, Arm, Right [[id number]] Collected: 01/15/24 1126    Specimen: Blood from Arm, Right Updated: 01/15/24 1130             Imaging Results (Last 24 Hours)       Procedure Component Value Units Date/Time    CT Abdomen Pelvis Without Contrast [[id number]] Collected: 01/15/24 1504     Updated: 01/15/24 1515    Narrative:      CT ABDOMEN PELVIS WO CONTRAST    Date of Exam: 1/15/2024 2:47 PM EST    Indication: Nominal pain diarrhea elevated white count renal failure.    Comparison: Renal sonogram performed on March 21, 2022.    Technique: Axial CT images were obtained of the abdomen and pelvis without the administration of contrast. Sagittal and coronal reconstructions were performed.  Automated exposure control and iterative reconstruction methods were used.      Findings:    Lung Bases:  Mild right base atelectasis visualized.      Peritoneum:  No free intraperitoneal air or fluid.     Abdominal wall:  Unremarkable.    Liver:  Liver is normal in size and contour. No focal  lesions.      Biliary/Gallbladder:  The gallbladder is filled with calculi. No pericholecystic fluid visualized. The biliary tree is nondilated.    Pancreas:  Pancreas is within normal limits. There is no evidence of pancreatic mass or peripancreatic fluid.      Spleen:  Spleen is normal in size and contour. Multiple small splenic granulomas are visualized.    Gastrointestinal/Mesentery:   No evidence of bowel obstruction or gross inflammatory changes.The appendix appears within normal limits. There is descending and sigmoid diverticulosis without evidence of diverticulitis.      Adrenals:  Adrenal glands are unremarkable.      Kidneys:  The kidneys are in anatomic position. Punctate bilateral nonobstructing nephrolithiasis visualized. The kidneys are atrophic. There is moderate right hydronephrosis and an obstructing right mid ureteral calculus measuring 9 mm there is moderate left   hydronephrosis and an obstructing mid ureteral calculus measuring 8 mm. There is mild bilateral perinephric fat stranding. Multiple small cysts are visualized bilaterally. There is a complex cyst with either soft tissue component or small volume   hemorrhage in the lower pole of the right kidney measuring 3.1 cm.    Bladder:   The urinary bladder is not abnormally distended. Mild urinary bladder wall thickening secondary to underdistention however, sequela of chronic outlet obstruction can't be excluded.    Reproductive organs:    The prostate is moderately enlarged.      Retroperitoneal/Lymph Nodes:  No retroperitoneal adenopathy is identified.     Vasculature:  Extensive aortoiliac atheromatous calcifications. The aorta is normal in caliber.        Bony Structures:    No acute fracture or aggressive lesions.          Impression:      Impression:    Moderate bilateral hydronephrosis secondary to obstructing right mid ureteral calculus measuring 9 mm and left mid ureteral calculus measuring 8 mm.    3.1 cm complex cyst in the lower  pole of the right kidney containing either mural nodularity or small volume hemorrhage. If possible, correlation with contrast-enhanced renal protocol CT or MRI is suggested. If there is not possible due to renal   function, close follow-up findings of noncontrast CT or renal ultrasound is suggested. Consider urologic evaluation.    Cholelithiasis.    Additional findings per body of the report.            Electronically Signed: James Monsalve MD    1/15/2024 3:12 PM EST    Workstation ID: [id number]    XR Chest 1 View [[id number]] Collected: 01/15/24 1212     Updated: 01/15/24 1215    Narrative:      XR CHEST 1 VW    Date of Exam: 1/15/2024 11:58 AM EST    Indication: General weakness    Comparison: Two-view chest x-ray 1/3/2023, 1/23/2019.    Findings:  There are lower lung volumes, but lungs appear grossly clear. No pneumothorax or large pleural effusion is seen. Cardiomediastinal contours are not significantly changed, allowing for portable AP technique and lower lung volumes.      Impression:      Impression:  Low lung volumes without definite acute cardiopulmonary abnormality.      Electronically Signed: Madelyn Vaughn    1/15/2024 12:13 PM EST    Workstation ID: [id number]              Assessment & Plan        This is a 78 y.o. male with:    Active and Resolved Problems  Active Hospital Problems    Diagnosis  POA    **Hyperkalemia [E87.5]  Yes      Resolved Hospital Problems   No resolved problems to display.     CKD stage IV, progressively worsening, patient will require hemodialysis   nephrology consulted.  Plan for dialysis today.    Hyperkalemia  Secondary to above, patient is on bicarb drip and has received hyperkalemia cocktail in the ER, needs urgent dialysis today.  Nephrology on board.    Bilateral renal stones with hydronephrosis likely contributing to deterioration of the CKD  Urology has been consulted  Will cover with Rocephin 2 g IV daily for now.  Blood cultures have been sent  Order urine cultures as  well    Bradycardia and hypotension  Due to hyperkalemia  Patient has sinus bradycardia  Check TSH and free T4 as well  Consult cardiology  Holding atenolol and all other blood pressure medications, holding Lasix as well.                DVT prophylaxis:  No DVT prophylaxis order currently exists.  Scds      The patient desires to be as follows:    CODE STATUS:     Discussed with the patient and family, full code for now.    Full code  Admission Status:  I believe this patient meets inpatient status    Expected Length of Stay: Unknown    PDMP and Medication Dispenses via Sidebar reviewed and consistent with patient reported medications.    I discussed the patient's findings and my recommendations with patient and family.      Signature:     This document has been electronically signed by Licha Clinton MD on January 15, 2024 16:02 [facility] Hospitalist Team

## 2024-01-15 NOTE — CONSULTS
[facility] CONSULT NOTE    Referring Provider: Raleigh Serrato MD   Reason for Consultation: Renal failure, hyperkalemia    Chief complaint.  Generalized weakness, dizziness    History of present illness: Patient is 78 years old male well-known to me and has advanced chronic kidney disease, hypertension, diabetes, anemia, sent to the hospital from the office today.  Patient presented with generalized weakness, dizziness, decreased appetite and nausea.  Patient felt sick 2 days after Chari with vomiting and diarrhea which lasted about 2 days but since then he has been feeling very tired and has decreased appetite, orthostatic dizziness.  Patient was recently treated with antibiotics for UTI.  Denied any hemoptysis, hematemesis, hematuria, dysuria.  Patient lab workup showed very high BUN and creatinine along with hyperkalemia.    History  Past Medical History:   Diagnosis Date    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    Coronary artery disease     Deep vein thrombosis 1990    Diabetes mellitus     Dyslipidemia     Erectile dysfunction 51 years old    Heart murmur 10/4/2023    HL (hearing loss) 7 year old    right    Hyperlipidemia 1970    Hypertension     Neuropathy     Renal insufficiency     Stage 4 chronic kidney disease     3-4     Past Surgical History:   Procedure Laterality Date    ARTERIOVENOUS FISTULA/SHUNT SURGERY Left 2022    Procedure: BRACHIAL CEPHALIC FISTULA FORMATION;  Surgeon: Edy Vega MD;  Location: [facility];  Service: Vascular;  Laterality: Left;    BACK SURGERY      BASAL CELL CARCINOMA EXCISION  2019    CARDIAC CATHETERIZATION      TURP / TRANSURETHRAL INCISION / DRAINAGE PROSTATE       Social History     Tobacco Use    Smoking status: Former     Types: Cigarettes     Quit date:      Years since quittin.0     Passive exposure: Past    Smokeless tobacco: Never   Vaping Use    Vaping Use: Never used   Substance Use Topics    Alcohol use: No    Drug use: No  "    Family History   Problem Relation Age of Onset    Heart disease Mother     Heart disease Brother     Heart attack Maternal Grandfather     Hypertension Father     Hearing loss Father        Review of Systems  ROS  As per HPI  Objective     Vital Signs  Temp:  [98 °F (36.7 °C)] 98 °F (36.7 °C)  Heart Rate:  [42-44] 44  Resp:  [12-20] 20  BP: (101-107)/(53-61) 101/53    No intake/output data recorded.  No intake/output data recorded.    Physical Exam:  Physical Exam    General Appearance: Chronically ill-appearing,  Skin: warm and dry  HEENT: oral mucosa dry, nonicteric sclera  Neck: supple, no JVD  Lungs: Bradycardia  Heart: RRR, normal S1 and S2  Abdomen: soft, nontender, nondistended  : no palpable bladder  Extremities: no edema, cyanosis or clubbing  Neuro: normal speech and mental status     Results Review:   I reviewed the patient's new clinical results.    Lab Results   Component Value Date    CALCIUM 9.4 01/15/2024    PHOS 5.1 (H) 05/31/2022     Results from last 7 days   Lab Units 01/15/24  1223 01/15/24  1125   MAGNESIUM mg/dL 2.9*  --    SODIUM mmol/L 140  --    POTASSIUM mmol/L 7.3*  --    CHLORIDE mmol/L 100  --    CO2 mmol/L 9.0*  --    BUN mg/dL 256*  --    CREATININE mg/dL 16.11*  --    GLUCOSE mg/dL 111*  --    CALCIUM mg/dL 9.4  --    WBC 10*3/mm3  --  23.10*   HEMOGLOBIN g/dL  --  10.4*   PLATELETS 10*3/mm3  --  274   ALT (SGPT) U/L 17  --    AST (SGOT) U/L 18  --      Lab Results   Component Value Date    CKTOTAL 44 01/15/2024     Estimated Creatinine Clearance: 4.7 mL/min (A) (by C-G formula based on SCr of 16.11 mg/dL (H)).No results found for: \"URICACID\"    Brief Urine Lab Results  (Last result in the past 365 days)        Color   Clarity   Blood   Leuk Est   Nitrite   Protein   CREAT   Urine HCG        01/15/24 1145 Yellow   Clear   Moderate (2+)   Small (1+)   Negative   100 mg/dL (2+)                   Prior to Admission medications    Medication Sig Start Date End Date Taking? " Authorizing Provider   amLODIPine (NORVASC) 5 MG tablet Take 1 tablet by mouth Daily.   Yes Krista Johnson MD   aspirin 81 MG tablet Take 1 tablet by mouth Daily. 12/1/11  Yes Krista Johnson MD   atenolol (TENORMIN) 50 MG tablet Take 1 tablet by mouth Daily.   Yes Krista Johnson MD   calcitriol (ROCALTROL) 0.25 MCG capsule Take 2 capsules by mouth Daily. 5/3/22  Yes Krista Johnson MD   ciprofloxacin (CIPRO) 250 MG tablet Take 1 tablet by mouth 2 (Two) Times a Day. 1/8/24 1/18/24 Yes Krista Johnson MD   Insulin Glargine-Lixisenatide (SOLIQUA) 100-33 UNT-MCG/ML solution pen-injector injection Inject 20 Units under the skin into the appropriate area as directed Daily.   Yes Krista Johnson MD   sevelamer (RENVELA) 800 MG tablet Take 1 tablet by mouth 3 (Three) Times a Day As Needed. 9/11/23 9/10/24 Yes Krista Johnson MD   simvastatin (ZOCOR) 20 MG tablet Take 1 tablet by mouth Every Night.   Yes Krista Johnson MD   torsemide (DEMADEX) 20 MG tablet Take 1 tablet by mouth Daily.   Yes Krista Johnson MD   torsemide (DEMADEX) 20 MG tablet Take 1 tablet by mouth Daily. 5/5/22 1/15/24 Yes Krista Johnson MD   amLODIPine (NORVASC) 5 MG tablet Take 1 tablet by mouth Daily. 10/10/23 1/15/24  Waylon Johnson MD   atenolol (TENORMIN) 50 MG tablet Take 0.5 tablets by mouth 2 (Two) Times a Day. 10/10/23 1/15/24  Waylon Johnson MD   ciprofloxacin (Cipro) 250 MG tablet Take 1 tablet by mouth Daily. 1/8/24 1/15/24  Gabby Hernandez APRN   Continuous Blood Gluc  (Dexcom G7 ) device Use 1 Device Continuous. 10/10/23 1/15/24  Waylon Johnson MD   Continuous Blood Gluc Sensor (Dexcom G7 Sensor) misc Use 1 Device Every 10 (Ten) Days. E11.22 10/10/23 1/15/24  Waylon Johnson MD   doxycycline (VIBRAMYCIN) 100 MG capsule Take 1 capsule by mouth 2 (Two) Times a Day for 10 days. 1/5/24 1/15/24  Waylon Johnosn MD    glucose blood (OneTouch Ultra) test strip Test once daily DX:  e11.65 5/8/23 1/15/24  Waylon Johnson MD   Insulin Pen Needle (Novofine Pen Needle) 32G X 6 MM misc Uses 1 shot daily E11.65 1/13/23 1/15/24  Waylon Johnson MD   simvastatin (ZOCOR) 20 MG tablet TAKE 1 TABLET BY MOUTH  DAILY 4/30/23 1/15/24  Waylon Johnson MD   Soliqua 100-33 UNT-MCG/ML solution pen-injector injection INJECT 20 UNITS SUBCUTANEOUSLY  INTO THE APPROPRIATE AREA AS  DIRECTED DAILY 4/30/23 1/15/24  Waylon Johnson MD       calcium gluconate, 1,000 mg, Intravenous, Once  cefTRIAXone, 2,000 mg, Intravenous, Once  dextrose, 25 g, Intravenous, Once  insulin regular, 5 Units, Intravenous, Once  lactated ringers, 1,000 mL, Intravenous, Once  lactated ringers, 500 mL, Intravenous, Once  metroNIDAZOLE, 500 mg, Intravenous, Once           Assessment & Plan       New end-stage renal disease.  Patient has advanced chronic kidney disease and presented today with generalized weakness, uremic symptoms and lab workup showed significantly worsened renal function along with hyperkalemia and metabolic acidosis.  Patient clinically looks dry  Hyperkalemia.  Secondary to renal failure  Metabolic acidosis.  Increased anion gap, secondary to renal failure  Pancreatitis  Anemia in chronic kidney disease.  Hemoglobin acceptable  Bradycardia.  Most likely due to hyperkalemia  Hypotension.    Plan:  Patient receiving medication per hyperkalemia protocol  Also receiving IV fluid bolus  Start bicarb drip  Ordered stat hemodialysis to correct metabolic abnormalities  CT abdomen has been ordered  Keep off antihypertensives  Discussed in detail with patient and his wife and informed the plan of care    I discussed the patients findings and my recommendations with patient, family, nursing staff, and consulting provider    Atul Fowler MD  01/15/24  13:59 EST

## 2024-01-15 NOTE — Clinical Note
Level of Care: Critical Care [6]   Diagnosis: Hyperkalemia [469731]   Admitting Physician: ROSHNI PIERCE [[id number]]   Attending Physician: ROSHNI PIERCE [[id number]]   Certification: I Certify That Inpatient Hospital Services Are Medically Necessary For Greater Than 2 Midnights

## 2024-01-15 NOTE — LETTER
EMS Transport Request  For use at [facility], [place], [place], and [place] only   Patient Name: Gabriel De Souza : 1945   Weight:89.3 kg (196 lb 13.9 oz) Pick-up Location: [facility] ([facility]) BLS/ALS:    Insurance: MEDICARE Auth End Date:    Pre-Cert #: D/C Summary complete:    Destination: Other [facility]   Contact Precautions: None   Equipment (O2, Fluids, etc.): None   Arrive By Date/Time: will call Stretcher/WC: Wheelchair   CM Requesting: Niurka Copeland RN Ext: [phone]   Notes/Medical Necessity: Can stand pivot with minimal assist.  Tunneled Picc line, nothing hooked up.       ______________________________________________________________________    *Only 2 patient bags OR 1 carry-on size bag are permitted.  Wheelchairs and walkers CANNOT transported with the patient. Acknowledge: Yes

## 2024-01-15 NOTE — ED PROVIDER NOTES
Subjective   History of Present Illness  Chief complaint sent from the kidney doctor's office for low blood pressure and low heart rate    History of present illness a 78-year-old male with multiple health problems who has a history of kidney disease has a fistula in but has not had dialysis in the past who states he was treated for recent UTI with some Cipro and he had doxycycline he states last 3 days has had a little bit of diarrhea nausea occasional vomiting some abdominal pain and general weakness and fatigue.  He denies any fever chills no chest pain neck arm jaw pain no shortness of breath he still making urine.  Denies dysuria frequency no black or bloody stool.  No one at home with similar illness no foreign travels been noted.  And no recent hospitalization.      Review of Systems   Constitutional:  Negative for chills and fever.   Respiratory:  Negative for chest tightness and shortness of breath.    Cardiovascular:  Negative for chest pain and palpitations.   Gastrointestinal:  Positive for abdominal pain, diarrhea and vomiting. Negative for blood in stool.   Genitourinary:  Negative for difficulty urinating and dysuria.   Skin:  Negative for rash and wound.   Neurological:  Positive for weakness and light-headedness. Negative for dizziness, facial asymmetry and speech difficulty.   Psychiatric/Behavioral:  Negative for confusion.        Past Medical History:   Diagnosis Date    Cancer 01/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    Coronary artery disease     Deep vein thrombosis 04/1990    Diabetes mellitus     Dyslipidemia     Erectile dysfunction 51 years old    Heart murmur 10/4/2023    HL (hearing loss) 7 year old    right    Hyperlipidemia 1970    Hypertension     Neuropathy     Renal insufficiency 2020    Stage 4 chronic kidney disease     3-4       Allergies   Allergen Reactions    Tylenol With Codeine #3 [Acetaminophen-Codeine] Nausea Only and GI Intolerance       Past Surgical History:   Procedure Laterality Date     ARTERIOVENOUS FISTULA/SHUNT SURGERY Left 2022    Procedure: BRACHIAL CEPHALIC FISTULA FORMATION;  Surgeon: Edy Vega MD;  Location: [facility] MAIN OR;  Service: Vascular;  Laterality: Left;    BACK SURGERY      BASAL CELL CARCINOMA EXCISION  2019    CARDIAC CATHETERIZATION      TURP / TRANSURETHRAL INCISION / DRAINAGE PROSTATE         Family History   Problem Relation Age of Onset    Heart disease Mother     Heart disease Brother     Heart attack Maternal Grandfather     Hypertension Father     Hearing loss Father        Social History     Socioeconomic History    Marital status:    Tobacco Use    Smoking status: Former     Types: Cigarettes     Quit date:      Years since quittin.0     Passive exposure: Past    Smokeless tobacco: Never   Vaping Use    Vaping Use: Never used   Substance and Sexual Activity    Alcohol use: No    Drug use: No    Sexual activity: Not Currently     Partners: Female     Birth control/protection: None     Prior to Admission medications    Medication Sig Start Date End Date Taking? Authorizing Provider   amLODIPine (NORVASC) 5 MG tablet Take 1 tablet by mouth Daily.   Yes ProviderKrista MD   aspirin 81 MG tablet Take 1 tablet by mouth Daily. 11  Yes ProviderKrista MD   atenolol (TENORMIN) 50 MG tablet Take 1 tablet by mouth Daily.   Yes ProviderKrista MD   calcitriol (ROCALTROL) 0.25 MCG capsule Take 2 capsules by mouth Daily. 5/3/22  Yes ProviderKrista MD   ciprofloxacin (CIPRO) 250 MG tablet Take 1 tablet by mouth 2 (Two) Times a Day. 24 Yes ProviderKrista MD   Insulin Glargine-Lixisenatide (SOLIQUA) 100-33 UNT-MCG/ML solution pen-injector injection Inject 20 Units under the skin into the appropriate area as directed Daily.   Yes ProviderKrista MD   sevelamer (RENVELA) 800 MG tablet Take 1 tablet by mouth 3 (Three) Times a Day As Needed. 9/11/23 9/10/24 Yes Krista Johnson MD   simvastatin  (ZOCOR) 20 MG tablet Take 1 tablet by mouth Every Night.   Yes Provider, MD Krista   torsemide (DEMADEX) 20 MG tablet Take 1 tablet by mouth Daily.   Yes Provider, MD Krista   torsemide (DEMADEX) 20 MG tablet Take 1 tablet by mouth Daily. 5/5/22 1/15/24 Yes ProviderKrista MD   amLODIPine (NORVASC) 5 MG tablet Take 1 tablet by mouth Daily. 10/10/23 1/15/24  Waylon Johnson MD   atenolol (TENORMIN) 50 MG tablet Take 0.5 tablets by mouth 2 (Two) Times a Day. 10/10/23 1/15/24  Waylon Johnson MD   ciprofloxacin (Cipro) 250 MG tablet Take 1 tablet by mouth Daily. 1/8/24 1/15/24  Gabby Hernandez APRN   Continuous Blood Gluc  (Dexcom G7 ) device Use 1 Device Continuous. 10/10/23 1/15/24  Waylon Johnson MD   Continuous Blood Gluc Sensor (Dexcom G7 Sensor) misc Use 1 Device Every 10 (Ten) Days. E11.22 10/10/23 1/15/24  Waylon Johnson MD   doxycycline (VIBRAMYCIN) 100 MG capsule Take 1 capsule by mouth 2 (Two) Times a Day for 10 days. 1/5/24 1/15/24  Waylon Johnson MD   glucose blood (OneTouch Ultra) test strip Test once daily DX:  e11.65 5/8/23 1/15/24  Waylon Johnson MD   Insulin Pen Needle (Novofine Pen Needle) 32G X 6 MM misc Uses 1 shot daily E11.65 1/13/23 1/15/24  Waylon Johnson MD   simvastatin (ZOCOR) 20 MG tablet TAKE 1 TABLET BY MOUTH  DAILY 4/30/23 1/15/24  Waylon Johnson MD   Soliqua 100-33 UNT-MCG/ML solution pen-injector injection INJECT 20 UNITS SUBCUTANEOUSLY  INTO THE APPROPRIATE AREA AS  DIRECTED DAILY 4/30/23 1/15/24  Waylon Johnson MD          Objective   Physical Exam  Constitutional is a 78-year-old gentleman awake alert triage vital signs reviewed blood pressure was 101 systolic heart rate about 45 while in the room.  Sats at 99% on room air.  HEENT extraocular muscles intact pupils equal round reactive no photophobia mouth clear neck supple no adenopathy no meningeal signs no JVD no  bruits lungs clear no retractions heart regular without murmur bradycardic rhythm abdomen soft mild mid abdominal pain but no rebound no guarding good bowel sounds no peritoneal findings or pulsatile masses extremities pulses equal upper and lower extremities no edema cords or Homans' sign no evidence of DVT skin warm and dry without cellulitic change neurologic awake alert and follows commands motor strength without focal weakness.  Procedures           ED Course      Results for orders placed or performed during the hospital encounter of 01/15/24   Comprehensive Metabolic Panel    Specimen: Arm, Right; Blood   Result Value Ref Range    Glucose 111 (H) 65 - 99 mg/dL     (H) 8 - 23 mg/dL    Creatinine 16.11 (H) 0.76 - 1.27 mg/dL    Sodium 140 136 - 145 mmol/L    Potassium 7.3 (C) 3.5 - 5.2 mmol/L    Chloride 100 98 - 107 mmol/L    CO2 9.0 (L) 22.0 - 29.0 mmol/L    Calcium 9.4 8.6 - 10.5 mg/dL    Total Protein 6.9 6.0 - 8.5 g/dL    Albumin 3.2 (L) 3.5 - 5.2 g/dL    ALT (SGPT) 17 1 - 41 U/L    AST (SGOT) 18 1 - 40 U/L    Alkaline Phosphatase 102 39 - 117 U/L    Total Bilirubin 0.2 0.0 - 1.2 mg/dL    Globulin 3.7 gm/dL    A/G Ratio 0.9 g/dL    BUN/Creatinine Ratio 15.9 7.0 - 25.0    Anion Gap 31.0 (H) 5.0 - 15.0 mmol/L    eGFR 2.8 (L) >60.0 mL/min/1.73   Urinalysis With Microscopic If Indicated (No Culture) - Urine, Clean Catch    Specimen: Urine, Clean Catch   Result Value Ref Range    Color, UA Yellow Yellow, Straw    Appearance, UA Clear Clear    pH, UA <=5.0 5.0 - 8.0    Specific Gravity, UA 1.020 1.005 - 1.030    Glucose, UA Negative Negative    Ketones, UA Negative Negative    Bilirubin, UA Negative Negative    Blood, UA Moderate (2+) (A) Negative    Protein,  mg/dL (2+) (A) Negative    Leuk Esterase, UA Small (1+) (A) Negative    Nitrite, UA Negative Negative    Urobilinogen, UA 0.2 E.U./dL 0.2 - 1.0 E.U./dL   Lipase    Specimen: Arm, Right; Blood   Result Value Ref Range    Lipase >3,000 (H) 13 -  60 U/L   CK    Specimen: Arm, Right; Blood   Result Value Ref Range    Creatine Kinase 44 20 - 200 U/L   Magnesium    Specimen: Arm, Right; Blood   Result Value Ref Range    Magnesium 2.9 (H) 1.6 - 2.4 mg/dL   TSH    Specimen: Arm, Right; Blood   Result Value Ref Range    TSH 1.270 0.270 - 4.200 uIU/mL   CBC Auto Differential    Specimen: Arm, Right; Blood   Result Value Ref Range    WBC 23.10 (H) 3.40 - 10.80 10*3/mm3    RBC 3.69 (L) 4.14 - 5.80 10*6/mm3    Hemoglobin 10.4 (L) 13.0 - 17.7 g/dL    Hematocrit 32.4 (L) 37.5 - 51.0 %    MCV 87.7 79.0 - 97.0 fL    MCH 28.2 26.6 - 33.0 pg    MCHC 32.2 31.5 - 35.7 g/dL    RDW 16.4 (H) 12.3 - 15.4 %    RDW-SD 53.8 37.0 - 54.0 fl    MPV 8.4 6.0 - 12.0 fL    Platelets 274 140 - 450 10*3/mm3    Neutrophil % 84.4 (H) 42.7 - 76.0 %    Lymphocyte % 6.1 (L) 19.6 - 45.3 %    Monocyte % 9.3 5.0 - 12.0 %    Eosinophil % 0.0 (L) 0.3 - 6.2 %    Basophil % 0.2 0.0 - 1.5 %    Neutrophils, Absolute 19.50 (H) 1.70 - 7.00 10*3/mm3    Lymphocytes, Absolute 1.40 0.70 - 3.10 10*3/mm3    Monocytes, Absolute 2.20 (H) 0.10 - 0.90 10*3/mm3    Eosinophils, Absolute 0.00 0.00 - 0.40 10*3/mm3    Basophils, Absolute 0.10 0.00 - 0.20 10*3/mm3    nRBC 0.2 0.0 - 0.2 /100 WBC   Urinalysis, Microscopic Only - Urine, Clean Catch    Specimen: Urine, Clean Catch   Result Value Ref Range    RBC, UA None Seen None Seen, 0-2 /HPF    WBC, UA 0-2 None Seen, 0-2 /HPF    Bacteria, UA Trace (A) None Seen /HPF    Squamous Epithelial Cells, UA 0-2 None Seen, 0-2 /HPF    Hyaline Casts, UA None Seen None Seen /LPF    Methodology Manual Light Microscopy    POC Lactate    Specimen: Blood   Result Value Ref Range    Lactate 1.9 0.3 - 2.0 mmol/L   POC Glucose Once    Specimen: Blood   Result Value Ref Range    Glucose 147 (H) 70 - 105 mg/dL   ECG 12 Lead Other; Hypotension, bradycardia   Result Value Ref Range    QT Interval 612 ms    QTC Interval 516 ms   Gold Top - SST   Result Value Ref Range    Extra Tube Hold for  add-ons.    Green Top (Gel)   Result Value Ref Range    Extra Tube Hold for add-ons.    Light Blue Top   Result Value Ref Range    Extra Tube Hold for add-ons.      CT Abdomen Pelvis Without Contrast    Result Date: 1/15/2024  Impression: Moderate bilateral hydronephrosis secondary to obstructing right mid ureteral calculus measuring 9 mm and left mid ureteral calculus measuring 8 mm. 3.1 cm complex cyst in the lower pole of the right kidney containing either mural nodularity or small volume hemorrhage. If possible, correlation with contrast-enhanced renal protocol CT or MRI is suggested. If there is not possible due to renal function, close follow-up findings of noncontrast CT or renal ultrasound is suggested. Consider urologic evaluation. Cholelithiasis. Additional findings per body of the report. Electronically Signed: James Monsalve MD  1/15/2024 3:12 PM EST  Workstation ID: [id number]    XR Chest 1 View    Result Date: 1/15/2024  Impression: Low lung volumes without definite acute cardiopulmonary abnormality. Electronically Signed: Madelyn Vaughn  1/15/2024 12:13 PM EST  Workstation ID: [id number]   Medications   sodium chloride 0.9 % flush 10 mL (has no administration in time range)   lactated ringers bolus 1,000 mL (1,000 mL Intravenous New Bag 1/15/24 1538)   sodium bicarbonate 8.4 % 150 mEq in dextrose (D5W) 5 % 1,000 mL infusion (greater than 75 mEq) (has no administration in time range)   lactated ringers bolus 1,000 mL (has no administration in time range)   cefTRIAXone (ROCEPHIN) 2,000 mg in sodium chloride 0.9 % 100 mL IVPB (0 mg Intravenous Stopped 1/15/24 1408)   lactated ringers bolus 500 mL (0 mL Intravenous Stopped 1/15/24 1436)   calcium gluconate 1000 Mg/50ml 0.675% NaCl IV SOLN (0 mg Intravenous Stopped 1/15/24 1447)   insulin regular (humuLIN R,novoLIN R) injection 5 Units (5 Units Intravenous Given 1/15/24 1415)   dextrose (D50W) (25 g/50 mL) IV injection 25 g (25 g Intravenous Given 1/15/24 1416)    lactated ringers bolus 500 mL (0 mL Intravenous Stopped 1/15/24 1550)   metroNIDAZOLE (FLAGYL) IVPB 500 mg (0 mg Intravenous Stopped 1/15/24 1528)                                    EKG my interpretation sinus bradycardic rhythm rate of 45 first AV block nonspecific interventricular conduction delay QTc of 516 abnormal EKG unchanged from previous EKG with slower rate compared to 9/14/2022      SEPTIC SHOCK FOCUSED EXAM ATTESTATION    I attest that I have reassessed tissue perfusion after the fluid bolus given.    Raleigh Serrato MD  01/15/24  15:51 EST     Medical Decision Making  Medical decision making.  IV established monitor placed open bradycardia my review.  Sinus bradycardia labs obtained independent reviewed by me labs obtained independent reviewed by me.  Competence metabolic profile BUN of 256 creatinine was 16.11 potassium was 7.3 patient CO2 was 9.  This is changed from 3 months ago when his creatinine was 4.9.  Urinalysis shows some blood otherwise negative lipase was 3000 liver enzymes are okay magnesium 2.9 CK was normal TSH was normal white count was 23,000 blood cultures pending lactate was normal at 1.9.  The patient had been given Rocephin 2 g IV when his white count returned at 23,000.  Although lactate was normal and cultures are pending.  The patient was also added Flagyl when he is found to have pancreatitis and some abdominal discomfort and considering abdominal sources as well.  I talked to the hospitalist case was discussed.  I talked to the nephrologist Dr. Fowler who saw the patient here in the ER.  He will do dialysis today.  Patient has a fistula in his left arm which has a good thrill and bruit.  The patient says he underwent a CT scan which read by radiology report reviewed by me showed moderate bilateral hydronephrosis secondary to an obstructing right mid ureteral calculus at 9 mm and a left ureteral calculus at 8 mm.  Both obstructing 3.1 cm complex cyst in the left lower right  kidney.  The patient on repeat exam is resting, is given 2 L of lactated Ringer's.  Blood pressure would fluctuate anywhere from 94 up to 105.  Heart rate remained in the 40s a sinus bradycardic rhythm.  EKG obtained independent reviewed by me showed a sinus bradycardic rhythm of 45 first AV block nonspecific interventricular conduction delay which is changed from previous EKG on 9/14/2022 with a slower rate previous rate was in the 50s.  Patient's blood pressure still running about 105 systolic on my exam when I am standing in room talking to the patient.  Heart rate still in the 40s he is mentating well good cap refill he was made aware of the findings and made n.p.o. he will have dialysis today I talked to Dr. Avitia urology and the patient will be added onto the OR and taken to the OR for removal of the stones and stent placement.  He has been given Rocephin and Flagyl.  Patient had 3 L lactated Ringer's initiated for a total of 30/kg bolus.  Blood pressure remains somewhat soft he still getting infusion but do not see the need to start pressors yet his blood pressures been above 90 other than 1 reading recorded 82.  Seems to be improving on the fluids.  His hyperkalemia treated with insulin D50 and calcium gluconate.  I talked to the hospitalist discussed the case.  She requested that I talk to the intensivist I talked to the nurse practitioner in the ICU we discussed the case and patient will be appropriate on PCU for now.  If he decompensates or blood pressure becomes worse may require pressors and ICU admission.  We talked about these findings again with the hospitalist.  She is here in the ER seeing the patient.  The patient remains awake and alert here carries on a conversation there is no focal findings on him exam was soft with mild abdominal pain in the midline but no rebound no guarding no peritoneal findings.  I talked to him and his family.  He will be admitted to PCU for further care critical care 30  minutes.  The charge nurse stated that the hospitalist talk to her and she wants the patient admitted to the ICU instead of to the PCU.  I talked to the nurse practitioner in the ICU they were agreeable to admission and patient will go the ICU for further care.  Based on the above clinical findings at this time anticipate patient will require a 2 midnight stay    Problems Addressed:  Acute pancreatitis, unspecified complication status, unspecified pancreatitis type: complicated acute illness or injury  Acute renal failure, unspecified acute renal failure type: complicated acute illness or injury  Hyperkalemia: complicated acute illness or injury  Ureterolithiasis: complicated acute illness or injury  Weakness: complicated acute illness or injury    Amount and/or Complexity of Data Reviewed  Labs: ordered. Decision-making details documented in ED Course.  Radiology: ordered and independent interpretation performed. Decision-making details documented in ED Course.  ECG/medicine tests: ordered and independent interpretation performed. Decision-making details documented in ED Course.    Risk  OTC drugs.  Prescription drug management.  Decision regarding hospitalization.        Final diagnoses:   Acute renal failure, unspecified acute renal failure type   Acute pancreatitis, unspecified complication status, unspecified pancreatitis type   Weakness   Hyperkalemia   Ureterolithiasis       ED Disposition  ED Disposition       ED Disposition   Decision to Admit    Condition   --    Comment   Level of Care: Critical Care [6]   Diagnosis: Hyperkalemia [509932]   Admitting Physician: ROSHNI PIERCE [[id number]]   Attending Physician: ROSHNI PIERCE [[id number]]   Certification: I Certify That Inpatient Hospital Services Are Medically Necessary For Greater Than 2 Midnights                 No follow-up provider specified.       Medication List        ASK your doctor about these medications      amLODIPine 5 MG tablet  Commonly known as:  NORVASC  Ask about: Which instructions should I use?     atenolol 50 MG tablet  Commonly known as: TENORMIN  Ask about: Which instructions should I use?     ciprofloxacin 250 MG tablet  Commonly known as: CIPRO  Ask about: Which instructions should I use?     Insulin Glargine-Lixisenatide 100-33 UNT-MCG/ML solution pen-injector injection  Commonly known as: SOLIQUA  Ask about: Which instructions should I use?     simvastatin 20 MG tablet  Commonly known as: ZOCOR  Ask about: Which instructions should I use?     torsemide 20 MG tablet  Commonly known as: DEMADEX  Ask about: Which instructions should I use?                 Raleigh Serrato MD  01/15/24 1551       Raleigh Serrato MD  01/15/24 1605       Raleigh Serrato MD  01/15/24 1620

## 2024-01-15 NOTE — PROGRESS NOTES
Patient with bilateral obstructing stones acute renal failure and hyperkalemia.  Also with leukocytosis but lactate normal.  No fevers.  Needs urgent bilateral stent placement but cannot undergo anesthesia due to hyperkalemia and needs stat dialysis first.  He will get stat dialysis then we will add on for bilateral ureteral stent placement.  He is currently added on for 7 AM tomorrow morning. Cont abx and supportive care    Tyrel Avitia MD

## 2024-01-15 NOTE — Clinical Note
Health Maintenance Due   Topic Date Due   • Varicella Vaccine (1 of 2 - 2-dose childhood series) Never done   • Hepatitis B Vaccine (1 of 3 - Risk 3-dose series) Never done   • DTaP/Tdap/Td Vaccine (1 - Tdap) Never done       Patient is due for topics as listed above but is not proceeding with Immunization(s) Dtap/Tdap/Td, Hep B and Varicella at this time.     COVID-19 Vaccine Interest Assessment:   • Patient reported already received outside of [facility]  If the patient reports an outside immunization, please update the [facility] information in the Immunizations activity        Care teams updated with patient       No in lab complications

## 2024-01-16 ENCOUNTER — APPOINTMENT (OUTPATIENT)
Dept: GENERAL RADIOLOGY | Facility: HOSPITAL | Age: 79
End: 2024-01-16
Payer: MEDICARE

## 2024-01-16 ENCOUNTER — ANESTHESIA (OUTPATIENT)
Dept: PERIOP | Facility: HOSPITAL | Age: 79
End: 2024-01-16
Payer: MEDICARE

## 2024-01-16 LAB
ALBUMIN SERPL-MCNC: 2.9 G/DL (ref 3.5–5.2)
ALBUMIN/GLOB SERPL: 1 G/DL
ALP SERPL-CCNC: 94 U/L (ref 39–117)
ALT SERPL W P-5'-P-CCNC: 20 U/L (ref 1–41)
ANION GAP SERPL CALCULATED.3IONS-SCNC: 22 MMOL/L (ref 5–15)
AST SERPL-CCNC: 28 U/L (ref 1–40)
BILIRUB SERPL-MCNC: 0.2 MG/DL (ref 0–1.2)
BUN SERPL-MCNC: 150 MG/DL (ref 8–23)
BUN SERPL-MCNC: 73 MG/DL (ref 8–23)
BUN/CREAT SERPL: 14.3 (ref 7–25)
CALCIUM SPEC-SCNC: 8.3 MG/DL (ref 8.6–10.5)
CHLORIDE SERPL-SCNC: 101 MMOL/L (ref 98–107)
CO2 SERPL-SCNC: 17 MMOL/L (ref 22–29)
CREAT SERPL-MCNC: 10.52 MG/DL (ref 0.76–1.27)
DEPRECATED RDW RBC AUTO: 47.7 FL (ref 37–54)
EGFRCR SERPLBLD CKD-EPI 2021: 4.6 ML/MIN/1.73
ERYTHROCYTE [DISTWIDTH] IN BLOOD BY AUTOMATED COUNT: 15.9 % (ref 12.3–15.4)
GLOBULIN UR ELPH-MCNC: 3 GM/DL
GLUCOSE BLDC GLUCOMTR-MCNC: 122 MG/DL (ref 70–105)
GLUCOSE BLDC GLUCOMTR-MCNC: 125 MG/DL (ref 70–105)
GLUCOSE BLDC GLUCOMTR-MCNC: 135 MG/DL (ref 70–105)
GLUCOSE BLDC GLUCOMTR-MCNC: 154 MG/DL (ref 70–105)
GLUCOSE BLDC GLUCOMTR-MCNC: 27 MG/DL (ref 70–105)
GLUCOSE BLDC GLUCOMTR-MCNC: 67 MG/DL (ref 70–105)
GLUCOSE BLDC GLUCOMTR-MCNC: 76 MG/DL (ref 70–105)
GLUCOSE BLDC GLUCOMTR-MCNC: 87 MG/DL (ref 70–105)
GLUCOSE BLDC GLUCOMTR-MCNC: 96 MG/DL (ref 70–105)
GLUCOSE SERPL-MCNC: 39 MG/DL (ref 65–99)
HCT VFR BLD AUTO: 27.4 % (ref 37.5–51)
HGB BLD-MCNC: 9.1 G/DL (ref 13–17.7)
MAGNESIUM SERPL-MCNC: 1.9 MG/DL (ref 1.6–2.4)
MCH RBC QN AUTO: 28.8 PG (ref 26.6–33)
MCHC RBC AUTO-ENTMCNC: 33.2 G/DL (ref 31.5–35.7)
MCV RBC AUTO: 86.9 FL (ref 79–97)
PHOSPHATE SERPL-MCNC: 11 MG/DL (ref 2.5–4.5)
PLATELET # BLD AUTO: 260 10*3/MM3 (ref 140–450)
PMV BLD AUTO: 7.5 FL (ref 6–12)
POTASSIUM SERPL-SCNC: 4.7 MMOL/L (ref 3.5–5.2)
PROT SERPL-MCNC: 5.9 G/DL (ref 6–8.5)
RBC # BLD AUTO: 3.16 10*6/MM3 (ref 4.14–5.8)
SODIUM SERPL-SCNC: 140 MMOL/L (ref 136–145)
WBC NRBC COR # BLD AUTO: 15.6 10*3/MM3 (ref 3.4–10.8)

## 2024-01-16 PROCEDURE — 84100 ASSAY OF PHOSPHORUS: CPT | Performed by: HOSPITALIST

## 2024-01-16 PROCEDURE — 25010000002 CEFTRIAXONE PER 250 MG: Performed by: UROLOGY

## 2024-01-16 PROCEDURE — 76000 FLUOROSCOPY <1 HR PHYS/QHP: CPT

## 2024-01-16 PROCEDURE — 25810000003 SODIUM CHLORIDE 0.9 % SOLUTION: Performed by: NURSE ANESTHETIST, CERTIFIED REGISTERED

## 2024-01-16 PROCEDURE — 85027 COMPLETE CBC AUTOMATED: CPT | Performed by: HOSPITALIST

## 2024-01-16 PROCEDURE — C1769 GUIDE WIRE: HCPCS | Performed by: UROLOGY

## 2024-01-16 PROCEDURE — 0T788DZ DILATION OF BILATERAL URETERS WITH INTRALUMINAL DEVICE, VIA NATURAL OR ARTIFICIAL OPENING ENDOSCOPIC: ICD-10-PCS | Performed by: UROLOGY

## 2024-01-16 PROCEDURE — 25010000002 ONDANSETRON PER 1 MG: Performed by: NURSE ANESTHETIST, CERTIFIED REGISTERED

## 2024-01-16 PROCEDURE — 80053 COMPREHEN METABOLIC PANEL: CPT | Performed by: HOSPITALIST

## 2024-01-16 PROCEDURE — 25010000002 HEPARIN (PORCINE) PER 1000 UNITS: Performed by: HOSPITALIST

## 2024-01-16 PROCEDURE — 74420 UROGRAPHY RTRGR +-KUB: CPT

## 2024-01-16 PROCEDURE — 0 IOHEXOL 300 MG/ML SOLUTION: Performed by: UROLOGY

## 2024-01-16 PROCEDURE — 25010000002 DEXAMETHASONE SODIUM PHOSPHATE 20 MG/5ML SOLUTION: Performed by: NURSE ANESTHETIST, CERTIFIED REGISTERED

## 2024-01-16 PROCEDURE — BT1D1ZZ FLUOROSCOPY OF RIGHT KIDNEY, URETER AND BLADDER USING LOW OSMOLAR CONTRAST: ICD-10-PCS | Performed by: UROLOGY

## 2024-01-16 PROCEDURE — 25010000002 PROPOFOL 200 MG/20ML EMULSION: Performed by: NURSE ANESTHETIST, CERTIFIED REGISTERED

## 2024-01-16 PROCEDURE — 82948 REAGENT STRIP/BLOOD GLUCOSE: CPT

## 2024-01-16 PROCEDURE — 84520 ASSAY OF UREA NITROGEN: CPT | Performed by: INTERNAL MEDICINE

## 2024-01-16 PROCEDURE — C1758 CATHETER, URETERAL: HCPCS | Performed by: UROLOGY

## 2024-01-16 PROCEDURE — 83735 ASSAY OF MAGNESIUM: CPT | Performed by: HOSPITALIST

## 2024-01-16 PROCEDURE — C2617 STENT, NON-COR, TEM W/O DEL: HCPCS | Performed by: UROLOGY

## 2024-01-16 PROCEDURE — 25010000002 ONDANSETRON PER 1 MG: Performed by: NURSE PRACTITIONER

## 2024-01-16 PROCEDURE — 0 DEXTROSE 5 % SOLUTION 1,000 ML FLEX CONT: Performed by: INTERNAL MEDICINE

## 2024-01-16 DEVICE — IMPLANTABLE DEVICE: Type: IMPLANTABLE DEVICE | Site: URETER | Status: FUNCTIONAL

## 2024-01-16 RX ORDER — DEXTROSE MONOHYDRATE 100 MG/ML
50 INJECTION, SOLUTION INTRAVENOUS CONTINUOUS
Status: DISCONTINUED | OUTPATIENT
Start: 2024-01-16 | End: 2024-01-17

## 2024-01-16 RX ORDER — IBUPROFEN 600 MG/1
1 TABLET ORAL
Status: DISCONTINUED | OUTPATIENT
Start: 2024-01-16 | End: 2024-01-25 | Stop reason: HOSPADM

## 2024-01-16 RX ORDER — AMOXICILLIN 250 MG
2 CAPSULE ORAL 2 TIMES DAILY
Status: DISCONTINUED | OUTPATIENT
Start: 2024-01-16 | End: 2024-01-25 | Stop reason: HOSPADM

## 2024-01-16 RX ORDER — EPHEDRINE SULFATE 5 MG/ML
INJECTION INTRAVENOUS AS NEEDED
Status: DISCONTINUED | OUTPATIENT
Start: 2024-01-16 | End: 2024-01-16 | Stop reason: SURG

## 2024-01-16 RX ORDER — ONDANSETRON 2 MG/ML
4 INJECTION INTRAMUSCULAR; INTRAVENOUS EVERY 6 HOURS PRN
Status: DISCONTINUED | OUTPATIENT
Start: 2024-01-16 | End: 2024-01-25 | Stop reason: HOSPADM

## 2024-01-16 RX ORDER — DEXTROSE AND SODIUM CHLORIDE 5; .45 G/100ML; G/100ML
125 INJECTION, SOLUTION INTRAVENOUS CONTINUOUS
Status: DISCONTINUED | OUTPATIENT
Start: 2024-01-16 | End: 2024-01-16

## 2024-01-16 RX ORDER — LIDOCAINE HYDROCHLORIDE 20 MG/ML
INJECTION, SOLUTION EPIDURAL; INFILTRATION; INTRACAUDAL; PERINEURAL AS NEEDED
Status: DISCONTINUED | OUTPATIENT
Start: 2024-01-16 | End: 2024-01-16 | Stop reason: SURG

## 2024-01-16 RX ORDER — DEXTROSE MONOHYDRATE 25 G/50ML
25 INJECTION, SOLUTION INTRAVENOUS
Status: DISCONTINUED | OUTPATIENT
Start: 2024-01-16 | End: 2024-01-25 | Stop reason: HOSPADM

## 2024-01-16 RX ORDER — ATORVASTATIN CALCIUM 10 MG/1
10 TABLET, FILM COATED ORAL DAILY
Status: DISCONTINUED | OUTPATIENT
Start: 2024-01-16 | End: 2024-01-25 | Stop reason: HOSPADM

## 2024-01-16 RX ORDER — IBUPROFEN 600 MG/1
1 TABLET ORAL
Status: DISCONTINUED | OUTPATIENT
Start: 2024-01-16 | End: 2024-01-17 | Stop reason: SDUPTHER

## 2024-01-16 RX ORDER — HYDRALAZINE HYDROCHLORIDE 20 MG/ML
5 INJECTION INTRAMUSCULAR; INTRAVENOUS
Status: DISCONTINUED | OUTPATIENT
Start: 2024-01-16 | End: 2024-01-16 | Stop reason: HOSPADM

## 2024-01-16 RX ORDER — PROMETHAZINE HYDROCHLORIDE 25 MG/1
25 SUPPOSITORY RECTAL ONCE AS NEEDED
Status: DISCONTINUED | OUTPATIENT
Start: 2024-01-16 | End: 2024-01-16 | Stop reason: HOSPADM

## 2024-01-16 RX ORDER — CALCITRIOL 0.25 UG/1
0.5 CAPSULE, LIQUID FILLED ORAL DAILY
Status: DISCONTINUED | OUTPATIENT
Start: 2024-01-16 | End: 2024-01-25 | Stop reason: HOSPADM

## 2024-01-16 RX ORDER — ONDANSETRON 2 MG/ML
INJECTION INTRAMUSCULAR; INTRAVENOUS AS NEEDED
Status: DISCONTINUED | OUTPATIENT
Start: 2024-01-16 | End: 2024-01-16 | Stop reason: SURG

## 2024-01-16 RX ORDER — ONDANSETRON 2 MG/ML
4 INJECTION INTRAMUSCULAR; INTRAVENOUS ONCE AS NEEDED
Status: DISCONTINUED | OUTPATIENT
Start: 2024-01-16 | End: 2024-01-16 | Stop reason: HOSPADM

## 2024-01-16 RX ORDER — DEXTROSE MONOHYDRATE 25 G/50ML
INJECTION, SOLUTION INTRAVENOUS AS NEEDED
Status: DISCONTINUED | OUTPATIENT
Start: 2024-01-16 | End: 2024-01-16 | Stop reason: SURG

## 2024-01-16 RX ORDER — ONDANSETRON 4 MG/1
4 TABLET, ORALLY DISINTEGRATING ORAL EVERY 6 HOURS PRN
Status: DISCONTINUED | OUTPATIENT
Start: 2024-01-16 | End: 2024-01-25 | Stop reason: HOSPADM

## 2024-01-16 RX ORDER — PROPOFOL 10 MG/ML
INJECTION, EMULSION INTRAVENOUS AS NEEDED
Status: DISCONTINUED | OUTPATIENT
Start: 2024-01-16 | End: 2024-01-16 | Stop reason: SURG

## 2024-01-16 RX ORDER — ALBUTEROL SULFATE 2.5 MG/3ML
2.5 SOLUTION RESPIRATORY (INHALATION) ONCE AS NEEDED
Status: DISCONTINUED | OUTPATIENT
Start: 2024-01-16 | End: 2024-01-16 | Stop reason: HOSPADM

## 2024-01-16 RX ORDER — LABETALOL HYDROCHLORIDE 5 MG/ML
5 INJECTION, SOLUTION INTRAVENOUS
Status: DISCONTINUED | OUTPATIENT
Start: 2024-01-16 | End: 2024-01-16 | Stop reason: HOSPADM

## 2024-01-16 RX ORDER — FENTANYL CITRATE 50 UG/ML
25 INJECTION, SOLUTION INTRAMUSCULAR; INTRAVENOUS
Status: DISCONTINUED | OUTPATIENT
Start: 2024-01-16 | End: 2024-01-16 | Stop reason: HOSPADM

## 2024-01-16 RX ORDER — DEXTROSE MONOHYDRATE, SODIUM CHLORIDE, AND POTASSIUM CHLORIDE 50; .745; 4.5 G/1000ML; G/1000ML; G/1000ML
125 INJECTION, SOLUTION INTRAVENOUS CONTINUOUS
Status: DISCONTINUED | OUTPATIENT
Start: 2024-01-16 | End: 2024-01-16

## 2024-01-16 RX ORDER — DROPERIDOL 2.5 MG/ML
0.62 INJECTION, SOLUTION INTRAMUSCULAR; INTRAVENOUS ONCE AS NEEDED
Status: DISCONTINUED | OUTPATIENT
Start: 2024-01-16 | End: 2024-01-16 | Stop reason: HOSPADM

## 2024-01-16 RX ORDER — SODIUM CHLORIDE 0.9 % (FLUSH) 0.9 %
10 SYRINGE (ML) INJECTION EVERY 12 HOURS SCHEDULED
Status: DISCONTINUED | OUTPATIENT
Start: 2024-01-16 | End: 2024-01-25 | Stop reason: HOSPADM

## 2024-01-16 RX ORDER — BISACODYL 10 MG
10 SUPPOSITORY, RECTAL RECTAL DAILY PRN
Status: DISCONTINUED | OUTPATIENT
Start: 2024-01-16 | End: 2024-01-25 | Stop reason: HOSPADM

## 2024-01-16 RX ORDER — SEVELAMER CARBONATE 800 MG/1
1600 TABLET, FILM COATED ORAL
Status: DISCONTINUED | OUTPATIENT
Start: 2024-01-16 | End: 2024-01-18

## 2024-01-16 RX ORDER — SEVELAMER CARBONATE 800 MG/1
800 TABLET, FILM COATED ORAL
Status: DISCONTINUED | OUTPATIENT
Start: 2024-01-16 | End: 2024-01-16

## 2024-01-16 RX ORDER — NICOTINE POLACRILEX 4 MG
15 LOZENGE BUCCAL
Status: DISCONTINUED | OUTPATIENT
Start: 2024-01-16 | End: 2024-01-25 | Stop reason: HOSPADM

## 2024-01-16 RX ORDER — POLYETHYLENE GLYCOL 3350 17 G/17G
17 POWDER, FOR SOLUTION ORAL DAILY PRN
Status: DISCONTINUED | OUTPATIENT
Start: 2024-01-16 | End: 2024-01-25 | Stop reason: HOSPADM

## 2024-01-16 RX ORDER — DEXAMETHASONE SODIUM PHOSPHATE 4 MG/ML
INJECTION, SOLUTION INTRA-ARTICULAR; INTRALESIONAL; INTRAMUSCULAR; INTRAVENOUS; SOFT TISSUE AS NEEDED
Status: DISCONTINUED | OUTPATIENT
Start: 2024-01-16 | End: 2024-01-16 | Stop reason: SURG

## 2024-01-16 RX ORDER — ENOXAPARIN SODIUM 100 MG/ML
40 INJECTION SUBCUTANEOUS DAILY
Status: DISCONTINUED | OUTPATIENT
Start: 2024-01-16 | End: 2024-01-16

## 2024-01-16 RX ORDER — EPHEDRINE SULFATE 5 MG/ML
5 INJECTION INTRAVENOUS ONCE AS NEEDED
Status: DISCONTINUED | OUTPATIENT
Start: 2024-01-16 | End: 2024-01-16 | Stop reason: HOSPADM

## 2024-01-16 RX ORDER — NICOTINE POLACRILEX 4 MG
15 LOZENGE BUCCAL
Status: DISCONTINUED | OUTPATIENT
Start: 2024-01-16 | End: 2024-01-17 | Stop reason: SDUPTHER

## 2024-01-16 RX ORDER — SODIUM CHLORIDE 9 MG/ML
40 INJECTION, SOLUTION INTRAVENOUS AS NEEDED
Status: DISCONTINUED | OUTPATIENT
Start: 2024-01-16 | End: 2024-01-25 | Stop reason: HOSPADM

## 2024-01-16 RX ORDER — NALOXONE HCL 0.4 MG/ML
0.4 VIAL (ML) INJECTION AS NEEDED
Status: DISCONTINUED | OUTPATIENT
Start: 2024-01-16 | End: 2024-01-16 | Stop reason: HOSPADM

## 2024-01-16 RX ORDER — SODIUM CHLORIDE 9 MG/ML
INJECTION, SOLUTION INTRAVENOUS CONTINUOUS PRN
Status: DISCONTINUED | OUTPATIENT
Start: 2024-01-16 | End: 2024-01-16 | Stop reason: SURG

## 2024-01-16 RX ORDER — BISACODYL 5 MG/1
5 TABLET, DELAYED RELEASE ORAL DAILY PRN
Status: DISCONTINUED | OUTPATIENT
Start: 2024-01-16 | End: 2024-01-25 | Stop reason: HOSPADM

## 2024-01-16 RX ORDER — DIPHENHYDRAMINE HYDROCHLORIDE 50 MG/ML
12.5 INJECTION INTRAMUSCULAR; INTRAVENOUS
Status: DISCONTINUED | OUTPATIENT
Start: 2024-01-16 | End: 2024-01-16 | Stop reason: HOSPADM

## 2024-01-16 RX ORDER — SODIUM CHLORIDE 0.9 % (FLUSH) 0.9 %
10 SYRINGE (ML) INJECTION AS NEEDED
Status: DISCONTINUED | OUTPATIENT
Start: 2024-01-16 | End: 2024-01-25 | Stop reason: HOSPADM

## 2024-01-16 RX ORDER — DIPHENHYDRAMINE HYDROCHLORIDE 50 MG/ML
12.5 INJECTION INTRAMUSCULAR; INTRAVENOUS ONCE AS NEEDED
Status: DISCONTINUED | OUTPATIENT
Start: 2024-01-16 | End: 2024-01-16 | Stop reason: HOSPADM

## 2024-01-16 RX ORDER — HEPARIN SODIUM 5000 [USP'U]/ML
5000 INJECTION, SOLUTION INTRAVENOUS; SUBCUTANEOUS EVERY 8 HOURS SCHEDULED
Status: DISCONTINUED | OUTPATIENT
Start: 2024-01-16 | End: 2024-01-18

## 2024-01-16 RX ORDER — DEXTROSE MONOHYDRATE 25 G/50ML
25 INJECTION, SOLUTION INTRAVENOUS
Status: DISCONTINUED | OUTPATIENT
Start: 2024-01-16 | End: 2024-01-17 | Stop reason: SDUPTHER

## 2024-01-16 RX ORDER — NITROGLYCERIN 0.4 MG/1
0.4 TABLET SUBLINGUAL
Status: DISCONTINUED | OUTPATIENT
Start: 2024-01-16 | End: 2024-01-25 | Stop reason: HOSPADM

## 2024-01-16 RX ORDER — PROMETHAZINE HYDROCHLORIDE 25 MG/1
25 TABLET ORAL ONCE AS NEEDED
Status: DISCONTINUED | OUTPATIENT
Start: 2024-01-16 | End: 2024-01-16 | Stop reason: HOSPADM

## 2024-01-16 RX ADMIN — DEXTROSE MONOHYDRATE 25 G: 25 INJECTION, SOLUTION INTRAVENOUS at 08:21

## 2024-01-16 RX ADMIN — DEXTROSE AND SODIUM CHLORIDE 125 ML/HR: 5; 450 INJECTION, SOLUTION INTRAVENOUS at 08:48

## 2024-01-16 RX ADMIN — SODIUM BICARBONATE 150 MEQ: 84 INJECTION, SOLUTION INTRAVENOUS at 04:58

## 2024-01-16 RX ADMIN — HEPARIN SODIUM 5000 UNITS: 5000 INJECTION INTRAVENOUS; SUBCUTANEOUS at 21:22

## 2024-01-16 RX ADMIN — DEXTROSE MONOHYDRATE 25 G: 25 INJECTION, SOLUTION INTRAVENOUS at 07:23

## 2024-01-16 RX ADMIN — ONDANSETRON 4 MG: 2 INJECTION INTRAMUSCULAR; INTRAVENOUS at 05:50

## 2024-01-16 RX ADMIN — CALCITRIOL CAPSULES 0.25 MCG 0.5 MCG: 0.25 CAPSULE ORAL at 09:59

## 2024-01-16 RX ADMIN — SEVELAMER CARBONATE 800 MG: 800 TABLET, FILM COATED ORAL at 11:29

## 2024-01-16 RX ADMIN — SODIUM CHLORIDE 2000 MG: 900 INJECTION INTRAVENOUS at 13:54

## 2024-01-16 RX ADMIN — SODIUM CHLORIDE: 9 INJECTION, SOLUTION INTRAVENOUS at 07:16

## 2024-01-16 RX ADMIN — EPHEDRINE SULFATE 10 MG: 5 INJECTION INTRAVENOUS at 07:42

## 2024-01-16 RX ADMIN — EPHEDRINE SULFATE 10 MG: 5 INJECTION INTRAVENOUS at 07:32

## 2024-01-16 RX ADMIN — EPHEDRINE SULFATE 10 MG: 5 INJECTION INTRAVENOUS at 07:37

## 2024-01-16 RX ADMIN — Medication 10 ML: at 09:59

## 2024-01-16 RX ADMIN — PROPOFOL 120 MG: 10 INJECTION, EMULSION INTRAVENOUS at 07:28

## 2024-01-16 RX ADMIN — LIDOCAINE HYDROCHLORIDE 100 MG: 20 INJECTION, SOLUTION EPIDURAL; INFILTRATION; INTRACAUDAL; PERINEURAL at 07:28

## 2024-01-16 RX ADMIN — DEXAMETHASONE SODIUM PHOSPHATE 6 MG: 4 INJECTION, SOLUTION INTRAMUSCULAR; INTRAVENOUS at 07:35

## 2024-01-16 RX ADMIN — HEPARIN SODIUM 5000 UNITS: 5000 INJECTION INTRAVENOUS; SUBCUTANEOUS at 13:54

## 2024-01-16 RX ADMIN — ATORVASTATIN CALCIUM 10 MG: 10 TABLET, FILM COATED ORAL at 09:58

## 2024-01-16 RX ADMIN — ONDANSETRON 4 MG: 2 INJECTION INTRAMUSCULAR; INTRAVENOUS at 07:43

## 2024-01-16 RX ADMIN — Medication 10 ML: at 21:20

## 2024-01-16 RX ADMIN — DEXTROSE MONOHYDRATE 50 ML/HR: 100 INJECTION, SOLUTION INTRAVENOUS at 10:46

## 2024-01-16 RX ADMIN — ACETAMINOPHEN 650 MG: 650 SUPPOSITORY RECTAL at 05:50

## 2024-01-16 NOTE — CONSULTS
[facility]SULT      Patient Identification:  NAME:  Gabriel De Souza  Age:  78 y.o.   Sex:  male   :  1945   MRN:  [id number]       Chief complaint/Reason for consult: Bilateral obstructing stones    History of present illness:  78 y.o. male presented altered mental status found to be in acute renal failure with hyperkalemia required emergent dialysis due to bilateral obstructing stones.  Also with leukocytosis.        Past medical history:  Past Medical History:   Diagnosis Date    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    Coronary artery disease     Deep vein thrombosis 1990    Diabetes mellitus     Dyslipidemia     Erectile dysfunction 51 years old    Heart murmur 10/4/2023    HL (hearing loss) 7 year old    right    Hyperlipidemia 1970    Hypertension     Neuropathy     Renal insufficiency     Stage 4 chronic kidney disease     3-4       Past surgical history:  Past Surgical History:   Procedure Laterality Date    ARTERIOVENOUS FISTULA/SHUNT SURGERY Left 2022    Procedure: BRACHIAL CEPHALIC FISTULA FORMATION;  Surgeon: Edy Vega MD;  Location: [facility];  Service: Vascular;  Laterality: Left;    BACK SURGERY      BASAL CELL CARCINOMA EXCISION  2019    CARDIAC CATHETERIZATION      TURP / TRANSURETHRAL INCISION / DRAINAGE PROSTATE         Allergies:  Tylenol with codeine #3 [acetaminophen-codeine]    Home medications:  Medications Prior to Admission   Medication Sig Dispense Refill Last Dose    amLODIPine (NORVASC) 5 MG tablet Take 1 tablet by mouth Daily.   2024    aspirin 81 MG tablet Take 1 tablet by mouth Daily.   2024    atenolol (TENORMIN) 50 MG tablet Take 1 tablet by mouth Daily.   2024    calcitriol (ROCALTROL) 0.25 MCG capsule Take 2 capsules by mouth Daily.   2024    ciprofloxacin (CIPRO) 250 MG tablet Take 1 tablet by mouth 2 (Two) Times a Day.   2024    Insulin Glargine-Lixisenatide (SOLIQUA) 100-33 UNT-MCG/ML solution pen-injector  injection Inject 20 Units under the skin into the appropriate area as directed Daily.       sevelamer (RENVELA) 800 MG tablet Take 1 tablet by mouth 3 (Three) Times a Day As Needed.   2024    simvastatin (ZOCOR) 20 MG tablet Take 1 tablet by mouth Every Night.       torsemide (DEMADEX) 20 MG tablet Take 1 tablet by mouth Daily.           Hospital medications:  cefTRIAXone, 2,000 mg, Intravenous, Q24H      sodium bicarbonate 8.4 % 150 mEq in dextrose (D5W) 5 % 1,000 mL infusion (greater than 75 mEq), 150 mEq, Last Rate: 150 mEq (24 0458)        [MAR Hold] acetaminophen    [MAR Hold] ondansetron    [COMPLETED] Insert Peripheral IV **AND** [MAR Hold] sodium chloride    Family history:  Family History   Problem Relation Age of Onset    Heart disease Mother     Heart disease Brother     Heart attack Maternal Grandfather     Hypertension Father     Hearing loss Father        Social history:  Social History     Tobacco Use    Smoking status: Former     Types: Cigarettes     Quit date:      Years since quittin.0     Passive exposure: Past    Smokeless tobacco: Never   Vaping Use    Vaping Use: Never used   Substance Use Topics    Alcohol use: No    Drug use: No       Objective:  TMax 24 hours:   Temp (24hrs), Av.4 °F (34.7 °C), Min:89.7 °F (32.1 °C), Max:98 °F (36.7 °C)      Vitals Ranges:   Temp:  [89.7 °F (32.1 °C)-98 °F (36.7 °C)] 97.5 °F (36.4 °C)  Heart Rate:  [40-85] 70  Resp:  [12-20] 14  BP: ()/(53-76) 129/67    Intake/Output Last 3 shifts:  I/O last 3 completed shifts:  In: 1250 [IV Piggyback:1250]  Out: 25 [Urine:25]     Physical Exam:    General Appearance:  In ICU, oriented only to self   Lungs:     Respirations unlabored, no audible wheezing    Heart:    No cyanosis   Abdomen:     Soft, ND    :    No suprapubic distention              Results review:   I reviewed the patient's new clinical results.    Data review:  Lab Results (last 24 hours)       Procedure Component Value Units  Date/Time    Magnesium [[id number]]  (Normal) Collected: 01/16/24 0646    Specimen: Blood Updated: 01/16/24 0720     Magnesium 1.9 mg/dL     Comprehensive Metabolic Panel [[id number]]  (Abnormal) Collected: 01/16/24 0646    Specimen: Blood Updated: 01/16/24 0720     Glucose 39 mg/dL       mg/dL      Creatinine 10.52 mg/dL      Sodium 140 mmol/L      Potassium 4.7 mmol/L      Chloride 101 mmol/L      CO2 17.0 mmol/L      Calcium 8.3 mg/dL      Total Protein 5.9 g/dL      Albumin 2.9 g/dL      ALT (SGPT) 20 U/L      AST (SGOT) 28 U/L      Alkaline Phosphatase 94 U/L      Total Bilirubin 0.2 mg/dL      Globulin 3.0 gm/dL      A/G Ratio 1.0 g/dL      BUN/Creatinine Ratio 14.3     Anion Gap 22.0 mmol/L      eGFR 4.6 mL/min/1.73      Comment: <15 Indicative of kidney failure       Narrative:      GFR Normal >60  Chronic Kidney Disease <60  Kidney Failure <15    The GFR formula is only valid for adults with stable renal function between ages 18 and 70.    Phosphorus [[id number]]  (Abnormal) Collected: 01/16/24 0646    Specimen: Blood Updated: 01/16/24 0712     Phosphorus 11.0 mg/dL     CBC (No Diff) [[id number]]  (Abnormal) Collected: 01/16/24 0646    Specimen: Blood Updated: 01/16/24 0653     WBC 15.60 10*3/mm3      RBC 3.16 10*6/mm3      Hemoglobin 9.1 g/dL      Hematocrit 27.4 %      MCV 86.9 fL      MCH 28.8 pg      MCHC 33.2 g/dL      RDW 15.9 %      RDW-SD 47.7 fl      MPV 7.5 fL      Platelets 260 10*3/mm3     Potassium [[id number]]  (Normal) Collected: 01/15/24 2304    Specimen: Blood Updated: 01/15/24 2332     Potassium 4.1 mmol/L     Basic Metabolic Panel [[id number]]  (Abnormal) Collected: 01/15/24 1719    Specimen: Blood Updated: 01/15/24 1759     Glucose 87 mg/dL       mg/dL      Creatinine 15.98 mg/dL      Sodium 140 mmol/L      Potassium 6.7 mmol/L      Chloride 102 mmol/L      CO2 8.0 mmol/L      Calcium 9.1 mg/dL      BUN/Creatinine Ratio 15.6     Anion Gap 30.0 mmol/L      eGFR 2.8 mL/min/1.73       Comment: <15 Indicative of kidney failure       Narrative:      GFR Normal >60  Chronic Kidney Disease <60  Kidney Failure <15    The GFR formula is only valid for adults with stable renal function between ages 18 and 70.    POC Glucose Once [[id number]]  (Normal) Collected: 01/15/24 1717    Specimen: Blood Updated: 01/15/24 1719     Glucose 75 mg/dL      Comment: Serial Number: [id number]:  [id number]       COVID-19 and FLU A/B PCR, 1 HR TAT - Swab, Nasopharynx [[id number]]  (Normal) Collected: 01/15/24 1602    Specimen: Swab from Nasopharynx Updated: 01/15/24 1624     COVID19 Not Detected     Influenza A PCR Not Detected     Influenza B PCR Not Detected    Narrative:      Fact sheet for providers: https://www.fda.gov/media/574145/download    Fact sheet for patients: https://www.fda.gov/media/982092/download    Test performed by PCR.    Urine Culture - Urine, Urine, Clean Catch [[id number]] Collected: 01/15/24 1145    Specimen: Urine, Clean Catch Updated: 01/15/24 1622    Hepatitis B Surface Antigen [[id number]]  (Normal) Collected: 01/15/24 1125    Specimen: Blood Updated: 01/15/24 1613     Hepatitis B Surface Ag Non-Reactive    POC Glucose Once [[id number]]  (Abnormal) Collected: 01/15/24 1600    Specimen: Blood Updated: 01/15/24 1604     Glucose 119 mg/dL      Comment: Serial Number: [id number]:  [id number]       POC Glucose Once [[id number]]  (Abnormal) Collected: 01/15/24 1519    Specimen: Blood Updated: 01/15/24 1521     Glucose 147 mg/dL      Comment: Serial Number: [id number]:  [id number]       POC Lactate [[id number]]  (Normal) Collected: 01/15/24 1328    Specimen: Blood Updated: 01/15/24 1339     Lactate 1.9 mmol/L      Comment: Serial Number: [id number]:  [id number]       Comprehensive Metabolic Panel [[id number]]  (Abnormal) Collected: 01/15/24 1223    Specimen: Blood from Arm, Right Updated: 01/15/24 1304     Glucose 111 mg/dL       mg/dL      Creatinine 16.11 mg/dL      Sodium  140 mmol/L      Potassium 7.3 mmol/L      Chloride 100 mmol/L      CO2 9.0 mmol/L      Calcium 9.4 mg/dL      Total Protein 6.9 g/dL      Albumin 3.2 g/dL      ALT (SGPT) 17 U/L      AST (SGOT) 18 U/L      Alkaline Phosphatase 102 U/L      Total Bilirubin 0.2 mg/dL      Globulin 3.7 gm/dL      A/G Ratio 0.9 g/dL      BUN/Creatinine Ratio 15.9     Anion Gap 31.0 mmol/L      eGFR 2.8 mL/min/1.73      Comment: <15 Indicative of kidney failure       Narrative:      GFR Normal >60  Chronic Kidney Disease <60  Kidney Failure <15    The GFR formula is only valid for adults with stable renal function between ages 18 and 70.    Lipase [[id number]]  (Abnormal) Collected: 01/15/24 1223    Specimen: Blood from Arm, Right Updated: 01/15/24 1304     Lipase >3,000 U/L     Magnesium [[id number]]  (Abnormal) Collected: 01/15/24 1223    Specimen: Blood from Arm, Right Updated: 01/15/24 1304     Magnesium 2.9 mg/dL     CK [[id number]]  (Normal) Collected: 01/15/24 1223    Specimen: Blood from Arm, Right Updated: 01/15/24 1252     Creatine Kinase 44 U/L     Extra Tubes [[id number]] Collected: 01/15/24 1125    Specimen: Blood, Venous Line Updated: 01/15/24 1230    Narrative:      The following orders were created for panel order Extra Tubes.  Procedure                               Abnormality         Status                     ---------                               -----------         ------                     Gold Top - SST[[id number]]                                   Final result               Green Top (Gel)[[id number]]                                  Final result               Light Blue Top[[id number]]                                   Final result                 Please view results for these tests on the individual orders.    Gold Top - SST [[id number]] Collected: 01/15/24 1125    Specimen: Blood Updated: 01/15/24 1230     Extra Tube Hold for add-ons.     Comment: Auto resulted.       Green Top (Gel) [[id number]] Collected: 01/15/24  1125    Specimen: Blood Updated: 01/15/24 1230     Extra Tube Hold for add-ons.     Comment: Auto resulted.       Light Blue Top [[id number]] Collected: 01/15/24 1125    Specimen: Blood from Arm, Right Updated: 01/15/24 1230     Extra Tube Hold for add-ons.     Comment: Auto resulted       Urinalysis, Microscopic Only - Urine, Clean Catch [[id number]]  (Abnormal) Collected: 01/15/24 1145    Specimen: Urine, Clean Catch Updated: 01/15/24 1209     RBC, UA None Seen /HPF      WBC, UA 0-2 /HPF      Bacteria, UA Trace /HPF      Squamous Epithelial Cells, UA 0-2 /HPF      Hyaline Casts, UA None Seen /LPF      Methodology Manual Light Microscopy    TSH [[id number]]  (Normal) Collected: 01/15/24 1125    Specimen: Blood from Arm, Right Updated: 01/15/24 1205     TSH 1.270 uIU/mL     Urinalysis With Microscopic If Indicated (No Culture) - Urine, Clean Catch [[id number]]  (Abnormal) Collected: 01/15/24 1145    Specimen: Urine, Clean Catch Updated: 01/15/24 1153     Color, UA Yellow     Appearance, UA Clear     pH, UA <=5.0     Specific Gravity, UA 1.020     Glucose, UA Negative     Ketones, UA Negative     Bilirubin, UA Negative     Blood, UA Moderate (2+)     Protein,  mg/dL (2+)     Leuk Esterase, UA Small (1+)     Nitrite, UA Negative     Urobilinogen, UA 0.2 E.U./dL    CBC & Differential [[id number]]  (Abnormal) Collected: 01/15/24 1125    Specimen: Blood from Arm, Right Updated: 01/15/24 1138    Narrative:      The following orders were created for panel order CBC & Differential.  Procedure                               Abnormality         Status                     ---------                               -----------         ------                     CBC Auto Differential[[id number]]        Abnormal            Final result                 Please view results for these tests on the individual orders.    CBC Auto Differential [[id number]]  (Abnormal) Collected: 01/15/24 1125    Specimen: Blood from Arm, Right Updated:  01/15/24 1138     WBC 23.10 10*3/mm3      RBC 3.69 10*6/mm3      Hemoglobin 10.4 g/dL      Hematocrit 32.4 %      MCV 87.7 fL      MCH 28.2 pg      MCHC 32.2 g/dL      RDW 16.4 %      RDW-SD 53.8 fl      MPV 8.4 fL      Platelets 274 10*3/mm3      Neutrophil % 84.4 %      Lymphocyte % 6.1 %      Monocyte % 9.3 %      Eosinophil % 0.0 %      Basophil % 0.2 %      Neutrophils, Absolute 19.50 10*3/mm3      Lymphocytes, Absolute 1.40 10*3/mm3      Monocytes, Absolute 2.20 10*3/mm3      Eosinophils, Absolute 0.00 10*3/mm3      Basophils, Absolute 0.10 10*3/mm3      nRBC 0.2 /100 WBC     Blood Culture - Blood, Arm, Left [[id number]] Collected: 01/15/24 1125    Specimen: Blood from Arm, Left Updated: 01/15/24 1130    Blood Culture - Blood, Arm, Right [[id number]] Collected: 01/15/24 1126    Specimen: Blood from Arm, Right Updated: 01/15/24 1130             Imaging:  Imaging Results (Last 24 Hours)       Procedure Component Value Units Date/Time    CT Abdomen Pelvis Without Contrast [[id number]] Collected: 01/15/24 1504     Updated: 01/15/24 1515    Narrative:      CT ABDOMEN PELVIS WO CONTRAST    Date of Exam: 1/15/2024 2:47 PM EST    Indication: Nominal pain diarrhea elevated white count renal failure.    Comparison: Renal sonogram performed on March 21, 2022.    Technique: Axial CT images were obtained of the abdomen and pelvis without the administration of contrast. Sagittal and coronal reconstructions were performed.  Automated exposure control and iterative reconstruction methods were used.      Findings:    Lung Bases:  Mild right base atelectasis visualized.      Peritoneum:  No free intraperitoneal air or fluid.     Abdominal wall:  Unremarkable.    Liver:  Liver is normal in size and contour. No focal lesions.      Biliary/Gallbladder:  The gallbladder is filled with calculi. No pericholecystic fluid visualized. The biliary tree is nondilated.    Pancreas:  Pancreas is within normal limits. There is no evidence of  pancreatic mass or peripancreatic fluid.      Spleen:  Spleen is normal in size and contour. Multiple small splenic granulomas are visualized.    Gastrointestinal/Mesentery:   No evidence of bowel obstruction or gross inflammatory changes.The appendix appears within normal limits. There is descending and sigmoid diverticulosis without evidence of diverticulitis.      Adrenals:  Adrenal glands are unremarkable.      Kidneys:  The kidneys are in anatomic position. Punctate bilateral nonobstructing nephrolithiasis visualized. The kidneys are atrophic. There is moderate right hydronephrosis and an obstructing right mid ureteral calculus measuring 9 mm there is moderate left   hydronephrosis and an obstructing mid ureteral calculus measuring 8 mm. There is mild bilateral perinephric fat stranding. Multiple small cysts are visualized bilaterally. There is a complex cyst with either soft tissue component or small volume   hemorrhage in the lower pole of the right kidney measuring 3.1 cm.    Bladder:   The urinary bladder is not abnormally distended. Mild urinary bladder wall thickening secondary to underdistention however, sequela of chronic outlet obstruction can't be excluded.    Reproductive organs:    The prostate is moderately enlarged.      Retroperitoneal/Lymph Nodes:  No retroperitoneal adenopathy is identified.     Vasculature:  Extensive aortoiliac atheromatous calcifications. The aorta is normal in caliber.        Bony Structures:    No acute fracture or aggressive lesions.          Impression:      Impression:    Moderate bilateral hydronephrosis secondary to obstructing right mid ureteral calculus measuring 9 mm and left mid ureteral calculus measuring 8 mm.    3.1 cm complex cyst in the lower pole of the right kidney containing either mural nodularity or small volume hemorrhage. If possible, correlation with contrast-enhanced renal protocol CT or MRI is suggested. If there is not possible due to renal    function, close follow-up findings of noncontrast CT or renal ultrasound is suggested. Consider urologic evaluation.    Cholelithiasis.    Additional findings per body of the report.            Electronically Signed: James Monsalve MD    1/15/2024 3:12 PM EST    Workstation ID: [id number]    XR Chest 1 View [[id number]] Collected: 01/15/24 1212     Updated: 01/15/24 1215    Narrative:      XR CHEST 1 VW    Date of Exam: 1/15/2024 11:58 AM EST    Indication: General weakness    Comparison: Two-view chest x-ray 1/3/2023, 1/23/2019.    Findings:  There are lower lung volumes, but lungs appear grossly clear. No pneumothorax or large pleural effusion is seen. Cardiomediastinal contours are not significantly changed, allowing for portable AP technique and lower lung volumes.      Impression:      Impression:  Low lung volumes without definite acute cardiopulmonary abnormality.      Electronically Signed: Madelyn Vaughn    1/15/2024 12:13 PM EST    Workstation ID: [id number]               Assessment:       Hyperkalemia      Bilateral obstructing stones  Acute renal failure with hyperkalemia      Plan:     CT images reviewed with bilateral obstructing stones  Needs urgent stent placement, got stat dialysis now okay for OR  Discussed with wife cannot treat bilateral stones due to his current condition we will just have to place stent bilaterally and treat stones at a later date  All risks, benefits and alternatives to surgery were discussed with the patient preoperatively including but not limited to need for multiple procedures, infection, sepsis, bleeding, risk of anesthesia and damage to other organs. The details of the procedure have been fully reviewed with the patient and informed consent has been obtained.      Tyrel Avitia MD  [facility]  Formerly [facility] [facility], [address]  [place] [id number]  Office: [phone]  Available via [employer] Secure Chat  01/16/24  07:24 EST      Hip click in  LGA (large for gestational age) infant Born by breech delivery

## 2024-01-16 NOTE — PROGRESS NOTES
[facility] MEDICINE SERVICE  DAILY PROGRESS NOTE    NAME: Gabriel De Souza  : 1945  MRN: [id number]      LOS: 1 day     PROVIDER OF SERVICE: Licha Clinton MD    Chief Complaint: Hyperkalemia    Subjective:     Interval History:  History taken from: patient  Patient seen and examined, he is doing much better.  Bradycardia and hypotension have resolved.  He did have low blood sugars in the morning with hypoglycemia protocol was activated.  He is currently resting comfortably.  Has  sandy quiñones on    Review of Systems:   Review of Systems  10 point ROS is negative other than what is stated positive above  Objective:     Vital Signs  Temp:  [89.7 °F (32.1 °C)-97.5 °F (36.4 °C)] 95.5 °F (35.3 °C)  Heart Rate:  [40-85] 67  Resp:  [14-24] 16  BP: ()/(50-76) 117/65  Flow (L/min):  [2-6] 2   Body mass index is 28.13 kg/m².    Physical Exam  Physical Exam  Exam, awake and alert, hard of hearing  HEENT normocephalic atraumatic  Chest clear to auscultation bilaterally  CVs S1-S2 normal, regular rhythm, bradycardia  Abdomen soft nontender nondistended bowel sounds positive  Extremities warm to touch 2+ pulses no edema  Neuro AOx3, grossly normal     Scheduled Meds   atorvastatin, 10 mg, Oral, Daily  calcitriol, 0.5 mcg, Oral, Daily  cefTRIAXone, 2,000 mg, Intravenous, Q24H  heparin (porcine), 5,000 Units, Subcutaneous, Q8H  senna-docusate sodium, 2 tablet, Oral, BID  sevelamer, 800 mg, Oral, TID With Meals  sodium chloride, 10 mL, Intravenous, Q12H       PRN Meds     acetaminophen    senna-docusate sodium **AND** polyethylene glycol **AND** bisacodyl **AND** bisacodyl    Calcium Replacement - Follow Nurse / BPA Driven Protocol    dextrose    dextrose    dextrose    dextrose    glucagon (human recombinant)    glucagon (human recombinant)    Magnesium Standard Dose Replacement - Follow Nurse / BPA Driven Protocol    nitroglycerin    ondansetron    ondansetron ODT    Phosphorus Replacement - Follow Nurse / BPA  Driven Protocol    Potassium Replacement - Follow Nurse / BPA Driven Protocol    [COMPLETED] Insert Peripheral IV **AND** sodium chloride    sodium chloride    sodium chloride   Infusions  dextrose, 50 mL/hr, Last Rate: 50 mL/hr (01/16/24 1046)          Diagnostic Data    Results from last 7 days   Lab Units 01/16/24  0646   WBC 10*3/mm3 15.60*   HEMOGLOBIN g/dL 9.1*   HEMATOCRIT % 27.4*   PLATELETS 10*3/mm3 260   GLUCOSE mg/dL 39*   CREATININE mg/dL 10.52*   BUN mg/dL 150*   SODIUM mmol/L 140   POTASSIUM mmol/L 4.7   AST (SGOT) U/L 28   ALT (SGPT) U/L 20   ALK PHOS U/L 94   BILIRUBIN mg/dL 0.2   ANION GAP mmol/L 22.0*       CT Abdomen Pelvis Without Contrast    Result Date: 1/15/2024  Impression: Moderate bilateral hydronephrosis secondary to obstructing right mid ureteral calculus measuring 9 mm and left mid ureteral calculus measuring 8 mm. 3.1 cm complex cyst in the lower pole of the right kidney containing either mural nodularity or small volume hemorrhage. If possible, correlation with contrast-enhanced renal protocol CT or MRI is suggested. If there is not possible due to renal function, close follow-up findings of noncontrast CT or renal ultrasound is suggested. Consider urologic evaluation. Cholelithiasis. Additional findings per body of the report. Electronically Signed: James Monsalve MD  1/15/2024 3:12 PM EST  Workstation ID: [id number]    XR Chest 1 View    Result Date: 1/15/2024  Impression: Low lung volumes without definite acute cardiopulmonary abnormality. Electronically Signed: Madelyn Vaughn  1/15/2024 12:13 PM EST  Workstation ID: [id number]       I reviewed the patient's new clinical results.    Assessment/Plan:     Active and Resolved Problems  Active Hospital Problems    Diagnosis  POA    **Hyperkalemia [E87.5]  Yes      Resolved Hospital Problems   No resolved problems to display.     CKD stage IV, now end-stage renal disease, hemodialysis has been started.  Patient has a fistula already       Hyperkalemia  Resolved        Bilateral renal stones with hydronephrosis likely contributing to deterioration of the CKD  Urology was consulted, patient s/p placement of bilateral ureteral stents.  Continue Rocephin 2 g IV daily for now.  Monitor blood and urine cultures.  Patient currently has a Peterson in.         Bradycardia and hypotension  Due to hyperkalemia  Resolved  Vitals now stable    Hypoglycemic episode this morning, likely secondary to being NPO.  On hypoglycemic protocol.  Continue to monitor blood sugars    DVT prophylaxis:  Medical and mechanical DVT prophylaxis orders are present.     Code status is   Code Status and Medical Interventions:   Ordered at: 01/15/24 9145     Level Of Support Discussed With:    Patient    Health Care Surrogate    Next of Kin (If No Surrogate)     Code Status (Patient has no pulse and is not breathing):    CPR (Attempt to Resuscitate)     Medical Interventions (Patient has pulse or is breathing):    Full Support       Plan for disposition: 1-2 days     Time: 30 minutes    Signature: Electronically signed by Licha Clinton MD, 01/16/24, 11:00 [facility].  [facility] Hospitalist Team

## 2024-01-16 NOTE — ANESTHESIA PREPROCEDURE EVALUATION
Anesthesia Evaluation     Patient summary reviewed and Nursing notes reviewed   NPO Solid Status: > 8 hours  NPO Liquid Status: > 2 hours           Airway   Dental      Pulmonary    Cardiovascular   Exercise tolerance: poor (<4 METS)    ECG reviewed    (+) hypertension well controlled, valvular problems/murmurs, CAD, angina, DVT, hyperlipidemia      Neuro/Psych  (+) numbness  GI/Hepatic/Renal/Endo    (+) renal disease- dialysis, ESRD and stones, diabetes mellitus type 2 well controlled, thyroid problem hypothyroidism    Musculoskeletal     Abdominal    Substance History      OB/GYN          Other      history of cancer    ROS/Med Hx Other: Recently hyperkalemic now back within normal range                    Anesthesia Plan    ASA 3     general     intravenous induction     Anesthetic plan, risks, benefits, and alternatives have been provided, discussed and informed consent has been obtained with: patient.    Use of blood products discussed with patient .    Plan discussed with CRNA.        CODE STATUS:    Level Of Support Discussed With: Patient; Health Care Surrogate; Next of Kin (If No Surrogate)  Code Status (Patient has no pulse and is not breathing): CPR (Attempt to Resuscitate)  Medical Interventions (Patient has pulse or is breathing): Full Support

## 2024-01-16 NOTE — NURSING NOTE
Spoke with Dr. Clinton via telephone regarding low BG of 34. Received new telephone orders for hypoglycemia protocol orders and Q6 blood glucose checks. See new orders.

## 2024-01-16 NOTE — ANESTHESIA POSTPROCEDURE EVALUATION
Patient: Gabriel De Souza    Procedure Summary       Date: 01/16/24 Room / Location: [facility] OR 07 / [facility] MAIN OR    Anesthesia Start: 0716 Anesthesia Stop: 0802    Procedure: CYSTOSCOPY, BILATERAL URETERAL STENT INSERTION (Bilateral: Ureter) Diagnosis:     Surgeons: Tyrel Avitia MD Provider: Tez Thomson MD    Anesthesia Type: general ASA Status: 3            Anesthesia Type: general    Vitals  Vitals Value Taken Time   /61 01/16/24 0831   Temp 96.8 °F (36 °C) 01/16/24 0758   Pulse 67 01/16/24 0836   Resp 16 01/16/24 0828   SpO2 95 % 01/16/24 0836   Vitals shown include unfiled device data.        Post Anesthesia Care and Evaluation    Patient location during evaluation: PACU  Patient participation: complete - patient participated  Level of consciousness: awake  Pain scale: See nurse's notes for pain score.  Pain management: adequate    Airway patency: patent  Anesthetic complications: No anesthetic complications  PONV Status: none  Cardiovascular status: acceptable  Respiratory status: acceptable and spontaneous ventilation  Hydration status: acceptable    Comments: Patient seen and examined postoperatively; vital signs stable; SpO2 greater than or equal to 90%; cardiopulmonary status stable; nausea/vomiting adequately controlled; pain adequately controlled; no apparent anesthesia complications; patient discharged from anesthesia care when discharge criteria were met.  Patient still treated in intermittent hypoglycemia.  Started in D5 1/2NS maintenance.

## 2024-01-16 NOTE — ANESTHESIA PROCEDURE NOTES
Airway  Urgency: elective    Date/Time: 1/16/2024 7:31 AM  Airway not difficult    General Information and Staff    Patient location during procedure: OR  CRNA/CAA: Leonela Yap CRNA    Indications and Patient Condition  Indications for airway management: airway protection    Preoxygenated: yes  MILS not maintained throughout  Mask difficulty assessment: 0 - not attempted    Final Airway Details  Final airway type: supraglottic airway      Successful airway: I-gel  Size 4     Number of attempts at approach: 1  Assessment: lips, teeth, and gum same as pre-op and atraumatic intubation    Additional Comments  LMA inserted with ease, assist to SV

## 2024-01-16 NOTE — PLAN OF CARE
Goal Outcome Evaluation:                                     Patient remains on room air. AOx1, follows commands. Urinal collected around 25cc. No BM on shift. Dialysis started around 1715. Per Dr. Avitia note - tentative plan to place bilateral ureteral stents. Kept NPO. Family updated.

## 2024-01-16 NOTE — NURSING NOTE
Informed Dr. Fowler about patient's hypoglycemia. Inquired about maintenance fluids and bicarb drip, see new orders.

## 2024-01-16 NOTE — PLAN OF CARE
Goal Outcome Evaluation:  Plan of Care Reviewed With: patient        Progress: no change       Patient oriented to self and place. No complaints of pain. HD done 1/15/23. Pt remains NPO and is awaiting cysto with stent placement. Patient placed on a bear hugger for 89.7 rectal temp. VSS

## 2024-01-16 NOTE — NURSING NOTE
Called surgery charge nurse phone, spoke with Tez in OR. Reported that lab called with BG 39. Tez stated that he would report to RNs.

## 2024-01-16 NOTE — PROGRESS NOTES
[facility] Progress Note      Patient Name: Gabriel De Souza  : 1945  MRN: [id number]  Primary Care Physician:  Waylon Johnson MD  Date of admission: 1/15/2024    Subjective     Interval History:     Patient resting comfortably.  Feeling little better  CT abdomen showed bilateral ureteral stones and hydronephrosis.  Patient was evaluated by urology and underwent bilateral ureteral stents placement    Review of Systems:   As noted above    Objective     Vitals:   Temp:  [89.7 °F (32.1 °C)-97.5 °F (36.4 °C)] 97.5 °F (36.4 °C)  Heart Rate:  [40-85] 77  Resp:  [14-24] 16  BP: ()/(50-76) 128/68  Flow (L/min):  [2-6] 2    Intake/Output Summary (Last 24 hours) at 2024 1218  Last data filed at 2024 1142  Gross per 24 hour   Intake 1840 ml   Output 275 ml   Net 1565 ml       Physical Exam:    General Appearance: chronically ill-appearing, NAD  HEENT: oral mucosa normal, nonicteric sclera  Neck: supple, no JVD  Lungs: CTA  Heart: RRR, normal S1 and S2  Abdomen: soft, nondistended  Extremities: no edema  Neuro: Awake alert and moving all extremities    Scheduled Meds:     atorvastatin, 10 mg, Oral, Daily  calcitriol, 0.5 mcg, Oral, Daily  cefTRIAXone, 2,000 mg, Intravenous, Q24H  heparin (porcine), 5,000 Units, Subcutaneous, Q8H  senna-docusate sodium, 2 tablet, Oral, BID  sevelamer, 800 mg, Oral, TID With Meals  sodium chloride, 10 mL, Intravenous, Q12H      IV Meds:   dextrose, 50 mL/hr, Last Rate: 50 mL/hr (24 1046)        Results Reviewed:   I have personally reviewed the results from the time of this admission to 2024 12:18 EST     Results from last 7 days   Lab Units 24  0646 01/15/24  2304 01/15/24  1719 01/15/24  1223   SODIUM mmol/L 140  --  140 140   POTASSIUM mmol/L 4.7 4.1 6.7* 7.3*   CHLORIDE mmol/L 101  --  102 100   CO2 mmol/L 17.0*  --  8.0* 9.0*   BUN mg/dL 150*  --  250* 256*   CREATININE mg/dL 10.52*  --  15.98* 16.11*   CALCIUM  mg/dL 8.3*  --  9.1 9.4   BILIRUBIN mg/dL 0.2  --   --  0.2   ALK PHOS U/L 94  --   --  102   ALT (SGPT) U/L 20  --   --  17   AST (SGOT) U/L 28  --   --  18   GLUCOSE mg/dL 39*  --  87 111*     Estimated Creatinine Clearance: 7.2 mL/min (A) (by C-G formula based on SCr of 10.52 mg/dL (H)).  Results from last 7 days   Lab Units 01/16/24  0646 01/15/24  1223   MAGNESIUM mg/dL 1.9 2.9*   PHOSPHORUS mg/dL 11.0*  --          Results from last 7 days   Lab Units 01/16/24  0646 01/15/24  1125   WBC 10*3/mm3 15.60* 23.10*   HEMOGLOBIN g/dL 9.1* 10.4*   PLATELETS 10*3/mm3 260 274           Assessment / Plan     ASSESSMENT:  New end-stage renal disease.  Patient has advanced chronic kidney disease and presented today with generalized weakness, uremic symptoms, worsened renal function along with hyperkalemia and metabolic acidosis.  status post hemodialysis yesterday  Obstructive uropathy.  CT abdomen showed bilateral ureteral stones and hydronephrosis.  Status post bilateral ureteral stents placement  Hyperkalemia.  Secondary to renal failure  Metabolic acidosis.  Increased anion gap, secondary to renal failure.  Improving with dialysis  Anemia in chronic kidney disease.   Bradycardia.  Most likely due to hyperkalemia.  Improved  hyperphosphatemia    PLAN:  Repeat hemodialysis today  Discontinued bicarb drip  On D10W drip for hypoglycemia  Increase phosphorus binders    Atul Fowler MD  01/16/24  12:18 [facility]    [facility]  [phone]

## 2024-01-16 NOTE — OP NOTE
Urology Operative Note    1/16/2024    Gabriel De Souza  78 y.o.  1945  male  [id number]      Surgeon(s) and Role:  Tyrel Avitia MD - Primary     Pre-operative Diagnosis: Bilateral obstructing stones, acute renal failure    Post-operative Diagnosis: Same    Complications: None    Procedures:  Cystoscopy  Retrograde pyelogram  Bilateral ureteral stent placement  Fluoroscopy with interpretation     Indications   Gabriel De Souza is a 78 y.o. male who was found to have acute renal failure hyperkalemia and leukocytosis added on urgently after stat dialysis for bilateral stent placement    During the informed consent process, the procedure was discussed in detail including the risks of bleeding, infection, ureteral injury, failure and need for secondary procedures    Findings     Fluoroscopy with interpretation: Right retrograde pyelogram showing severe hydronephrosis stent placed with curl in the middle the renal pelvis.  Left retrograde pyelogram showing moderate hydronephrosis with stent in the upper pole the kidney    Description of procedure:  The patient was properly identified in the preoperative holding area and taken to the operating room where general anesthesia was induced. The patient was placed in the cystolithotomy position and prepped and draped in a sterile fashion. The patient was given antibiotics intravenously before the start of the surgery. After ensuring that all of the required equipment was ready and available a surgical time out was performed.     A 21 Greenlandic rigid cystoscope was inserted into the bladder under direct visualization.   Pollack catheter was used to perform retrograde pyelogram as above  A Sensor wire was passed up the right ureter under fluoroscopic guidance.  A 7Fr x multi cm stent was placed under direct and fluoroscopic visualization with good curl in the renal pelvis as well as the bladder.  Sensor wire was then passed up the left ureter.  Pollick catheter over this and  retrograde pyelogram was performed as above.  7 Maltese multilength stent was then placed with curl in the upper pole as well as the bladder.  A Peterson was left in the bladder to maximize drainage until creatinine down      There were no apparent complications. The patient woke up in the operating room and was taken to the recovery room in stable condition.     I was present and scrubbed for the entire procedure.     Specimens: None    Estimated Blood Loss: minimal      Plan   -Return to floor  -Follow cultures  -Peterson may be removed once creatinine down, he is status post TURP with widely patent prostate  -We will arrange for outpatient stone management         Tyrel Avitia MD  [facility]  [facility], [address]  [place] [address] [phone]

## 2024-01-17 ENCOUNTER — APPOINTMENT (OUTPATIENT)
Dept: CARDIOLOGY | Facility: HOSPITAL | Age: 79
End: 2024-01-17
Payer: MEDICARE

## 2024-01-17 PROBLEM — N18.6 END STAGE RENAL DISEASE ON DIALYSIS: Status: ACTIVE | Noted: 2017-07-07

## 2024-01-17 PROBLEM — N18.6 END STAGE RENAL DISEASE ON DIALYSIS: Status: ACTIVE | Noted: 2024-01-17

## 2024-01-17 PROBLEM — Z99.2 END STAGE RENAL DISEASE ON DIALYSIS: Status: ACTIVE | Noted: 2017-07-07

## 2024-01-17 PROBLEM — T82.590A MECHANICAL COMPLICATION OF ARTERIOVENOUS FISTULA SURGICALLY CREATED: Status: ACTIVE | Noted: 2024-01-15

## 2024-01-17 PROBLEM — Z99.2 END STAGE RENAL DISEASE ON DIALYSIS: Status: ACTIVE | Noted: 2024-01-17

## 2024-01-17 LAB
ALBUMIN SERPL-MCNC: 2.9 G/DL (ref 3.5–5.2)
ALBUMIN/GLOB SERPL: 0.9 G/DL
ALP SERPL-CCNC: 122 U/L (ref 39–117)
ALT SERPL W P-5'-P-CCNC: 24 U/L (ref 1–41)
ANION GAP SERPL CALCULATED.3IONS-SCNC: 19 MMOL/L (ref 5–15)
AST SERPL-CCNC: 24 U/L (ref 1–40)
BACTERIA UR QL AUTO: ABNORMAL /HPF
BASOPHILS # BLD AUTO: 0 10*3/MM3 (ref 0–0.2)
BASOPHILS NFR BLD AUTO: 0 % (ref 0–1.5)
BH CV LOW VAS LEFT COMMON FEMORAL SPONT: 1
BH CV LOW VAS LEFT EXTERNAL ILIAC COMPRESS: NORMAL
BH CV LOW VAS LEFT EXTERNAL ILIAC SPONT: 1
BH CV LOW VAS LEFT EXTERNAL ILIAC THROMBUS: NORMAL
BH CV LOW VAS LEFT GASTRONEMIUS VESSEL: 1
BH CV LOW VAS LEFT LESSER SAPH VESSEL: 1
BH CV LOW VAS LEFT POPLITEAL SPONT: 1
BH CV LOW VAS LEFT PROFUNDA FEMORAL SPONT: 1
BH CV LOW VAS LEFT PROXIMAL FEMORAL SPONT: 1
BH CV LOWER VASCULAR LEFT COMMON FEMORAL AUGMENT: NORMAL
BH CV LOWER VASCULAR LEFT COMMON FEMORAL COMPETENT: NORMAL
BH CV LOWER VASCULAR LEFT COMMON FEMORAL COMPRESS: NORMAL
BH CV LOWER VASCULAR LEFT COMMON FEMORAL PHASIC: NORMAL
BH CV LOWER VASCULAR LEFT COMMON FEMORAL SPONT: NORMAL
BH CV LOWER VASCULAR LEFT COMMON FEMORAL THROMBUS: NORMAL
BH CV LOWER VASCULAR LEFT DISTAL FEMORAL COMPRESS: NORMAL
BH CV LOWER VASCULAR LEFT GASTRONEMIUS COMPRESS: NORMAL
BH CV LOWER VASCULAR LEFT GASTRONEMIUS THROMBUS: NORMAL
BH CV LOWER VASCULAR LEFT GREATER SAPH AK COMPRESS: NORMAL
BH CV LOWER VASCULAR LEFT GREATER SAPH BK COMPRESS: NORMAL
BH CV LOWER VASCULAR LEFT LESSER SAPH COMPRESS: NORMAL
BH CV LOWER VASCULAR LEFT LESSER SAPH THROMBUS: NORMAL
BH CV LOWER VASCULAR LEFT MID FEMORAL AUGMENT: NORMAL
BH CV LOWER VASCULAR LEFT MID FEMORAL COMPETENT: NORMAL
BH CV LOWER VASCULAR LEFT MID FEMORAL COMPRESS: NORMAL
BH CV LOWER VASCULAR LEFT MID FEMORAL PHASIC: NORMAL
BH CV LOWER VASCULAR LEFT MID FEMORAL SPONT: NORMAL
BH CV LOWER VASCULAR LEFT PERONEAL COMPRESS: NORMAL
BH CV LOWER VASCULAR LEFT POPLITEAL AUGMENT: NORMAL
BH CV LOWER VASCULAR LEFT POPLITEAL COMPETENT: NORMAL
BH CV LOWER VASCULAR LEFT POPLITEAL COMPRESS: NORMAL
BH CV LOWER VASCULAR LEFT POPLITEAL PHASIC: NORMAL
BH CV LOWER VASCULAR LEFT POPLITEAL SPONT: NORMAL
BH CV LOWER VASCULAR LEFT POPLITEAL THROMBUS: NORMAL
BH CV LOWER VASCULAR LEFT POSTERIOR TIBIAL COMPRESS: NORMAL
BH CV LOWER VASCULAR LEFT PROFUNDA FEMORAL COMPRESS: NORMAL
BH CV LOWER VASCULAR LEFT PROFUNDA FEMORAL THROMBUS: NORMAL
BH CV LOWER VASCULAR LEFT PROXIMAL FEMORAL AUGMENT: NORMAL
BH CV LOWER VASCULAR LEFT PROXIMAL FEMORAL COMPETENT: NORMAL
BH CV LOWER VASCULAR LEFT PROXIMAL FEMORAL COMPRESS: NORMAL
BH CV LOWER VASCULAR LEFT PROXIMAL FEMORAL PHASIC: NORMAL
BH CV LOWER VASCULAR LEFT PROXIMAL FEMORAL SPONT: NORMAL
BH CV LOWER VASCULAR LEFT PROXIMAL FEMORAL THROMBUS: NORMAL
BH CV LOWER VASCULAR LEFT SAPHENOFEMORAL JUNCTION COMPRESS: NORMAL
BH CV LOWER VASCULAR RIGHT COMMON FEMORAL AUGMENT: NORMAL
BH CV LOWER VASCULAR RIGHT COMMON FEMORAL COMPETENT: NORMAL
BH CV LOWER VASCULAR RIGHT COMMON FEMORAL COMPRESS: NORMAL
BH CV LOWER VASCULAR RIGHT COMMON FEMORAL PHASIC: NORMAL
BH CV LOWER VASCULAR RIGHT COMMON FEMORAL SPONT: NORMAL
BILIRUB SERPL-MCNC: 0.3 MG/DL (ref 0–1.2)
BILIRUB UR QL STRIP: ABNORMAL
BUN SERPL-MCNC: 89 MG/DL (ref 8–23)
BUN/CREAT SERPL: 13.2 (ref 7–25)
CALCIUM SPEC-SCNC: 7.9 MG/DL (ref 8.6–10.5)
CHLORIDE SERPL-SCNC: 99 MMOL/L (ref 98–107)
CLARITY UR: ABNORMAL
CO2 SERPL-SCNC: 21 MMOL/L (ref 22–29)
COLOR UR: ABNORMAL
CREAT SERPL-MCNC: 6.73 MG/DL (ref 0.76–1.27)
DEPRECATED RDW RBC AUTO: 45.5 FL (ref 37–54)
EGFRCR SERPLBLD CKD-EPI 2021: 7.9 ML/MIN/1.73
EOSINOPHIL # BLD AUTO: 0 10*3/MM3 (ref 0–0.4)
EOSINOPHIL NFR BLD AUTO: 0 % (ref 0.3–6.2)
ERYTHROCYTE [DISTWIDTH] IN BLOOD BY AUTOMATED COUNT: 15.2 % (ref 12.3–15.4)
GLOBULIN UR ELPH-MCNC: 3.1 GM/DL
GLUCOSE BLDC GLUCOMTR-MCNC: 108 MG/DL (ref 70–105)
GLUCOSE BLDC GLUCOMTR-MCNC: 132 MG/DL (ref 70–105)
GLUCOSE BLDC GLUCOMTR-MCNC: 135 MG/DL (ref 70–105)
GLUCOSE BLDC GLUCOMTR-MCNC: 153 MG/DL (ref 70–105)
GLUCOSE BLDC GLUCOMTR-MCNC: 257 MG/DL (ref 70–105)
GLUCOSE BLDC GLUCOMTR-MCNC: 90 MG/DL (ref 70–105)
GLUCOSE SERPL-MCNC: 140 MG/DL (ref 65–99)
GLUCOSE UR STRIP-MCNC: NEGATIVE MG/DL
HCT VFR BLD AUTO: 25.5 % (ref 37.5–51)
HGB BLD-MCNC: 8.6 G/DL (ref 13–17.7)
HGB UR QL STRIP.AUTO: ABNORMAL
HYALINE CASTS UR QL AUTO: ABNORMAL /LPF
KETONES UR QL STRIP: NEGATIVE
LEUKOCYTE ESTERASE UR QL STRIP.AUTO: ABNORMAL
LYMPHOCYTES # BLD AUTO: 1.3 10*3/MM3 (ref 0.7–3.1)
LYMPHOCYTES NFR BLD AUTO: 7.4 % (ref 19.6–45.3)
MAGNESIUM SERPL-MCNC: 1.8 MG/DL (ref 1.6–2.4)
MCH RBC QN AUTO: 29.3 PG (ref 26.6–33)
MCHC RBC AUTO-ENTMCNC: 34 G/DL (ref 31.5–35.7)
MCV RBC AUTO: 86.3 FL (ref 79–97)
MONOCYTES # BLD AUTO: 1.8 10*3/MM3 (ref 0.1–0.9)
MONOCYTES NFR BLD AUTO: 10.5 % (ref 5–12)
NEUTROPHILS NFR BLD AUTO: 14.1 10*3/MM3 (ref 1.7–7)
NEUTROPHILS NFR BLD AUTO: 82.1 % (ref 42.7–76)
NITRITE UR QL STRIP: NEGATIVE
NRBC BLD AUTO-RTO: 0 /100 WBC (ref 0–0.2)
PH UR STRIP.AUTO: 8.5 [PH] (ref 5–8)
PHOSPHATE SERPL-MCNC: 8 MG/DL (ref 2.5–4.5)
PLATELET # BLD AUTO: 198 10*3/MM3 (ref 140–450)
PMV BLD AUTO: 8.2 FL (ref 6–12)
POTASSIUM SERPL-SCNC: 4.4 MMOL/L (ref 3.5–5.2)
PROCALCITONIN SERPL-MCNC: 0.03 NG/ML (ref 0–0.25)
PROT SERPL-MCNC: 6 G/DL (ref 6–8.5)
PROT UR QL STRIP: ABNORMAL
RBC # BLD AUTO: 2.95 10*6/MM3 (ref 4.14–5.8)
RBC # UR STRIP: ABNORMAL /HPF
REF LAB TEST METHOD: ABNORMAL
SODIUM SERPL-SCNC: 139 MMOL/L (ref 136–145)
SP GR UR STRIP: 1.01 (ref 1–1.03)
SQUAMOUS #/AREA URNS HPF: ABNORMAL /HPF
UROBILINOGEN UR QL STRIP: ABNORMAL
WBC # UR STRIP: ABNORMAL /HPF
WBC NRBC COR # BLD AUTO: 17.2 10*3/MM3 (ref 3.4–10.8)

## 2024-01-17 PROCEDURE — 81001 URINALYSIS AUTO W/SCOPE: CPT | Performed by: INTERNAL MEDICINE

## 2024-01-17 PROCEDURE — 97162 PT EVAL MOD COMPLEX 30 MIN: CPT

## 2024-01-17 PROCEDURE — 25010000002 CEFTRIAXONE PER 250 MG: Performed by: UROLOGY

## 2024-01-17 PROCEDURE — 84100 ASSAY OF PHOSPHORUS: CPT | Performed by: HOSPITALIST

## 2024-01-17 PROCEDURE — 85025 COMPLETE CBC W/AUTO DIFF WBC: CPT | Performed by: HOSPITALIST

## 2024-01-17 PROCEDURE — 25010000002 ONDANSETRON PER 1 MG: Performed by: UROLOGY

## 2024-01-17 PROCEDURE — 87040 BLOOD CULTURE FOR BACTERIA: CPT | Performed by: INTERNAL MEDICINE

## 2024-01-17 PROCEDURE — 84145 PROCALCITONIN (PCT): CPT | Performed by: HOSPITALIST

## 2024-01-17 PROCEDURE — 99222 1ST HOSP IP/OBS MODERATE 55: CPT | Performed by: INTERNAL MEDICINE

## 2024-01-17 PROCEDURE — 87086 URINE CULTURE/COLONY COUNT: CPT | Performed by: INTERNAL MEDICINE

## 2024-01-17 PROCEDURE — 93971 EXTREMITY STUDY: CPT

## 2024-01-17 PROCEDURE — 97165 OT EVAL LOW COMPLEX 30 MIN: CPT

## 2024-01-17 PROCEDURE — 82948 REAGENT STRIP/BLOOD GLUCOSE: CPT

## 2024-01-17 PROCEDURE — 25010000002 HEPARIN (PORCINE) PER 1000 UNITS: Performed by: HOSPITALIST

## 2024-01-17 PROCEDURE — 80053 COMPREHEN METABOLIC PANEL: CPT | Performed by: HOSPITALIST

## 2024-01-17 PROCEDURE — 83735 ASSAY OF MAGNESIUM: CPT | Performed by: HOSPITALIST

## 2024-01-17 RX ORDER — DEXTROSE MONOHYDRATE 25 G/50ML
25 INJECTION, SOLUTION INTRAVENOUS
Status: DISCONTINUED | OUTPATIENT
Start: 2024-01-17 | End: 2024-01-17 | Stop reason: SDUPTHER

## 2024-01-17 RX ORDER — NICOTINE POLACRILEX 4 MG
15 LOZENGE BUCCAL
Status: DISCONTINUED | OUTPATIENT
Start: 2024-01-17 | End: 2024-01-17 | Stop reason: SDUPTHER

## 2024-01-17 RX ORDER — INSULIN LISPRO 100 [IU]/ML
2-7 INJECTION, SOLUTION INTRAVENOUS; SUBCUTANEOUS
Status: DISCONTINUED | OUTPATIENT
Start: 2024-01-17 | End: 2024-01-25 | Stop reason: HOSPADM

## 2024-01-17 RX ORDER — IBUPROFEN 600 MG/1
1 TABLET ORAL
Status: DISCONTINUED | OUTPATIENT
Start: 2024-01-17 | End: 2024-01-17 | Stop reason: SDUPTHER

## 2024-01-17 RX ADMIN — ONDANSETRON 4 MG: 2 INJECTION INTRAMUSCULAR; INTRAVENOUS at 14:56

## 2024-01-17 RX ADMIN — HEPARIN SODIUM 5000 UNITS: 5000 INJECTION INTRAVENOUS; SUBCUTANEOUS at 06:35

## 2024-01-17 RX ADMIN — SODIUM CHLORIDE 2000 MG: 900 INJECTION INTRAVENOUS at 13:48

## 2024-01-17 RX ADMIN — HEPARIN SODIUM 5000 UNITS: 5000 INJECTION INTRAVENOUS; SUBCUTANEOUS at 13:49

## 2024-01-17 RX ADMIN — ONDANSETRON 4 MG: 2 INJECTION INTRAMUSCULAR; INTRAVENOUS at 01:17

## 2024-01-17 RX ADMIN — HEPARIN SODIUM 5000 UNITS: 5000 INJECTION INTRAVENOUS; SUBCUTANEOUS at 21:18

## 2024-01-17 RX ADMIN — ONDANSETRON 4 MG: 2 INJECTION INTRAMUSCULAR; INTRAVENOUS at 08:44

## 2024-01-17 RX ADMIN — SEVELAMER CARBONATE 1600 MG: 800 TABLET, FILM COATED ORAL at 11:27

## 2024-01-17 RX ADMIN — DEXTROSE MONOHYDRATE 50 ML/HR: 100 INJECTION, SOLUTION INTRAVENOUS at 06:54

## 2024-01-17 RX ADMIN — SEVELAMER CARBONATE 1600 MG: 800 TABLET, FILM COATED ORAL at 17:51

## 2024-01-17 RX ADMIN — Medication 10 ML: at 10:17

## 2024-01-17 NOTE — PLAN OF CARE
Goal Outcome Evaluation:  Plan of Care Reviewed With: patient        Progress: no change  Outcome Evaluation: Gabriel De Souza is a 78 y.o. male admitted with Hyperkalemia, bradycardia,Hypotension as well as bilateral kidney stones. He underwent cystoscopy with bilateral uretral stents placed by Dr. Avitia on 1/16/24 PMH: DM, HLD, HTN, CKD.  Pt. lives at home w/ spouse, maintains ADL independence at baseline. Pt. oriented to self and place this date. Provided min A for LB dressing seated at EOB, min A for SPS transfer EOB to armchair. Pt. w/ limited standing tolerance secondary to c/o dizziness, BP WNL. Pt. not safe to d/c home at this time and will require SNF placement to address aforementioned deficits.      Anticipated Discharge Disposition (OT): skilled nursing facility

## 2024-01-17 NOTE — PLAN OF CARE
Goal Outcome Evaluation:      Pt is on RA. Pt remains alert to self and place. Pt nauseous several times today, PRNs given. Peterson remains in with bloody UOP. UOP- 975 mL. Pt had 1 BM throughout shift.

## 2024-01-17 NOTE — PROGRESS NOTES
[facility] Progress Note      Patient Name: Gabriel De Souza  : 1945  MRN: [id number]  Primary Care Physician:  Waylon Johnson MD  Date of admission: 1/15/2024    Subjective     Interval History:     Patient somewhat confused this morning.  Patient awake and able to answer some questions    Review of Systems:   As noted above    Objective     Vitals:   Temp:  [93 °F (33.9 °C)-97.9 °F (36.6 °C)] 95.1 °F (35.1 °C)  Heart Rate:  [58-80] 58  Resp:  [14-17] 14  BP: ()/(43-77) 121/64  Flow (L/min):  [2] 2    Intake/Output Summary (Last 24 hours) at 2024 1002  Last data filed at 2024 0827  Gross per 24 hour   Intake 3165 ml   Output 1700 ml   Net 1465 ml       Physical Exam:    General Appearance: chronically ill-appearing, somewhat confused  HEENT: oral mucosa normal, nonicteric sclera  Neck: supple, no JVD  Lungs: CTA  Heart: RRR, normal S1 and S2  Abdomen: soft, nondistended  Extremities: no edema  Neuro: Awake alert and moving all extremities    Scheduled Meds:     atorvastatin, 10 mg, Oral, Daily  calcitriol, 0.5 mcg, Oral, Daily  cefTRIAXone, 2,000 mg, Intravenous, Q24H  heparin (porcine), 5,000 Units, Subcutaneous, Q8H  senna-docusate sodium, 2 tablet, Oral, BID  sevelamer, 1,600 mg, Oral, TID With Meals  sodium chloride, 10 mL, Intravenous, Q12H      IV Meds:   dextrose, 50 mL/hr, Last Rate: 50 mL/hr (24 0654)        Results Reviewed:   I have personally reviewed the results from the time of this admission to 2024 10:02 EST     Results from last 7 days   Lab Units 24  0847 24  0646 01/15/24  2304 01/15/24  1719 01/15/24  1223   SODIUM mmol/L 139  --  140  --  140 140   POTASSIUM mmol/L 4.4  --  4.7 4.1 6.7* 7.3*   CHLORIDE mmol/L 99  --  101  --  102 100   CO2 mmol/L 21.0*  --  17.0*  --  8.0* 9.0*   BUN mg/dL 89* 73* 150*  --  250* 256*   CREATININE mg/dL 6.73*  --  10.52*  --  15.98* 16.11*   CALCIUM mg/dL 7.9*  --   8.3*  --  9.1 9.4   BILIRUBIN mg/dL 0.3  --  0.2  --   --  0.2   ALK PHOS U/L 122*  --  94  --   --  102   ALT (SGPT) U/L 24  --  20  --   --  17   AST (SGOT) U/L 24  --  28  --   --  18   GLUCOSE mg/dL 140*  --  39*  --  87 111*     Estimated Creatinine Clearance: 11.3 mL/min (A) (by C-G formula based on SCr of 6.73 mg/dL (H)).  Results from last 7 days   Lab Units 01/17/24  0847 01/16/24  0646 01/15/24  1223   MAGNESIUM mg/dL 1.8 1.9 2.9*   PHOSPHORUS mg/dL 8.0* 11.0*  --          Results from last 7 days   Lab Units 01/17/24  0848 01/16/24  0646 01/15/24  1125   WBC 10*3/mm3 17.20* 15.60* 23.10*   HEMOGLOBIN g/dL 8.6* 9.1* 10.4*   PLATELETS 10*3/mm3 198 260 274           Assessment / Plan     ASSESSMENT:  New end-stage renal disease.  Patient has advanced chronic kidney disease and presented today with generalized weakness, uremic symptoms, worsened renal function along with hyperkalemia and metabolic acidosis.  status post hemodialysis 1/14 and 1/15  Obstructive uropathy.  CT abdomen showed bilateral ureteral stones and hydronephrosis.  Status post bilateral ureteral stents placement  Hyperkalemia.  Secondary to renal failure.  Improved  Metabolic acidosis.  Increased anion gap, secondary to renal failure.  Improving with dialysis  Anemia in chronic kidney disease.   Bradycardia.  Most likely due to hyperkalemia.  Improved  hyperphosphatemia.  Improving.  On Renvela    PLAN:  Hemodialysis tomorrow  Discontinue D10W drip  Repeat labs in a.m.  Dialysis nurse reported prolonged bleeding from AV fistula after dialysis.  I will consult vascular for evaluation for possible stenosis    Atul Fowler MD  01/17/24  10:02 [facility]    [facility]  [phone]

## 2024-01-17 NOTE — PROGRESS NOTES
[facility] MEDICINE SERVICE  DAILY PROGRESS NOTE    NAME: Gabriel De Souza  : 1945  MRN: [id number]      LOS: 2 days     PROVIDER OF SERVICE: Licha Clinton MD    Chief Complaint: Hyperkalemia    Subjective:     Interval History:  History taken from: patient  Patient seen and examined, he is doing much better.  Bradycardia and hypotension have resolved.  He did have low blood sugars in the morning with hypoglycemia protocol was activated.  He is currently resting comfortably.  Has  sandy quiñones on     patient seen and examined, he is lying in bed comfortably.  Eating drinking okay.  Having bowel movements as well.  Repeat blood cultures were sent by nephrology today    Review of Systems:   Review of Systems  10 point ROS is negative other than what is stated positive above  Objective:     Vital Signs  Temp:  [93 °F (33.9 °C)-98 °F (36.7 °C)] 98 °F (36.7 °C)  Heart Rate:  [58-80] 66  Resp:  [14-17] 14  BP: ()/(43-77) 116/62  Flow (L/min):  [2] 2   Body mass index is 28.13 kg/m².    Physical Exam  Physical Exam  Exam, awake and alert, hard of hearing  HEENT normocephalic atraumatic  Chest clear to auscultation bilaterally  CVs S1-S2 normal, regular rhythm, bradycardia  Abdomen soft nontender nondistended bowel sounds positive  Extremities warm to touch 2+ pulses no edema  Neuro AOx3, grossly normal     Scheduled Meds   atorvastatin, 10 mg, Oral, Daily  calcitriol, 0.5 mcg, Oral, Daily  cefTRIAXone, 2,000 mg, Intravenous, Q24H  heparin (porcine), 5,000 Units, Subcutaneous, Q8H  senna-docusate sodium, 2 tablet, Oral, BID  sevelamer, 1,600 mg, Oral, TID With Meals  sodium chloride, 10 mL, Intravenous, Q12H       PRN Meds     acetaminophen    senna-docusate sodium **AND** polyethylene glycol **AND** bisacodyl **AND** bisacodyl    Calcium Replacement - Follow Nurse / BPA Driven Protocol    dextrose    dextrose    dextrose    dextrose    glucagon (human recombinant)    glucagon (human recombinant)     Magnesium Standard Dose Replacement - Follow Nurse / BPA Driven Protocol    nitroglycerin    ondansetron    ondansetron ODT    Phosphorus Replacement - Follow Nurse / BPA Driven Protocol    Potassium Replacement - Follow Nurse / BPA Driven Protocol    [COMPLETED] Insert Peripheral IV **AND** sodium chloride    sodium chloride    sodium chloride   Infusions           Diagnostic Data    Results from last 7 days   Lab Units 01/17/24  0848 01/17/24  0847   WBC 10*3/mm3 17.20*  --    HEMOGLOBIN g/dL 8.6*  --    HEMATOCRIT % 25.5*  --    PLATELETS 10*3/mm3 198  --    GLUCOSE mg/dL  --  140*   CREATININE mg/dL  --  6.73*   BUN mg/dL  --  89*   SODIUM mmol/L  --  139   POTASSIUM mmol/L  --  4.4   AST (SGOT) U/L  --  24   ALT (SGPT) U/L  --  24   ALK PHOS U/L  --  122*   BILIRUBIN mg/dL  --  0.3   ANION GAP mmol/L  --  19.0*       CT Abdomen Pelvis Without Contrast    Result Date: 1/15/2024  Impression: Moderate bilateral hydronephrosis secondary to obstructing right mid ureteral calculus measuring 9 mm and left mid ureteral calculus measuring 8 mm. 3.1 cm complex cyst in the lower pole of the right kidney containing either mural nodularity or small volume hemorrhage. If possible, correlation with contrast-enhanced renal protocol CT or MRI is suggested. If there is not possible due to renal function, close follow-up findings of noncontrast CT or renal ultrasound is suggested. Consider urologic evaluation. Cholelithiasis. Additional findings per body of the report. Electronically Signed: James Monsalve MD  1/15/2024 3:12 PM EST  Workstation ID: [id number]    XR Chest 1 View    Result Date: 1/15/2024  Impression: Low lung volumes without definite acute cardiopulmonary abnormality. Electronically Signed: Madelyn Vaughn  1/15/2024 12:13 PM EST  Workstation ID: [id number]       I reviewed the patient's new clinical results.    Assessment/Plan:     Active and Resolved Problems  Active Hospital Problems    Diagnosis  POA     **Hyperkalemia [E87.5]  Yes      Resolved Hospital Problems   No resolved problems to display.     CKD stage IV, now end-stage renal disease, hemodialysis has been started.  Patient has a fistula already   However it appears that child did receive bleeding from fistula, vascular surgery consult per nephrology.  Patient will likely get dialyzed tomorrow again and has been dialyzed 2 days in a row now.  Today he is having a dialysis break  Still has anion gap metabolic acidosis.  But improving gradually.    Hyperkalemia  Resolved        Bilateral renal stones with hydronephrosis likely contributing to deterioration of the CKD  Urology was consulted, patient s/p placement of bilateral ureteral stents.  Continue Rocephin 2 g IV daily for now.  Monitor blood and urine cultures.--Blood cultures negative for 24 hours, urine cultures growing Serratia, sensitive to Rocephin.  Continue  Patient currently has a Peterson in.  Leukocytosis are worsening, check procalcitonin.  If continues to worsen, will consult ID but for now continue current antibiotics         Bradycardia and hypotension  Due to hyperkalemia  Resolved  Vitals now stable    Hypoglycemia  Blood glucose better now, encourage p.o. intake    DVT prophylaxis:  Medical and mechanical DVT prophylaxis orders are present.     Code status is   Code Status and Medical Interventions:   Ordered at: 01/15/24 1955     Level Of Support Discussed With:    Patient    Health Care Surrogate    Next of Kin (If No Surrogate)     Code Status (Patient has no pulse and is not breathing):    CPR (Attempt to Resuscitate)     Medical Interventions (Patient has pulse or is breathing):    Full Support       Plan for disposition: 1-2 days     Time: 30 minutes    Signature: Electronically signed by Licha Clinton MD, 01/17/24, 11:26 EST.  [facility]ist Team

## 2024-01-17 NOTE — CONSULTS
Name: Gabriel De Souza ADMIT: 1/15/2024   : 1945  PCP: Waylon Johnson MD    MRN: [id number] LOS: 2 days   AGE/SEX: 78 y.o. male  ROOM: 2305/1   [facility]    Patient Care Team:  Waylon Johnson MD as PCP - General  Chemchirian, MD Lali as Consulting Physician (Cardiology)  Atul Fowler MD as Consulting Physician (Nephrology)  Chief Complaint   Patient presents with    Hypotension     CC: AV fistula dysfunction    History of Present Illness  78-year-old gentleman with history of chronic kidney disease, now at end-stage, admitted to the hospital for initiation of hemodialysis.  Is known to my partner Dr. Andrew.  Underwent creation left brachiocephalic AV fistula .  Last evaluated in our office , at which time fistula was widely patent with volume flows 8264-0767 mL/min, with adequate depth and diameters.  Patient given the go ahead for fistula cannulation.  Patient underwent dialysis successfully, but there was report of prolonged bleeding, suggesting possible outflow stenosis.  Patient does not take any anticoagulants.  Takes only aspirin.    Review of Systems   Constitutional:  Positive for activity change, appetite change and fatigue.   Gastrointestinal:  Positive for nausea and vomiting.   Genitourinary:  Positive for dysuria.   Neurological:  Positive for dizziness.     Past Medical History:   Diagnosis Date    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    Coronary artery disease     Deep vein thrombosis 1990    Diabetes mellitus     Dyslipidemia     Erectile dysfunction 51 years old    Heart murmur 10/4/2023    HL (hearing loss) 7 year old    right    Hyperlipidemia     Hypertension     Neuropathy     Renal insufficiency     Stage 4 chronic kidney disease     3-4     Past Surgical History:   Procedure Laterality Date    ARTERIOVENOUS FISTULA/SHUNT SURGERY Left 2022    Procedure: BRACHIAL CEPHALIC FISTULA FORMATION;  Surgeon: Edy Vega MD;  Location:   [facility];  Service: Vascular;  Laterality: Left;    BACK SURGERY      BASAL CELL CARCINOMA EXCISION  2019    CARDIAC CATHETERIZATION      TURP / TRANSURETHRAL INCISION / DRAINAGE PROSTATE       Family History   Problem Relation Age of Onset    Heart disease Mother     Heart disease Brother     Heart attack Maternal Grandfather     Hypertension Father     Hearing loss Father      Social History     Tobacco Use    Smoking status: Former     Types: Cigarettes     Quit date:      Years since quittin.0     Passive exposure: Past    Smokeless tobacco: Never   Vaping Use    Vaping Use: Never used   Substance Use Topics    Alcohol use: No    Drug use: No     Medications Prior to Admission   Medication Sig Dispense Refill Last Dose    amLODIPine (NORVASC) 5 MG tablet Take 1 tablet by mouth Daily.   2024    aspirin 81 MG tablet Take 1 tablet by mouth Daily.   2024    atenolol (TENORMIN) 50 MG tablet Take 1 tablet by mouth Daily.   2024    calcitriol (ROCALTROL) 0.25 MCG capsule Take 2 capsules by mouth Daily.   2024    ciprofloxacin (CIPRO) 250 MG tablet Take 1 tablet by mouth 2 (Two) Times a Day.   2024    Insulin Glargine-Lixisenatide (SOLIQUA) 100-33 UNT-MCG/ML solution pen-injector injection Inject 20 Units under the skin into the appropriate area as directed Daily.       sevelamer (RENVELA) 800 MG tablet Take 1 tablet by mouth 3 (Three) Times a Day As Needed.   2024    simvastatin (ZOCOR) 20 MG tablet Take 1 tablet by mouth Every Night.       torsemide (DEMADEX) 20 MG tablet Take 1 tablet by mouth Daily.        atorvastatin, 10 mg, Oral, Daily  calcitriol, 0.5 mcg, Oral, Daily  cefTRIAXone, 2,000 mg, Intravenous, Q24H  heparin (porcine), 5,000 Units, Subcutaneous, Q8H  senna-docusate sodium, 2 tablet, Oral, BID  sevelamer, 1,600 mg, Oral, TID With Meals  sodium chloride, 10 mL, Intravenous, Q12H    Tylenol with codeine #3 [acetaminophen-codeine]    Vital Signs and Labs:  Vital  Signs Patient Vitals for the past 24 hrs:   BP Temp Temp src Pulse Resp SpO2   01/17/24 1400 118/68 -- -- 61 -- 95 %   01/17/24 1300 129/67 -- -- 62 -- 96 %   01/17/24 1200 112/57 -- -- 65 16 95 %   01/17/24 1150 -- 97.8 °F (36.6 °C) Oral -- -- --   01/17/24 1100 119/64 -- -- 63 -- 95 %   01/17/24 1021 -- 98 °F (36.7 °C) Axillary -- -- --   01/17/24 1000 116/62 -- -- 66 -- 94 %   01/17/24 0900 127/70 -- -- 65 -- 96 %   01/17/24 0800 121/64 95.1 °F (35.1 °C) Oral 58 16 99 %   01/17/24 0700 116/68 -- -- 65 -- 96 %   01/17/24 0630 121/69 -- -- 60 -- 98 %   01/17/24 0600 118/68 -- -- 67 -- 99 %   01/17/24 0530 126/69 -- -- 62 -- 99 %   01/17/24 0500 124/74 -- -- 67 -- 97 %   01/17/24 0430 126/77 -- -- 63 -- 98 %   01/17/24 0400 120/67 93.7 °F (34.3 °C) Oral 69 14 98 %   01/17/24 0330 122/65 -- -- 68 -- 98 %   01/17/24 0300 119/67 -- -- 68 -- 100 %   01/17/24 0230 117/63 -- -- 72 -- 100 %   01/17/24 0200 122/65 -- -- 71 -- 98 %   01/17/24 0130 111/60 -- -- 63 -- 96 %   01/17/24 0100 123/61 -- -- 66 -- 96 %   01/17/24 0030 129/75 -- -- 71 -- 97 %   01/17/24 0000 127/73 94.7 °F (34.8 °C) Oral 63 17 99 %   01/16/24 2330 130/75 -- -- 66 -- 97 %   01/16/24 2300 108/52 -- -- 73 -- 98 %   01/16/24 2235 111/75 -- -- 63 -- 97 %   01/16/24 2230 -- -- -- 66 -- 97 %   01/16/24 2205 91/54 -- -- 66 -- 97 %   01/16/24 2200 -- -- -- 69 -- 99 %   01/16/24 2130 99/43 -- -- 64 -- 97 %   01/16/24 2100 112/54 -- -- 70 -- 97 %   01/16/24 2030 121/67 93 °F (33.9 °C) Oral 66 16 96 %   01/16/24 2000 125/64 -- -- 69 15 97 %   01/16/24 1930 122/64 -- -- 72 15 97 %   01/16/24 1900 120/60 -- -- 68 16 97 %   01/16/24 1830 -- -- -- -- 16 --   01/16/24 1800 116/57 -- -- 74 17 96 %   01/16/24 1730 -- -- -- -- 16 --   01/16/24 1700 115/60 -- -- 77 16 96 %   01/16/24 1630 -- -- -- -- 16 --   01/16/24 1600 122/62 97.8 °F (36.6 °C) Oral 70 16 96 %   01/16/24 1500 122/67 -- -- 80 -- 98 %     BMI:  Body mass index is 28.13 kg/m².    Physical Exam  Vitals  reviewed.   Constitutional:       General: He is not in acute distress.     Appearance: He is ill-appearing. He is not toxic-appearing or diaphoretic.   HENT:      Head: Normocephalic and atraumatic.      Right Ear: External ear normal.      Left Ear: External ear normal.      Nose: Nose normal.      Mouth/Throat:      Mouth: Mucous membranes are dry.   Eyes:      General: No scleral icterus.     Extraocular Movements: Extraocular movements intact.      Conjunctiva/sclera: Conjunctivae normal.      Pupils: Pupils are equal, round, and reactive to light.   Cardiovascular:      Comments: Regular cardiac rhythm.  Patent left brachiocephalic AV fistula, with suitable diameter.  Seems to have pulsatile nature to thrill.  Radial artery pulse palpated.  No upper extremity edema.  Pulmonary:      Effort: Pulmonary effort is normal. No respiratory distress.      Breath sounds: No stridor.   Skin:     General: Skin is warm and dry.      Coloration: Skin is not jaundiced or pale.   Neurological:      General: No focal deficit present.      Mental Status: He is alert and oriented to person, place, and time.      Cranial Nerves: No cranial nerve deficit.   Psychiatric:         Mood and Affect: Mood normal.         Behavior: Behavior normal.         Thought Content: Thought content normal.         Judgment: Judgment normal.     CBC    Results from last 7 days   Lab Units 01/17/24  0848 01/16/24  0646 01/15/24  1125   WBC 10*3/mm3 17.20* 15.60* 23.10*   HEMOGLOBIN g/dL 8.6* 9.1* 10.4*   PLATELETS 10*3/mm3 198 260 274     BMP   Results from last 7 days   Lab Units 01/17/24  0847 01/16/24  2022 01/16/24  0646 01/15/24  2304 01/15/24  1719 01/15/24  1223   SODIUM mmol/L 139  --  140  --  140 140   POTASSIUM mmol/L 4.4  --  4.7 4.1 6.7* 7.3*   CHLORIDE mmol/L 99  --  101  --  102 100   CO2 mmol/L 21.0*  --  17.0*  --  8.0* 9.0*   BUN mg/dL 89* 73* 150*  --  250* 256*   CREATININE mg/dL 6.73*  --  10.52*  --  15.98* 16.11*   GLUCOSE  mg/dL 140*  --  39*  --  87 111*   MAGNESIUM mg/dL 1.8  --  1.9  --   --  2.9*   PHOSPHORUS mg/dL 8.0*  --  11.0*  --   --   --      Cr Clearance Estimated Creatinine Clearance: 11.3 mL/min (A) (by C-G formula based on SCr of 6.73 mg/dL (H)).  Coag       Active Hospital Problems    Diagnosis  POA    **Hyperkalemia [E87.5]  Yes    Mechanical complication of arteriovenous fistula surgically created [T82.590A]  Yes    End stage renal disease on dialysis [N18.6, Z99.2]  Not Applicable      Resolved Hospital Problems   No resolved problems to display.     Problem Points:  4:  Patient has a new problem, with additional work-up planned  Total problem points:4 or more    Data Points:  1:  I have reviewed or order clinical lab test  1:  I have personally decided to obtain of records  Total data points:2    Risk Points:  Moderate:  Physiologic testing with stress, incisional biopsy, or cardiovascular imaging with contrast without risk factors    MDM requires 2/3 (Problem points, Data points and Risk)  MDM Prob point Data point Risk   SF 1 1 Minimal   Low 2 2 Low   Mod 3 3 Moderate   High 4 4 High     Code requires 3/3 (MDM, History and Exam)  Code MDM History Exam Time   24769 SF/Low Detailed Detailed 30   81816 Mod Comprehensive Comprehensive 50   31187 High Comprehensive Comprehensive 70     Detailed history:  4 elements HPI or status of 3 chronic problems; 2-9 system ROS  Comprehensive:  4 elements HPI or status of 3 chronic problems;  10 system ROS    Detailed Exam:  12 findings from any organ system  Comprehensive Exam:  2 findings from each of 9 systems.   57138    Assessment & Plan       Hyperkalemia    Mechanical complication of arteriovenous fistula surgically created    End stage renal disease on dialysis    78 y.o. male with history of progressive chronic kidney disease, now at end-stage, admitted to hospital for initiation of hemodialysis.  Underwent hemodialysis yesterday, complicated by prolonged bleeding.   Asked to see regarding possible access stenosis.  Patient not treated with anticoagulants.  Recent scan June, 2023 with adequate volume flows, no stenosis and adequate diameters and depth.  Given the history and physical findings, I speculate that he has outflow stenosis and is a candidate for AV fistulogram, possible angioplasty.  Discussed with patient and wife at bedside.  Reviewed plan.  They agree to proceed.  Will try to schedule for tomorrow.    I discussed the patients findings and my recommendations with patient, family, and nursing staff.    Moi Liz MD  01/17/24  14:58 EST    Please call my office with any question: [phone]

## 2024-01-17 NOTE — PROGRESS NOTES
"  [facility] DAILY PROGRESS NOTE    Patient Identification  Name: Gabriel De Souza  Age: 78 y.o.  Sex: male  :  1945  MRN: [id number]    Date: 2024             Subjective:  Interval History: Recovering in ICU    Objective:    Scheduled Meds:atorvastatin, 10 mg, Oral, Daily  calcitriol, 0.5 mcg, Oral, Daily  cefTRIAXone, 2,000 mg, Intravenous, Q24H  heparin (porcine), 5,000 Units, Subcutaneous, Q8H  senna-docusate sodium, 2 tablet, Oral, BID  sevelamer, 1,600 mg, Oral, TID With Meals  sodium chloride, 10 mL, Intravenous, Q12H      Continuous Infusions:   PRN Meds:  acetaminophen    senna-docusate sodium **AND** polyethylene glycol **AND** bisacodyl **AND** bisacodyl    Calcium Replacement - Follow Nurse / BPA Driven Protocol    dextrose    dextrose    dextrose    dextrose    glucagon (human recombinant)    glucagon (human recombinant)    Magnesium Standard Dose Replacement - Follow Nurse / BPA Driven Protocol    nitroglycerin    ondansetron    ondansetron ODT    Phosphorus Replacement - Follow Nurse / BPA Driven Protocol    Potassium Replacement - Follow Nurse / BPA Driven Protocol    [COMPLETED] Insert Peripheral IV **AND** sodium chloride    sodium chloride    sodium chloride    Vital signs in last 24 hours:  Temp:  [93 °F (33.9 °C)-98.2 °F (36.8 °C)] 98.2 °F (36.8 °C)  Heart Rate:  [58-77] 69  Resp:  [14-17] 16  BP: ()/(43-77) 118/69    Intake/Output:    Intake/Output Summary (Last 24 hours) at 2024 1637  Last data filed at 2024 1600  Gross per 24 hour   Intake 3285 ml   Output 2050 ml   Net 1235 ml       Exam:  /69   Pulse 69   Temp 98.2 °F (36.8 °C) (Oral)   Resp 16   Ht 185.4 cm (73\")   Wt 96.7 kg (213 lb 3 oz)   SpO2 97%   BMI 28.13 kg/m²     General Appearance:    Alert, cooperative, NAD   Lungs:     Respirations unlabored, no audible wheezing    Heart:    No cyanosis   Abdomen:     Soft, ND    :    No suprapubic distention, urine pink            Data Review:  All " labs (24hrs):   Recent Results (from the past 24 hour(s))   POC Glucose Once    Collection Time: 01/16/24  6:29 PM    Specimen: Blood   Result Value Ref Range    Glucose 135 (H) 70 - 105 mg/dL   BUN    Collection Time: 01/16/24  8:22 PM    Specimen: Arm, Left; Blood   Result Value Ref Range    BUN 73 (H) 8 - 23 mg/dL   POC Glucose Once    Collection Time: 01/17/24 12:37 AM    Specimen: Blood   Result Value Ref Range    Glucose 153 (H) 70 - 105 mg/dL   POC Glucose Once    Collection Time: 01/17/24  6:32 AM    Specimen: Blood   Result Value Ref Range    Glucose 135 (H) 70 - 105 mg/dL   Duplex Venous Lower Extremity - Left CAR    Collection Time: 01/17/24  7:16 AM   Result Value Ref Range    Left External Iliac Spont 1.0     Left Common Femoral Spont 1.0     Left Profunda Femoral Spont 1.0     Left Proximal Femoral Spont 1.0     Left Popliteal Spont 1.0     Left Gastronemius Vessel 1.0     Left Lesser Saph Vessel 1.0     Right Common Femoral Spont Y     Right Common Femoral Competent Y     Right Common Femoral Phasic Y     Right Common Femoral Compress C     Right Common Femoral Augment Y     Left External Iliac Compress P     Left Common Femoral Spont D     Left Common Femoral Competent D     Left Common Femoral Phasic D     Left Common Femoral Compress P     Left Common Femoral Augment D     Left Saphenofemoral Junction Compress C     Left Profunda Femoral Compress N     Left Proximal Femoral Spont D     Left Proximal Femoral Competent D     Left Proximal Femoral Phasic D     Left Proximal Femoral Compress P     Left Proximal Femoral Augment D     Left Mid Femoral Spont Y     Left Mid Femoral Competent Y     Left Mid Femoral Phasic Y     Left Mid Femoral Compress C     Left Mid Femoral Augment Y     Left Distal Femoral Compress C     Left Popliteal Spont D     Left Popliteal Competent D     Left Popliteal Phasic D     Left Popliteal Compress P     Left Popliteal Augment D     Left Popliteal Thrombus C     Left  Posterior Tibial Compress C     Left Peroneal Compress C     Left Gastronemius Compress P     Left Greater Saph AK Compress C     Left Greater Saph BK Compress C     Left Lesser Saph Compress P     Left Lesser Saph Thrombus C     Left External Iliac Thrombus S     Left Common Femoral Thrombus C     Left Profunda Femoral Thrombus S     Left Proximal Femoral Thrombus C     Left Gastronemius Thrombus C    Comprehensive Metabolic Panel    Collection Time: 01/17/24  8:47 AM    Specimen: Blood   Result Value Ref Range    Glucose 140 (H) 65 - 99 mg/dL    BUN 89 (H) 8 - 23 mg/dL    Creatinine 6.73 (H) 0.76 - 1.27 mg/dL    Sodium 139 136 - 145 mmol/L    Potassium 4.4 3.5 - 5.2 mmol/L    Chloride 99 98 - 107 mmol/L    CO2 21.0 (L) 22.0 - 29.0 mmol/L    Calcium 7.9 (L) 8.6 - 10.5 mg/dL    Total Protein 6.0 6.0 - 8.5 g/dL    Albumin 2.9 (L) 3.5 - 5.2 g/dL    ALT (SGPT) 24 1 - 41 U/L    AST (SGOT) 24 1 - 40 U/L    Alkaline Phosphatase 122 (H) 39 - 117 U/L    Total Bilirubin 0.3 0.0 - 1.2 mg/dL    Globulin 3.1 gm/dL    A/G Ratio 0.9 g/dL    BUN/Creatinine Ratio 13.2 7.0 - 25.0    Anion Gap 19.0 (H) 5.0 - 15.0 mmol/L    eGFR 7.9 (L) >60.0 mL/min/1.73   Magnesium    Collection Time: 01/17/24  8:47 AM    Specimen: Blood   Result Value Ref Range    Magnesium 1.8 1.6 - 2.4 mg/dL   Phosphorus    Collection Time: 01/17/24  8:47 AM    Specimen: Blood   Result Value Ref Range    Phosphorus 8.0 (H) 2.5 - 4.5 mg/dL   Procalcitonin    Collection Time: 01/17/24  8:47 AM    Specimen: Blood   Result Value Ref Range    Procalcitonin 0.03 0.00 - 0.25 ng/mL   CBC Auto Differential    Collection Time: 01/17/24  8:48 AM    Specimen: Blood   Result Value Ref Range    WBC 17.20 (H) 3.40 - 10.80 10*3/mm3    RBC 2.95 (L) 4.14 - 5.80 10*6/mm3    Hemoglobin 8.6 (L) 13.0 - 17.7 g/dL    Hematocrit 25.5 (L) 37.5 - 51.0 %    MCV 86.3 79.0 - 97.0 fL    MCH 29.3 26.6 - 33.0 pg    MCHC 34.0 31.5 - 35.7 g/dL    RDW 15.2 12.3 - 15.4 %    RDW-SD 45.5 37.0 - 54.0 fl     MPV 8.2 6.0 - 12.0 fL    Platelets 198 140 - 450 10*3/mm3    Neutrophil % 82.1 (H) 42.7 - 76.0 %    Lymphocyte % 7.4 (L) 19.6 - 45.3 %    Monocyte % 10.5 5.0 - 12.0 %    Eosinophil % 0.0 (L) 0.3 - 6.2 %    Basophil % 0.0 0.0 - 1.5 %    Neutrophils, Absolute 14.10 (H) 1.70 - 7.00 10*3/mm3    Lymphocytes, Absolute 1.30 0.70 - 3.10 10*3/mm3    Monocytes, Absolute 1.80 (H) 0.10 - 0.90 10*3/mm3    Eosinophils, Absolute 0.00 0.00 - 0.40 10*3/mm3    Basophils, Absolute 0.00 0.00 - 0.20 10*3/mm3    nRBC 0.0 0.0 - 0.2 /100 WBC   POC Glucose Once    Collection Time: 01/17/24 11:26 AM    Specimen: Blood   Result Value Ref Range    Glucose 132 (H) 70 - 105 mg/dL   Urinalysis With Culture If Indicated - Indwelling Urethral Catheter    Collection Time: 01/17/24  3:01 PM    Specimen: Indwelling Urethral Catheter; Urine   Result Value Ref Range    Color, UA Red (A) Yellow, Straw    Appearance, UA Cloudy (A) Clear    pH, UA 8.5 (H) 5.0 - 8.0    Specific Gravity, UA 1.007 1.005 - 1.030    Glucose, UA Negative Negative    Ketones, UA Negative Negative    Bilirubin, UA Small (1+) (A) Negative    Blood, UA Large (3+) (A) Negative    Protein,  mg/dL (2+) (A) Negative    Leuk Esterase, UA Large (3+) (A) Negative    Nitrite, UA Negative Negative    Urobilinogen, UA 0.2 E.U./dL 0.2 - 1.0 E.U./dL   Urinalysis, Microscopic Only - Indwelling Urethral Catheter    Collection Time: 01/17/24  3:01 PM    Specimen: Indwelling Urethral Catheter; Urine   Result Value Ref Range    RBC, UA Too Numerous to Count (A) None Seen, 0-2 /HPF    WBC, UA Too Numerous to Count (A) None Seen, 0-2 /HPF    Bacteria, UA None Seen None Seen /HPF    Squamous Epithelial Cells, UA 0-2 None Seen, 0-2 /HPF    Hyaline Casts, UA 0-2 None Seen /LPF    Methodology Automated Microscopy       Imaging Results (Last 24 Hours)       ** No results found for the last 24 hours. **             Assessment:    Hyperkalemia    Mechanical complication of arteriovenous fistula  surgically created    End stage renal disease on dialysis      Bilateral obstructing stones status post stent  NAEL  UTI    Plan:    Good urine output status post stent placement  Okay to remove Peterson once creatinine cat and confusion improved as early as tomorrow morning  We will arrange for bilateral ureteroscopy to clear his stones in approximately 2 to 3 weeks once he is fully recovered   Call with questions or concerns    Tyrel Avitia MD  [facility]  [address], [address]  [place] [address]  Office: [phone]  Available via [employer]_ Secure Chat  01/17/24  16:37 EST

## 2024-01-17 NOTE — PLAN OF CARE
Goal Outcome Evaluation:  Plan of Care Reviewed With: patient, spouse, family           Outcome Evaluation: Gabriel De Souza is a 78 y.o. male admitted with Hyperkalemia, bradycardia,Hypotension as well as bilateral kidney stones. He underwent cystoscopy with bilateral uretral stents placed by Dr. Avitia on 1/16/24  PMH: DM, HLD, HTN, CKD. Pt lives with wife in a H with basement. Shower is in basement. Pt is typically independent with moblility without AD. Pt reports neuropathy in feet,denies any falls but states he balance is affected.  At time of PT evaluation he is A&O x 3 with c/o soreness from surgery.  He requires min A for bed mobility, min A for transfers and short distance ambulation.   He fatigues with transfer from bed to chair.  He would benefit from SNF for therapy at discharge      Anticipated Discharge Disposition (PT): skilled nursing facility

## 2024-01-17 NOTE — THERAPY EVALUATION
Patient Name: Gabriel De Souza  : 1945    MRN: [id number]                              Today's Date: 2024       Admit Date: 1/15/2024    Visit Dx:     ICD-10-CM ICD-9-CM   1. Acute renal failure, unspecified acute renal failure type  N17.9 584.9   2. Acute pancreatitis, unspecified complication status, unspecified pancreatitis type  K85.90 577.0   3. Weakness  R53.1 780.79   4. Hyperkalemia  E87.5 276.7   5. Ureterolithiasis  N20.1 592.1   6. Mechanical complication of arteriovenous fistula surgically created, initial encounter  T82.590A 996.1     Patient Active Problem List   Diagnosis    Angina pectoris    Arteriosclerosis    Dyslipidemia    Hypertension    Sinus bradycardia    Type 2 diabetes mellitus    Encounter for screening for malignant neoplasm of prostate    Hip pain, right    Anemia in chronic kidney disease    Benign prostatic hyperplasia    Bronchitis    Deafness in right ear    Diabetic peripheral neuropathy associated with type 2 diabetes mellitus    Goiter    History of thrombosis    Hypercholesterolemia    Hypogonadism    Low back pain    Peripheral neuropathy    Proteinuria    Secondary hyperparathyroidism of renal origin    Renal cyst    Vitamin D deficiency    Hyperphosphatemia due to chronic kidney disease    Heart murmur    Hyperkalemia    Mechanical complication of arteriovenous fistula surgically created    End stage renal disease on dialysis     Past Medical History:   Diagnosis Date    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    Coronary artery disease     Deep vein thrombosis 1990    Diabetes mellitus     Dyslipidemia     Erectile dysfunction 51 years old    Heart murmur 10/4/2023    HL (hearing loss) 7 year old    right    Hyperlipidemia 1970    Hypertension     Neuropathy     Renal insufficiency     Stage 4 chronic kidney disease     3-4     Past Surgical History:   Procedure Laterality Date    ARTERIOVENOUS FISTULA/SHUNT SURGERY Left 2022    Procedure: BRACHIAL CEPHALIC  FISTULA FORMATION;  Surgeon: Edy Vega MD;  Location: [facility] MAIN OR;  Service: Vascular;  Laterality: Left;    BACK SURGERY      BASAL CELL CARCINOMA EXCISION  01/2019    CARDIAC CATHETERIZATION      TURP / TRANSURETHRAL INCISION / DRAINAGE PROSTATE        General Information       Row Name 01/17/24 1703          OT Time and Intention    Document Type evaluation  -MP     Mode of Treatment occupational therapy  -       Row Name 01/17/24 1703          General Information    Patient Profile Reviewed yes  -MP     Prior Level of Function independent:  -MP     Existing Precautions/Restrictions fall  -       Row Name 01/17/24 1703          Living Environment    People in Home spouse  -       Row Name 01/17/24 1703          Cognition    Orientation Status (Cognition) oriented x 3  -       Row Name 01/17/24 1703          Safety Issues, Functional Mobility    Impairments Affecting Function (Mobility) endurance/activity tolerance;balance;strength  -               User Key  (r) = Recorded By, (t) = Taken By, (c) = Cosigned By      Initials Name Provider Type     Fawad Guerin OT Occupational Therapist                     Mobility/ADL's       Row Name 01/17/24 1706          Bed Mobility    Bed Mobility supine-sit  -     Supine-Sit [place] (Bed Mobility) minimum assist (75% patient effort)  -     Assistive Device (Bed Mobility) head of bed elevated;bed rails  -       Row Name 01/17/24 1706          Bed-Chair Transfer    Bed-Chair [place] (Transfers) minimum assist (75% patient effort)  -       Row Name 01/17/24 1706          Sit-Stand Transfer    Sit-Stand [place] (Transfers) minimum assist (75% patient effort)  -       Row Name 01/17/24 1706          Activities of Daily Living    BADL Assessment/Intervention lower body dressing  -       Row Name 01/17/24 1706          Lower Body Dressing Assessment/Training    [place] Level (Lower Body Dressing) lower body dressing  skills;moderate assist (50% patient effort)  -MP               User Key  (r) = Recorded By, (t) = Taken By, (c) = Cosigned By      Initials Name Provider Type    Fawad Melendez OT Occupational Therapist                   Obj/Interventions       Row Name 01/17/24 1706          Range of Motion Comprehensive    Comment, General Range of Motion BUE WFL  -MP       Row Name 01/17/24 1706          Strength Comprehensive (MMT)    Comment, General Manual Muscle Testing (MMT) Assessment BUE 4/5  -MP       Row Name 01/17/24 1706          Balance    Balance Interventions sitting;standing;supported;sit to stand;static;dynamic  -MP               User Key  (r) = Recorded By, (t) = Taken By, (c) = Cosigned By      Initials Name Provider Type    Fawad Melendez OT Occupational Therapist                   Goals/Plan       Row Name 01/17/24 1709          Bed Mobility Goal 1 (OT)    Activity/Assistive Device (Bed Mobility Goal 1, OT) bed mobility activities, all  -MP     [place] Level/Cues Needed (Bed Mobility Goal 1, OT) contact guard required  -MP     Time Frame (Bed Mobility Goal 1, OT) long term goal (LTG);2 weeks  -MP       Row Name 01/17/24 1709          Transfer Goal 1 (OT)    Activity/Assistive Device (Transfer Goal 1, OT) sit-to-stand/stand-to-sit;toilet  -MP     [place] Level/Cues Needed (Transfer Goal 1, OT) contact guard required  -MP     Time Frame (Transfer Goal 1, OT) long term goal (LTG);2 weeks  -MP       Row Name 01/17/24 1709          Dressing Goal 1 (OT)    Activity/Device (Dressing Goal 1, OT) lower body dressing  -MP     [place]/Cues Needed (Dressing Goal 1, OT) supervision required;set-up required  -MP     Time Frame (Dressing Goal 1, OT) long term goal (LTG);2 weeks  -MP               User Key  (r) = Recorded By, (t) = Taken By, (c) = Cosigned By      Initials Name Provider Type    Fawad Melendez OT Occupational Therapist                   Clinical Impression       Row Name  01/17/24 1707          Pain Assessment    Pretreatment Pain Rating 3/10  -MP     Posttreatment Pain Rating 3/10  -MP       Row Name 01/17/24 1707          Plan of Care Review    Plan of Care Reviewed With patient  -MP     Progress no change  -MP     Outcome Evaluation Gabriel De Souza is a 78 y.o. male admitted with Hyperkalemia, bradycardia,Hypotension as well as bilateral kidney stones. He underwent cystoscopy with bilateral uretral stents placed by Dr. Avitia on 1/16/24 PMH: DM, HLD, HTN, CKD.  Pt. lives at home w/ spouse, maintains ADL independence at baseline. Pt. oriented to self and place this date. Provided min A for LB dressing seated at EOB, min A for SPS transfer EOB to armchair. Pt. w/ limited standing tolerance secondary to c/o dizziness, BP WNL. Pt. not safe to d/c home at this time and will require SNF placement to address aforementioned deficits.  -MP       Row Name 01/17/24 1707          Therapy Assessment/Plan (OT)    Rehab Potential (OT) good, to achieve stated therapy goals  -MP     Criteria for Skilled Therapeutic Interventions Met (OT) yes;meets criteria;skilled treatment is necessary  -MP     Therapy Frequency (OT) 3 times/wk  -MP       Row Name 01/17/24 1707          Therapy Plan Review/Discharge Plan (OT)    Anticipated Discharge Disposition (OT) skilled nursing facility  -MP       Row Name 01/17/24 1707          Vital Signs    Pre Patient Position Supine  -MP     Intra Patient Position Standing  -MP     Post Patient Position Sitting  -MP       Row Name 01/17/24 1707          Positioning and Restraints    Pre-Treatment Position in bed  -MP     Post Treatment Position chair  -MP     In Chair sitting;call light within reach;encouraged to call for assist;exit alarm on  -MP               User Key  (r) = Recorded By, (t) = Taken By, (c) = Cosigned By      Initials Name Provider Type    Fwaad Melendez, OT Occupational Therapist                   Outcome Measures       Row Name 01/17/24 4547  01/17/24 0827       How much help from another person do you currently need...    Turning from your back to your side while in flat bed without using bedrails? 4  -CL 4  -CLA    Moving from lying on back to sitting on the side of a flat bed without bedrails? 3  -CL 4  -CLA    Moving to and from a bed to a chair (including a wheelchair)? 3  -CL 2  -CLA    Standing up from a chair using your arms (e.g., wheelchair, bedside chair)? 3  -CL 2  -CLA    Climbing 3-5 steps with a railing? 2  -CL 2  -CLA    To walk in hospital room? 3  -CL 2  -CLA    AM-PAC 6 Clicks Score (PT) 18  -CL 16  -CLA    Highest Level of Mobility Goal 6 --> Walk 10 steps or more  -CL 5 --> Static standing  -CLA              User Key  (r) = Recorded By, (t) = Taken By, (c) = Cosigned By      Initials Name Provider Type    Maggy Samayoa, RN Registered Nurse    Giselle Vilchis, PT Physical Therapist                    Occupational Therapy Education       Title: PT OT SLP Therapies (In Progress)       Topic: Occupational Therapy (In Progress)       Point: ADL training (Not Started)       Description:   Instruct learner(s) on proper safety adaptation and remediation techniques during self care or transfers.   Instruct in proper use of assistive devices.                  Learner Progress:  Not documented in this visit.              Point: Home exercise program (Not Started)       Description:   Instruct learner(s) on appropriate technique for monitoring, assisting and/or progressing therapeutic exercises/activities.                  Learner Progress:  Not documented in this visit.              Point: Precautions (Not Started)       Description:   Instruct learner(s) on prescribed precautions during self-care and functional transfers.                  Learner Progress:  Not documented in this visit.              Point: Body mechanics (Done)       Description:   Instruct learner(s) on proper positioning and spine alignment during self-care,  functional mobility activities and/or exercises.                  Learning Progress Summary             Patient Acceptance, E,TB, VU by  at 1/17/2024 1710                                         User Key       Initials Effective Dates Name Provider Type Discipline     06/16/21 -  Fawad Guerin OT Occupational Therapist OT                  OT Recommendation and Plan  Therapy Frequency (OT): 3 times/wk  Plan of Care Review  Plan of Care Reviewed With: patient  Progress: no change  Outcome Evaluation: Gabriel De Souza is a 78 y.o. male admitted with Hyperkalemia, bradycardia,Hypotension as well as bilateral kidney stones. He underwent cystoscopy with bilateral uretral stents placed by Dr. Avitia on 1/16/24 PMH: DM, HLD, HTN, CKD.  Pt. lives at home w/ spouse, maintains ADL independence at baseline. Pt. oriented to self and place this date. Provided min A for LB dressing seated at EOB, min A for SPS transfer EOB to armchair. Pt. w/ limited standing tolerance secondary to c/o dizziness, BP WNL. Pt. not safe to d/c home at this time and will require SNF placement to address aforementioned deficits.     Time Calculation:         Time Calculation- OT       Row Name 01/17/24 1710             Time Calculation- OT    OT Start Time 1433  -      OT Stop Time 1455  -      OT Time Calculation (min) 22 min  -      Total Timed Code Minutes- OT 0 minute(s)  -      OT Received On 01/17/24  -      OT - Next Appointment 01/19/24  -      OT Goal Re-Cert Due Date 01/31/24  -                User Key  (r) = Recorded By, (t) = Taken By, (c) = Cosigned By      Initials Name Provider Type     Fawad Guerin OT Occupational Therapist                  Therapy Charges for Today       Code Description Service Date Service Provider Modifiers Qty    [id number] HC OT EVAL LOW COMPLEXITY 4 1/17/2024 Fawad Guerin OT GO 1                 Fawad Guerin OT  1/17/2024

## 2024-01-17 NOTE — CASE MANAGEMENT/SOCIAL WORK
Discharge Planning Assessment  [facility]     Patient Name: Gabriel De Souza  MRN: [id number]  Today's Date: 1/17/2024    Admit Date: 1/15/2024    Plan: DC Plan: Anticipate Rehab Acute vs. SNF pending PT/OT evaluation. From home with spouse. No precert required. PASRR needed.   Discharge Needs Assessment       Row Name 01/17/24 1243       Living Environment    People in Home spouse    Name(s) of People in Home Pat De Souza    Current Living Arrangements home    Potentially Unsafe Housing Conditions none    Primary Care Provided by self    Provides Primary Care For no one    Family Caregiver if Needed spouse    Family Caregiver Names Pat De Souza    Quality of Family Relationships helpful;involved;supportive    Able to Return to Prior Arrangements yes       Resource/Environmental Concerns    Resource/Environmental Concerns none    Transportation Concerns none       Food Insecurity    Within the past 12 months, you worried that your food would run out before you got the money to buy more. Never true    Within the past 12 months, the food you bought just didn't last and you didn't have money to get more. Never true       Transition Planning    Patient/Family Anticipates Transition to inpatient rehabilitation facility    Patient/Family Anticipated Services at Transition rehabilitation services    Transportation Anticipated family or friend will provide       Discharge Needs Assessment    Readmission Within the Last 30 Days no previous admission in last 30 days    Concerns to be Addressed discharge planning    Anticipated Changes Related to Illness none    Equipment Needed After Discharge none    Outpatient/Agency/Support Group Needs skilled nursing facility    Discharge Facility/Level of Care Needs rehabilitation facility;nursing facility, skilled    Provided Post Acute Provider List? N/A    Provided Post Acute Provider Quality & Resource List? N/A    Patient's Choice of Community Agency(s) Awaiting PT/OT recommendations     Current Discharge Risk physical impairment;cognitively impaired                   Discharge Plan       Row Name 01/17/24 1244       Plan    Plan DC Plan: Anticipate Rehab Acute vs. SNF pending PT/OT evaluation. From home with spouse. No precert required. PASRR needed.    Patient/Family in Agreement with Plan yes    Provided Post Acute Provider List? N/A    Provided Post Acute Provider Quality & Resource List? N/A    Plan Comments CM spoke with patient spouse via telephone to discuss admission assessment and discharge planning. Patient confused. Tr[place]in confirms PCP and pharmacy.Pat denies any difficulty affording medications at this time. Pat states she feels patient will need some rehab when he is ready for disharge. PT/OT evaluations pending. CM discussed potential needs for continuous HD at discharge and what that means for facility placement and options available. Pat verbalized understanding. Pat states if patient can tolerate car transport, then family will provide transportation when ready to DC.CM will continue to follow for any further needs and adjust discharge plan accordingly. DC Barriers: Cardiac monitoring, O2@2L nc, IV abx, pending PT/OT eval, abnormal labs, and monitor renal function.                  Continued Care and Services - Admitted Since 1/15/2024    Coordination has not been started for this encounter.       Expected Discharge Date and Time       Expected Discharge Date Expected Discharge Time    Jan 20, 2024            Demographic Summary       Row Name 01/17/24 1238       General Information    Admission Type inpatient    Arrived From emergency department    Required Notices Provided Important Message from Medicare    Referral Source admission list    Reason for Consult discharge planning    Preferred Language English       Contact Information    Permission Granted to Share Info With                    Functional Status       Row Name 01/17/24 1246       Functional  Status    Usual Activity Tolerance moderate    Current Activity Tolerance moderate    Functional Status Comments All admission information obtained from patient spouse Pat via telephone. Patient confused.       Physical Activity    On average, how many days per week do you engage in moderate to strenuous exercise (like a brisk walk)? 2 days    On average, how many minutes do you engage in exercise at this level? 10 min    Number of minutes of exercise per week 20       Functional Status, IADL    Medications independent    Meal Preparation independent    Housekeeping independent    Laundry independent    Shopping independent       Mental Status    General Appearance WDL WDL       Mental Status Summary    Recent Changes in Mental Status/Cognitive Functioning mental status       Employment/    Employment Status retired;, active duty    Current or Previous Occupation not applicable           Current or Previous  Service none               Phone communication or documentation only- no physical contact with patient or family.      Linda Clemente RN     Office Phone: [phone]  Office Cell:     [phone]'

## 2024-01-17 NOTE — CONSULTS
"        Hematology/Oncology Inpatient Consultation    Patient name: Gabriel De Souza  : 1945  MRN: [id number]  Referring Provider: Dr. Clinton   Reason for Consultation: Venous doppler results    Chief complaint: Hypotension, bradycardia    History of present illness:    Gabriel De Souza is a 78 y.o. male who [facility] to [facility]24 with complaints of hypotension and low blood sugar. Patient reports he was sent to ED from nephrology office due bradycardia. Patient reports he has been \"feeling bad\" since . He was prescribed antibiotics for UTI by PCP 1 week ago but reported no improvement. Patient reports nausea, vomiting, and abdominal discomfort.     ED course: Patient was found to have potassium of 7.3, systolic blood pressure of 80, heart rate around 44.  EKG showed sinus bradycardia.  BUN up to 56, creatinine of 16.11. WBC 23.10, Hgb 10.4, Plt 274.     01/15/2024 CXR showed low lung volumes without definite acute cardiopulmonary abnormality.     01/15/2024: CT ab/pelvis wo contrast:  Impression:  Moderate bilateral hydronephrosis secondary to obstructing right mid ureteral calculus measuring 9 mm and left mid ureteral calculus measuring 8 mm.   3.1 cm complex cyst in the lower pole of the right kidney containing either mural nodularity or small volume hemorrhage. If possible, correlation with contrast-enhanced renal protocol CT or MRI is suggested. If there is not possible due to renal   function, close follow-up findings of noncontrast CT or renal ultrasound is suggested. Consider urologic evaluation.   Cholelithiasis.    Patient underwent urgent bilateral stent placement per urology and was started on hemodialysis per nephrology.     2024: Doppler LLE    Chronic left lower extremity deep vein thrombosis noted in the common femoral, proximal femoral, popliteal and gastrocnemius.    Sub-acute left lower extremity deep vein thrombosis noted in the external iliac and deep " femoral.    Chronic left lower extremity superficial thrombophlebitis noted in the small saphenous.    All other left sided veins appeared normal.    24  Hematology/Oncology was consulted for further recommendations. Patient reports no known history of blood clots. Previously, only on aspirin, no anticoagulation.     He/She  has a past medical history of Cancer (2019), Coronary artery disease, Deep vein thrombosis (1990), Diabetes mellitus, Dyslipidemia, Erectile dysfunction (51 years old), Heart murmur (10/4/2023), HL (hearing loss) (7 year old), Hyperlipidemia (), Hypertension, Neuropathy, Renal insufficiency (), and Stage 4 chronic kidney disease.    PCP: Waylon Johnson MD    History:  Past Medical History:   Diagnosis Date    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    Coronary artery disease     Deep vein thrombosis 1990    Diabetes mellitus     Dyslipidemia     Erectile dysfunction 51 years old    Heart murmur 10/4/2023    HL (hearing loss) 7 year old    right    Hyperlipidemia     Hypertension     Neuropathy     Renal insufficiency     Stage 4 chronic kidney disease     3-4   ,   Past Surgical History:   Procedure Laterality Date    ARTERIOVENOUS FISTULA/SHUNT SURGERY Left 2022    Procedure: BRACHIAL CEPHALIC FISTULA FORMATION;  Surgeon: Edy Vega MD;  Location: [facility];  Service: Vascular;  Laterality: Left;    BACK SURGERY      BASAL CELL CARCINOMA EXCISION  2019    CARDIAC CATHETERIZATION      TURP / TRANSURETHRAL INCISION / DRAINAGE PROSTATE     ,   Family History   Problem Relation Age of Onset    Heart disease Mother     Heart disease Brother     Heart attack Maternal Grandfather     Hypertension Father     Hearing loss Father    ,   Social History     Tobacco Use    Smoking status: Former     Types: Cigarettes     Quit date: 1967     Years since quittin.0     Passive exposure: Past    Smokeless tobacco: Never   Vaping Use    Vaping Use: Never  used   Substance Use Topics    Alcohol use: No    Drug use: No   ,   Medications Prior to Admission   Medication Sig Dispense Refill Last Dose    amLODIPine (NORVASC) 5 MG tablet Take 1 tablet by mouth Daily.   1/14/2024    aspirin 81 MG tablet Take 1 tablet by mouth Daily.   1/14/2024    atenolol (TENORMIN) 50 MG tablet Take 1 tablet by mouth Daily.   1/14/2024    calcitriol (ROCALTROL) 0.25 MCG capsule Take 2 capsules by mouth Daily.   1/14/2024    ciprofloxacin (CIPRO) 250 MG tablet Take 1 tablet by mouth 2 (Two) Times a Day.   1/14/2024    Insulin Glargine-Lixisenatide (SOLIQUA) 100-33 UNT-MCG/ML solution pen-injector injection Inject 20 Units under the skin into the appropriate area as directed Daily.       sevelamer (RENVELA) 800 MG tablet Take 1 tablet by mouth 3 (Three) Times a Day As Needed.   1/14/2024    simvastatin (ZOCOR) 20 MG tablet Take 1 tablet by mouth Every Night.       torsemide (DEMADEX) 20 MG tablet Take 1 tablet by mouth Daily.      , Scheduled Meds:  atorvastatin, 10 mg, Oral, Daily  calcitriol, 0.5 mcg, Oral, Daily  cefTRIAXone, 2,000 mg, Intravenous, Q24H  heparin (porcine), 5,000 Units, Subcutaneous, Q8H  senna-docusate sodium, 2 tablet, Oral, BID  sevelamer, 1,600 mg, Oral, TID With Meals  sodium chloride, 10 mL, Intravenous, Q12H    , Continuous Infusions:   , PRN Meds:    acetaminophen    senna-docusate sodium **AND** polyethylene glycol **AND** bisacodyl **AND** bisacodyl    Calcium Replacement - Follow Nurse / BPA Driven Protocol    dextrose    dextrose    dextrose    dextrose    glucagon (human recombinant)    glucagon (human recombinant)    Magnesium Standard Dose Replacement - Follow Nurse / BPA Driven Protocol    nitroglycerin    ondansetron    ondansetron ODT    Phosphorus Replacement - Follow Nurse / BPA Driven Protocol    Potassium Replacement - Follow Nurse / BPA Driven Protocol    [COMPLETED] Insert Peripheral IV **AND** sodium chloride    sodium chloride    sodium chloride  "  Allergies:  Tylenol with codeine #3 [acetaminophen-codeine]    Subjective     ROS:  Review of Systems   Constitutional:  Negative for fatigue and fever.   HENT:  Negative for congestion and nosebleeds.    Eyes:  Negative for pain.   Respiratory:  Negative for cough and shortness of breath.    Cardiovascular:  Positive for leg swelling. Negative for chest pain.   Gastrointestinal:  Negative for abdominal pain, blood in stool, diarrhea, nausea and vomiting.   Endocrine: Negative for cold intolerance and heat intolerance.   Genitourinary:  Negative for difficulty urinating.   Musculoskeletal:  Negative for arthralgias.   Skin:  Negative for rash.   Neurological:  Negative for dizziness and headaches.   Hematological:  Does not bruise/bleed easily.   Psychiatric/Behavioral:  Negative for behavioral problems.         Objective   Vital Signs:   /68   Pulse 61   Temp 97.8 °F (36.6 °C) (Oral)   Resp 16   Ht 185.4 cm (73\")   Wt 96.7 kg (213 lb 3 oz)   SpO2 95%   BMI 28.13 kg/m²     Physical Exam: (performed by MD)  Physical Exam  Constitutional:       Appearance: Normal appearance.   HENT:      Head: Normocephalic and atraumatic.   Eyes:      Pupils: Pupils are equal, round, and reactive to light.   Cardiovascular:      Rate and Rhythm: Normal rate and regular rhythm.      Pulses: Normal pulses.      Heart sounds: No murmur heard.  Pulmonary:      Effort: Pulmonary effort is normal.      Breath sounds: Normal breath sounds.   Abdominal:      General: There is no distension.      Palpations: Abdomen is soft. There is no mass.      Tenderness: There is no abdominal tenderness.   Musculoskeletal:         General: Normal range of motion.      Cervical back: Normal range of motion.      Left lower leg: Edema present.   Skin:     General: Skin is warm.   Neurological:      General: No focal deficit present.      Mental Status: He is alert.   Psychiatric:         Mood and Affect: Mood normal.         Results " "Review:  Lab Results (last 48 hours)       Procedure Component Value Units Date/Time    Blood Culture - Blood, Arm, Left [[id number]] Collected: 01/17/24 1203    Specimen: Blood from Arm, Left Updated: 01/17/24 1220    Blood Culture - Blood, Arm, Left [[id number]] Collected: 01/17/24 1205    Specimen: Blood from Arm, Left Updated: 01/17/24 1220    Procalcitonin [[id number]]  (Normal) Collected: 01/17/24 0847    Specimen: Blood Updated: 01/17/24 1157     Procalcitonin 0.03 ng/mL     Narrative:      As a Marker for Sepsis (Non-Neonates):    1. <0.5 ng/mL represents a low risk of severe sepsis and/or septic shock.  2. >2 ng/mL represents a high risk of severe sepsis and/or septic shock.    As a Marker for Lower Respiratory Tract Infections that require antibiotic therapy:    PCT on Admission    Antibiotic Therapy       6-12 Hrs later    >0.5                Strongly Recommended  >0.25 - <0.5        Recommended   0.1 - 0.25          Discouraged              Remeasure/reassess PCT  <0.1                Strongly Discouraged     Remeasure/reassess PCT    As 28 day mortality risk marker: \"Change in Procalcitonin Result\" (>80% or <=80%) if Day 0 (or Day 1) and Day 4 values are available. Refer to http://[email or web address]-pct-calculator.com    Change in PCT <=80%  A decrease of PCT levels below or equal to 80% defines a positive change in PCT test result representing a higher risk for 28-day all-cause mortality of patients diagnosed with severe sepsis for septic shock.    Change in PCT >80%  A decrease of PCT levels of more than 80% defines a negative change in PCT result representing a lower risk for 28-day all-cause mortality of patients diagnosed with severe sepsis or septic shock.       POC Glucose Once [[id number]]  (Abnormal) Collected: 01/17/24 1126    Specimen: Blood Updated: 01/17/24 1144     Glucose 132 mg/dL      Comment: Serial Number: [id number]:  [id number]       Blood Culture - Blood, Arm, Left [[id number]]  (Normal) " Collected: 01/15/24 1125    Specimen: Blood from Arm, Left Updated: 01/17/24 1130     Blood Culture No growth at 2 days    Narrative:      Less than seven (7) mL's of blood was collected.  Insufficient quantity may yield false negative results.    Blood Culture - Blood, Arm, Right [[id number]]  (Normal) Collected: 01/15/24 1126    Specimen: Blood from Arm, Right Updated: 01/17/24 1130     Blood Culture No growth at 2 days    Narrative:      Less than seven (7) mL's of blood was collected.  Insufficient quantity may yield false negative results.    Phosphorus [[id number]]  (Abnormal) Collected: 01/17/24 0847    Specimen: Blood Updated: 01/17/24 1000     Phosphorus 8.0 mg/dL     Comprehensive Metabolic Panel [[id number]]  (Abnormal) Collected: 01/17/24 0847    Specimen: Blood Updated: 01/17/24 0950     Glucose 140 mg/dL      BUN 89 mg/dL      Creatinine 6.73 mg/dL      Sodium 139 mmol/L      Potassium 4.4 mmol/L      Chloride 99 mmol/L      CO2 21.0 mmol/L      Calcium 7.9 mg/dL      Total Protein 6.0 g/dL      Albumin 2.9 g/dL      ALT (SGPT) 24 U/L      AST (SGOT) 24 U/L      Alkaline Phosphatase 122 U/L      Total Bilirubin 0.3 mg/dL      Globulin 3.1 gm/dL      A/G Ratio 0.9 g/dL      BUN/Creatinine Ratio 13.2     Anion Gap 19.0 mmol/L      eGFR 7.9 mL/min/1.73      Comment: <15 Indicative of kidney failure       Narrative:      GFR Normal >60  Chronic Kidney Disease <60  Kidney Failure <15    The GFR formula is only valid for adults with stable renal function between ages 18 and 70.    Magnesium [[id number]]  (Normal) Collected: 01/17/24 0847    Specimen: Blood Updated: 01/17/24 0950     Magnesium 1.8 mg/dL     CBC & Differential [[id number]]  (Abnormal) Collected: 01/17/24 0848    Specimen: Blood Updated: 01/17/24 0925    Narrative:      The following orders were created for panel order CBC & Differential.  Procedure                               Abnormality         Status                     ---------                                -----------         ------                     CBC Auto Differential[[id number]]        Abnormal            Final result                 Please view results for these tests on the individual orders.    CBC Auto Differential [[id number]]  (Abnormal) Collected: 01/17/24 0848    Specimen: Blood Updated: 01/17/24 0925     WBC 17.20 10*3/mm3      RBC 2.95 10*6/mm3      Hemoglobin 8.6 g/dL      Hematocrit 25.5 %      MCV 86.3 fL      MCH 29.3 pg      MCHC 34.0 g/dL      RDW 15.2 %      RDW-SD 45.5 fl      MPV 8.2 fL      Platelets 198 10*3/mm3      Neutrophil % 82.1 %      Lymphocyte % 7.4 %      Monocyte % 10.5 %      Eosinophil % 0.0 %      Basophil % 0.0 %      Neutrophils, Absolute 14.10 10*3/mm3      Lymphocytes, Absolute 1.30 10*3/mm3      Monocytes, Absolute 1.80 10*3/mm3      Eosinophils, Absolute 0.00 10*3/mm3      Basophils, Absolute 0.00 10*3/mm3      nRBC 0.0 /100 WBC     Urine Culture - Urine, Urine, Clean Catch [[id number]]  (Abnormal)  (Susceptibility) Collected: 01/15/24 1145    Specimen: Urine, Clean Catch Updated: 01/17/24 0833     Urine Culture 25,000 CFU/mL Serratia marcescens     Comment: Pip/Tazo to follow       Narrative:      Colonization of the urinary tract without infection is common. Treatment is discouraged unless the patient is symptomatic, pregnant, or undergoing an invasive urologic procedure.    Susceptibility        Serratia marcescens      ZOILA (Preliminary)      Amoxicillin + Clavulanate Resistant      Cefazolin Resistant      Cefepime Susceptible      Ceftazidime Susceptible      Ceftriaxone Susceptible      Gentamicin Susceptible      Levofloxacin Susceptible      Nitrofurantoin Resistant      Trimethoprim + Sulfamethoxazole Susceptible                           POC Glucose Once [[id number]]  (Abnormal) Collected: 01/17/24 0632    Specimen: Blood Updated: 01/17/24 0807     Glucose 135 mg/dL      Comment: Serial Number: [id number]:  [id number]       POC Glucose Once  [[id number]]  (Abnormal) Collected: 01/17/24 0037    Specimen: Blood Updated: 01/17/24 0039     Glucose 153 mg/dL      Comment: Serial Number: [id number]:  [id number]       BUN [[id number]]  (Abnormal) Collected: 01/16/24 2022    Specimen: Blood from Arm, Left Updated: 01/16/24 2051     BUN 73 mg/dL     POC Glucose Once [[id number]]  (Abnormal) Collected: 01/16/24 1829    Specimen: Blood Updated: 01/16/24 1833     Glucose 135 mg/dL      Comment: Serial Number: [id number]:  [id number]       POC Glucose Once [[id number]]  (Abnormal) Collected: 01/16/24 1352    Specimen: Blood Updated: 01/16/24 1354     Glucose 125 mg/dL      Comment: Serial Number: [id number]:  [id number]       POC Glucose Once [[id number]]  (Abnormal) Collected: 01/16/24 1114    Specimen: Blood Updated: 01/16/24 1116     Glucose 122 mg/dL      Comment: Serial Number: [id number]:  [id number]       POC Glucose Once [[id number]]  (Normal) Collected: 01/16/24 1004    Specimen: Blood Updated: 01/16/24 1006     Glucose 76 mg/dL      Comment: Serial Number: [id number]:  [id number]       POC Glucose Once [[id number]]  (Normal) Collected: 01/16/24 0923    Specimen: Blood Updated: 01/16/24 0925     Glucose 87 mg/dL      Comment: Serial Number: [id number]:  [id number]       POC Glucose Once [[id number]]  (Abnormal) Collected: 01/16/24 0831    Specimen: Blood Updated: 01/16/24 0834     Glucose 154 mg/dL      Comment: Serial Number: [id number]:  [id number]       POC Glucose Once [[id number]]  (Abnormal) Collected: 01/16/24 0816    Specimen: Blood Updated: 01/16/24 0819     Glucose 67 mg/dL      Comment: Serial Number: [id number]:  [id number]       POC Glucose Once [[id number]]  (Normal) Collected: 01/16/24 0754    Specimen: Blood Updated: 01/16/24 0756     Glucose 96 mg/dL      Comment: Serial Number: [id number]:  [id number]       POC Glucose Once [[id number]]  (Abnormal) Collected: 01/16/24 0723    Specimen: Blood  Updated: 01/16/24 0725     Glucose 27 mg/dL      Comment: Serial Number: [id number]:  [id number]       Magnesium [[id number]]  (Normal) Collected: 01/16/24 0646    Specimen: Blood Updated: 01/16/24 0720     Magnesium 1.9 mg/dL     Comprehensive Metabolic Panel [[id number]]  (Abnormal) Collected: 01/16/24 0646    Specimen: Blood Updated: 01/16/24 0720     Glucose 39 mg/dL       mg/dL      Creatinine 10.52 mg/dL      Sodium 140 mmol/L      Potassium 4.7 mmol/L      Chloride 101 mmol/L      CO2 17.0 mmol/L      Calcium 8.3 mg/dL      Total Protein 5.9 g/dL      Albumin 2.9 g/dL      ALT (SGPT) 20 U/L      AST (SGOT) 28 U/L      Alkaline Phosphatase 94 U/L      Total Bilirubin 0.2 mg/dL      Globulin 3.0 gm/dL      A/G Ratio 1.0 g/dL      BUN/Creatinine Ratio 14.3     Anion Gap 22.0 mmol/L      eGFR 4.6 mL/min/1.73      Comment: <15 Indicative of kidney failure       Narrative:      GFR Normal >60  Chronic Kidney Disease <60  Kidney Failure <15    The GFR formula is only valid for adults with stable renal function between ages 18 and 70.    Phosphorus [[id number]]  (Abnormal) Collected: 01/16/24 0646    Specimen: Blood Updated: 01/16/24 0712     Phosphorus 11.0 mg/dL     CBC (No Diff) [[id number]]  (Abnormal) Collected: 01/16/24 0646    Specimen: Blood Updated: 01/16/24 0653     WBC 15.60 10*3/mm3      RBC 3.16 10*6/mm3      Hemoglobin 9.1 g/dL      Hematocrit 27.4 %      MCV 86.9 fL      MCH 28.8 pg      MCHC 33.2 g/dL      RDW 15.9 %      RDW-SD 47.7 fl      MPV 7.5 fL      Platelets 260 10*3/mm3     Potassium [[id number]]  (Normal) Collected: 01/15/24 2304    Specimen: Blood Updated: 01/15/24 2332     Potassium 4.1 mmol/L     Basic Metabolic Panel [[id number]]  (Abnormal) Collected: 01/15/24 1719    Specimen: Blood Updated: 01/15/24 1759     Glucose 87 mg/dL       mg/dL      Creatinine 15.98 mg/dL      Sodium 140 mmol/L      Potassium 6.7 mmol/L      Chloride 102 mmol/L      CO2 8.0 mmol/L      Calcium  9.1 mg/dL      BUN/Creatinine Ratio 15.6     Anion Gap 30.0 mmol/L      eGFR 2.8 mL/min/1.73      Comment: <15 Indicative of kidney failure       Narrative:      GFR Normal >60  Chronic Kidney Disease <60  Kidney Failure <15    The GFR formula is only valid for adults with stable renal function between ages 18 and 70.    POC Glucose Once [[id number]]  (Normal) Collected: 01/15/24 1717    Specimen: Blood Updated: 01/15/24 1719     Glucose 75 mg/dL      Comment: Serial Number: [id number]:  [id number]       COVID-19 and FLU A/B PCR, 1 HR TAT - Swab, Nasopharynx [[id number]]  (Normal) Collected: 01/15/24 1602    Specimen: Swab from Nasopharynx Updated: 01/15/24 1624     COVID19 Not Detected     Influenza A PCR Not Detected     Influenza B PCR Not Detected    Narrative:      Fact sheet for providers: https://www.fda.gov/media/455223/download    Fact sheet for patients: https://www.fda.gov/media/799582/download    Test performed by PCR.    Hepatitis B Surface Antigen [[id number]]  (Normal) Collected: 01/15/24 1125    Specimen: Blood Updated: 01/15/24 1613     Hepatitis B Surface Ag Non-Reactive    POC Glucose Once [[id number]]  (Abnormal) Collected: 01/15/24 1600    Specimen: Blood Updated: 01/15/24 1604     Glucose 119 mg/dL      Comment: Serial Number: [id number]:  [id number]       POC Glucose Once [[id number]]  (Abnormal) Collected: 01/15/24 1519    Specimen: Blood Updated: 01/15/24 1521     Glucose 147 mg/dL      Comment: Serial Number: [id number]:  [id number]                Pending Results: Urine and blood cultures    Imaging Reviewed:   CT Abdomen Pelvis Without Contrast    Result Date: 1/15/2024  Impression: Moderate bilateral hydronephrosis secondary to obstructing right mid ureteral calculus measuring 9 mm and left mid ureteral calculus measuring 8 mm. 3.1 cm complex cyst in the lower pole of the right kidney containing either mural nodularity or small volume hemorrhage. If possible, correlation with  contrast-enhanced renal protocol CT or MRI is suggested. If there is not possible due to renal function, close follow-up findings of noncontrast CT or renal ultrasound is suggested. Consider urologic evaluation. Cholelithiasis. Additional findings per body of the report. Electronically Signed: James Monsalve MD  1/15/2024 3:12 PM EST  Workstation ID: [id number]    XR Chest 1 View    Result Date: 1/15/2024  Impression: Low lung volumes without definite acute cardiopulmonary abnormality. Electronically Signed: Madelyn Vaughn  1/15/2024 12:13 PM EST  Workstation ID: [id number]          Assessment & Plan     Patient is a 78 y.o. male admitted with weakness, bradycardia secondary to hyperkalemia, acute on chronic kidney injury. He was found to have obstructive uropathy. S/p bilateral stent placement and initiation of dialysis. Now found to have left lower extremity VTE.     ASSESSMENT/PLAN    Chronic DVT noted in left common femoral, proximal femoral, popliteal and gastrocnemius  Chronic LLE superficial thrombophlebitis in  small saphenous  Sub-acute DVT noted in left external iliac, deep femoral  - Patient has several risk factors. Patient is on heparin. Can continue and will transition to oral.    Anemia  -Likely related to chronic disease, renal insufficiency.      Acute on chronic renal failure with hyperkalemia  - Longstanding CKD Stage IV. Started on dialysis 1/15  - Hyperkalemia secondary to renal failure, improved   - Nephrology managing    Obstructive uropathy  -Secondary to ureteral stones. S/p urgent stent placement on 1/16.   - Initial blood cultures with no growth. Repeat blood cultures pending. Procal is normal. IV Rocephin  - Likely contributing to renal deterioration  - Urology following      Electronically signed by Mona Parker PA-C, 01/17/24    Patient seen and examined agree with assessment plan    Patient is a 78-year-old male admitted with acute on chronic kidney injury, obstructive uropathy  status post bilateral stent placement initiation on dialysis, hyperkalemia.  Patient has had persistent left leg swelling for several years and he was told previously that he has a blood clot in his left leg.  He does not remember being on anticoagulation.  There is no prior ultrasound to compare.  Now with left leg swelling ultrasound Doppler was obtained.  This showed chronic DVT in the left common femoral, proximal femoral, popliteal and gastrocnemius.  Chronic superficial thrombophlebitis in the small saphenous.  Subacute DVT in the left external iliac, deep femoral  He does have risk factors with poor mobility, chronic kidney disease.  There is no family history of DVT no indication for hypercoagulable workup however he will need anticoagulation.  He is currently on heparin subcutaneous for prophylaxis.  He has had hemodialysis yesterday complicated with prolonged bleeding.  Plan for AV fistulogram, possible angioplasty tomorrow.  Given bleeding risk continue heparin subcutaneous for prophylaxis.  Postop we can consider anticoagulation. Patient does have high bleeding risk.     Thank you for this consult. We will be happy to follow along with you.

## 2024-01-17 NOTE — CONSULTS
Nutrition Services    Patient Name: Gabriel De Souza  YOB: 1945  MRN: [id number]  Admission date: 1/15/2024    Comment:  -- Message out to attending and Nephrology re: diet liberalization, RD to await reply  Addendum: Dr. Fowler want the patient on a renal diet     -- Add Novasource Renal BID (Provides 950 kcals, 43 g protein if consumed)       CLINICAL NUTRITION ASSESSMENT      Reason for Assessment 1/17: Consult for Nursing Admission Screen, MST of 2      H&P      Past Medical History:   Diagnosis Date    Cancer 01/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    Coronary artery disease     Deep vein thrombosis 04/1990    Diabetes mellitus     Dyslipidemia     Erectile dysfunction 51 years old    Heart murmur 10/4/2023    HL (hearing loss) 7 year old    right    Hyperlipidemia 1970    Hypertension     Neuropathy     Renal insufficiency 2020    Stage 4 chronic kidney disease     3-4       Past Surgical History:   Procedure Laterality Date    ARTERIOVENOUS FISTULA/SHUNT SURGERY Left 9/22/2022    Procedure: BRACHIAL CEPHALIC FISTULA FORMATION;  Surgeon: Edy Vega MD;  Location: [facility];  Service: Vascular;  Laterality: Left;    BACK SURGERY      BASAL CELL CARCINOMA EXCISION  01/2019    CARDIAC CATHETERIZATION      TURP / TRANSURETHRAL INCISION / DRAINAGE PROSTATE          Current Problems   CKD stage IV  - Nephrology following  - routine HD     Hyperkalemia  - resolved 1/15     Bilateral renal stones with hydronephrosis   - Urology following  - S/P cystoscopy 1/16    Bradycardia and hypotension       Encounter Information        Trending Narrative     1/17: Admitted for hypotension and hypoglycemia.  RD visited patient at bedside.  Spouse and son at bedside report that patient not eating well recently, N/V this AM, no chewing/swallowing issues, -210# range with some weight loss over the past year.  NFPE completed and not consistent with nutrition diagnosis of malnutrition at this time using AND/ASPEN criteria.  " Patient accepting of Boost at time of RD visit.         Anthropometrics        Current Height, Weight Height: 185.4 cm (73\")  Weight: 96.7 kg (213 lb 3 oz) (01/15/24 1702)       Usual Body Weight (UBW) 210-212# range        Trending Weight Hx     This admission: 1/17: 213#             PTA: Weight gain x 3 months   7.7% weight loss x 1 year     Wt Readings from Last 30 Encounters:   01/15/24 1702 96.7 kg (213 lb 3 oz)   01/15/24 1047 97.5 kg (215 lb)   01/08/24 1001 97.6 kg (215 lb 3.2 oz)   10/10/23 0941 92.3 kg (203 lb 8 oz)   10/04/23 1048 92.1 kg (203 lb)   09/01/23 1306 98.9 kg (218 lb)   04/06/23 0752 99.1 kg (218 lb 6.4 oz)   01/03/23 1240 105 kg (231 lb)   10/06/22 0909 105 kg (232 lb)   09/22/22 0608 101 kg (222 lb 12.8 oz)   09/13/22 1059 90.7 kg (200 lb)   09/20/22 1126 104 kg (229 lb)   07/06/22 1021 101 kg (223 lb)   12/28/21 0822 99.3 kg (219 lb)   09/17/21 0915 101 kg (222 lb)   06/28/21 0814 102 kg (224 lb)   12/28/20 0844 103 kg (226 lb)   09/14/20 0958 104 kg (229 lb)   06/25/20 1037 104 kg (229 lb)   11/06/19 0803 106 kg (234 lb 9.6 oz)   09/06/19 0958 105 kg (231 lb)   05/09/19 0827 107 kg (235 lb 3.2 oz)   01/17/19 1250 108 kg (238 lb)   11/08/18 0912 108 kg (238 lb)   09/04/18 0931 105 kg (232 lb)   05/10/18 0916 107 kg (236 lb 2 oz)   12/21/17 1051 107 kg (235 lb)   11/09/17 0930 104 kg (229 lb 12.8 oz)   07/07/17 1142 105 kg (231 lb 8 oz)   05/11/17 0920 104 kg (230 lb)   11/07/16 0853 106 kg (233 lb 6 oz)   08/08/16 0854 103 kg (227 lb)      BMI kg/m2 Body mass index is 28.13 kg/m².       Labs        Pertinent Labs    Results from last 7 days   Lab Units 01/17/24  0847 01/16/24 2022 01/16/24  0646 01/15/24  2304 01/15/24  1719 01/15/24  1223   SODIUM mmol/L 139  --  140  --  140 140   POTASSIUM mmol/L 4.4  --  4.7 4.1 6.7* 7.3*   CHLORIDE mmol/L 99  --  101  --  102 100   CO2 mmol/L 21.0*  --  17.0*  --  8.0* 9.0*   BUN mg/dL 89* 73* 150*  --  250* 256*   CREATININE mg/dL 6.73*  --  10.52*  " --  15.98* 16.11*   CALCIUM mg/dL 7.9*  --  8.3*  --  9.1 9.4   BILIRUBIN mg/dL 0.3  --  0.2  --   --  0.2   ALK PHOS U/L 122*  --  94  --   --  102   ALT (SGPT) U/L 24  --  20  --   --  17   AST (SGOT) U/L 24  --  28  --   --  18   GLUCOSE mg/dL 140*  --  39*  --  87 111*     Results from last 7 days   Lab Units 01/17/24  0848 01/17/24  0847 01/16/24  0646 01/15/24  1223 01/15/24  1125   MAGNESIUM mg/dL  --  1.8 1.9 2.9*  --    PHOSPHORUS mg/dL  --  8.0* 11.0*  --    < >   HEMOGLOBIN g/dL 8.6*  --  9.1*  --   --    HEMATOCRIT % 25.5*  --  27.4*  --   --     < > = values in this interval not displayed.     Lab Results   Component Value Date    HGBA1C 5.80 (H) 10/10/2023        Medications    Scheduled Medications atorvastatin, 10 mg, Oral, Daily  calcitriol, 0.5 mcg, Oral, Daily  cefTRIAXone, 2,000 mg, Intravenous, Q24H  heparin (porcine), 5,000 Units, Subcutaneous, Q8H  senna-docusate sodium, 2 tablet, Oral, BID  sevelamer, 1,600 mg, Oral, TID With Meals  sodium chloride, 10 mL, Intravenous, Q12H        Infusions      PRN Medications   acetaminophen    senna-docusate sodium **AND** polyethylene glycol **AND** bisacodyl **AND** bisacodyl    Calcium Replacement - Follow Nurse / BPA Driven Protocol    dextrose    dextrose    dextrose    dextrose    glucagon (human recombinant)    glucagon (human recombinant)    Magnesium Standard Dose Replacement - Follow Nurse / BPA Driven Protocol    nitroglycerin    ondansetron    ondansetron ODT    Phosphorus Replacement - Follow Nurse / BPA Driven Protocol    Potassium Replacement - Follow Nurse / BPA Driven Protocol    [COMPLETED] Insert Peripheral IV **AND** sodium chloride    sodium chloride    sodium chloride     Physical Findings        Trending Physical   Appearance, NFPE 1/17: NFPE completed and not consistent with nutrition diagnosis of malnutrition at this time using AND/ASPEN criteria      --  Edema  2+ right arm  3+ left leg     Bowel Function Last documented BM 1/17  "(today)     Tubes No feeding tube      Chewing/Swallowing No known issues      Skin Intact      --  Current Nutrition Orders & Evaluation of Intake       Oral Nutrition     Food Allergies NKFA   Current PO Diet Diet: Renal Diets; Low Sodium (2-3g), Low Potassium, Low Phosphorus; Texture: Regular Texture (IDDSI 7); Fluid Consistency: Thin (IDDSI 0)   Supplement None ordered    PO Evaluation     Trending % PO Intake 1/17: 0-50%   --  Nutritional Risk Screening        NRS-2002 Score          Nutrition Diagnosis         Nutrition Dx Problem 1 Inadequate oral intake related to intake less than needs as evidenced by PO intakes less than 50% in recent weeks per family.        Nutrition Dx Problem 2        Intervention Goal         Intervention Goal(s) Accept ONS  Stable weight  PO intake greater than 50%     Nutrition Intervention        RD Action Add ONS  Completed NFPE  Provided \"Living Well Nourished\" handout to family      Nutrition Prescription          Diet Prescription Low Na+. Low Potassium, Low Phosphorus    Supplement Prescription Novasource Renal BID   --  Monitor/Evaluation        Monitor Per protocol, I&O, PO intake, Supplement intake, Pertinent labs, Weight, Skin status, GI status, Symptoms, POC/GOC       Electronically signed by:  Corazon Fuentes RD  01/17/24 13:59 EST    "

## 2024-01-17 NOTE — PLAN OF CARE
Goal Outcome Evaluation:      Pt remained on 2L NC entire shift. Pt alert and oriented to self and place, but confused about time and situation entire shift. Pt stated urethral meatus was uncomfortable and painful associated with the placement of the godfrey, urine remains bloody with some blood clots and sediment present. Good urine and several BM s this shift. Pt had dialysis at the beginning of the shift, tolerated fine. Pt status and vital signs stable entire shift.

## 2024-01-17 NOTE — THERAPY EVALUATION
Patient Name: Gabriel De Souza  : 1945    MRN: [id number]                              Today's Date: 2024       Admit Date: 1/15/2024    Visit Dx:     ICD-10-CM ICD-9-CM   1. Acute renal failure, unspecified acute renal failure type  N17.9 584.9   2. Acute pancreatitis, unspecified complication status, unspecified pancreatitis type  K85.90 577.0   3. Weakness  R53.1 780.79   4. Hyperkalemia  E87.5 276.7   5. Ureterolithiasis  N20.1 592.1   6. Mechanical complication of arteriovenous fistula surgically created, initial encounter  T82.590A 996.1     Patient Active Problem List   Diagnosis    Angina pectoris    Arteriosclerosis    Dyslipidemia    Hypertension    Sinus bradycardia    Type 2 diabetes mellitus    Encounter for screening for malignant neoplasm of prostate    Hip pain, right    Anemia in chronic kidney disease    Benign prostatic hyperplasia    Bronchitis    Deafness in right ear    Diabetic peripheral neuropathy associated with type 2 diabetes mellitus    Goiter    History of thrombosis    Hypercholesterolemia    Hypogonadism    Low back pain    Peripheral neuropathy    Proteinuria    Secondary hyperparathyroidism of renal origin    Renal cyst    Vitamin D deficiency    Hyperphosphatemia due to chronic kidney disease    Heart murmur    Hyperkalemia    Mechanical complication of arteriovenous fistula surgically created    End stage renal disease on dialysis     Past Medical History:   Diagnosis Date    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    Coronary artery disease     Deep vein thrombosis 1990    Diabetes mellitus     Dyslipidemia     Erectile dysfunction 51 years old    Heart murmur 10/4/2023    HL (hearing loss) 7 year old    right    Hyperlipidemia 1970    Hypertension     Neuropathy     Renal insufficiency     Stage 4 chronic kidney disease     3-4     Past Surgical History:   Procedure Laterality Date    ARTERIOVENOUS FISTULA/SHUNT SURGERY Left 2022    Procedure: BRACHIAL CEPHALIC  FISTULA FORMATION;  Surgeon: Edy Vega MD;  Location: [facility] MAIN OR;  Service: Vascular;  Laterality: Left;    BACK SURGERY      BASAL CELL CARCINOMA EXCISION  01/2019    CARDIAC CATHETERIZATION      TURP / TRANSURETHRAL INCISION / DRAINAGE PROSTATE        General Information       Row Name 01/17/24 1604          Physical Therapy Time and Intention    Document Type evaluation  -CL     Mode of Treatment physical therapy  -CL       Row Name 01/17/24 1604          General Information    Patient Profile Reviewed yes  -CL     Prior Level of Function independent:;all household mobility  -CL     Existing Precautions/Restrictions fall  -CL     Barriers to Rehab impaired sensation  -CL       Row Name 01/17/24 1604          Living Environment    People in Home spouse  -CL     Name(s) of People in Home Wife: Pat  -CL       Row Name 01/17/24 1604          Home Main Entrance    Number of Stairs, Main Entrance none  -CL       Row Name 01/17/24 1604          Stairs Within Home, Primary    Number of Stairs, Within Home, Primary twelve  -CL     Stairs Comment, Within Home, Primary Can stay on 1st floor but shower is in basement.  -CL       Row Name 01/17/24 1604          Cognition    Orientation Status (Cognition) oriented x 3  -CL       Row Name 01/17/24 1604          Safety Issues, Functional Mobility    Impairments Affecting Function (Mobility) endurance/activity tolerance  -CL               User Key  (r) = Recorded By, (t) = Taken By, (c) = Cosigned By      Initials Name Provider Type    CL Giselle Ventura PT Physical Therapist                   Mobility       Row Name 01/17/24 1606          Bed Mobility    Bed Mobility supine-sit  -CL     Supine-Sit Person (Bed Mobility) minimum assist (75% patient effort)  -CL     Assistive Device (Bed Mobility) head of bed elevated;bed rails  -CL       Row Name 01/17/24 1606          Bed-Chair Transfer    Bed-Chair Person (Transfers) minimum assist (75% patient  effort)  -CL       Row Name 01/17/24 1606          Sit-Stand Transfer    Sit-Stand [place] (Transfers) minimum assist (75% patient effort)  -CL       Row Name 01/17/24 1606          Gait/Stairs (Locomotion)    [place] Level (Gait) minimum assist (75% patient effort)  -CL     Distance in Feet (Gait) 3' to chair  -CL               User Key  (r) = Recorded By, (t) = Taken By, (c) = Cosigned By      Initials Name Provider Type    CL Giselle Ventura PT Physical Therapist                   Obj/Interventions       Row Name 01/17/24 1607          Range of Motion Comprehensive    General Range of Motion bilateral lower extremity ROM WFL  -CL       Row Name 01/17/24 1607          Strength Comprehensive (MMT)    Comment, General Manual Muscle Testing (MMT) Assessment BLEs grossly 4/5  -CL       Row Name 01/17/24 1607          Balance    Balance Assessment sitting static balance;sitting dynamic balance;standing static balance;standing dynamic balance  -CL     Static Sitting Balance standby assist  -CL     Dynamic Sitting Balance minimal assist  -CL     Position, Sitting Balance sitting edge of bed  -CL     Static Standing Balance minimal assist  -CL     Dynamic Standing Balance minimal assist  -CL     Position/Device Used, Standing Balance unsupported  -CL       Row Name 01/17/24 1607          Sensory Assessment (Somatosensory)    Sensory Assessment (Somatosensory) --  light touch intact, pt and wife report neuropathy  -CL               User Key  (r) = Recorded By, (t) = Taken By, (c) = Cosigned By      Initials Name Provider Type    CL Giselle Ventura, PT Physical Therapist                   Goals/Plan       Row Name 01/17/24 1613          Bed Mobility Goal 1 (PT)    Activity/Assistive Device (Bed Mobility Goal 1, PT) bed mobility activities, all  -CL     [place] Level/Cues Needed (Bed Mobility Goal 1, PT) modified independence  -CL     Time Frame (Bed Mobility Goal 1, PT) long term goal (LTG);2 weeks   -CL       Row Name 01/17/24 1613          Transfer Goal 1 (PT)    Activity/Assistive Device (Transfer Goal 1, PT) sit-to-stand/stand-to-sit;bed-to-chair/chair-to-bed  -CL     [place] Level/Cues Needed (Transfer Goal 1, PT) modified independence  -CL     Time Frame (Transfer Goal 1, PT) long term goal (LTG);2 weeks  -CL       Row Name 01/17/24 1613          Gait Training Goal 1 (PT)    Activity/Assistive Device (Gait Training Goal 1, PT) gait (walking locomotion);assistive device use  -CL     [place] Level (Gait Training Goal 1, PT) modified independence  -CL     Distance (Gait Training Goal 1, PT) 150'  -CL     Time Frame (Gait Training Goal 1, PT) long term goal (LTG);2 weeks  -CL       Row Name 01/17/24 1613          Stairs Goal 1 (PT)    Activity/Assistive Device (Stairs Goal 1, PT) ascending stairs;descending stairs  -CL     [place] Level/Cues Needed (Stairs Goal 1, PT) modified independence  -CL     Number of Stairs (Stairs Goal 1, PT) 12  -CL     Time Frame (Stairs Goal 1, PT) long term goal (LTG);2 weeks  -CL       Row Name 01/17/24 1613          Therapy Assessment/Plan (PT)    Planned Therapy Interventions (PT) balance training;bed mobility training;gait training;patient/family education;stair training;strengthening;transfer training  -CL               User Key  (r) = Recorded By, (t) = Taken By, (c) = Cosigned By      Initials Name Provider Type    CL Giselle Ventura, PT Physical Therapist                   Clinical Impression       Row Name 01/17/24 1608          Pain    Pretreatment Pain Rating 3/10  -CL     Posttreatment Pain Rating 3/10  -CL       Row Name 01/17/24 1608          Plan of Care Review    Plan of Care Reviewed With patient;spouse;family  -CL     Outcome Evaluation Gabriel De Souza is a 78 y.o. male admitted with Hyperkalemia, bradycardia,Hypotension as well as bilateral kidney stones. He underwent cystoscopy with bilateral uretral stents placed by Dr. Avitia on 1/16/24  PMH:  DM, HLD, HTN, CKD. Pt lives with wife in a H with basement. Shower is in basement. Pt is typically independent with moblility without AD. Pt reports neuropathy in feet,denies any falls but states he balance is affected.  At time of PT evaluation he is A&O x 3 with c/o soreness from surgery.  He requires min A for bed mobility, min A for transfers and short distance ambulation.   He fatigues with transfer from bed to chair.  He would benefit from SNF for therapy at discharge  -       Row Name 01/17/24 1608          Therapy Assessment/Plan (PT)    Rehab Potential (PT) good, to achieve stated therapy goals  -CL     Criteria for Skilled Interventions Met (PT) yes;skilled treatment is necessary  -CL     Therapy Frequency (PT) 5 times/wk  -CL     Predicted Duration of Therapy Intervention (PT) Until discharge  -CL       Row Name 01/17/24 1608          Vital Signs    Pre Systolic BP Rehab 123  -CL     Pre Treatment Diastolic BP 68  -CL     Intra Systolic BP Rehab 128  -CL     Intra Treatment Diastolic BP 65  -CL     Post Systolic BP Rehab 128  -CL     Post Treatment Diastolic BP 65  -CL     Pretreatment Heart Rate (beats/min) 98  -CL     Intratreatment Heart Rate (beats/min) 68  -CL     Posttreatment Heart Rate (beats/min) 64  -CL     Pretreatment Resp Rate (breaths/min) 14  -CL     Intratreatment Resp Rate (breaths/min) 15  -CL     Posttreatment Resp Rate (breaths/min) 13  -CL     Pre SpO2 (%) 97  -CL     O2 Delivery Pre Treatment room air  -CL     Intra SpO2 (%) 96  -CL     O2 Delivery Intra Treatment room air  -CL     Post SpO2 (%) 96  -CL     O2 Delivery Post Treatment room air  -CL     Pre Patient Position Supine  -CL     Intra Patient Position Sitting  -CL     Post Patient Position Sitting  -CL       Row Name 01/17/24 1608          Positioning and Restraints    Pre-Treatment Position in bed  -CL     Post Treatment Position chair  -CL     In Chair notified nsg;reclined;call light within reach;exit alarm on;with  family/caregiver  -CL               User Key  (r) = Recorded By, (t) = Taken By, (c) = Cosigned By      Initials Name Provider Type    CL Giselle Ventura, PT Physical Therapist                   Outcome Measures       Row Name 01/17/24 1613 01/17/24 0827       How much help from another person do you currently need...    Turning from your back to your side while in flat bed without using bedrails? 4  -CL 4  -CLA    Moving from lying on back to sitting on the side of a flat bed without bedrails? 3  -CL 4  -CLA    Moving to and from a bed to a chair (including a wheelchair)? 3  -CL 2  -CLA    Standing up from a chair using your arms (e.g., wheelchair, bedside chair)? 3  -CL 2  -CLA    Climbing 3-5 steps with a railing? 2  -CL 2  -CLA    To walk in hospital room? 3  -CL 2  -CLA    AM-PAC 6 Clicks Score (PT) 18  -CL 16  -CLA    Highest Level of Mobility Goal 6 --> Walk 10 steps or more  -CL 5 --> Static standing  -CLA              User Key  (r) = Recorded By, (t) = Taken By, (c) = Cosigned By      Initials Name Provider Type    CLA Maggy Keith, RN Registered Nurse    CL Giselle Ventura, PT Physical Therapist                                 Physical Therapy Education       Title: PT OT SLP Therapies (Done)       Topic: Physical Therapy (Done)       Point: Mobility training (Done)       Learning Progress Summary             Patient Acceptance, E,TB, VU by CL at 1/17/2024 1614                                         User Key       Initials Effective Dates Name Provider Type Discipline     03/02/22 -  Giselle Ventura, PT Physical Therapist PT                  PT Recommendation and Plan  Planned Therapy Interventions (PT): balance training, bed mobility training, gait training, patient/family education, stair training, strengthening, transfer training  Plan of Care Reviewed With: patient, spouse, family  Outcome Evaluation: Gabriel De Souza is a 78 y.o. male admitted with Hyperkalemia, bradycardia,Hypotension as  well as bilateral kidney stones. He underwent cystoscopy with bilateral uretral stents placed by Dr. Avitia on 1/16/24  PMH: DM, HLD, HTN, CKD. Pt lives with wife in a H with basement. Shower is in basement. Pt is typically independent with moblility without AD. Pt reports neuropathy in feet,denies any falls but states he balance is affected.  At time of PT evaluation he is A&O x 3 with c/o soreness from surgery.  He requires min A for bed mobility, min A for transfers and short distance ambulation.   He fatigues with transfer from bed to chair.  He would benefit from SNF for therapy at discharge     Time Calculation:   PT Evaluation Complexity  History, PT Evaluation Complexity: 3 or more personal factors and/or comorbidities  Examination of Body Systems (PT Eval Complexity): total of 3 or more elements  Clinical Presentation (PT Evaluation Complexity): evolving  Clinical Decision Making (PT Evaluation Complexity): moderate complexity  Overall Complexity (PT Evaluation Complexity): moderate complexity     PT Charges       Row Name 01/17/24 1614             Time Calculation    Start Time 1433  -CL      Stop Time 1453  -CL      Time Calculation (min) 20 min  -CL      PT Received On 01/17/24  -CL      PT - Next Appointment 01/18/24  -CL      PT Goal Re-Cert Due Date 01/31/24  -CL         Time Calculation- PT    Total Timed Code Minutes- PT 0 minute(s)  -CL                User Key  (r) = Recorded By, (t) = Taken By, (c) = Cosigned By      Initials Name Provider Type    CL Giselle Ventura, PT Physical Therapist                  Therapy Charges for Today       Code Description Service Date Service Provider Modifiers Qty    [id number]  PT EVAL MOD COMPLEXITY 4 1/17/2024 Giselle Ventura, PT GP 1            PT G-Codes  AM-PAC 6 Clicks Score (PT): 18  PT Discharge Summary  Anticipated Discharge Disposition (PT): skilled nursing facility    Giselle Ventura PT  1/17/2024

## 2024-01-18 ENCOUNTER — APPOINTMENT (OUTPATIENT)
Dept: NEPHROLOGY | Facility: HOSPITAL | Age: 79
End: 2024-01-18
Payer: MEDICARE

## 2024-01-18 LAB
ALBUMIN SERPL-MCNC: 2.8 G/DL (ref 3.5–5.2)
ALBUMIN/GLOB SERPL: 0.9 G/DL
ALP SERPL-CCNC: 93 U/L (ref 39–117)
ALT SERPL W P-5'-P-CCNC: 20 U/L (ref 1–41)
ANION GAP SERPL CALCULATED.3IONS-SCNC: 16 MMOL/L (ref 5–15)
ANISOCYTOSIS BLD QL: ABNORMAL
APTT PPP: 41.3 SECONDS (ref 24–31)
APTT PPP: NORMAL S
AST SERPL-CCNC: 21 U/L (ref 1–40)
BACTERIA SPEC AEROBE CULT: ABNORMAL
BACTERIA SPEC AEROBE CULT: NO GROWTH
BILIRUB SERPL-MCNC: 0.2 MG/DL (ref 0–1.2)
BUN SERPL-MCNC: 100 MG/DL (ref 8–23)
BUN/CREAT SERPL: 13 (ref 7–25)
BURR CELLS BLD QL SMEAR: ABNORMAL
CALCIUM SPEC-SCNC: 8.3 MG/DL (ref 8.6–10.5)
CHLORIDE SERPL-SCNC: 102 MMOL/L (ref 98–107)
CO2 SERPL-SCNC: 24 MMOL/L (ref 22–29)
CREAT SERPL-MCNC: 7.68 MG/DL (ref 0.76–1.27)
DACRYOCYTES BLD QL SMEAR: ABNORMAL
DEPRECATED RDW RBC AUTO: 46.8 FL (ref 37–54)
EGFRCR SERPLBLD CKD-EPI 2021: 6.7 ML/MIN/1.73
ERYTHROCYTE [DISTWIDTH] IN BLOOD BY AUTOMATED COUNT: 15.5 % (ref 12.3–15.4)
GLOBULIN UR ELPH-MCNC: 3.2 GM/DL
GLUCOSE BLDC GLUCOMTR-MCNC: 72 MG/DL (ref 70–105)
GLUCOSE BLDC GLUCOMTR-MCNC: 73 MG/DL (ref 70–105)
GLUCOSE BLDC GLUCOMTR-MCNC: 81 MG/DL (ref 70–105)
GLUCOSE BLDC GLUCOMTR-MCNC: 84 MG/DL (ref 70–105)
GLUCOSE SERPL-MCNC: 78 MG/DL (ref 65–99)
HCT VFR BLD AUTO: 27.3 % (ref 37.5–51)
HGB BLD-MCNC: 9 G/DL (ref 13–17.7)
INR PPP: 1.27 (ref 0.9–1.1)
LYMPHOCYTES # BLD MANUAL: 0.42 10*3/MM3 (ref 0.7–3.1)
LYMPHOCYTES NFR BLD MANUAL: 8 % (ref 5–12)
MAGNESIUM SERPL-MCNC: 1.9 MG/DL (ref 1.6–2.4)
MCH RBC QN AUTO: 28.8 PG (ref 26.6–33)
MCHC RBC AUTO-ENTMCNC: 33 G/DL (ref 31.5–35.7)
MCV RBC AUTO: 87.3 FL (ref 79–97)
MICROCYTES BLD QL: ABNORMAL
MONOCYTES # BLD: 1.7 10*3/MM3 (ref 0.1–0.9)
NEUTROPHILS # BLD AUTO: 19.08 10*3/MM3 (ref 1.7–7)
NEUTROPHILS NFR BLD MANUAL: 86 % (ref 42.7–76)
NEUTS BAND NFR BLD MANUAL: 4 % (ref 0–5)
PHOSPHATE SERPL-MCNC: 9.3 MG/DL (ref 2.5–4.5)
PLATELET # BLD AUTO: 210 10*3/MM3 (ref 140–450)
PMV BLD AUTO: 8.4 FL (ref 6–12)
POIKILOCYTOSIS BLD QL SMEAR: ABNORMAL
POTASSIUM SERPL-SCNC: 4.9 MMOL/L (ref 3.5–5.2)
PROT SERPL-MCNC: 6 G/DL (ref 6–8.5)
PROTHROMBIN TIME: 16.1 SECONDS (ref 11.7–14.2)
RBC # BLD AUTO: 3.13 10*6/MM3 (ref 4.14–5.8)
SMALL PLATELETS BLD QL SMEAR: ADEQUATE
SODIUM SERPL-SCNC: 142 MMOL/L (ref 136–145)
VARIANT LYMPHS NFR BLD MANUAL: 1 % (ref 0–5)
VARIANT LYMPHS NFR BLD MANUAL: 1 % (ref 19.6–45.3)
WBC MORPH BLD: NORMAL
WBC NRBC COR # BLD AUTO: 21.2 10*3/MM3 (ref 3.4–10.8)

## 2024-01-18 PROCEDURE — 83735 ASSAY OF MAGNESIUM: CPT | Performed by: HOSPITALIST

## 2024-01-18 PROCEDURE — 82948 REAGENT STRIP/BLOOD GLUCOSE: CPT | Performed by: SURGERY

## 2024-01-18 PROCEDURE — 82948 REAGENT STRIP/BLOOD GLUCOSE: CPT

## 2024-01-18 PROCEDURE — 84100 ASSAY OF PHOSPHORUS: CPT | Performed by: HOSPITALIST

## 2024-01-18 PROCEDURE — 25510000001 IOPAMIDOL PER 1 ML: Performed by: SURGERY

## 2024-01-18 PROCEDURE — 85007 BL SMEAR W/DIFF WBC COUNT: CPT | Performed by: NURSE PRACTITIONER

## 2024-01-18 PROCEDURE — 99221 1ST HOSP IP/OBS SF/LOW 40: CPT | Performed by: INTERNAL MEDICINE

## 2024-01-18 PROCEDURE — 25010000002 ERTAPENEM PER 500 MG: Performed by: NURSE PRACTITIONER

## 2024-01-18 PROCEDURE — 85730 THROMBOPLASTIN TIME PARTIAL: CPT | Performed by: HOSPITALIST

## 2024-01-18 PROCEDURE — 85007 BL SMEAR W/DIFF WBC COUNT: CPT | Performed by: HOSPITALIST

## 2024-01-18 PROCEDURE — 85730 THROMBOPLASTIN TIME PARTIAL: CPT | Performed by: RADIOLOGY

## 2024-01-18 PROCEDURE — 85025 COMPLETE CBC W/AUTO DIFF WBC: CPT | Performed by: HOSPITALIST

## 2024-01-18 PROCEDURE — C1894 INTRO/SHEATH, NON-LASER: HCPCS | Performed by: SURGERY

## 2024-01-18 PROCEDURE — 80053 COMPREHEN METABOLIC PANEL: CPT | Performed by: HOSPITALIST

## 2024-01-18 PROCEDURE — C1725 CATH, TRANSLUMIN NON-LASER: HCPCS | Performed by: SURGERY

## 2024-01-18 PROCEDURE — 25010000002 HEPARIN (PORCINE) PER 1000 UNITS: Performed by: HOSPITALIST

## 2024-01-18 PROCEDURE — 85610 PROTHROMBIN TIME: CPT | Performed by: HOSPITALIST

## 2024-01-18 PROCEDURE — C1769 GUIDE WIRE: HCPCS | Performed by: SURGERY

## 2024-01-18 PROCEDURE — 03180ZD BYPASS LEFT BRACHIAL ARTERY TO UPPER ARM VEIN, OPEN APPROACH: ICD-10-PCS | Performed by: SURGERY

## 2024-01-18 PROCEDURE — 057F3ZZ DILATION OF LEFT CEPHALIC VEIN, PERCUTANEOUS APPROACH: ICD-10-PCS | Performed by: SURGERY

## 2024-01-18 PROCEDURE — 85025 COMPLETE CBC W/AUTO DIFF WBC: CPT | Performed by: NURSE PRACTITIONER

## 2024-01-18 RX ORDER — SEVELAMER CARBONATE 800 MG/1
2400 TABLET, FILM COATED ORAL
Status: DISCONTINUED | OUTPATIENT
Start: 2024-01-18 | End: 2024-01-25 | Stop reason: HOSPADM

## 2024-01-18 RX ORDER — LIDOCAINE HYDROCHLORIDE 20 MG/ML
INJECTION, SOLUTION INFILTRATION; PERINEURAL
Status: DISCONTINUED | OUTPATIENT
Start: 2024-01-18 | End: 2024-01-18 | Stop reason: HOSPADM

## 2024-01-18 RX ADMIN — HEPARIN SODIUM 5000 UNITS: 5000 INJECTION INTRAVENOUS; SUBCUTANEOUS at 05:22

## 2024-01-18 RX ADMIN — CALCITRIOL CAPSULES 0.25 MCG 0.5 MCG: 0.25 CAPSULE ORAL at 10:37

## 2024-01-18 RX ADMIN — ATORVASTATIN CALCIUM 10 MG: 10 TABLET, FILM COATED ORAL at 10:38

## 2024-01-18 RX ADMIN — SEVELAMER CARBONATE 2400 MG: 800 TABLET, FILM COATED ORAL at 18:00

## 2024-01-18 RX ADMIN — Medication 10 ML: at 20:00

## 2024-01-18 RX ADMIN — APIXABAN 2.5 MG: 2.5 TABLET, FILM COATED ORAL at 18:00

## 2024-01-18 RX ADMIN — Medication 10 ML: at 10:37

## 2024-01-18 RX ADMIN — ERTAPENEM SODIUM 500 MG: 1 INJECTION, POWDER, LYOPHILIZED, FOR SOLUTION INTRAMUSCULAR; INTRAVENOUS at 17:11

## 2024-01-18 NOTE — PROGRESS NOTES
Vascular Surgery     Dr. Kirti sierra/onc asked me to weigh in regarding left lower extremity DVT.  I have had a chance to review records.  This is what I told him:  We have no previous left lower extremity venous duplex scan here documenting DVT.  My review of the sonographic images demonstrates acute or subacute proximal LLE DVT which as you indicate is at risk for embolism. Thrombus from these larger veins could be significant volume-wise if they embolize.  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  I understand an argument for lower dose apixaban, but point out that the reduced apixaban dose applies to the atrial fibrillation indication and not to acute VTE. I guess the most conservative approach if we cannot give apixaban 5 mg twice daily would be to place IVC filter, with or without apixaban at 2.5 twice daily. Judgment call for sure but I think that would be the board answer. IR places filters here.

## 2024-01-18 NOTE — DISCHARGE PLACEMENT REQUEST
"Gabriel Collins (78 y.o. Male)       Date of Birth   1945    Social Security Number       Address   [address] [place] [facility]    Home Phone   [phone]    MRN   [id number]       [facility]    Marital Status                               Admission Date   1/15/24    Admission Type   Emergency    Admitting Provider   Licha Clinton MD    Attending Provider   Licha Clinton MD    Department, Room/Bed   [facility] 2D, 270/1       Discharge Date       Discharge Disposition       Discharge Destination                                 Attending Provider: Licha Clinton MD    Allergies: Tylenol With Codeine #3 [Acetaminophen-codeine]    Isolation: None   Infection: None   Code Status: CPR    Ht: 185.4 cm (73\")   Wt: 97.3 kg (214 lb 8.1 oz)    Admission Cmt: None   Principal Problem: Hyperkalemia [E87.5]                   Active Insurance as of 1/15/2024       Primary Coverage       Payor Plan Insurance Group Employer/Plan Group    MEDICARE RAILROAD MEDICARE        Payor Plan Address Payor Plan Phone Number Payor Plan Fax Number Effective Dates    [address] [phone]  6/1/2011 - None Entered    [facility] [id number]         Subscriber Name Subscriber Birth Date Member ID       GABRIEL COLLINS 1945 [id number]               Secondary Coverage       Payor Plan Insurance Group Employer/Plan Group    [facility] [id number]       Coverage Address Coverage Phone Number Coverage Fax Number Effective Dates    [address] [phone]  1/1/2023 - None Entered    [facility] [address]         Subscriber Name Subscriber Birth Date Member ID       GABRIEL COLLINS 1945 [id number]                     Emergency Contacts        (Rel.) Home Phone Work Phone Mobile Phone    Pat Collins (Spouse) -- -- [phone]    NEAL COLLINS (Son) -- [phone]                 History & Physical        Licha Clinton MD at 01/15/24 " 1602              [facility] Medicine Services  History & Physical    Patient Name: Gabriel De Souza  : 1945  MRN: [id number]  Primary Care Physician:  Waylon Johnson MD  Date of admission: 1/15/2024  Date and Time of Service: 1/15/2024     Subjective      Chief Complaint: Hypotension and low blood sugar    History of Present Illness: Gabriel De Souza is a 78 y.o. male with past medical history of diabetes mellitus dyslipidemia hypertension presented to the hospital with complaints of low blood pressure and low blood sugar.  Patient has history of CKD and was progressively worsening to the point that he had a fistula placed for possible dialysis.  He has been feeling bad since Chari and went to see his PCP a week back when he was given antibiotics for UTI however did not really help much.  He has been nauseated been vomiting now and then, has been having some abdominal discomfort, which progressively became worse.  Today the wife took his blood pressure which was running low also blood sugar was low so they went to nephrology office and his heart rate was also found to be low in the office so he was referred to ER for further evaluation.  Patient denies any new symptoms, no chest pain lightheadedness or dizziness but does have mild abdominal pain and nausea as mentioned above.  Upon presentation to the ER, patient was found to have a potassium of 7.3, systolic blood pressure of 80, heart rate around 44.  EKG showed sinus bradycardia.  BUN up to 56, creatinine of 16.11. .  Patient is currently a full code.  CT scan of the abdomen pelvis also came back later that showed moderate bilateral hydronephrosis secondary to obstructing right medical calculus measuring 9 mm left mid ureteral calculus measuring 8 mm.  3.1 cm complex axis the lower lobe of the right kidney containing either mural nodularity or small volume hemorrhage.  Patient also does have an elevated white count of 23.10.  Blood cultures  are in process.  UA showed small leukocytes, also moderate amount of blood        Review of Systems  14 point ROS is negative other than what is stated positive above  Personal History     Past Medical History:   Diagnosis Date    Cancer 01/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    Coronary artery disease     Deep vein thrombosis 04/1990    Diabetes mellitus     Dyslipidemia     Erectile dysfunction 51 years old    Heart murmur 10/4/2023    HL (hearing loss) 7 year old    right    Hyperlipidemia 1970    Hypertension     Neuropathy     Renal insufficiency 2020    Stage 4 chronic kidney disease     3-4       Past Surgical History:   Procedure Laterality Date    ARTERIOVENOUS FISTULA/SHUNT SURGERY Left 9/22/2022    Procedure: BRACHIAL CEPHALIC FISTULA FORMATION;  Surgeon: Edy Vega MD;  Location: [facility] MAIN OR;  Service: Vascular;  Laterality: Left;    BACK SURGERY      BASAL CELL CARCINOMA EXCISION  01/2019    CARDIAC CATHETERIZATION      TURP / TRANSURETHRAL INCISION / DRAINAGE PROSTATE         Family History: family history includes Hearing loss in his father; Heart attack in his maternal grandfather; Heart disease in his brother and mother; Hypertension in his father. Otherwise pertinent FHx was reviewed and not pertinent to current issue.    Social History:  reports that he quit smoking about 57 years ago. His smoking use included cigarettes. He has been exposed to tobacco smoke. He has never used smokeless tobacco. He reports that he does not drink alcohol and does not use drugs.    Home Medications:  Prior to Admission Medications       Prescriptions Last Dose Informant Patient Reported? Taking?    amLODIPine (NORVASC) 5 MG tablet 1/14/2024  Yes Yes    Take 1 tablet by mouth Daily.    aspirin 81 MG tablet 1/14/2024  Yes Yes    Take 1 tablet by mouth Daily.    atenolol (TENORMIN) 50 MG tablet 1/14/2024  Yes Yes    Take 1 tablet by mouth Daily.    calcitriol (ROCALTROL) 0.25 MCG capsule 1/14/2024  Yes Yes    Take 2  capsules by mouth Daily.    ciprofloxacin (CIPRO) 250 MG tablet 1/14/2024  Yes Yes    Take 1 tablet by mouth 2 (Two) Times a Day.    Insulin Glargine-Lixisenatide (SOLIQUA) 100-33 UNT-MCG/ML solution pen-injector injection   Yes Yes    Inject 20 Units under the skin into the appropriate area as directed Daily.    sevelamer (RENVELA) 800 MG tablet 1/14/2024  Yes Yes    Take 1 tablet by mouth 3 (Three) Times a Day As Needed.    simvastatin (ZOCOR) 20 MG tablet   Yes Yes    Take 1 tablet by mouth Every Night.    torsemide (DEMADEX) 20 MG tablet   Yes Yes    Take 1 tablet by mouth Daily.              Allergies:  Allergies   Allergen Reactions    Tylenol With Codeine #3 [Acetaminophen-Codeine] Nausea Only and GI Intolerance       Objective      Vitals:   Temp:  [98 °F (36.7 °C)] 98 °F (36.7 °C)  Heart Rate:  [40-44] 41  Resp:  [12-20] 14  BP: ()/(53-61) 94/53  Body mass index is 28.37 kg/m².  Physical Exam  Exam, awake and alert, hard of hearing  HEENT normocephalic atraumatic  Chest clear to auscultation bilaterally  CVs S1-S2 normal, regular rhythm, bradycardia  Abdomen soft nontender nondistended bowel sounds positive  Extremities warm to touch 2+ pulses no edema  Neuro AOx3, grossly normal      Diagnostic Data:  Lab Results (last 24 hours)       Procedure Component Value Units Date/Time    COVID-19 and FLU A/B PCR, 1 HR TAT - Swab, Nasopharynx [[id number]] Collected: 01/15/24 1602    Specimen: Swab from Nasopharynx Updated: 01/15/24 1602    POC Glucose Once [[id number]]  (Abnormal) Collected: 01/15/24 1519    Specimen: Blood Updated: 01/15/24 1521     Glucose 147 mg/dL      Comment: Serial Number: [id number]:  [id number]       Hepatitis B Surface Antigen [[id number]] Collected: 01/15/24 1125    Specimen: Blood Updated: 01/15/24 1430    POC Lactate [[id number]]  (Normal) Collected: 01/15/24 1328    Specimen: Blood Updated: 01/15/24 1339     Lactate 1.9 mmol/L      Comment: Serial Number: [id number]:   [id number]       Comprehensive Metabolic Panel [[id number]]  (Abnormal) Collected: 01/15/24 1223    Specimen: Blood from Arm, Right Updated: 01/15/24 1304     Glucose 111 mg/dL       mg/dL      Creatinine 16.11 mg/dL      Sodium 140 mmol/L      Potassium 7.3 mmol/L      Chloride 100 mmol/L      CO2 9.0 mmol/L      Calcium 9.4 mg/dL      Total Protein 6.9 g/dL      Albumin 3.2 g/dL      ALT (SGPT) 17 U/L      AST (SGOT) 18 U/L      Alkaline Phosphatase 102 U/L      Total Bilirubin 0.2 mg/dL      Globulin 3.7 gm/dL      A/G Ratio 0.9 g/dL      BUN/Creatinine Ratio 15.9     Anion Gap 31.0 mmol/L      eGFR 2.8 mL/min/1.73      Comment: <15 Indicative of kidney failure       Narrative:      GFR Normal >60  Chronic Kidney Disease <60  Kidney Failure <15    The GFR formula is only valid for adults with stable renal function between ages 18 and 70.    Lipase [[id number]]  (Abnormal) Collected: 01/15/24 1223    Specimen: Blood from Arm, Right Updated: 01/15/24 1304     Lipase >3,000 U/L     Magnesium [[id number]]  (Abnormal) Collected: 01/15/24 1223    Specimen: Blood from Arm, Right Updated: 01/15/24 1304     Magnesium 2.9 mg/dL     CK [[id number]]  (Normal) Collected: 01/15/24 1223    Specimen: Blood from Arm, Right Updated: 01/15/24 1252     Creatine Kinase 44 U/L     Extra Tubes [[id number]] Collected: 01/15/24 1125    Specimen: Blood, Venous Line Updated: 01/15/24 1230    Narrative:      The following orders were created for panel order Extra Tubes.  Procedure                               Abnormality         Status                     ---------                               -----------         ------                     Gold Top - SST[[id number]]                                   Final result               Green Top (Gel)[[id number]]                                  Final result               Light Blue Top[[id number]]                                   Final result                 Please view results for these tests on the  individual orders.    Gold Top - SST [[id number]] Collected: 01/15/24 1125    Specimen: Blood Updated: 01/15/24 1230     Extra Tube Hold for add-ons.     Comment: Auto resulted.       Green Top (Gel) [[id number]] Collected: 01/15/24 1125    Specimen: Blood Updated: 01/15/24 1230     Extra Tube Hold for add-ons.     Comment: Auto resulted.       Light Blue Top [[id number]] Collected: 01/15/24 1125    Specimen: Blood from Arm, Right Updated: 01/15/24 1230     Extra Tube Hold for add-ons.     Comment: Auto resulted       Urinalysis, Microscopic Only - Urine, Clean Catch [[id number]]  (Abnormal) Collected: 01/15/24 1145    Specimen: Urine, Clean Catch Updated: 01/15/24 1209     RBC, UA None Seen /HPF      WBC, UA 0-2 /HPF      Bacteria, UA Trace /HPF      Squamous Epithelial Cells, UA 0-2 /HPF      Hyaline Casts, UA None Seen /LPF      Methodology Manual Light Microscopy    TSH [[id number]]  (Normal) Collected: 01/15/24 1125    Specimen: Blood from Arm, Right Updated: 01/15/24 1205     TSH 1.270 uIU/mL     Urinalysis With Microscopic If Indicated (No Culture) - Urine, Clean Catch [[id number]]  (Abnormal) Collected: 01/15/24 1145    Specimen: Urine, Clean Catch Updated: 01/15/24 1153     Color, UA Yellow     Appearance, UA Clear     pH, UA <=5.0     Specific Gravity, UA 1.020     Glucose, UA Negative     Ketones, UA Negative     Bilirubin, UA Negative     Blood, UA Moderate (2+)     Protein,  mg/dL (2+)     Leuk Esterase, UA Small (1+)     Nitrite, UA Negative     Urobilinogen, UA 0.2 E.U./dL    CBC & Differential [[id number]]  (Abnormal) Collected: 01/15/24 1125    Specimen: Blood from Arm, Right Updated: 01/15/24 1138    Narrative:      The following orders were created for panel order CBC & Differential.  Procedure                               Abnormality         Status                     ---------                               -----------         ------                     CBC Auto Differential[[id number]]         Abnormal            Final result                 Please view results for these tests on the individual orders.    CBC Auto Differential [[id number]]  (Abnormal) Collected: 01/15/24 1125    Specimen: Blood from Arm, Right Updated: 01/15/24 1138     WBC 23.10 10*3/mm3      RBC 3.69 10*6/mm3      Hemoglobin 10.4 g/dL      Hematocrit 32.4 %      MCV 87.7 fL      MCH 28.2 pg      MCHC 32.2 g/dL      RDW 16.4 %      RDW-SD 53.8 fl      MPV 8.4 fL      Platelets 274 10*3/mm3      Neutrophil % 84.4 %      Lymphocyte % 6.1 %      Monocyte % 9.3 %      Eosinophil % 0.0 %      Basophil % 0.2 %      Neutrophils, Absolute 19.50 10*3/mm3      Lymphocytes, Absolute 1.40 10*3/mm3      Monocytes, Absolute 2.20 10*3/mm3      Eosinophils, Absolute 0.00 10*3/mm3      Basophils, Absolute 0.10 10*3/mm3      nRBC 0.2 /100 WBC     Blood Culture - Blood, Arm, Left [[id number]] Collected: 01/15/24 1125    Specimen: Blood from Arm, Left Updated: 01/15/24 1130    Blood Culture - Blood, Arm, Right [[id number]] Collected: 01/15/24 1126    Specimen: Blood from Arm, Right Updated: 01/15/24 1130             Imaging Results (Last 24 Hours)       Procedure Component Value Units Date/Time    CT Abdomen Pelvis Without Contrast [[id number]] Collected: 01/15/24 1504     Updated: 01/15/24 1515    Narrative:      CT ABDOMEN PELVIS WO CONTRAST    Date of Exam: 1/15/2024 2:47 PM EST    Indication: Nominal pain diarrhea elevated white count renal failure.    Comparison: Renal sonogram performed on March 21, 2022.    Technique: Axial CT images were obtained of the abdomen and pelvis without the administration of contrast. Sagittal and coronal reconstructions were performed.  Automated exposure control and iterative reconstruction methods were used.      Findings:    Lung Bases:  Mild right base atelectasis visualized.      Peritoneum:  No free intraperitoneal air or fluid.     Abdominal wall:  Unremarkable.    Liver:  Liver is normal in size and contour. No focal  lesions.      Biliary/Gallbladder:  The gallbladder is filled with calculi. No pericholecystic fluid visualized. The biliary tree is nondilated.    Pancreas:  Pancreas is within normal limits. There is no evidence of pancreatic mass or peripancreatic fluid.      Spleen:  Spleen is normal in size and contour. Multiple small splenic granulomas are visualized.    Gastrointestinal/Mesentery:   No evidence of bowel obstruction or gross inflammatory changes.The appendix appears within normal limits. There is descending and sigmoid diverticulosis without evidence of diverticulitis.      Adrenals:  Adrenal glands are unremarkable.      Kidneys:  The kidneys are in anatomic position. Punctate bilateral nonobstructing nephrolithiasis visualized. The kidneys are atrophic. There is moderate right hydronephrosis and an obstructing right mid ureteral calculus measuring 9 mm there is moderate left   hydronephrosis and an obstructing mid ureteral calculus measuring 8 mm. There is mild bilateral perinephric fat stranding. Multiple small cysts are visualized bilaterally. There is a complex cyst with either soft tissue component or small volume   hemorrhage in the lower pole of the right kidney measuring 3.1 cm.    Bladder:   The urinary bladder is not abnormally distended. Mild urinary bladder wall thickening secondary to underdistention however, sequela of chronic outlet obstruction can't be excluded.    Reproductive organs:    The prostate is moderately enlarged.      Retroperitoneal/Lymph Nodes:  No retroperitoneal adenopathy is identified.     Vasculature:  Extensive aortoiliac atheromatous calcifications. The aorta is normal in caliber.        Bony Structures:    No acute fracture or aggressive lesions.          Impression:      Impression:    Moderate bilateral hydronephrosis secondary to obstructing right mid ureteral calculus measuring 9 mm and left mid ureteral calculus measuring 8 mm.    3.1 cm complex cyst in the lower  pole of the right kidney containing either mural nodularity or small volume hemorrhage. If possible, correlation with contrast-enhanced renal protocol CT or MRI is suggested. If there is not possible due to renal   function, close follow-up findings of noncontrast CT or renal ultrasound is suggested. Consider urologic evaluation.    Cholelithiasis.    Additional findings per body of the report.            Electronically Signed: James Monsalve MD    1/15/2024 3:12 PM EST    Workstation ID: [id number]    XR Chest 1 View [[id number]] Collected: 01/15/24 1212     Updated: 01/15/24 1215    Narrative:      XR CHEST 1 VW    Date of Exam: 1/15/2024 11:58 AM EST    Indication: General weakness    Comparison: Two-view chest x-ray 1/3/2023, 1/23/2019.    Findings:  There are lower lung volumes, but lungs appear grossly clear. No pneumothorax or large pleural effusion is seen. Cardiomediastinal contours are not significantly changed, allowing for portable AP technique and lower lung volumes.      Impression:      Impression:  Low lung volumes without definite acute cardiopulmonary abnormality.      Electronically Signed: Madelyn Vaughn    1/15/2024 12:13 PM EST    Workstation ID: [id number]              Assessment & Plan        This is a 78 y.o. male with:    Active and Resolved Problems  Active Hospital Problems    Diagnosis  POA    **Hyperkalemia [E87.5]  Yes      Resolved Hospital Problems   No resolved problems to display.     CKD stage IV, progressively worsening, patient will require hemodialysis   nephrology consulted.  Plan for dialysis today.    Hyperkalemia  Secondary to above, patient is on bicarb drip and has received hyperkalemia cocktail in the ER, needs urgent dialysis today.  Nephrology on board.    Bilateral renal stones with hydronephrosis likely contributing to deterioration of the CKD  Urology has been consulted  Will cover with Rocephin 2 g IV daily for now.  Blood cultures have been sent  Order urine cultures as  well    Bradycardia and hypotension  Due to hyperkalemia  Patient has sinus bradycardia  Check TSH and free T4 as well  Consult cardiology  Holding atenolol and all other blood pressure medications, holding Lasix as well.                DVT prophylaxis:  No DVT prophylaxis order currently exists.  Scds      The patient desires to be as follows:    CODE STATUS:     Discussed with the patient and family, full code for now.    Full code  Admission Status:  I believe this patient meets inpatient status    Expected Length of Stay: Unknown    PDMP and Medication Dispenses via Sidebar reviewed and consistent with patient reported medications.    I discussed the patient's findings and my recommendations with patient and family.      Signature:     This document has been electronically signed by Licha Clinton MD on January 15, 2024 16:02 [facility]ist Team    Electronically signed by Licha Clinton MD at 01/15/24 1628          Physician Progress Notes (last 24 hours)        Moi Liz MD at 01/18/24 1153          Vascular Surgery     Dr. Kirti sierra/onc asked me to weigh in regarding left lower extremity DVT.  I have had a chance to review records.  This is what I told him:  We have no previous left lower extremity venous duplex scan here documenting DVT.  My review of the sonographic images demonstrates acute or subacute proximal LLE DVT which as you indicate is at risk for embolism. Thrombus from these larger veins could be significant volume-wise if they embolize.  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  I understand an argument for lower dose apixaban, but point out that the reduced apixaban dose applies to the atrial fibrillation indication and not to acute VTE. I guess the most conservative approach if we cannot give apixaban 5 mg twice daily would be to place IVC filter, with or without apixaban at 2.5 twice daily.  Judgment call for sure but I think that would be the board answer. IR places filters here.     Electronically signed by Moi Liz MD at 24 1156       Licha Clinton MD at 24 0957              [facility] MEDICINE SERVICE  DAILY PROGRESS NOTE    NAME: Gbariel De Souza  : 1945  MRN: [id number]      LOS: 3 days     PROVIDER OF SERVICE: Licha Clinton MD    Chief Complaint: Hyperkalemia    Subjective:     Interval History:  History taken from: patient  Patient seen and examined, he is doing much better.  Bradycardia and hypotension have resolved.  He did have low blood sugars in the morning with hypoglycemia protocol was activated.  He is currently resting comfortably.  Has  sandy quiñones on     patient seen and examined, he is lying in bed comfortably.  Eating drinking okay.  Having bowel movements as well.  Repeat blood cultures were sent by nephrology today     patient seen and examined,resting comfortably , denies any leg pain    Review of Systems:   Review of Systems  10 point ROS is negative other than what is stated positive above  Objective:     Vital Signs  Temp:  [97.3 °F (36.3 °C)-98.4 °F (36.9 °C)] 97.7 °F (36.5 °C)  Heart Rate:  [61-86] 65  Resp:  [16-20] 20  BP: ()/(49-77) 136/77  Flow (L/min):  [2] 2   Body mass index is 28.3 kg/m².    Physical Exam  Physical Exam  Exam, awake and alert, hard of hearing  HEENT normocephalic atraumatic  Chest clear to auscultation bilaterally  CVs S1-S2 normal, regular rhythm, bradycardia  Abdomen soft nontender nondistended bowel sounds positive  Extremities warm to touch 2+ pulses no edema  Neuro AOx3, grossly normal     Scheduled Meds   atorvastatin, 10 mg, Oral, Daily  calcitriol, 0.5 mcg, Oral, Daily  cefTRIAXone, 2,000 mg, Intravenous, Q24H  heparin (porcine), 5,000 Units, Subcutaneous, Q8H  insulin lispro, 2-7 Units, Subcutaneous, 4x Daily AC & at Bedtime  senna-docusate sodium, 2 tablet, Oral, BID  sevelamer, 2,400 mg,  Oral, TID With Meals  sodium chloride, 10 mL, Intravenous, Q12H       PRN Meds     acetaminophen    senna-docusate sodium **AND** polyethylene glycol **AND** bisacodyl **AND** bisacodyl    Calcium Replacement - Follow Nurse / BPA Driven Protocol    dextrose    dextrose    glucagon (human recombinant)    Magnesium Standard Dose Replacement - Follow Nurse / BPA Driven Protocol    nitroglycerin    ondansetron    ondansetron ODT    Phosphorus Replacement - Follow Nurse / BPA Driven Protocol    Potassium Replacement - Follow Nurse / BPA Driven Protocol    [COMPLETED] Insert Peripheral IV **AND** sodium chloride    sodium chloride    sodium chloride   Infusions           Diagnostic Data    Results from last 7 days   Lab Units 01/18/24  0352   WBC 10*3/mm3 21.20*   HEMOGLOBIN g/dL 9.0*   HEMATOCRIT % 27.3*   PLATELETS 10*3/mm3 210   GLUCOSE mg/dL 78   CREATININE mg/dL 7.68*   BUN mg/dL 100*   SODIUM mmol/L 142   POTASSIUM mmol/L 4.9   AST (SGOT) U/L 21   ALT (SGPT) U/L 20   ALK PHOS U/L 93   BILIRUBIN mg/dL 0.2   ANION GAP mmol/L 16.0*       No radiology results for the last day      I reviewed the patient's new clinical results.    Assessment/Plan:     Active and Resolved Problems  Active Hospital Problems    Diagnosis  POA    **Hyperkalemia [E87.5]  Yes    Mechanical complication of arteriovenous fistula surgically created [T82.590A]  Yes    End stage renal disease on dialysis [N18.6, Z99.2]  Not Applicable      Resolved Hospital Problems   No resolved problems to display.     CKD stage IV, now end-stage renal disease, hemodialysis has been started.  Patient has a fistula already however it was bleeding status post fistulogram and angioplasty on 1/18/2024.  Plan for dialysis today.  Renvela dose increased      Hyperkalemia  Resolved        Bilateral renal stones with hydronephrosis likely contributing to deterioration of the CKD  Urology was consulted, patient s/p placement of bilateral ureteral stents.  Continue  Rocephin 2 g IV daily for now.  Monitor blood and urine cultures.--Blood cultures negative for 24 hours, urine cultures growing Serratia, sensitive to Rocephin.  Continue  Patient currently has a Peterson in.  Urology following, plan to remove Peterson once creatinine improves and confusion improves, likely today.    Leukocytosis  continues to worsen.  Pro-Suraj was negative.  Consult ID.  Continue Rocephin for now.  Leukocytosis might be secondary to the clot burden  Repeat UA and blood cultures were sent yesterday.  Repeat UA still positive, repeat urine cultures and blood cultures are pending      Chronic DVT noted in left common femoral, proximal femoral, popliteal and gastrocnemius  Chronic LLE superficial thrombophlebitis in  small saphenous  Sub-acute DVT noted in left external iliac, deep femoral  Oncology following.  Continue subcu heparin for now.  However plan on starting Eliquis 2.5 mg p.o. twice daily also recommending IVC fracture.  I discussed with family and they are agreeable however they do want to talk to oncology as well..         Bradycardia and hypotension  Due to hyperkalemia  Resolved  Vitals now stable    Hypoglycemia  Blood glucose better now, encourage p.o. intake    DVT prophylaxis:  Medical and mechanical DVT prophylaxis orders are present.     Code status is   Code Status and Medical Interventions:   Ordered at: 01/15/24 9839     Level Of Support Discussed With:    Patient    Health Care Surrogate    Next of Kin (If No Surrogate)     Code Status (Patient has no pulse and is not breathing):    CPR (Attempt to Resuscitate)     Medical Interventions (Patient has pulse or is breathing):    Full Support       Plan for disposition: 1-2 days     Time: 30 minutes    Signature: Electronically signed by Licha Clinton MD, 01/18/24, 09:57 EST.  [facility] [place] [facility]    Electronically signed by Licha Clinton MD at 01/18/24 1217       Atul Fowler MD at 01/18/24 7657              [facility] Progress Note      Patient Name: Gabriel De Souza  : 1945  MRN: [id number]  Primary Care Physician:  Waylon Johnson MD  Date of admission: 1/15/2024    Subjective     Interval History:     S/p fistulogram and angioplasty this morning  Patient comfortably sitting.  Denies any chest pain, shortness of breath, nausea or vomiting  Patient more awake and able to answer simple questions    Review of Systems:   As noted above    Objective     Vitals:   Temp:  [97.3 °F (36.3 °C)-98.4 °F (36.9 °C)] 97.7 °F (36.5 °C)  Heart Rate:  [61-86] 65  Resp:  [16-20] 20  BP: ()/(49-77) 136/77  Flow (L/min):  [2] 2    Intake/Output Summary (Last 24 hours) at 2024 0937  Last data filed at 2024 0511  Gross per 24 hour   Intake 240 ml   Output 1900 ml   Net -1660 ml       Physical Exam:    General Appearance: Ill-appearing, NAD  HEENT: oral mucosa normal, nonicteric sclera  Neck: supple, no JVD  Lungs: CTA  Heart: RRR, normal S1 and S2  Abdomen: soft, nondistended  Extremities: no edema  Neuro: Awake alert and moving all extremities    Scheduled Meds:     atorvastatin, 10 mg, Oral, Daily  calcitriol, 0.5 mcg, Oral, Daily  cefTRIAXone, 2,000 mg, Intravenous, Q24H  heparin (porcine), 5,000 Units, Subcutaneous, Q8H  insulin lispro, 2-7 Units, Subcutaneous, 4x Daily AC & at Bedtime  senna-docusate sodium, 2 tablet, Oral, BID  sevelamer, 1,600 mg, Oral, TID With Meals  sodium chloride, 10 mL, Intravenous, Q12H      IV Meds:          Results Reviewed:   I have personally reviewed the results from the time of this admission to 2024 09:37 EST     Results from last 7 days   Lab Units 24  0352 24  0847 24  0646   SODIUM mmol/L 142 139  --  140   POTASSIUM mmol/L 4.9 4.4  --  4.7   CHLORIDE mmol/L 102 99  --  101   CO2 mmol/L 24.0 21.0*  --  17.0*   BUN mg/dL 100* 89* 73* 150*   CREATININE mg/dL 7.68* 6.73*  --  10.52*   CALCIUM mg/dL 8.3* 7.9*  --  8.3*   BILIRUBIN  mg/dL 0.2 0.3  --  0.2   ALK PHOS U/L 93 122*  --  94   ALT (SGPT) U/L 20 24  --  20   AST (SGOT) U/L 21 24  --  28   GLUCOSE mg/dL 78 140*  --  39*     Estimated Creatinine Clearance: 9.9 mL/min (A) (by C-G formula based on SCr of 7.68 mg/dL (H)).  Results from last 7 days   Lab Units 24  0352 24  0847 24  0646   MAGNESIUM mg/dL 1.9 1.8 1.9   PHOSPHORUS mg/dL 9.3* 8.0* 11.0*         Results from last 7 days   Lab Units 24  0352 24  0848 24  0646 01/15/24  1125   WBC 10*3/mm3 21.20* 17.20* 15.60* 23.10*   HEMOGLOBIN g/dL 9.0* 8.6* 9.1* 10.4*   PLATELETS 10*3/mm3 210 198 260 274           Assessment / Plan     ASSESSMENT:  End-stage renal disease.  Patient has advanced chronic kidney disease and presented today with generalized weakness, uremic symptoms, worsened renal function along with hyperkalemia and metabolic acidosis.  status post hemodialysis  and 1/15  Obstructive uropathy.  Status post bilateral ureteral stents placement  Hyperkalemia.  Secondary to renal failure.  Improved  Metabolic acidosis.  Increased anion gap, secondary to renal failure.  Improving with dialysis  Anemia in chronic kidney disease.   Bradycardia.  Most likely due to hyperkalemia.  Improved  hyperphosphatemia.   DVT.  Hematology following.  Possible transition to low-dose Eliquis  Urinary tract infection.  Patient is on IV Rocephin.  Urine culture growing Serratia Marcescans    PLAN:  Hemodialysis today  S/p fistulogram and angioplasty for stenosis  Increase Renvela dose  On IV Rocephin.Urine culture growing Serratia Marcescans  Arrange for outpatient dialysis  Possible transition to Eliquis for DVTs.  Hematology following      Atul Fowler MD  24  09:37 [facility]    [facility]  [phone]               Electronically signed by Atul Fowler MD at 24 0986       Moi Liz MD at 24 0816            Name: Gabriel De Souza ADMIT: 1/15/2024   : 1945   PCP: Waylon Johnson MD    MRN: [id number] LOS: 3 days   AGE/SEX: 78 y.o. male  ROOM: [facility]    [place], subsequent hospital care, including subsequent note later today    History:  78 y.o. male with end-stage renal disease now on hemodialysis via left brachiocephalic AV fistula.  Had bleeding following dialysis run.  No further bleeding overnight.  No upper extremity symptoms.    Scheduled Medications:   [MAR Hold] atorvastatin, 10 mg, Oral, Daily  [MAR Hold] calcitriol, 0.5 mcg, Oral, Daily  cefTRIAXone, 2,000 mg, Intravenous, Q24H  [MAR Hold] heparin (porcine), 5,000 Units, Subcutaneous, Q8H  [MAR Hold] insulin lispro, 2-7 Units, Subcutaneous, 4x Daily AC & at Bedtime  [MAR Hold] senna-docusate sodium, 2 tablet, Oral, BID  [MAR Hold] sevelamer, 1,600 mg, Oral, TID With Meals  [MAR Hold] sodium chloride, 10 mL, Intravenous, Q12H    Vital Signs  Vital Signs Patient Vitals for the past 24 hrs:   BP Temp Temp src Pulse Resp SpO2 Weight   24 0807 136/77 -- -- 65 20 97 % --   24 0512 133/68 97.7 °F (36.5 °C) Oral -- 16 -- 97.3 kg (214 lb 8.1 oz)   24 0000 128/69 97.3 °F (36.3 °C) Oral 67 16 94 % --   24 2330 108/53 -- -- 62 -- -- --   24 2300 133/71 97.4 °F (36.3 °C) Oral 69 -- 96 % --   24 2200 96/54 -- -- 86 -- -- --   24 2130 104/54 -- -- 64 -- 93 % --   24 2100 101/49 -- -- 61 -- 90 % --   24 123/67 98.4 °F (36.9 °C) Oral 65 16 96 % --   24 1930 128/67 -- -- 64 -- 96 % --   24 1900 131/70 -- -- 64 -- 98 % --   24 1830 128/63 -- -- 69 -- 96 % --   24 1800 127/67 -- -- 67 -- 96 % --   24 1700 123/68 -- -- 68 -- 95 % --   24 1600 118/69 98.2 °F (36.8 °C) Oral 69 16 97 % --   24 1500 130/65 -- -- 71 -- 97 % --   24 1400 118/68 -- -- 61 -- 95 % --   24 1300 129/67 -- -- 62 -- 96 % --   24 1200 112/57 -- -- 65 16 95 % --   24 1150 -- 97.8 °F (36.6 °C) Oral -- -- -- --    01/17/24 1100 119/64 -- -- 63 -- 95 % --   01/17/24 1021 -- 98 °F (36.7 °C) Axillary -- -- -- --   01/17/24 1000 116/62 -- -- 66 -- 94 % --   01/17/24 0900 127/70 -- -- 65 -- 96 % --     I/O:  I/O last 3 completed shifts:  In: 3285 [P.O.:390; I.V.:2895]  Out: 3350 [Urine:3350]  Physical Exam: Patent fistula with pulsatile thrill and bruit.  No upper extremity edema.    CBC    Results from last 7 days   Lab Units 01/18/24  0352 01/17/24  0848 01/16/24  0646 01/15/24  1125   WBC 10*3/mm3 21.20* 17.20* 15.60* 23.10*   HEMOGLOBIN g/dL 9.0* 8.6* 9.1* 10.4*   PLATELETS 10*3/mm3 210 198 260 274     BMP   Results from last 7 days   Lab Units 01/18/24  0352 01/17/24  0847 01/16/24  2022 01/16/24  0646 01/15/24  2304 01/15/24  1719 01/15/24  1223   SODIUM mmol/L 142 139  --  140  --  140 140   POTASSIUM mmol/L 4.9 4.4  --  4.7 4.1 6.7* 7.3*   CHLORIDE mmol/L 102 99  --  101  --  102 100   CO2 mmol/L 24.0 21.0*  --  17.0*  --  8.0* 9.0*   BUN mg/dL 100* 89* 73* 150*  --  250* 256*   CREATININE mg/dL 7.68* 6.73*  --  10.52*  --  15.98* 16.11*   GLUCOSE mg/dL 78 140*  --  39*  --  87 111*   MAGNESIUM mg/dL 1.9 1.8  --  1.9  --   --  2.9*   PHOSPHORUS mg/dL 9.3* 8.0*  --  11.0*  --   --   --      Cr Clearance Estimated Creatinine Clearance: 9.9 mL/min (A) (by C-G formula based on SCr of 7.68 mg/dL (H)).    Assessment & Plan   Assessment & Plan    Hyperkalemia    Mechanical complication of arteriovenous fistula surgically created    End stage renal disease on dialysis    78 y.o. male with ESRD/now HD, with bleeding from AV fistula.  Has had recent scans documenting satisfactory volume flow and no stenoses.  Given history, suspect outflow stenosis.  Plan fistulogram with possible balloon angioplasty.  Patient agreeable.  Informed consent.    Moi Liz MD  01/18/24  08:54 EST    Please call my office with any question: [phone]    Active Hospital Problems    Diagnosis  POA    **Hyperkalemia [E87.5]  Yes    Mechanical  complication of arteriovenous fistula surgically created [T82.590A]  Yes    End stage renal disease on dialysis [N18.6, Z99.2]  Not Applicable      Resolved Hospital Problems   No resolved problems to display.           Electronically signed by Moi Liz MD at 24 1157       Tyrel Avitia MD at 24 1637            [facility] PROGRESS NOTE    Patient Identification  Name: Gabriel De Souza  Age: 78 y.o.  Sex: male  :  1945  MRN: [id number]    Date: 2024             Subjective:  Interval History: Recovering in ICU    Objective:    Scheduled Meds:atorvastatin, 10 mg, Oral, Daily  calcitriol, 0.5 mcg, Oral, Daily  cefTRIAXone, 2,000 mg, Intravenous, Q24H  heparin (porcine), 5,000 Units, Subcutaneous, Q8H  senna-docusate sodium, 2 tablet, Oral, BID  sevelamer, 1,600 mg, Oral, TID With Meals  sodium chloride, 10 mL, Intravenous, Q12H      Continuous Infusions:   PRN Meds:  acetaminophen    senna-docusate sodium **AND** polyethylene glycol **AND** bisacodyl **AND** bisacodyl    Calcium Replacement - Follow Nurse / BPA Driven Protocol    dextrose    dextrose    dextrose    dextrose    glucagon (human recombinant)    glucagon (human recombinant)    Magnesium Standard Dose Replacement - Follow Nurse / BPA Driven Protocol    nitroglycerin    ondansetron    ondansetron ODT    Phosphorus Replacement - Follow Nurse / BPA Driven Protocol    Potassium Replacement - Follow Nurse / BPA Driven Protocol    [COMPLETED] Insert Peripheral IV **AND** sodium chloride    sodium chloride    sodium chloride    Vital signs in last 24 hours:  Temp:  [93 °F (33.9 °C)-98.2 °F (36.8 °C)] 98.2 °F (36.8 °C)  Heart Rate:  [58-77] 69  Resp:  [14-17] 16  BP: ()/(43-77) 118/69    Intake/Output:    Intake/Output Summary (Last 24 hours) at 2024 1637  Last data filed at 2024 1600  Gross per 24 hour   Intake 3285 ml   Output 2050 ml   Net 1235 ml       Exam:  /69   Pulse 69   Temp 98.2 °F  "(36.8 °C) (Oral)   Resp 16   Ht 185.4 cm (73\")   Wt 96.7 kg (213 lb 3 oz)   SpO2 97%   BMI 28.13 kg/m²     General Appearance:    Alert, cooperative, NAD   Lungs:     Respirations unlabored, no audible wheezing    Heart:    No cyanosis   Abdomen:     Soft, ND    :    No suprapubic distention, urine pink            Data Review:  All labs (24hrs):   Recent Results (from the past 24 hour(s))   POC Glucose Once    Collection Time: 01/16/24  6:29 PM    Specimen: Blood   Result Value Ref Range    Glucose 135 (H) 70 - 105 mg/dL   BUN    Collection Time: 01/16/24  8:22 PM    Specimen: Arm, Left; Blood   Result Value Ref Range    BUN 73 (H) 8 - 23 mg/dL   POC Glucose Once    Collection Time: 01/17/24 12:37 AM    Specimen: Blood   Result Value Ref Range    Glucose 153 (H) 70 - 105 mg/dL   POC Glucose Once    Collection Time: 01/17/24  6:32 AM    Specimen: Blood   Result Value Ref Range    Glucose 135 (H) 70 - 105 mg/dL   Duplex Venous Lower Extremity - Left CAR    Collection Time: 01/17/24  7:16 AM   Result Value Ref Range    Left External Iliac Spont 1.0     Left Common Femoral Spont 1.0     Left Profunda Femoral Spont 1.0     Left Proximal Femoral Spont 1.0     Left Popliteal Spont 1.0     Left Gastronemius Vessel 1.0     Left Lesser Saph Vessel 1.0     Right Common Femoral Spont Y     Right Common Femoral Competent Y     Right Common Femoral Phasic Y     Right Common Femoral Compress C     Right Common Femoral Augment Y     Left External Iliac Compress P     Left Common Femoral Spont D     Left Common Femoral Competent D     Left Common Femoral Phasic D     Left Common Femoral Compress P     Left Common Femoral Augment D     Left Saphenofemoral Junction Compress C     Left Profunda Femoral Compress N     Left Proximal Femoral Spont D     Left Proximal Femoral Competent D     Left Proximal Femoral Phasic D     Left Proximal Femoral Compress P     Left Proximal Femoral Augment D     Left Mid Femoral Spont Y     Left " Mid Femoral Competent Y     Left Mid Femoral Phasic Y     Left Mid Femoral Compress C     Left Mid Femoral Augment Y     Left Distal Femoral Compress C     Left Popliteal Spont D     Left Popliteal Competent D     Left Popliteal Phasic D     Left Popliteal Compress P     Left Popliteal Augment D     Left Popliteal Thrombus C     Left Posterior Tibial Compress C     Left Peroneal Compress C     Left Gastronemius Compress P     Left Greater Saph AK Compress C     Left Greater Saph BK Compress C     Left Lesser Saph Compress P     Left Lesser Saph Thrombus C     Left External Iliac Thrombus S     Left Common Femoral Thrombus C     Left Profunda Femoral Thrombus S     Left Proximal Femoral Thrombus C     Left Gastronemius Thrombus C    Comprehensive Metabolic Panel    Collection Time: 01/17/24  8:47 AM    Specimen: Blood   Result Value Ref Range    Glucose 140 (H) 65 - 99 mg/dL    BUN 89 (H) 8 - 23 mg/dL    Creatinine 6.73 (H) 0.76 - 1.27 mg/dL    Sodium 139 136 - 145 mmol/L    Potassium 4.4 3.5 - 5.2 mmol/L    Chloride 99 98 - 107 mmol/L    CO2 21.0 (L) 22.0 - 29.0 mmol/L    Calcium 7.9 (L) 8.6 - 10.5 mg/dL    Total Protein 6.0 6.0 - 8.5 g/dL    Albumin 2.9 (L) 3.5 - 5.2 g/dL    ALT (SGPT) 24 1 - 41 U/L    AST (SGOT) 24 1 - 40 U/L    Alkaline Phosphatase 122 (H) 39 - 117 U/L    Total Bilirubin 0.3 0.0 - 1.2 mg/dL    Globulin 3.1 gm/dL    A/G Ratio 0.9 g/dL    BUN/Creatinine Ratio 13.2 7.0 - 25.0    Anion Gap 19.0 (H) 5.0 - 15.0 mmol/L    eGFR 7.9 (L) >60.0 mL/min/1.73   Magnesium    Collection Time: 01/17/24  8:47 AM    Specimen: Blood   Result Value Ref Range    Magnesium 1.8 1.6 - 2.4 mg/dL   Phosphorus    Collection Time: 01/17/24  8:47 AM    Specimen: Blood   Result Value Ref Range    Phosphorus 8.0 (H) 2.5 - 4.5 mg/dL   Procalcitonin    Collection Time: 01/17/24  8:47 AM    Specimen: Blood   Result Value Ref Range    Procalcitonin 0.03 0.00 - 0.25 ng/mL   CBC Auto Differential    Collection Time: 01/17/24  8:48 AM     Specimen: Blood   Result Value Ref Range    WBC 17.20 (H) 3.40 - 10.80 10*3/mm3    RBC 2.95 (L) 4.14 - 5.80 10*6/mm3    Hemoglobin 8.6 (L) 13.0 - 17.7 g/dL    Hematocrit 25.5 (L) 37.5 - 51.0 %    MCV 86.3 79.0 - 97.0 fL    MCH 29.3 26.6 - 33.0 pg    MCHC 34.0 31.5 - 35.7 g/dL    RDW 15.2 12.3 - 15.4 %    RDW-SD 45.5 37.0 - 54.0 fl    MPV 8.2 6.0 - 12.0 fL    Platelets 198 140 - 450 10*3/mm3    Neutrophil % 82.1 (H) 42.7 - 76.0 %    Lymphocyte % 7.4 (L) 19.6 - 45.3 %    Monocyte % 10.5 5.0 - 12.0 %    Eosinophil % 0.0 (L) 0.3 - 6.2 %    Basophil % 0.0 0.0 - 1.5 %    Neutrophils, Absolute 14.10 (H) 1.70 - 7.00 10*3/mm3    Lymphocytes, Absolute 1.30 0.70 - 3.10 10*3/mm3    Monocytes, Absolute 1.80 (H) 0.10 - 0.90 10*3/mm3    Eosinophils, Absolute 0.00 0.00 - 0.40 10*3/mm3    Basophils, Absolute 0.00 0.00 - 0.20 10*3/mm3    nRBC 0.0 0.0 - 0.2 /100 WBC   POC Glucose Once    Collection Time: 01/17/24 11:26 AM    Specimen: Blood   Result Value Ref Range    Glucose 132 (H) 70 - 105 mg/dL   Urinalysis With Culture If Indicated - Indwelling Urethral Catheter    Collection Time: 01/17/24  3:01 PM    Specimen: Indwelling Urethral Catheter; Urine   Result Value Ref Range    Color, UA Red (A) Yellow, Straw    Appearance, UA Cloudy (A) Clear    pH, UA 8.5 (H) 5.0 - 8.0    Specific Gravity, UA 1.007 1.005 - 1.030    Glucose, UA Negative Negative    Ketones, UA Negative Negative    Bilirubin, UA Small (1+) (A) Negative    Blood, UA Large (3+) (A) Negative    Protein,  mg/dL (2+) (A) Negative    Leuk Esterase, UA Large (3+) (A) Negative    Nitrite, UA Negative Negative    Urobilinogen, UA 0.2 E.U./dL 0.2 - 1.0 E.U./dL   Urinalysis, Microscopic Only - Indwelling Urethral Catheter    Collection Time: 01/17/24  3:01 PM    Specimen: Indwelling Urethral Catheter; Urine   Result Value Ref Range    RBC, UA Too Numerous to Count (A) None Seen, 0-2 /HPF    WBC, UA Too Numerous to Count (A) None Seen, 0-2 /HPF    Bacteria, UA None  Seen None Seen /HPF    Squamous Epithelial Cells, UA 0-2 None Seen, 0-2 /HPF    Hyaline Casts, UA 0-2 None Seen /LPF    Methodology Automated Microscopy       Imaging Results (Last 24 Hours)       ** No results found for the last 24 hours. **             Assessment:    Hyperkalemia    Mechanical complication of arteriovenous fistula surgically created    End stage renal disease on dialysis      Bilateral obstructing stones status post stent  NAEL  UTI    Plan:    Good urine output status post stent placement  Okay to remove Peterson once creatinine cat and confusion improved as early as tomorrow morning  We will arrange for bilateral ureteroscopy to clear his stones in approximately 2 to 3 weeks once he is fully recovered   Call with questions or concerns    Tyrel Avitia MD  [facility]  [facility], [address]  [place] [address]  Office: [phone]  Available via Epic Secure Chat  01/17/24  16:37 EST       Electronically signed by Tyrel Avitia MD at 01/17/24 1639          Physical Therapy Notes (last 24 hours)        Giselle Ventura, PT at 01/17/24 1614  Version 1 of 1         Goal Outcome Evaluation:  Plan of Care Reviewed With: patient, spouse, family           Outcome Evaluation: Gabriel De Souza is a 78 y.o. male admitted with Hyperkalemia, bradycardia,Hypotension as well as bilateral kidney stones. He underwent cystoscopy with bilateral uretral stents placed by Dr. Avitia on 1/16/24  PMH: DM, HLD, HTN, CKD. Pt lives with wife in a [facility] with basement. Shower is in basement. Pt is typically independent with moblility without AD. Pt reports neuropathy in feet,denies any falls but states he balance is affected.  At time of PT evaluation he is A&O x 3 with c/o soreness from surgery.  He requires min A for bed mobility, min A for transfers and short distance ambulation.   He fatigues with transfer from bed to chair.  He would benefit from SNF for therapy at discharge      Anticipated Discharge  Disposition (PT): skilled nursing facility                          Electronically signed by Giselle Ventura, PT at 24 1614       Giselle Ventura, PT at 24 1615  Version 1 of 1         Patient Name: Gabriel De Souza  : 1945    MRN: [id number]                              Today's Date: 2024       Admit Date: 1/15/2024    Visit Dx:     ICD-10-CM ICD-9-CM   1. Acute renal failure, unspecified acute renal failure type  N17.9 584.9   2. Acute pancreatitis, unspecified complication status, unspecified pancreatitis type  K85.90 577.0   3. Weakness  R53.1 780.79   4. Hyperkalemia  E87.5 276.7   5. Ureterolithiasis  N20.1 592.1   6. Mechanical complication of arteriovenous fistula surgically created, initial encounter  T82.590A 996.1     Patient Active Problem List   Diagnosis    Angina pectoris    Arteriosclerosis    Dyslipidemia    Hypertension    Sinus bradycardia    Type 2 diabetes mellitus    Encounter for screening for malignant neoplasm of prostate    Hip pain, right    Anemia in chronic kidney disease    Benign prostatic hyperplasia    Bronchitis    Deafness in right ear    Diabetic peripheral neuropathy associated with type 2 diabetes mellitus    Goiter    History of thrombosis    Hypercholesterolemia    Hypogonadism    Low back pain    Peripheral neuropathy    Proteinuria    Secondary hyperparathyroidism of renal origin    Renal cyst    Vitamin D deficiency    Hyperphosphatemia due to chronic kidney disease    Heart murmur    Hyperkalemia    Mechanical complication of arteriovenous fistula surgically created    End stage renal disease on dialysis     Past Medical History:   Diagnosis Date    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    Coronary artery disease     Deep vein thrombosis 1990    Diabetes mellitus     Dyslipidemia     Erectile dysfunction 51 years old    Heart murmur 10/4/2023    HL (hearing loss) 7 year old    right    Hyperlipidemia 1970    Hypertension     Neuropathy     Renal  insufficiency 2020    Stage 4 chronic kidney disease     3-4     Past Surgical History:   Procedure Laterality Date    ARTERIOVENOUS FISTULA/SHUNT SURGERY Left 9/22/2022    Procedure: BRACHIAL CEPHALIC FISTULA FORMATION;  Surgeon: Edy Vega MD;  Location: [facility] MAIN OR;  Service: Vascular;  Laterality: Left;    BACK SURGERY      BASAL CELL CARCINOMA EXCISION  01/2019    CARDIAC CATHETERIZATION      TURP / TRANSURETHRAL INCISION / DRAINAGE PROSTATE        General Information       Row Name 01/17/24 1604          Physical Therapy Time and Intention    Document Type evaluation  -CL     Mode of Treatment physical therapy  -CL       Row Name 01/17/24 1604          General Information    Patient Profile Reviewed yes  -CL     Prior Level of Function independent:;all household mobility  -CL     Existing Precautions/Restrictions fall  -CL     Barriers to Rehab impaired sensation  -CL       Row Name 01/17/24 1604          Living Environment    People in Home spouse  -CL     Name(s) of People in Home Wife: Pat  -CL       Row Name 01/17/24 1604          Home Main Entrance    Number of Stairs, Main Entrance none  -CL       Row Name 01/17/24 1604          Stairs Within Home, Primary    Number of Stairs, Within Home, Primary twelve  -CL     Stairs Comment, Within Home, Primary Can stay on 1st floor but shower is in basement.  -CL       Row Name 01/17/24 1604          Cognition    Orientation Status (Cognition) oriented x 3  -CL       Row Name 01/17/24 1604          Safety Issues, Functional Mobility    Impairments Affecting Function (Mobility) endurance/activity tolerance  -CL               User Key  (r) = Recorded By, (t) = Taken By, (c) = Cosigned By      Initials Name Provider Type    CL Giselle Ventura PT Physical Therapist                   Mobility       Row Name 01/17/24 1606          Bed Mobility    Bed Mobility supine-sit  -CL     Supine-Sit [place] (Bed Mobility) minimum assist (75% patient effort)   -CL     Assistive Device (Bed Mobility) head of bed elevated;bed rails  -CL       Row Name 01/17/24 1606          Bed-Chair Transfer    Bed-Chair [place] (Transfers) minimum assist (75% patient effort)  -CL       Row Name 01/17/24 1606          Sit-Stand Transfer    Sit-Stand [place] (Transfers) minimum assist (75% patient effort)  -CL       Row Name 01/17/24 1606          Gait/Stairs (Locomotion)    [place] Level (Gait) minimum assist (75% patient effort)  -CL     Distance in Feet (Gait) 3' to chair  -CL               User Key  (r) = Recorded By, (t) = Taken By, (c) = Cosigned By      Initials Name Provider Type    CL Giselle Ventura PT Physical Therapist                   Obj/Interventions       Row Name 01/17/24 1607          Range of Motion Comprehensive    General Range of Motion bilateral lower extremity ROM WFL  -CL       Row Name 01/17/24 1607          Strength Comprehensive (MMT)    Comment, General Manual Muscle Testing (MMT) Assessment BLEs grossly 4/5  -CL       Row Name 01/17/24 1607          Balance    Balance Assessment sitting static balance;sitting dynamic balance;standing static balance;standing dynamic balance  -CL     Static Sitting Balance standby assist  -CL     Dynamic Sitting Balance minimal assist  -CL     Position, Sitting Balance sitting edge of bed  -CL     Static Standing Balance minimal assist  -CL     Dynamic Standing Balance minimal assist  -CL     Position/Device Used, Standing Balance unsupported  -CL       Row Name 01/17/24 1607          Sensory Assessment (Somatosensory)    Sensory Assessment (Somatosensory) --  light touch intact, pt and wife report neuropathy  -CL               User Key  (r) = Recorded By, (t) = Taken By, (c) = Cosigned By      Initials Name Provider Type    Giselle Vilchis PT Physical Therapist                   Goals/Plan       Row Name 01/17/24 1613          Bed Mobility Goal 1 (PT)    Activity/Assistive Device (Bed Mobility Goal 1,  PT) bed mobility activities, all  -CL     [place] Level/Cues Needed (Bed Mobility Goal 1, PT) modified independence  -CL     Time Frame (Bed Mobility Goal 1, PT) long term goal (LTG);2 weeks  -CL       Row Name 01/17/24 1613          Transfer Goal 1 (PT)    Activity/Assistive Device (Transfer Goal 1, PT) sit-to-stand/stand-to-sit;bed-to-chair/chair-to-bed  -CL     [place] Level/Cues Needed (Transfer Goal 1, PT) modified independence  -CL     Time Frame (Transfer Goal 1, PT) long term goal (LTG);2 weeks  -CL       Row Name 01/17/24 1613          Gait Training Goal 1 (PT)    Activity/Assistive Device (Gait Training Goal 1, PT) gait (walking locomotion);assistive device use  -CL     [place] Level (Gait Training Goal 1, PT) modified independence  -CL     Distance (Gait Training Goal 1, PT) 150'  -CL     Time Frame (Gait Training Goal 1, PT) long term goal (LTG);2 weeks  -CL       Row Name 01/17/24 1613          Stairs Goal 1 (PT)    Activity/Assistive Device (Stairs Goal 1, PT) ascending stairs;descending stairs  -CL     [place] Level/Cues Needed (Stairs Goal 1, PT) modified independence  -CL     Number of Stairs (Stairs Goal 1, PT) 12  -CL     Time Frame (Stairs Goal 1, PT) long term goal (LTG);2 weeks  -CL       Row Name 01/17/24 1613          Therapy Assessment/Plan (PT)    Planned Therapy Interventions (PT) balance training;bed mobility training;gait training;patient/family education;stair training;strengthening;transfer training  -CL               User Key  (r) = Recorded By, (t) = Taken By, (c) = Cosigned By      Initials Name Provider Type    CL Giselle Ventura, PT Physical Therapist                   Clinical Impression       Row Name 01/17/24 1608          Pain    Pretreatment Pain Rating 3/10  -CL     Posttreatment Pain Rating 3/10  -CL       Row Name 01/17/24 1608          Plan of Care Review    Plan of Care Reviewed With patient;spouse;family  -CL     Outcome Evaluation Gabriel De Souza  is a 78 y.o. male admitted with Hyperkalemia, bradycardia,Hypotension as well as bilateral kidney stones. He underwent cystoscopy with bilateral uretral stents placed by Dr. Avitia on 1/16/24  PMH: DM, HLD, HTN, CKD. Pt lives with wife in a H with basement. Shower is in basement. Pt is typically independent with moblility without AD. Pt reports neuropathy in feet,denies any falls but states he balance is affected.  At time of PT evaluation he is A&O x 3 with c/o soreness from surgery.  He requires min A for bed mobility, min A for transfers and short distance ambulation.   He fatigues with transfer from bed to chair.  He would benefit from SNF for therapy at discharge  -       Row Name 01/17/24 1608          Therapy Assessment/Plan (PT)    Rehab Potential (PT) good, to achieve stated therapy goals  -CL     Criteria for Skilled Interventions Met (PT) yes;skilled treatment is necessary  -CL     Therapy Frequency (PT) 5 times/wk  -CL     Predicted Duration of Therapy Intervention (PT) Until discharge  -       Row Name 01/17/24 1608          Vital Signs    Pre Systolic BP Rehab 123  -CL     Pre Treatment Diastolic BP 68  -CL     Intra Systolic BP Rehab 128  -CL     Intra Treatment Diastolic BP 65  -CL     Post Systolic BP Rehab 128  -CL     Post Treatment Diastolic BP 65  -CL     Pretreatment Heart Rate (beats/min) 98  -CL     Intratreatment Heart Rate (beats/min) 68  -CL     Posttreatment Heart Rate (beats/min) 64  -CL     Pretreatment Resp Rate (breaths/min) 14  -CL     Intratreatment Resp Rate (breaths/min) 15  -CL     Posttreatment Resp Rate (breaths/min) 13  -CL     Pre SpO2 (%) 97  -CL     O2 Delivery Pre Treatment room air  -CL     Intra SpO2 (%) 96  -CL     O2 Delivery Intra Treatment room air  -CL     Post SpO2 (%) 96  -CL     O2 Delivery Post Treatment room air  -CL     Pre Patient Position Supine  -CL     Intra Patient Position Sitting  -CL     Post Patient Position Sitting  -CL       Row Name 01/17/24  1608          Positioning and Restraints    Pre-Treatment Position in bed  -CL     Post Treatment Position chair  -CL     In Chair notified nsg;reclined;call light within reach;exit alarm on;with family/caregiver  -CL               User Key  (r) = Recorded By, (t) = Taken By, (c) = Cosigned By      Initials Name Provider Type    CL Giselle Ventura, PT Physical Therapist                   Outcome Measures       Row Name 01/17/24 1613 01/17/24 0827       How much help from another person do you currently need...    Turning from your back to your side while in flat bed without using bedrails? 4  -CL 4  -CLA    Moving from lying on back to sitting on the side of a flat bed without bedrails? 3  -CL 4  -CLA    Moving to and from a bed to a chair (including a wheelchair)? 3  -CL 2  -CLA    Standing up from a chair using your arms (e.g., wheelchair, bedside chair)? 3  -CL 2  -CLA    Climbing 3-5 steps with a railing? 2  -CL 2  -CLA    To walk in hospital room? 3  -CL 2  -CLA    AM-PAC 6 Clicks Score (PT) 18  -CL 16  -CLA    Highest Level of Mobility Goal 6 --> Walk 10 steps or more  -CL 5 --> Static standing  -CLA              User Key  (r) = Recorded By, (t) = Taken By, (c) = Cosigned By      Initials Name Provider Type    CLA Maggy Keith, RN Registered Nurse    CL Giselle Ventura, PT Physical Therapist                                 Physical Therapy Education       Title: PT OT SLP Therapies (Done)       Topic: Physical Therapy (Done)       Point: Mobility training (Done)       Learning Progress Summary             Patient Acceptance, E,TB, VU by CL at 1/17/2024 1614                                         User Key       Initials Effective Dates Name Provider Type Discipline    CL 03/02/22 -  Giselle Ventura, PT Physical Therapist PT                  PT Recommendation and Plan  Planned Therapy Interventions (PT): balance training, bed mobility training, gait training, patient/family education, stair training,  strengthening, transfer training  Plan of Care Reviewed With: patient, spouse, family  Outcome Evaluation: Gabriel De Souza is a 78 y.o. male admitted with Hyperkalemia, bradycardia,Hypotension as well as bilateral kidney stones. He underwent cystoscopy with bilateral uretral stents placed by Dr. Avitia on 1/16/24  PMH: DM, HLD, HTN, CKD. Pt lives with wife in a H with basement. Shower is in basement. Pt is typically independent with moblility without AD. Pt reports neuropathy in feet,denies any falls but states he balance is affected.  At time of PT evaluation he is A&O x 3 with c/o soreness from surgery.  He requires min A for bed mobility, min A for transfers and short distance ambulation.   He fatigues with transfer from bed to chair.  He would benefit from SNF for therapy at discharge     Time Calculation:   PT Evaluation Complexity  History, PT Evaluation Complexity: 3 or more personal factors and/or comorbidities  Examination of Body Systems (PT Eval Complexity): total of 3 or more elements  Clinical Presentation (PT Evaluation Complexity): evolving  Clinical Decision Making (PT Evaluation Complexity): moderate complexity  Overall Complexity (PT Evaluation Complexity): moderate complexity     PT Charges       Row Name 01/17/24 1614             Time Calculation    Start Time 1433  -CL      Stop Time 1453  -CL      Time Calculation (min) 20 min  -CL      PT Received On 01/17/24  -CL      PT - Next Appointment 01/18/24  -CL      PT Goal Re-Cert Due Date 01/31/24  -CL         Time Calculation- PT    Total Timed Code Minutes- PT 0 minute(s)  -CL                User Key  (r) = Recorded By, (t) = Taken By, (c) = Cosigned By      Initials Name Provider Type    Giselle Vilchis, PT Physical Therapist                  Therapy Charges for Today       Code Description Service Date Service Provider Modifiers Qty    28271876718 HC PT EVAL MOD COMPLEXITY 4 1/17/2024 Giselle Ventura, PT GP 1            PT  G-Codes  AM-PAC 6 Clicks Score (PT): 18  PT Discharge Summary  Anticipated Discharge Disposition (PT): skilled nursing facility    Giselle Ventura, PT  2024      Electronically signed by Giselle Ventura, PT at 24 1615          Occupational Therapy Notes (last 24 hours)        Fawad Guerin, OT at 24 1711          Patient Name: Gabriel De Souza  : 1945    MRN: [id number]                              Today's Date: 2024       Admit Date: 1/15/2024    Visit Dx:     ICD-10-CM ICD-9-CM   1. Acute renal failure, unspecified acute renal failure type  N17.9 584.9   2. Acute pancreatitis, unspecified complication status, unspecified pancreatitis type  K85.90 577.0   3. Weakness  R53.1 780.79   4. Hyperkalemia  E87.5 276.7   5. Ureterolithiasis  N20.1 592.1   6. Mechanical complication of arteriovenous fistula surgically created, initial encounter  T82.590A 996.1     Patient Active Problem List   Diagnosis    Angina pectoris    Arteriosclerosis    Dyslipidemia    Hypertension    Sinus bradycardia    Type 2 diabetes mellitus    Encounter for screening for malignant neoplasm of prostate    Hip pain, right    Anemia in chronic kidney disease    Benign prostatic hyperplasia    Bronchitis    Deafness in right ear    Diabetic peripheral neuropathy associated with type 2 diabetes mellitus    Goiter    History of thrombosis    Hypercholesterolemia    Hypogonadism    Low back pain    Peripheral neuropathy    Proteinuria    Secondary hyperparathyroidism of renal origin    Renal cyst    Vitamin D deficiency    Hyperphosphatemia due to chronic kidney disease    Heart murmur    Hyperkalemia    Mechanical complication of arteriovenous fistula surgically created    End stage renal disease on dialysis     Past Medical History:   Diagnosis Date    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    Coronary artery disease     Deep vein thrombosis 1990    Diabetes mellitus     Dyslipidemia     Erectile dysfunction 51 years  old    Heart murmur 10/4/2023    HL (hearing loss) 7 year old    right    Hyperlipidemia 1970    Hypertension     Neuropathy     Renal insufficiency 2020    Stage 4 chronic kidney disease     3-4     Past Surgical History:   Procedure Laterality Date    ARTERIOVENOUS FISTULA/SHUNT SURGERY Left 9/22/2022    Procedure: BRACHIAL CEPHALIC FISTULA FORMATION;  Surgeon: Edy Vega MD;  Location: [facility] MAIN OR;  Service: Vascular;  Laterality: Left;    BACK SURGERY      BASAL CELL CARCINOMA EXCISION  01/2019    CARDIAC CATHETERIZATION      TURP / TRANSURETHRAL INCISION / DRAINAGE PROSTATE        General Information       Row Name 01/17/24 1703          OT Time and Intention    Document Type evaluation  -MP     Mode of Treatment occupational therapy  -MP       Row Name 01/17/24 1703          General Information    Patient Profile Reviewed yes  -MP     Prior Level of Function independent:  -MP     Existing Precautions/Restrictions fall  -       Row Name 01/17/24 1703          Living Environment    People in Home spouse  -       Row Name 01/17/24 1703          Cognition    Orientation Status (Cognition) oriented x 3  -       Row Name 01/17/24 1703          Safety Issues, Functional Mobility    Impairments Affecting Function (Mobility) endurance/activity tolerance;balance;strength  -MP               User Key  (r) = Recorded By, (t) = Taken By, (c) = Cosigned By      Initials Name Provider Type    MP Fawad Guerin OT Occupational Therapist                     Mobility/ADL's       Row Name 01/17/24 1706          Bed Mobility    Bed Mobility supine-sit  -MP     Supine-Sit [place] (Bed Mobility) minimum assist (75% patient effort)  -MP     Assistive Device (Bed Mobility) head of bed elevated;bed rails  -       Row Name 01/17/24 1706          Bed-Chair Transfer    Bed-Chair [place] (Transfers) minimum assist (75% patient effort)  -       Row Name 01/17/24 1706          Sit-Stand Transfer     Sit-Stand [place] (Transfers) minimum assist (75% patient effort)  -MP       Row Name 01/17/24 1706          Activities of Daily Living    BADL Assessment/Intervention lower body dressing  -[facility] Name 01/17/24 1706          Lower Body Dressing Assessment/Training    [place] Level (Lower Body Dressing) lower body dressing skills;moderate assist (50% patient effort)  -MP               User Key  (r) = Recorded By, (t) = Taken By, (c) = Cosigned By      Initials Name Provider Type    Fawad Melendez OT Occupational Therapist                   Obj/Interventions       [facility] Name 01/17/24 1706          Range of Motion Comprehensive    Comment, General Range of Motion BUE WFL  -[facility] Name 01/17/24 1706          Strength Comprehensive (MMT)    Comment, General Manual Muscle Testing (MMT) Assessment BUE 4/5  -[facility] Name 01/17/24 1706          Balance    Balance Interventions sitting;standing;supported;sit to stand;static;dynamic  -               User Key  (r) = Recorded By, (t) = Taken By, (c) = Cosigned By      Initials Name Provider Type    Fawad Melendez OT Occupational Therapist                   Goals/Plan       [facility] Name 01/17/24 1709          Bed Mobility Goal 1 (OT)    Activity/Assistive Device (Bed Mobility Goal 1, OT) bed mobility activities, all  -MP     [place] Level/Cues Needed (Bed Mobility Goal 1, OT) contact guard required  -MP     Time Frame (Bed Mobility Goal 1, OT) long term goal (LTG);2 weeks  -[facility] Name 01/17/24 1709          Transfer Goal 1 (OT)    Activity/Assistive Device (Transfer Goal 1, OT) sit-to-stand/stand-to-sit;toilet  -MP     [place] Level/Cues Needed (Transfer Goal 1, OT) contact guard required  -MP     Time Frame (Transfer Goal 1, OT) long term goal (LTG);2 weeks  -[facility] Name 01/17/24 1709          Dressing Goal 1 (OT)    Activity/Device (Dressing Goal 1, OT) lower body dressing  -MP     [place]/Cues Needed (Dressing  Goal 1, OT) supervision required;set-up required  -MP     Time Frame (Dressing Goal 1, OT) long term goal (LTG);2 weeks  -MP               User Key  (r) = Recorded By, (t) = Taken By, (c) = Cosigned By      Initials Name Provider Type    Fawad Melendez, CRISTOPHER Occupational Therapist                   Clinical Impression       Row Name 01/17/24 1707          Pain Assessment    Pretreatment Pain Rating 3/10  -MP     Posttreatment Pain Rating 3/10  -MP       Row Name 01/17/24 1707          Plan of Care Review    Plan of Care Reviewed With patient  -MP     Progress no change  -MP     Outcome Evaluation Gabriel De Souza is a 78 y.o. male admitted with Hyperkalemia, bradycardia,Hypotension as well as bilateral kidney stones. He underwent cystoscopy with bilateral uretral stents placed by Dr. Avitia on 1/16/24 PMH: DM, HLD, HTN, CKD.  Pt. lives at home w/ spouse, maintains ADL independence at baseline. Pt. oriented to self and place this date. Provided min A for LB dressing seated at EOB, min A for SPS transfer EOB to armchair. Pt. w/ limited standing tolerance secondary to c/o dizziness, BP WNL. Pt. not safe to d/c home at this time and will require SNF placement to address aforementioned deficits.  -       Row Name 01/17/24 1707          Therapy Assessment/Plan (OT)    Rehab Potential (OT) good, to achieve stated therapy goals  -     Criteria for Skilled Therapeutic Interventions Met (OT) yes;meets criteria;skilled treatment is necessary  -MP     Therapy Frequency (OT) 3 times/wk  -MP       Row Name 01/17/24 1707          Therapy Plan Review/Discharge Plan (OT)    Anticipated Discharge Disposition (OT) skilled nursing facility  -MP       Row Name 01/17/24 1707          Vital Signs    Pre Patient Position Supine  -MP     Intra Patient Position Standing  -MP     Post Patient Position Sitting  -MP       Row Name 01/17/24 1707          Positioning and Restraints    Pre-Treatment Position in bed  -MP     Post Treatment  Position chair  -MP     In Chair sitting;call light within reach;encouraged to call for assist;exit alarm on  -MP               User Key  (r) = Recorded By, (t) = Taken By, (c) = Cosigned By      Initials Name Provider Type    Fawad Melendez OT Occupational Therapist                   Outcome Measures       Row Name 01/17/24 1613 01/17/24 0827       How much help from another person do you currently need...    Turning from your back to your side while in flat bed without using bedrails? 4  -CL 4  -CLA    Moving from lying on back to sitting on the side of a flat bed without bedrails? 3  -CL 4  -CLA    Moving to and from a bed to a chair (including a wheelchair)? 3  -CL 2  -CLA    Standing up from a chair using your arms (e.g., wheelchair, bedside chair)? 3  -CL 2  -CLA    Climbing 3-5 steps with a railing? 2  -CL 2  -CLA    To walk in hospital room? 3  -CL 2  -CLA    AM-PAC 6 Clicks Score (PT) 18  -CL 16  -CLA    Highest Level of Mobility Goal 6 --> Walk 10 steps or more  -CL 5 --> Static standing  -CLA              User Key  (r) = Recorded By, (t) = Taken By, (c) = Cosigned By      Initials Name Provider Type    Maggy Samayoa, RN Registered Nurse    Giselle Vilchis, PT Physical Therapist                    Occupational Therapy Education       Title: PT OT SLP Therapies (In Progress)       Topic: Occupational Therapy (In Progress)       Point: ADL training (Not Started)       Description:   Instruct learner(s) on proper safety adaptation and remediation techniques during self care or transfers.   Instruct in proper use of assistive devices.                  Learner Progress:  Not documented in this visit.              Point: Home exercise program (Not Started)       Description:   Instruct learner(s) on appropriate technique for monitoring, assisting and/or progressing therapeutic exercises/activities.                  Learner Progress:  Not documented in this visit.              Point:  Precautions (Not Started)       Description:   Instruct learner(s) on prescribed precautions during self-care and functional transfers.                  Learner Progress:  Not documented in this visit.              Point: Body mechanics (Done)       Description:   Instruct learner(s) on proper positioning and spine alignment during self-care, functional mobility activities and/or exercises.                  Learning Progress Summary             Patient Acceptance, E,TB, VU by  at 1/17/2024 1710                                         User Key       Initials Effective Dates Name Provider Type Discipline     06/16/21 -  Fawad Guerin OT Occupational Therapist OT                  OT Recommendation and Plan  Therapy Frequency (OT): 3 times/wk  Plan of Care Review  Plan of Care Reviewed With: patient  Progress: no change  Outcome Evaluation: Gabriel De Souza is a 78 y.o. male admitted with Hyperkalemia, bradycardia,Hypotension as well as bilateral kidney stones. He underwent cystoscopy with bilateral uretral stents placed by Dr. Avitia on 1/16/24 PMH: DM, HLD, HTN, CKD.  Pt. lives at home w/ spouse, maintains ADL independence at baseline. Pt. oriented to self and place this date. Provided min A for LB dressing seated at EOB, min A for SPS transfer EOB to armchair. Pt. w/ limited standing tolerance secondary to c/o dizziness, BP WNL. Pt. not safe to d/c home at this time and will require SNF placement to address aforementioned deficits.     Time Calculation:         Time Calculation- OT       Row Name 01/17/24 1710             Time Calculation- OT    OT Start Time 1433  -MP      OT Stop Time 1455  -      OT Time Calculation (min) 22 min  -      Total Timed Code Minutes- OT 0 minute(s)  -      OT Received On 01/17/24  -      OT - Next Appointment 01/19/24  -      OT Goal Re-Cert Due Date 01/31/24  -                User Key  (r) = Recorded By, (t) = Taken By, (c) = Cosigned By      Initials Name Provider  Type    MP Fawad Guerin OT Occupational Therapist                  Therapy Charges for Today       Code Description Service Date Service Provider Modifiers Qty    72288848064 HC OT EVAL LOW COMPLEXITY 4 1/17/2024 Fawad Guerin OT GO 1                 Fawad Guerin OT  1/17/2024    Electronically signed by Fawad Guerin OT at 01/17/24 1711       Fawad Guerin OT at 01/17/24 1710          Goal Outcome Evaluation:  Plan of Care Reviewed With: patient        Progress: no change  Outcome Evaluation: Gabriel De Souza is a 78 y.o. male admitted with Hyperkalemia, bradycardia,Hypotension as well as bilateral kidney stones. He underwent cystoscopy with bilateral uretral stents placed by Dr. Avitia on 1/16/24 PMH: DM, HLD, HTN, CKD.  Pt. lives at home w/ spouse, maintains ADL independence at baseline. Pt. oriented to self and place this date. Provided min A for LB dressing seated at EOB, min A for SPS transfer EOB to armchair. Pt. w/ limited standing tolerance secondary to c/o dizziness, BP WNL. Pt. not safe to d/c home at this time and will require SNF placement to address aforementioned deficits.      Anticipated Discharge Disposition (OT): skilled nursing facility                          Electronically signed by Fawad Guerin OT at 01/17/24 1710

## 2024-01-18 NOTE — PROGRESS NOTES
Name: Gabriel De Souza ADMIT: 1/15/2024   : 1945  PCP: Waylon Johnson MD    MRN: [id number] LOS: 3 days   AGE/SEX: 78 y.o. male  ROOM: [facility]    Billin, subsequent hospital care, including subsequent note later today    History:  78 y.o. male with end-stage renal disease now on hemodialysis via left brachiocephalic AV fistula.  Had bleeding following dialysis run.  No further bleeding overnight.  No upper extremity symptoms.    Scheduled Medications:   [MAR Hold] atorvastatin, 10 mg, Oral, Daily  [MAR Hold] calcitriol, 0.5 mcg, Oral, Daily  cefTRIAXone, 2,000 mg, Intravenous, Q24H  [MAR Hold] heparin (porcine), 5,000 Units, Subcutaneous, Q8H  [MAR Hold] insulin lispro, 2-7 Units, Subcutaneous, 4x Daily AC & at Bedtime  [MAR Hold] senna-docusate sodium, 2 tablet, Oral, BID  [MAR Hold] sevelamer, 1,600 mg, Oral, TID With Meals  [MAR Hold] sodium chloride, 10 mL, Intravenous, Q12H    Vital Signs  Vital Signs Patient Vitals for the past 24 hrs:   BP Temp Temp src Pulse Resp SpO2 Weight   24 0807 136/77 -- -- 65 20 97 % --   24 0512 133/68 97.7 °F (36.5 °C) Oral -- 16 -- 97.3 kg (214 lb 8.1 oz)   24 0000 128/69 97.3 °F (36.3 °C) Oral 67 16 94 % --   24 2330 108/53 -- -- 62 -- -- --   24 2300 133/71 97.4 °F (36.3 °C) Oral 69 -- 96 % --   24 2200 96/54 -- -- 86 -- -- --   24 2130 104/54 -- -- 64 -- 93 % --   24 2100 101/49 -- -- 61 -- 90 % --   24 123/67 98.4 °F (36.9 °C) Oral 65 16 96 % --   24 1930 128/67 -- -- 64 -- 96 % --   24 1900 131/70 -- -- 64 -- 98 % --   24 1830 128/63 -- -- 69 -- 96 % --   24 1800 127/67 -- -- 67 -- 96 % --   24 1700 123/68 -- -- 68 -- 95 % --   24 1600 118/69 98.2 °F (36.8 °C) Oral 69 16 97 % --   24 1500 130/65 -- -- 71 -- 97 % --   24 1400 118/68 -- -- 61 -- 95 % --   24 1300 129/67 -- -- 62 -- 96 % --   24 1200 112/57 -- -- 65 16 95 % --    01/17/24 1150 -- 97.8 °F (36.6 °C) Oral -- -- -- --   01/17/24 1100 119/64 -- -- 63 -- 95 % --   01/17/24 1021 -- 98 °F (36.7 °C) Axillary -- -- -- --   01/17/24 1000 116/62 -- -- 66 -- 94 % --   01/17/24 0900 127/70 -- -- 65 -- 96 % --     I/O:  I/O last 3 completed shifts:  In: 3285 [P.O.:390; I.V.:2895]  Out: 3350 [Urine:3350]  Physical Exam: Patent fistula with pulsatile thrill and bruit.  No upper extremity edema.    CBC    Results from last 7 days   Lab Units 01/18/24  0352 01/17/24  0848 01/16/24  0646 01/15/24  1125   WBC 10*3/mm3 21.20* 17.20* 15.60* 23.10*   HEMOGLOBIN g/dL 9.0* 8.6* 9.1* 10.4*   PLATELETS 10*3/mm3 210 198 260 274     BMP   Results from last 7 days   Lab Units 01/18/24  0352 01/17/24  0847 01/16/24  2022 01/16/24  0646 01/15/24  2304 01/15/24  1719 01/15/24  1223   SODIUM mmol/L 142 139  --  140  --  140 140   POTASSIUM mmol/L 4.9 4.4  --  4.7 4.1 6.7* 7.3*   CHLORIDE mmol/L 102 99  --  101  --  102 100   CO2 mmol/L 24.0 21.0*  --  17.0*  --  8.0* 9.0*   BUN mg/dL 100* 89* 73* 150*  --  250* 256*   CREATININE mg/dL 7.68* 6.73*  --  10.52*  --  15.98* 16.11*   GLUCOSE mg/dL 78 140*  --  39*  --  87 111*   MAGNESIUM mg/dL 1.9 1.8  --  1.9  --   --  2.9*   PHOSPHORUS mg/dL 9.3* 8.0*  --  11.0*  --   --   --      Cr Clearance Estimated Creatinine Clearance: 9.9 mL/min (A) (by C-G formula based on SCr of 7.68 mg/dL (H)).    Assessment & Plan   Assessment & Plan    Hyperkalemia    Mechanical complication of arteriovenous fistula surgically created    End stage renal disease on dialysis    78 y.o. male with ESRD/now HD, with bleeding from AV fistula.  Has had recent scans documenting satisfactory volume flow and no stenoses.  Given history, suspect outflow stenosis.  Plan fistulogram with possible balloon angioplasty.  Patient agreeable.  Informed consent.    Moi Liz MD  01/18/24  08:54 EST    Please call my office with any question: [phone]    Active Hospital Problems     Diagnosis  POA    **Hyperkalemia [E87.5]  Yes    Mechanical complication of arteriovenous fistula surgically created [T82.590A]  Yes    End stage renal disease on dialysis [N18.6, Z99.2]  Not Applicable      Resolved Hospital Problems   No resolved problems to display.

## 2024-01-18 NOTE — NURSING NOTE
Patient completed 3.5 hour HD treatment with no complications. No access issues post-fistulagram.     Total UF: 0 mL  BVP: 74.2 L    Hemostasis obtained <5 minutes post-needle removal. Pressure dressing applied.    Report called to SELMA Alegria.    Patient transported back to room.

## 2024-01-18 NOTE — PROGRESS NOTES
[facility] MEDICINE SERVICE  DAILY PROGRESS NOTE    NAME: Gabriel De Souza  : 1945  MRN: [id number]      LOS: 3 days     PROVIDER OF SERVICE: Licha Clinton MD    Chief Complaint: Hyperkalemia    Subjective:     Interval History:  History taken from: patient  Patient seen and examined, he is doing much better.  Bradycardia and hypotension have resolved.  He did have low blood sugars in the morning with hypoglycemia protocol was activated.  He is currently resting comfortably.  Has  sandy quiñones on     patient seen and examined, he is lying in bed comfortably.  Eating drinking okay.  Having bowel movements as well.  Repeat blood cultures were sent by nephrology today     patient seen and examined,resting comfortably , denies any leg pain    Review of Systems:   Review of Systems  10 point ROS is negative other than what is stated positive above  Objective:     Vital Signs  Temp:  [97.3 °F (36.3 °C)-98.4 °F (36.9 °C)] 97.7 °F (36.5 °C)  Heart Rate:  [61-86] 65  Resp:  [16-20] 20  BP: ()/(49-77) 136/77  Flow (L/min):  [2] 2   Body mass index is 28.3 kg/m².    Physical Exam  Physical Exam  Exam, awake and alert, hard of hearing  HEENT normocephalic atraumatic  Chest clear to auscultation bilaterally  CVs S1-S2 normal, regular rhythm, bradycardia  Abdomen soft nontender nondistended bowel sounds positive  Extremities warm to touch 2+ pulses no edema  Neuro AOx3, grossly normal     Scheduled Meds   atorvastatin, 10 mg, Oral, Daily  calcitriol, 0.5 mcg, Oral, Daily  cefTRIAXone, 2,000 mg, Intravenous, Q24H  heparin (porcine), 5,000 Units, Subcutaneous, Q8H  insulin lispro, 2-7 Units, Subcutaneous, 4x Daily AC & at Bedtime  senna-docusate sodium, 2 tablet, Oral, BID  sevelamer, 2,400 mg, Oral, TID With Meals  sodium chloride, 10 mL, Intravenous, Q12H       PRN Meds     acetaminophen    senna-docusate sodium **AND** polyethylene glycol **AND** bisacodyl **AND** bisacodyl    Calcium Replacement -  Follow Nurse / BPA Driven Protocol    dextrose    dextrose    glucagon (human recombinant)    Magnesium Standard Dose Replacement - Follow Nurse / BPA Driven Protocol    nitroglycerin    ondansetron    ondansetron ODT    Phosphorus Replacement - Follow Nurse / BPA Driven Protocol    Potassium Replacement - Follow Nurse / BPA Driven Protocol    [COMPLETED] Insert Peripheral IV **AND** sodium chloride    sodium chloride    sodium chloride   Infusions           Diagnostic Data    Results from last 7 days   Lab Units 01/18/24  0352   WBC 10*3/mm3 21.20*   HEMOGLOBIN g/dL 9.0*   HEMATOCRIT % 27.3*   PLATELETS 10*3/mm3 210   GLUCOSE mg/dL 78   CREATININE mg/dL 7.68*   BUN mg/dL 100*   SODIUM mmol/L 142   POTASSIUM mmol/L 4.9   AST (SGOT) U/L 21   ALT (SGPT) U/L 20   ALK PHOS U/L 93   BILIRUBIN mg/dL 0.2   ANION GAP mmol/L 16.0*       No radiology results for the last day      I reviewed the patient's new clinical results.    Assessment/Plan:     Active and Resolved Problems  Active Hospital Problems    Diagnosis  POA    **Hyperkalemia [E87.5]  Yes    Mechanical complication of arteriovenous fistula surgically created [T82.590A]  Yes    End stage renal disease on dialysis [N18.6, Z99.2]  Not Applicable      Resolved Hospital Problems   No resolved problems to display.     CKD stage IV, now end-stage renal disease, hemodialysis has been started.  Patient has a fistula already however it was bleeding status post fistulogram and angioplasty on 1/18/2024.  Plan for dialysis today.  Renvela dose increased      Hyperkalemia  Resolved        Bilateral renal stones with hydronephrosis likely contributing to deterioration of the CKD  Urology was consulted, patient s/p placement of bilateral ureteral stents.  Continue Rocephin 2 g IV daily for now.  Monitor blood and urine cultures.--Blood cultures negative for 24 hours, urine cultures growing Serratia, sensitive to Rocephin.  Continue  Patient currently has a Peterson in.  Urology  following, plan to remove Peterson once creatinine improves and confusion improves, likely today.    Leukocytosis  continues to worsen.  Pro-Suraj was negative.  Consult ID.  Continue Rocephin for now.  Leukocytosis might be secondary to the clot burden  Repeat UA and blood cultures were sent yesterday.  Repeat UA still positive, repeat urine cultures and blood cultures are pending      Chronic DVT noted in left common femoral, proximal femoral, popliteal and gastrocnemius  Chronic LLE superficial thrombophlebitis in  small saphenous  Sub-acute DVT noted in left external iliac, deep femoral  Oncology following.  Continue subcu heparin for now.  However plan on starting Eliquis 2.5 mg p.o. twice daily also recommending IVC fracture.  I discussed with family and they are agreeable however they do want to talk to oncology as well..         Bradycardia and hypotension  Due to hyperkalemia  Resolved  Vitals now stable    Hypoglycemia  Blood glucose better now, encourage p.o. intake    DVT prophylaxis:  Medical and mechanical DVT prophylaxis orders are present.     Code status is   Code Status and Medical Interventions:   Ordered at: 01/15/24 8533     Level Of Support Discussed With:    Patient    Health Care Surrogate    Next of Kin (If No Surrogate)     Code Status (Patient has no pulse and is not breathing):    CPR (Attempt to Resuscitate)     Medical Interventions (Patient has pulse or is breathing):    Full Support       Plan for disposition: 1-2 days     Time: 30 minutes    Signature: Electronically signed by Licha Clinton MD, 01/18/24, 09:57 EST.  [facility], [place] Hospitalist Team

## 2024-01-18 NOTE — PROGRESS NOTES
[facility] Progress Note      Patient Name: Gabriel De Souza  : 1945  MRN: [id number]  Primary Care Physician:  Waylon Johnson MD  Date of admission: 1/15/2024    Subjective     Interval History:     S/p fistulogram and angioplasty this morning  Patient comfortably sitting.  Denies any chest pain, shortness of breath, nausea or vomiting  Patient more awake and able to answer simple questions    Review of Systems:   As noted above    Objective     Vitals:   Temp:  [97.3 °F (36.3 °C)-98.4 °F (36.9 °C)] 97.7 °F (36.5 °C)  Heart Rate:  [61-86] 65  Resp:  [16-20] 20  BP: ()/(49-77) 136/77  Flow (L/min):  [2] 2    Intake/Output Summary (Last 24 hours) at 2024 0937  Last data filed at 2024 0511  Gross per 24 hour   Intake 240 ml   Output 1900 ml   Net -1660 ml       Physical Exam:    General Appearance: Ill-appearing, NAD  HEENT: oral mucosa normal, nonicteric sclera  Neck: supple, no JVD  Lungs: CTA  Heart: RRR, normal S1 and S2  Abdomen: soft, nondistended  Extremities: no edema  Neuro: Awake alert and moving all extremities    Scheduled Meds:     atorvastatin, 10 mg, Oral, Daily  calcitriol, 0.5 mcg, Oral, Daily  cefTRIAXone, 2,000 mg, Intravenous, Q24H  heparin (porcine), 5,000 Units, Subcutaneous, Q8H  insulin lispro, 2-7 Units, Subcutaneous, 4x Daily AC & at Bedtime  senna-docusate sodium, 2 tablet, Oral, BID  sevelamer, 1,600 mg, Oral, TID With Meals  sodium chloride, 10 mL, Intravenous, Q12H      IV Meds:          Results Reviewed:   I have personally reviewed the results from the time of this admission to 2024 09:37 EST     Results from last 7 days   Lab Units 24  0352 24  0847 24  0646   SODIUM mmol/L 142 139  --  140   POTASSIUM mmol/L 4.9 4.4  --  4.7   CHLORIDE mmol/L 102 99  --  101   CO2 mmol/L 24.0 21.0*  --  17.0*   BUN mg/dL 100* 89* 73* 150*   CREATININE mg/dL 7.68* 6.73*  --  10.52*   CALCIUM mg/dL 8.3*  7.9*  --  8.3*   BILIRUBIN mg/dL 0.2 0.3  --  0.2   ALK PHOS U/L 93 122*  --  94   ALT (SGPT) U/L 20 24  --  20   AST (SGOT) U/L 21 24  --  28   GLUCOSE mg/dL 78 140*  --  39*     Estimated Creatinine Clearance: 9.9 mL/min (A) (by C-G formula based on SCr of 7.68 mg/dL (H)).  Results from last 7 days   Lab Units 01/18/24  0352 01/17/24  0847 01/16/24  0646   MAGNESIUM mg/dL 1.9 1.8 1.9   PHOSPHORUS mg/dL 9.3* 8.0* 11.0*         Results from last 7 days   Lab Units 01/18/24  0352 01/17/24  0848 01/16/24  0646 01/15/24  1125   WBC 10*3/mm3 21.20* 17.20* 15.60* 23.10*   HEMOGLOBIN g/dL 9.0* 8.6* 9.1* 10.4*   PLATELETS 10*3/mm3 210 198 260 274           Assessment / Plan     ASSESSMENT:  End-stage renal disease.  Patient has advanced chronic kidney disease and presented today with generalized weakness, uremic symptoms, worsened renal function along with hyperkalemia and metabolic acidosis.  status post hemodialysis 1/14 and 1/15  Obstructive uropathy.  Status post bilateral ureteral stents placement  Hyperkalemia.  Secondary to renal failure.  Improved  Metabolic acidosis.  Increased anion gap, secondary to renal failure.  Improving with dialysis  Anemia in chronic kidney disease.   Bradycardia.  Most likely due to hyperkalemia.  Improved  hyperphosphatemia.   DVT.  Hematology following.  Possible transition to low-dose Eliquis  Urinary tract infection.  Patient is on IV Rocephin.  Urine culture growing Serratia Marcescans    PLAN:  Hemodialysis today  S/p fistulogram and angioplasty for stenosis  Increase Renvela dose  On IV Rocephin.Urine culture growing Serratia Marcescans  Arrange for outpatient dialysis  Possible transition to Eliquis for DVTs.  Hematology following      Atul Fowler MD  01/18/24  09:37 [facility]    [facility]  [phone]

## 2024-01-18 NOTE — PLAN OF CARE
Goal Outcome Evaluation:  Plan of Care Reviewed With: patient        Progress: no change  Outcome Evaluation: Slept at intervals. No c/o discomfort. F/C intact with clear red urine. NPO for fistulogram today. Will continue to monitor.

## 2024-01-18 NOTE — CONSULTS
Infectious Diseases Consult Note    Referring Provider: Licha Clinton MD    Reason for Consultation: Leukocytosis, UTI    Patient Care Team:  Waylon Johnson MD as PCP - General  Lali Borja MD as Consulting Physician (Cardiology)  Atul Fowler MD as Consulting Physician (Nephrology)    Chief complaint no specific complaints today    Subjective     The patient has been afebrile for the last 24 hours.  The patient is on room air, hemodynamically stable, and is tolerating antimicrobial therapy.  Patient was seen in the dialysis unit    History of present illness:      This is a 78-year-old male who presents to the hospital on 1/15/2024 with complaints of hypotension and hypoglycemia.  Patient's been feeling to that since Chari and was given a round of antibiotics for a possible UTI.  Patient started having nausea and vomiting and some abdominal pain.  Patient has had worsening chronic kidney disease and recently had a fistula placed but had not been started on dialysis before admission.  Patient was found to have bilateral hydronephrosis and had a cystoscopy with bilateral stent placement on 1/16/2023.  He then had a left AV fistulogram and angioplasty on 1/18/2024.  Patient has a persistent UTI and worsening leukocytosis.  Denies fever and chills, shortness of breath, cough, GI symptoms.  Patient had a Peterson catheter placed 2 days ago    Review of Systems   Review of Systems   Constitutional: Negative.    HENT: Negative.     Eyes: Negative.    Respiratory: Negative.     Cardiovascular: Negative.    Gastrointestinal: Negative.    Endocrine: Negative.    Genitourinary: Negative.    Musculoskeletal: Negative.    Skin: Negative.    Neurological: Negative.    Psychiatric/Behavioral: Negative.     All other systems reviewed and are negative.      Medications  Medications Prior to Admission   Medication Sig Dispense Refill Last Dose    amLODIPine (NORVASC) 5 MG tablet Take 1 tablet by mouth Daily.    1/14/2024    aspirin 81 MG tablet Take 1 tablet by mouth Daily.   1/14/2024    atenolol (TENORMIN) 50 MG tablet Take 1 tablet by mouth Daily.   1/14/2024    calcitriol (ROCALTROL) 0.25 MCG capsule Take 2 capsules by mouth Daily.   1/14/2024    ciprofloxacin (CIPRO) 250 MG tablet Take 1 tablet by mouth 2 (Two) Times a Day.   1/14/2024    Insulin Glargine-Lixisenatide (SOLIQUA) 100-33 UNT-MCG/ML solution pen-injector injection Inject 20 Units under the skin into the appropriate area as directed Daily.       sevelamer (RENVELA) 800 MG tablet Take 1 tablet by mouth 3 (Three) Times a Day As Needed.   1/14/2024    simvastatin (ZOCOR) 20 MG tablet Take 1 tablet by mouth Every Night.       torsemide (DEMADEX) 20 MG tablet Take 1 tablet by mouth Daily.          History  Past Medical History:   Diagnosis Date    Cancer 01/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    Coronary artery disease     Deep vein thrombosis 04/1990    Diabetes mellitus     Dyslipidemia     Erectile dysfunction 51 years old    Heart murmur 10/4/2023    HL (hearing loss) 7 year old    right    Hyperlipidemia 1970    Hypertension     Neuropathy     Renal insufficiency 2020    Stage 4 chronic kidney disease     3-4     Past Surgical History:   Procedure Laterality Date    ARTERIOVENOUS FISTULA/SHUNT SURGERY Left 9/22/2022    Procedure: BRACHIAL CEPHALIC FISTULA FORMATION;  Surgeon: Edy Vega MD;  Location: [facility] OR;  Service: Vascular;  Laterality: Left;    BACK SURGERY      BASAL CELL CARCINOMA EXCISION  01/2019    CARDIAC CATHETERIZATION      CYSTOSCOPY W/ URETERAL STENT PLACEMENT Bilateral 1/16/2024    Procedure: CYSTOSCOPY, BILATERAL URETERAL STENT INSERTION;  Surgeon: Tyrel Avitia MD;  Location: [facility] MA[facility] OR;  Service: Urology;  Laterality: Bilateral;    TURP / TRANSURETHRAL INCISION / DRAINAGE PROSTATE         Family History  Family History   Problem Relation Age of Onset    Heart disease Mother     Heart disease Brother     Heart attack Maternal  Grandfather     Hypertension Father     Hearing loss Father        Social History   reports that he quit smoking about 57 years ago. His smoking use included cigarettes. He has been exposed to tobacco smoke. He has never used smokeless tobacco. He reports that he does not drink alcohol and does not use drugs.    Allergies  Tylenol with codeine #3 [acetaminophen-codeine]    Objective     Vital Signs   Vital Signs (last 24 hours)         01/17 0700  01/18 0659 01/18 0700  01/18 1425   Most Recent      Temp (°F) 95.1 -  98.4    97.2 -  97.5     97.2 (36.2) 01/18 1230    Heart Rate 58 -  86    60 -  70     66 01/18 1400    Resp   16    16 -  20     16 01/18 1400    BP 96/54 -  133/71    112/51 -  136/77     129/64 01/18 1400    SpO2 (%) 90 -  99    95 -  97     95 01/18 1125    Flow (L/min)     2     2 01/18 0806            Physical Exam:  Physical Exam  Vitals and nursing note reviewed.   Constitutional:       General: He is not in acute distress.     Appearance: He is well-developed and normal weight. He is ill-appearing. He is not diaphoretic.   HENT:      Head: Normocephalic and atraumatic.   Eyes:      Conjunctiva/sclera: Conjunctivae normal.      Pupils: Pupils are equal, round, and reactive to light.   Cardiovascular:      Rate and Rhythm: Normal rate and regular rhythm.      Heart sounds: Normal heart sounds, S1 normal and S2 normal.   Pulmonary:      Effort: Pulmonary effort is normal. No respiratory distress.      Breath sounds: Normal breath sounds. No stridor. No wheezing or rales.   Abdominal:      General: Bowel sounds are normal. There is no distension.      Palpations: Abdomen is soft. There is no mass.      Tenderness: There is no abdominal tenderness. There is no guarding.   Genitourinary:     Comments: Peterson catheter with pink-tinged urine  Musculoskeletal:         General: No deformity. Normal range of motion.      Cervical back: Neck supple.   Skin:     General: Skin is warm and dry.       Coloration: Skin is not pale.      Findings: No erythema or rash.      Comments: Left arm fistula currently being used for dialysis   Neurological:      Mental Status: He is alert and oriented to person, place, and time.      Cranial Nerves: No cranial nerve deficit.   Psychiatric:         Mood and Affect: Mood normal.         Microbiology  Microbiology Results (last 10 days)       Procedure Component Value - Date/Time    Urine Culture - Urine, Indwelling Urethral Catheter [[id number]]  (Normal) Collected: 01/17/24 1501    Lab Status: Preliminary result Specimen: Urine from Indwelling Urethral Catheter Updated: 01/18/24 1344     Urine Culture No growth    Blood Culture - Blood, Arm, Left [[id number]]  (Normal) Collected: 01/17/24 1205    Lab Status: Preliminary result Specimen: Blood from Arm, Left Updated: 01/18/24 1230     Blood Culture No growth at 24 hours    Blood Culture - Blood, Arm, Left [[id number]]  (Normal) Collected: 01/17/24 1203    Lab Status: Preliminary result Specimen: Blood from Arm, Left Updated: 01/18/24 1230     Blood Culture No growth at 24 hours    COVID-19 and FLU A/B PCR, 1 HR TAT - Swab, Nasopharynx [[id number]]  (Normal) Collected: 01/15/24 1602    Lab Status: Final result Specimen: Swab from Nasopharynx Updated: 01/15/24 1624     COVID19 Not Detected     Influenza A PCR Not Detected     Influenza B PCR Not Detected    Narrative:      Fact sheet for providers: https://www.fda.gov/media/960367/download    Fact sheet for patients: https://www.fda.gov/media/118581/download    Test performed by PCR.    Urine Culture - Urine, Urine, Clean Catch [[id number]]  (Abnormal)  (Susceptibility) Collected: 01/15/24 1145    Lab Status: Final result Specimen: Urine, Clean Catch Updated: 01/18/24 0837     Urine Culture 25,000 CFU/mL Serratia marcescens    Narrative:      Colonization of the urinary tract without infection is common. Treatment is discouraged unless the patient is symptomatic, pregnant, or  undergoing an invasive urologic procedure.    Susceptibility        Serratia marcescens      ZOILA      Amoxicillin + Clavulanate Resistant      Cefazolin Resistant      Cefepime Susceptible      Ceftazidime Susceptible      Ceftriaxone Susceptible      Gentamicin Susceptible      Levofloxacin Susceptible      Nitrofurantoin Resistant      Piperacillin + Tazobactam Susceptible  [1]       Trimethoprim + Sulfamethoxazole Susceptible                   [1]  Appended report. These results have been appended to a previously preliminary verified report.                   Blood Culture - Blood, Arm, Right [[id number]]  (Normal) Collected: 01/15/24 1126    Lab Status: Preliminary result Specimen: Blood from Arm, Right Updated: 01/18/24 1130     Blood Culture No growth at 3 days    Narrative:      Less than seven (7) mL's of blood was collected.  Insufficient quantity may yield false negative results.    Blood Culture - Blood, Arm, Left [[id number]]  (Normal) Collected: 01/15/24 1125    Lab Status: Preliminary result Specimen: Blood from Arm, Left Updated: 01/18/24 1145     Blood Culture No growth at 3 days    Narrative:      Less than seven (7) mL's of blood was collected.  Insufficient quantity may yield false negative results.            Laboratory  Results from last 7 days   Lab Units 01/18/24  0352   WBC 10*3/mm3 21.20*   HEMOGLOBIN g/dL 9.0*   HEMATOCRIT % 27.3*   PLATELETS 10*3/mm3 210     Results from last 7 days   Lab Units 01/18/24  0352   SODIUM mmol/L 142   POTASSIUM mmol/L 4.9   CHLORIDE mmol/L 102   CO2 mmol/L 24.0   BUN mg/dL 100*   CREATININE mg/dL 7.68*   GLUCOSE mg/dL 78   CALCIUM mg/dL 8.3*     Results from last 7 days   Lab Units 01/18/24  0352   SODIUM mmol/L 142   POTASSIUM mmol/L 4.9   CHLORIDE mmol/L 102   CO2 mmol/L 24.0   BUN mg/dL 100*   CREATININE mg/dL 7.68*   GLUCOSE mg/dL 78   CALCIUM mg/dL 8.3*     Results from last 7 days   Lab Units 01/15/24  1223   CK TOTAL U/L 44                Radiology  Imaging Results (Last 72 Hours)       Procedure Component Value Units Date/Time    FL < 1 Hour [[id number]] Resulted: 01/16/24 0805     Updated: 01/16/24 0805    Narrative:      This procedure was auto-finalized with no dictation required.    CT Abdomen Pelvis Without Contrast [[id number]] Collected: 01/15/24 1504     Updated: 01/15/24 1515    Narrative:      CT ABDOMEN PELVIS WO CONTRAST    Date of Exam: 1/15/2024 2:47 PM EST    Indication: Nominal pain diarrhea elevated white count renal failure.    Comparison: Renal sonogram performed on March 21, 2022.    Technique: Axial CT images were obtained of the abdomen and pelvis without the administration of contrast. Sagittal and coronal reconstructions were performed.  Automated exposure control and iterative reconstruction methods were used.      Findings:    Lung Bases:  Mild right base atelectasis visualized.      Peritoneum:  No free intraperitoneal air or fluid.     Abdominal wall:  Unremarkable.    Liver:  Liver is normal in size and contour. No focal lesions.      Biliary/Gallbladder:  The gallbladder is filled with calculi. No pericholecystic fluid visualized. The biliary tree is nondilated.    Pancreas:  Pancreas is within normal limits. There is no evidence of pancreatic mass or peripancreatic fluid.      Spleen:  Spleen is normal in size and contour. Multiple small splenic granulomas are visualized.    Gastrointestinal/Mesentery:   No evidence of bowel obstruction or gross inflammatory changes.The appendix appears within normal limits. There is descending and sigmoid diverticulosis without evidence of diverticulitis.      Adrenals:  Adrenal glands are unremarkable.      Kidneys:  The kidneys are in anatomic position. Punctate bilateral nonobstructing nephrolithiasis visualized. The kidneys are atrophic. There is moderate right hydronephrosis and an obstructing right mid ureteral calculus measuring 9 mm there is moderate left    hydronephrosis and an obstructing mid ureteral calculus measuring 8 mm. There is mild bilateral perinephric fat stranding. Multiple small cysts are visualized bilaterally. There is a complex cyst with either soft tissue component or small volume   hemorrhage in the lower pole of the right kidney measuring 3.1 cm.    Bladder:   The urinary bladder is not abnormally distended. Mild urinary bladder wall thickening secondary to underdistention however, sequela of chronic outlet obstruction can't be excluded.    Reproductive organs:    The prostate is moderately enlarged.      Retroperitoneal/Lymph Nodes:  No retroperitoneal adenopathy is identified.     Vasculature:  Extensive aortoiliac atheromatous calcifications. The aorta is normal in caliber.        Bony Structures:    No acute fracture or aggressive lesions.          Impression:      Impression:    Moderate bilateral hydronephrosis secondary to obstructing right mid ureteral calculus measuring 9 mm and left mid ureteral calculus measuring 8 mm.    3.1 cm complex cyst in the lower pole of the right kidney containing either mural nodularity or small volume hemorrhage. If possible, correlation with contrast-enhanced renal protocol CT or MRI is suggested. If there is not possible due to renal   function, close follow-up findings of noncontrast CT or renal ultrasound is suggested. Consider urologic evaluation.    Cholelithiasis.    Additional findings per body of the report.            Electronically Signed: James Monsalve MD    1/15/2024 3:12 PM EST    Workstation ID: [id number]            Cardiology      Results Review:  I have reviewed all clinical data, test, lab, and imaging results.       Schedule Meds  apixaban, 2.5 mg, Oral, Q12H  atorvastatin, 10 mg, Oral, Daily  calcitriol, 0.5 mcg, Oral, Daily  cefTRIAXone, 2,000 mg, Intravenous, Q24H  insulin lispro, 2-7 Units, Subcutaneous, 4x Daily AC & at Bedtime  senna-docusate sodium, 2 tablet, Oral, BID  sevelamer,  2,400 mg, Oral, TID With Meals  sodium chloride, 10 mL, Intravenous, Q12H        Infusion Meds       PRN Meds    acetaminophen    senna-docusate sodium **AND** polyethylene glycol **AND** bisacodyl **AND** bisacodyl    Calcium Replacement - Follow Nurse / BPA Driven Protocol    dextrose    dextrose    glucagon (human recombinant)    Magnesium Standard Dose Replacement - Follow Nurse / BPA Driven Protocol    nitroglycerin    ondansetron    ondansetron ODT    Phosphorus Replacement - Follow Nurse / BPA Driven Protocol    Potassium Replacement - Follow Nurse / BPA Driven Protocol    [COMPLETED] Insert Peripheral IV **AND** sodium chloride    sodium chloride    sodium chloride      Assessment & Plan       Assessment    Persistent leukocytosis.  Concerning for persistent infection or renal abscess.    Probable complicated UTI.  Urine culture grew Serratia marcescens from January 15 2024.  Patient had prior urine culture before admission on January 8, 2024 and grew the same organism.  CT scan of abdomen pelvis done without contrast and showed bilateral hydronephrosis with possible complex cyst on the right kidney but cannot rule out renal abscess    End-stage renal disease-patient was started on dialysis this admission.  Patient followed by nephrology    History of left AV fistula placement on 9/22/2022 which required a fistulogram and angioplasty on 1/18/2024.  Patient followed by vascular surgery    Acute left leg DVTs.  Hematology following    Type 2 diabetes    History of prostate cancer    Plan    Discontinue IV Rocephin  Start IV ertapenem 500 mg every 24 hours for at least 7 days  Case discussed with nephrology to approve IV contrast-will plan for CT-renal protocol with IV contrast tomorrow to rule out a renal abscess  Patient is scheduled for an IVC filter tomorrow  Continue supportive care  A.m. labs  Case discussed with RN    The above note was transcribed for Dr. Sanchez-physical exam and review of systems were  performed by him        Ann Beavers, APRN  01/18/24  14:25 EST    Note is dictated utilizing voice recognition software/Dragon

## 2024-01-18 NOTE — SIGNIFICANT NOTE
01/18/24 1421   OTHER   Discipline physical therapist   Rehab Time/Intention   Session Not Performed patient/family declined treatment  (off the floor for dialysis)   Recommendation   PT - Next Appointment 01/19/24

## 2024-01-18 NOTE — CONSULTS
HP      Name: Gabriel De Souza ADMIT: 1/15/2024   : 1945  PCP: Waylon Johnson MD    MRN: [id number] LOS: 3 days   AGE/SEX: 78 y.o. male  ROOM: 270/1     Chief Complaint   Patient presents with    Hypotension       Subjective        History of present illness  Gabriel De Souza is a 78-year-old male patient who has history of nonobstructive CAD, hypertension, diabetes, chronic renal insufficiency, is admitted to the hospital due to weakness, malaise, low blood pressure and low blood sugar.  He has progressively worsening renal failure and had a fistula placed recently for possible dialysis.  Upon arrival he was noted to have slow heart rates in the mid 40s but also his creatinine was 16 and potassium was 7.3.  CT scan of the abdomen showed bilateral hydronephrosis, ureteral calculus.  His white count is also elevated at 23,000.  When I interviewed the patient, he was receiving dialysis.    Past Medical History:   Diagnosis Date    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    Coronary artery disease     Deep vein thrombosis 1990    Diabetes mellitus     Dyslipidemia     Erectile dysfunction 51 years old    Heart murmur 10/4/2023    HL (hearing loss) 7 year old    right    Hyperlipidemia 1970    Hypertension     Neuropathy     Renal insufficiency     Stage 4 chronic kidney disease     3-4     Past Surgical History:   Procedure Laterality Date    ARTERIOVENOUS FISTULA/SHUNT SURGERY Left 2022    Procedure: BRACHIAL CEPHALIC FISTULA FORMATION;  Surgeon: Edy Vega MD;  Location: [facility];  Service: Vascular;  Laterality: Left;    BACK SURGERY      BASAL CELL CARCINOMA EXCISION  2019    CARDIAC CATHETERIZATION      CYSTOSCOPY W/ URETERAL STENT PLACEMENT Bilateral 2024    Procedure: CYSTOSCOPY, BILATERAL URETERAL STENT INSERTION;  Surgeon: Tyrel Avitia MD;  Location: [facility] OR;  Service: Urology;  Laterality: Bilateral;    TURP / TRANSURETHRAL INCISION / DRAINAGE PROSTATE        Family History   Problem Relation Age of Onset    Heart disease Mother     Heart disease Brother     Heart attack Maternal Grandfather     Hypertension Father     Hearing loss Father      Social History     Tobacco Use    Smoking status: Former     Types: Cigarettes     Quit date:      Years since quittin.0     Passive exposure: Past    Smokeless tobacco: Never   Vaping Use    Vaping Use: Never used   Substance Use Topics    Alcohol use: No    Drug use: No     Medications Prior to Admission   Medication Sig Dispense Refill Last Dose    amLODIPine (NORVASC) 5 MG tablet Take 1 tablet by mouth Daily.   2024    aspirin 81 MG tablet Take 1 tablet by mouth Daily.   2024    atenolol (TENORMIN) 50 MG tablet Take 1 tablet by mouth Daily.   2024    calcitriol (ROCALTROL) 0.25 MCG capsule Take 2 capsules by mouth Daily.   2024    ciprofloxacin (CIPRO) 250 MG tablet Take 1 tablet by mouth 2 (Two) Times a Day.   2024    Insulin Glargine-Lixisenatide (SOLIQUA) 100-33 UNT-MCG/ML solution pen-injector injection Inject 20 Units under the skin into the appropriate area as directed Daily.       sevelamer (RENVELA) 800 MG tablet Take 1 tablet by mouth 3 (Three) Times a Day As Needed.   2024    simvastatin (ZOCOR) 20 MG tablet Take 1 tablet by mouth Every Night.       torsemide (DEMADEX) 20 MG tablet Take 1 tablet by mouth Daily.        Allergies:  Tylenol with codeine #3 [acetaminophen-codeine]    Review of systems    Constitutional: Negative.    Respiratory and cardiovascular: As detailed in HPI section.  Gastrointestinal: Negative for constipation, nausea and vomiting negative for abdominal distention, abdominal pain and diarrhea.   Genitourinary: Negative for difficulty urinating and flank pain.   Musculoskeletal: Negative for arthralgias, joint swelling and myalgias.   Skin: Negative for color change, rash and wound.   Neurological: Negative for dizziness, syncope, weakness and headaches.    Hematological: Negative for adenopathy.   Psychiatric/Behavioral: Negative for confusion.   All other systems reviewed and are negative.       Physical Exam  VITALS REVIEWED    General:      well developed, in no acute distress.    Head:      normocephalic and atraumatic.    Eyes:      PERRL/EOM intact, conjunctiva and sclera clear with out nystagmus.    Neck:      no masses, thyromegaly,  trachea central with normal respiratory effort and PMI displaced laterally  Lungs:      Clear to auscultation bilaterally  Heart:       Regular rate and rhythm  Msk:      no deformity or scoliosis noted of thoracic or lumbar spine.    Pulses:      pulses normal in all 4 extremities.    Extremities:       No lower extremity edema  Neurologic:      no focal deficits.   alert oriented x3  Skin:      intact without lesions or rashes.    Psych:      alert and cooperative; normal mood and affect; normal attention span and concentration.      Result Review :               Pertinent cardiac workup    EKG 10/4/2023 sinus rhythm with first-degree AV block  EKG 1/15/2024 sinus bradycardia at 43 bpm, first-degree AV block, AK interval 339 ms, IVCD.        Assessment and Plan         Hyperkalemia    Sinus bradycardia    Mechanical complication of arteriovenous fistula surgically created    End stage renal disease on dialysis      Gabriel De Souza is a 78-year-old male patient who presented to the hospital with worsening renal failure, hyperkalemia, obstructive uropathy.  Patient was initially bradycardic upon arrival.  That however has resolved with dialysis.  Due to the ongoing noncardiac issues, I would definitely hold off on pacemaker for now.  If he shows bradycardia after his renal function has recovered or steady dialysis regimen has been implemented, then we can think about pacemaker if indicated, but as of now there is no indication for pacemaker.  Patient is not experiencing any anginal symptoms or CHF symptoms either.        No  follow-ups on file.  Patient was given instructions and counseling regarding his condition or for health maintenance advice. Please see specific information pulled into the AVS if appropriate.

## 2024-01-18 NOTE — CASE MANAGEMENT/SOCIAL WORK
Continued Stay Note  [facility]     Patient Name: Gabriel De Souza  MRN: [id number]  Today's Date: 1/18/2024    Admit Date: 1/15/2024    Plan: [facility], pending acceptance.  No precert needed.  PASRR approved.   Pending [facility] (holding a MWF 1255 spot)   Discharge Plan       Row Name 01/18/24 1715       Plan    Plan [facility], pending acceptance.  No precert needed.  PASRR approved.   Pending [facility] (holding a MWF 1255 spot)    Patient/Family in Agreement with Plan yes    Plan Comments Spoke with patient, spouse and son at bedside regarding PT recc for SNF.   SNf lists given.  Discussed OP HD and facilities with HD inpatient vs facility transport to HD.   Chose 1. [facility] at [facility].  2. [facility] with HD at .   3. [facility] with HD across street at [place].   Referral sent to [facility].   Referral sent to [facility].   Spoke with Tyrel with [facility] ([phone] ext [phone]) who stated patient has been financially approved and holding a MWF 1255 time for him, pending hospital DC planning.   Explained Plan for SNF.   Tyrel requested call back when have plan and accepting facility.  Barriers to discharge: Bronch with washings today.  Dialysis today. IV abx.             Expected Discharge Date and Time       Expected Discharge Date Expected Discharge Time    Jan 21, 2024         Met with patient in room wearing PPE: mask.    Maintained distance greater than six feet and spent less than 15 minutes in the room.  Niurka Copeland RN     Office Phone [phone]  Office Cell [phone]

## 2024-01-18 NOTE — DISCHARGE PLACEMENT REQUEST
"Gabriel Collins (78 y.o. Male)       Date of Birth   1945    Social Security Number       [address]   [address] [place] [facility]    Home Phone   [phone]    MRN   [id number]       [facility]    Marital Status                               Admission Date   1/15/24    Admission Type   Emergency    Admitting Provider   Licha Clinton MD    Attending Provider   Licha Clinton MD    Department, Room/Bed   [facility] 2D, 270/1       Discharge Date       Discharge Disposition       Discharge Destination                                 Attending Provider: Licha Clinton MD    Allergies: Tylenol With Codeine #3 [Acetaminophen-codeine]    Isolation: None   Infection: None   Code Status: CPR    Ht: 185.4 cm (73\")   Wt: 97.3 kg (214 lb 8.1 oz)    Admission Cmt: None   Principal Problem: Hyperkalemia [E87.5]                   Active Insurance as of 1/15/2024       Primary Coverage       Payor Plan Insurance Group Employer/Plan Group    MEDICARE RAILROAD MEDICARE        Payor Plan Address Payor Plan Phone Number Payor Plan Fax Number Effective Dates    [address] [phone]  6/1/2011 - None Entered    [facility] [id number]         Subscriber Name Subscriber Birth Date Member ID       GABRIEL COLLINS 1945 [id number]               Secondary Coverage       Payor Plan Insurance Group Employer/Plan Group    [facility] [id number]       Coverage Address Coverage Phone Number Coverage Fax Number Effective Dates    [address] [phone]  1/1/2023 - None Entered    [facility] [id number]         Subscriber Name Subscriber Birth Date Member ID       GABRIEL COLLINS 1945 [id number]                     Emergency Contacts        (Rel.) Home Phone Work Phone Mobile Phone    Pat Collins (Spouse) -- -- [phone]    NEAL COLLINS (Son) -- [phone]                 History & Physical        Licha Clinton MD at 01/15/24 " 1602              [facility]  History & Physical    Patient Name: Gabriel De Souza  : 1945  MRN: [id number]  Primary Care Physician:  Waylon Johnson MD  Date of admission: 1/15/2024  Date and Time of Service: 1/15/2024     Subjective      Chief Complaint: Hypotension and low blood sugar    History of Present Illness: Gabriel De Souza is a 78 y.o. male with past medical history of diabetes mellitus dyslipidemia hypertension presented to the hospital with complaints of low blood pressure and low blood sugar.  Patient has history of CKD and was progressively worsening to the point that he had a fistula placed for possible dialysis.  He has been feeling bad since Chari and went to see his PCP a week back when he was given antibiotics for UTI however did not really help much.  He has been nauseated been vomiting now and then, has been having some abdominal discomfort, which progressively became worse.  Today the wife took his blood pressure which was running low also blood sugar was low so they went to nephrology office and his heart rate was also found to be low in the office so he was referred to ER for further evaluation.  Patient denies any new symptoms, no chest pain lightheadedness or dizziness but does have mild abdominal pain and nausea as mentioned above.  Upon presentation to the ER, patient was found to have a potassium of 7.3, systolic blood pressure of 80, heart rate around 44.  EKG showed sinus bradycardia.  BUN up to 56, creatinine of 16.11. .  Patient is currently a full code.  CT scan of the abdomen pelvis also came back later that showed moderate bilateral hydronephrosis secondary to obstructing right medical calculus measuring 9 mm left mid ureteral calculus measuring 8 mm.  3.1 cm complex axis the lower lobe of the right kidney containing either mural nodularity or small volume hemorrhage.  Patient also does have an elevated white count of 23.10.  Blood cultures  are in process.  UA showed small leukocytes, also moderate amount of blood        Review of Systems  14 point ROS is negative other than what is stated positive above  Personal History     Past Medical History:   Diagnosis Date    Cancer 01/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    Coronary artery disease     Deep vein thrombosis 04/1990    Diabetes mellitus     Dyslipidemia     Erectile dysfunction 51 years old    Heart murmur 10/4/2023    HL (hearing loss) 7 year old    right    Hyperlipidemia 1970    Hypertension     Neuropathy     Renal insufficiency 2020    Stage 4 chronic kidney disease     3-4       Past Surgical History:   Procedure Laterality Date    ARTERIOVENOUS FISTULA/SHUNT SURGERY Left 9/22/2022    Procedure: BRACHIAL CEPHALIC FISTULA FORMATION;  Surgeon: Edy Vega MD;  Location: [facility] MAIN OR;  Service: Vascular;  Laterality: Left;    BACK SURGERY      BASAL CELL CARCINOMA EXCISION  01/2019    CARDIAC CATHETERIZATION      TURP / TRANSURETHRAL INCISION / DRAINAGE PROSTATE         Family History: family history includes Hearing loss in his father; Heart attack in his maternal grandfather; Heart disease in his brother and mother; Hypertension in his father. Otherwise pertinent FHx was reviewed and not pertinent to current issue.    Social History:  reports that he quit smoking about 57 years ago. His smoking use included cigarettes. He has been exposed to tobacco smoke. He has never used smokeless tobacco. He reports that he does not drink alcohol and does not use drugs.    Home Medications:  Prior to Admission Medications       Prescriptions Last Dose Informant Patient Reported? Taking?    amLODIPine (NORVASC) 5 MG tablet 1/14/2024  Yes Yes    Take 1 tablet by mouth Daily.    aspirin 81 MG tablet 1/14/2024  Yes Yes    Take 1 tablet by mouth Daily.    atenolol (TENORMIN) 50 MG tablet 1/14/2024  Yes Yes    Take 1 tablet by mouth Daily.    calcitriol (ROCALTROL) 0.25 MCG capsule 1/14/2024  Yes Yes    Take 2  capsules by mouth Daily.    ciprofloxacin (CIPRO) 250 MG tablet 1/14/2024  Yes Yes    Take 1 tablet by mouth 2 (Two) Times a Day.    Insulin Glargine-Lixisenatide (SOLIQUA) 100-33 UNT-MCG/ML solution pen-injector injection   Yes Yes    Inject 20 Units under the skin into the appropriate area as directed Daily.    sevelamer (RENVELA) 800 MG tablet 1/14/2024  Yes Yes    Take 1 tablet by mouth 3 (Three) Times a Day As Needed.    simvastatin (ZOCOR) 20 MG tablet   Yes Yes    Take 1 tablet by mouth Every Night.    torsemide (DEMADEX) 20 MG tablet   Yes Yes    Take 1 tablet by mouth Daily.              Allergies:  Allergies   Allergen Reactions    Tylenol With Codeine #3 [Acetaminophen-Codeine] Nausea Only and GI Intolerance       Objective      Vitals:   Temp:  [98 °F (36.7 °C)] 98 °F (36.7 °C)  Heart Rate:  [40-44] 41  Resp:  [12-20] 14  BP: ()/(53-61) 94/53  Body mass index is 28.37 kg/m².  Physical Exam  Exam, awake and alert, hard of hearing  HEENT normocephalic atraumatic  Chest clear to auscultation bilaterally  CVs S1-S2 normal, regular rhythm, bradycardia  Abdomen soft nontender nondistended bowel sounds positive  Extremities warm to touch 2+ pulses no edema  Neuro AOx3, grossly normal      Diagnostic Data:  Lab Results (last 24 hours)       Procedure Component Value Units Date/Time    COVID-19 and FLU A/B PCR, 1 HR TAT - Swab, Nasopharynx [[id number]] Collected: 01/15/24 1602    Specimen: Swab from Nasopharynx Updated: 01/15/24 1602    POC Glucose Once [[id number]]  (Abnormal) Collected: 01/15/24 1519    Specimen: Blood Updated: 01/15/24 1521     Glucose 147 mg/dL      Comment: Serial Number: [id number]:  [id number]       Hepatitis B Surface Antigen [[id number]] Collected: 01/15/24 1125    Specimen: Blood Updated: 01/15/24 1430    POC Lactate [[id number]]  (Normal) Collected: 01/15/24 1328    Specimen: Blood Updated: 01/15/24 1339     Lactate 1.9 mmol/L      Comment: Serial Number: [id number]:   [id number]       Comprehensive Metabolic Panel [[id number]]  (Abnormal) Collected: 01/15/24 1223    Specimen: Blood from Arm, Right Updated: 01/15/24 1304     Glucose 111 mg/dL       mg/dL      Creatinine 16.11 mg/dL      Sodium 140 mmol/L      Potassium 7.3 mmol/L      Chloride 100 mmol/L      CO2 9.0 mmol/L      Calcium 9.4 mg/dL      Total Protein 6.9 g/dL      Albumin 3.2 g/dL      ALT (SGPT) 17 U/L      AST (SGOT) 18 U/L      Alkaline Phosphatase 102 U/L      Total Bilirubin 0.2 mg/dL      Globulin 3.7 gm/dL      A/G Ratio 0.9 g/dL      BUN/Creatinine Ratio 15.9     Anion Gap 31.0 mmol/L      eGFR 2.8 mL/min/1.73      Comment: <15 Indicative of kidney failure       Narrative:      GFR Normal >60  Chronic Kidney Disease <60  Kidney Failure <15    The GFR formula is only valid for adults with stable renal function between ages 18 and 70.    Lipase [[id number]]  (Abnormal) Collected: 01/15/24 1223    Specimen: Blood from Arm, Right Updated: 01/15/24 1304     Lipase >3,000 U/L     Magnesium [[id number]]  (Abnormal) Collected: 01/15/24 1223    Specimen: Blood from Arm, Right Updated: 01/15/24 1304     Magnesium 2.9 mg/dL     CK [[id number]]  (Normal) Collected: 01/15/24 1223    Specimen: Blood from Arm, Right Updated: 01/15/24 1252     Creatine Kinase 44 U/L     Extra Tubes [[id number]] Collected: 01/15/24 1125    Specimen: Blood, Venous Line Updated: 01/15/24 1230    Narrative:      The following orders were created for panel order Extra Tubes.  Procedure                               Abnormality         Status                     ---------                               -----------         ------                     Gold Top - SST[[id number]]                                   Final result               Green Top (Gel)[[id number]]                                  Final result               Light Blue Top[[id number]]                                   Final result                 Please view results for these tests on the  individual orders.    Gold Top - SST [[id number]] Collected: 01/15/24 1125    Specimen: Blood Updated: 01/15/24 1230     Extra Tube Hold for add-ons.     Comment: Auto resulted.       Green Top (Gel) [[id number]] Collected: 01/15/24 1125    Specimen: Blood Updated: 01/15/24 1230     Extra Tube Hold for add-ons.     Comment: Auto resulted.       Light Blue Top [[id number]] Collected: 01/15/24 1125    Specimen: Blood from Arm, Right Updated: 01/15/24 1230     Extra Tube Hold for add-ons.     Comment: Auto resulted       Urinalysis, Microscopic Only - Urine, Clean Catch [[id number]]  (Abnormal) Collected: 01/15/24 1145    Specimen: Urine, Clean Catch Updated: 01/15/24 1209     RBC, UA None Seen /HPF      WBC, UA 0-2 /HPF      Bacteria, UA Trace /HPF      Squamous Epithelial Cells, UA 0-2 /HPF      Hyaline Casts, UA None Seen /LPF      Methodology Manual Light Microscopy    TSH [[id number]]  (Normal) Collected: 01/15/24 1125    Specimen: Blood from Arm, Right Updated: 01/15/24 1205     TSH 1.270 uIU/mL     Urinalysis With Microscopic If Indicated (No Culture) - Urine, Clean Catch [[id number]]  (Abnormal) Collected: 01/15/24 1145    Specimen: Urine, Clean Catch Updated: 01/15/24 1153     Color, UA Yellow     Appearance, UA Clear     pH, UA <=5.0     Specific Gravity, UA 1.020     Glucose, UA Negative     Ketones, UA Negative     Bilirubin, UA Negative     Blood, UA Moderate (2+)     Protein,  mg/dL (2+)     Leuk Esterase, UA Small (1+)     Nitrite, UA Negative     Urobilinogen, UA 0.2 E.U./dL    CBC & Differential [[id number]]  (Abnormal) Collected: 01/15/24 1125    Specimen: Blood from Arm, Right Updated: 01/15/24 1138    Narrative:      The following orders were created for panel order CBC & Differential.  Procedure                               Abnormality         Status                     ---------                               -----------         ------                     CBC Auto Differential[[id number]]         Abnormal            Final result                 Please view results for these tests on the individual orders.    CBC Auto Differential [[id number]]  (Abnormal) Collected: 01/15/24 1125    Specimen: Blood from Arm, Right Updated: 01/15/24 1138     WBC 23.10 10*3/mm3      RBC 3.69 10*6/mm3      Hemoglobin 10.4 g/dL      Hematocrit 32.4 %      MCV 87.7 fL      MCH 28.2 pg      MCHC 32.2 g/dL      RDW 16.4 %      RDW-SD 53.8 fl      MPV 8.4 fL      Platelets 274 10*3/mm3      Neutrophil % 84.4 %      Lymphocyte % 6.1 %      Monocyte % 9.3 %      Eosinophil % 0.0 %      Basophil % 0.2 %      Neutrophils, Absolute 19.50 10*3/mm3      Lymphocytes, Absolute 1.40 10*3/mm3      Monocytes, Absolute 2.20 10*3/mm3      Eosinophils, Absolute 0.00 10*3/mm3      Basophils, Absolute 0.10 10*3/mm3      nRBC 0.2 /100 WBC     Blood Culture - Blood, Arm, Left [[id number]] Collected: 01/15/24 1125    Specimen: Blood from Arm, Left Updated: 01/15/24 1130    Blood Culture - Blood, Arm, Right [[id number]] Collected: 01/15/24 1126    Specimen: Blood from Arm, Right Updated: 01/15/24 1130             Imaging Results (Last 24 Hours)       Procedure Component Value Units Date/Time    CT Abdomen Pelvis Without Contrast [[id number]] Collected: 01/15/24 1504     Updated: 01/15/24 1515    Narrative:      CT ABDOMEN PELVIS WO CONTRAST    Date of Exam: 1/15/2024 2:47 PM EST    Indication: Nominal pain diarrhea elevated white count renal failure.    Comparison: Renal sonogram performed on March 21, 2022.    Technique: Axial CT images were obtained of the abdomen and pelvis without the administration of contrast. Sagittal and coronal reconstructions were performed.  Automated exposure control and iterative reconstruction methods were used.      Findings:    Lung Bases:  Mild right base atelectasis visualized.      Peritoneum:  No free intraperitoneal air or fluid.     Abdominal wall:  Unremarkable.    Liver:  Liver is normal in size and contour. No focal  lesions.      Biliary/Gallbladder:  The gallbladder is filled with calculi. No pericholecystic fluid visualized. The biliary tree is nondilated.    Pancreas:  Pancreas is within normal limits. There is no evidence of pancreatic mass or peripancreatic fluid.      Spleen:  Spleen is normal in size and contour. Multiple small splenic granulomas are visualized.    Gastrointestinal/Mesentery:   No evidence of bowel obstruction or gross inflammatory changes.The appendix appears within normal limits. There is descending and sigmoid diverticulosis without evidence of diverticulitis.      Adrenals:  Adrenal glands are unremarkable.      Kidneys:  The kidneys are in anatomic position. Punctate bilateral nonobstructing nephrolithiasis visualized. The kidneys are atrophic. There is moderate right hydronephrosis and an obstructing right mid ureteral calculus measuring 9 mm there is moderate left   hydronephrosis and an obstructing mid ureteral calculus measuring 8 mm. There is mild bilateral perinephric fat stranding. Multiple small cysts are visualized bilaterally. There is a complex cyst with either soft tissue component or small volume   hemorrhage in the lower pole of the right kidney measuring 3.1 cm.    Bladder:   The urinary bladder is not abnormally distended. Mild urinary bladder wall thickening secondary to underdistention however, sequela of chronic outlet obstruction can't be excluded.    Reproductive organs:    The prostate is moderately enlarged.      Retroperitoneal/Lymph Nodes:  No retroperitoneal adenopathy is identified.     Vasculature:  Extensive aortoiliac atheromatous calcifications. The aorta is normal in caliber.        Bony Structures:    No acute fracture or aggressive lesions.          Impression:      Impression:    Moderate bilateral hydronephrosis secondary to obstructing right mid ureteral calculus measuring 9 mm and left mid ureteral calculus measuring 8 mm.    3.1 cm complex cyst in the lower  pole of the right kidney containing either mural nodularity or small volume hemorrhage. If possible, correlation with contrast-enhanced renal protocol CT or MRI is suggested. If there is not possible due to renal   function, close follow-up findings of noncontrast CT or renal ultrasound is suggested. Consider urologic evaluation.    Cholelithiasis.    Additional findings per body of the report.            Electronically Signed: James Monsalve MD    1/15/2024 3:12 PM EST    Workstation ID: [id number]    XR Chest 1 View [[id number]] Collected: 01/15/24 1212     Updated: 01/15/24 1215    Narrative:      XR CHEST 1 VW    Date of Exam: 1/15/2024 11:58 AM EST    Indication: General weakness    Comparison: Two-view chest x-ray 1/3/2023, 1/23/2019.    Findings:  There are lower lung volumes, but lungs appear grossly clear. No pneumothorax or large pleural effusion is seen. Cardiomediastinal contours are not significantly changed, allowing for portable AP technique and lower lung volumes.      Impression:      Impression:  Low lung volumes without definite acute cardiopulmonary abnormality.      Electronically Signed: Madelyn Vaughn    1/15/2024 12:13 PM EST    Workstation ID: [id number]              Assessment & Plan        This is a 78 y.o. male with:    Active and Resolved Problems  Active Hospital Problems    Diagnosis  POA    **Hyperkalemia [E87.5]  Yes      Resolved Hospital Problems   No resolved problems to display.     CKD stage IV, progressively worsening, patient will require hemodialysis   nephrology consulted.  Plan for dialysis today.    Hyperkalemia  Secondary to above, patient is on bicarb drip and has received hyperkalemia cocktail in the ER, needs urgent dialysis today.  Nephrology on board.    Bilateral renal stones with hydronephrosis likely contributing to deterioration of the CKD  Urology has been consulted  Will cover with Rocephin 2 g IV daily for now.  Blood cultures have been sent  Order urine cultures as  well    Bradycardia and hypotension  Due to hyperkalemia  Patient has sinus bradycardia  Check TSH and free T4 as well  Consult cardiology  Holding atenolol and all other blood pressure medications, holding Lasix as well.                DVT prophylaxis:  No DVT prophylaxis order currently exists.  Scds      The patient desires to be as follows:    CODE STATUS:     Discussed with the patient and family, full code for now.    Full code  Admission Status:  I believe this patient meets inpatient status    Expected Length of Stay: Unknown    PDMP and Medication Dispenses via Sidebar reviewed and consistent with patient reported medications.    I discussed the patient's findings and my recommendations with patient and family.      Signature:     This document has been electronically signed by Licha Clinton MD on January 15, 2024 16:02 [facility]    Electronically signed by Licha Clinton MD at 01/15/24 1628          Operative/Procedure Notes (all)        Tyrel Avitia MD at 01/16/24 0737  Version 1 of 1         Urology Operative Note    1/16/2024    Gabriel De Souza  78 y.o.  1945  male  [id number]      Surgeon(s) and Role:  Tyrel Avitia MD - Primary     Pre-operative Diagnosis: Bilateral obstructing stones, acute renal failure    Post-operative Diagnosis: Same    Complications: None    Procedures:  Cystoscopy  Retrograde pyelogram  Bilateral ureteral stent placement  Fluoroscopy with interpretation     Indications   Gabriel De Souza is a 78 y.o. male who was found to have acute renal failure hyperkalemia and leukocytosis added on urgently after stat dialysis for bilateral stent placement    During the informed consent process, the procedure was discussed in detail including the risks of bleeding, infection, ureteral injury, failure and need for secondary procedures    Findings     Fluoroscopy with interpretation: Right retrograde pyelogram showing severe hydronephrosis stent placed with curl  in the middle the renal pelvis.  Left retrograde pyelogram showing moderate hydronephrosis with stent in the upper pole the kidney    Description of procedure:  The patient was properly identified in the preoperative holding area and taken to the operating room where general anesthesia was induced. The patient was placed in the cystolithotomy position and prepped and draped in a sterile fashion. The patient was given antibiotics intravenously before the start of the surgery. After ensuring that all of the required equipment was ready and available a surgical time out was performed.     A 21 Stateless rigid cystoscope was inserted into the bladder under direct visualization.   Pollack catheter was used to perform retrograde pyelogram as above  A Sensor wire was passed up the right ureter under fluoroscopic guidance.  A 7Fr x multi cm stent was placed under direct and fluoroscopic visualization with good curl in the renal pelvis as well as the bladder.  Sensor wire was then passed up the left ureter.  Pollick catheter over this and retrograde pyelogram was performed as above.  7 Stateless multilength stent was then placed with curl in the upper pole as well as the bladder.  A Peterson was left in the bladder to maximize drainage until creatinine down      There were no apparent complications. The patient woke up in the operating room and was taken to the recovery room in stable condition.     I was present and scrubbed for the entire procedure.     Specimens: None    Estimated Blood Loss: minimal      Plan   -Return to floor  -Follow cultures  -Peterson may be removed once creatinine down, he is status post TURP with widely patent prostate  -We will arrange for outpatient stone management         Tyrel Avitia MD  [facility]  [facility], [address]  [place] [address]  [phone]             Electronically signed by Tyrel Avitia MD at 01/16/24 0752       Moi Liz MD at 01/18/24 0759  Version 1 of 1          Operative Note  Location: [facility]  Date of Admission:  1/15/2024  OR Date: 1/18/2024    Pre-op Diagnosis:  Mechanical complication of left brachiocephalic AV fistula  End-stage renal disease on hemodialysis    Post-op Diagnosis:  Mechanical complication of left brachiocephalic AV fistula  End-stage renal disease on hemodialysis    Procedure:   Left brachiocephalic AV fistulogram with circuit (cephalic arch) balloon angioplasty to 8 mm  Fistulogram with central vein (left subclavian) angioplasty to 8 mm    Surgeon: Moi Liz MD    Assistant: Sherine Baumann    Anesthesia: Local    Staff:   Circulator: Dennis Saleh RN; Charleen Albert RN  Scrub Person: Mary Holly  Documenter: Chriss Puente    Estimated Blood Loss: minimal    Specimen: None    Complications: None    Findings: Patent left brachiocephalic AV fistula with no mid fistula stenoses.  Given clinical indication and recent AV fistula scan findings, did not shoot a retrograde just anastomotic picture.  80% focal cephalic arch stenosis with extensive collateral vein formation.  80% focal left subclavian vein stenosis adjacent to the junction of the axillary and subclavian veins.  Successful balloon angioplasty of cephalic arch and subclavian vein stenoses with 20% residual stenosis in both lesions.  Persistent opacification of collateral veins, but given that this was the first treatment, did not place stent.    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  Had prolonged time to hemostasis associated with his fistula.  Recent scan demonstrated satisfactory volume flow and no stenosis.  Given clinical problems with fistula, submits now for fistulogram, expecting central fistula or vein stenosis.       Procedure:  The patient was positioned supine  with his left arm extended onto an arm table.  After satisfactory prep and drape, local anesthetic was infiltrated over the fistula in the antecubital area.  The fistula was easily cannulated with a micropuncture kit and a micropuncture wire was used to exchange the needle for catheter.  Fistulogram was obtained by injection of contrast material with spot imaging over the arm.  This demonstrated an 80% focal cephalic arch stenosis with collateral vein development.  There was also a stenosis at the the distal left subclavian vein.  I felt both were amenable to angioplasty.  An angled Glidewire was passed and used to exchange the micropuncture catheter for a 6 Gabonese sheath.  An 8 x 40 mm balloon was used to perform balloon angioplasty of both the cephalic arch and left subclavian vein stenoses to burst pressure for 90 seconds each.  There was angiographic waist demonstrated during balloon inflation of each, with complete effacement during inflation.  Follow-up study demonstrated 20% residual stenosis in both lesions, but the result was accepted.  A bolster suture was placed at the puncture site and the sheath was removed.  The puncture site was hemostatic.  A sterile dressing was applied.  At its conclusion the patient had tolerated the procedure well, and without apparent complications.  The patient was taken to the recovery area in stable condition.    Moi Liz MD     Date: 1/18/2024  Time: 08:59 EST      Electronically signed by Moi Liz MD at 01/18/24 0906          Physician Progress Notes (last 48 hours)        Moi Liz MD at 01/18/24 1153          Vascular Surgery     Dr. Kirti sierra/onc asked me to weigh in regarding left lower extremity DVT.  I have had a chance to review records.  This is what I told him:  We have no previous left lower extremity venous duplex scan here documenting DVT.  My review of the sonographic images demonstrates acute or subacute proximal LLE DVT which  as you indicate is at risk for embolism. Thrombus from these larger veins could be significant volume-wise if they embolize.  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  I understand an argument for lower dose apixaban, but point out that the reduced apixaban dose applies to the atrial fibrillation indication and not to acute VTE. I guess the most conservative approach if we cannot give apixaban 5 mg twice daily would be to place IVC filter, with or without apixaban at 2.5 twice daily. Judgment call for sure but I think that would be the board answer. IR places filters here.     Electronically signed by Moi Liz MD at 24 1156       Atul Fowler MD at 24 0990              [facility] Progress Note      Patient Name: Gabriel De Souza  : 1945  MRN: [id number]  Primary Care Physician:  Waylon Johnson MD  Date of admission: 1/15/2024    Subjective     Interval History:     S/p fistulogram and angioplasty this morning  Patient comfortably sitting.  Denies any chest pain, shortness of breath, nausea or vomiting  Patient more awake and able to answer simple questions    Review of Systems:   As noted above    Objective     Vitals:   Temp:  [97.3 °F (36.3 °C)-98.4 °F (36.9 °C)] 97.7 °F (36.5 °C)  Heart Rate:  [61-86] 65  Resp:  [16-20] 20  BP: ()/(49-77) 136/77  Flow (L/min):  [2] 2    Intake/Output Summary (Last 24 hours) at 2024 0937  Last data filed at 2024 0511  Gross per 24 hour   Intake 240 ml   Output 1900 ml   Net -1660 ml       Physical Exam:    General Appearance: Ill-appearing, NAD  HEENT: oral mucosa normal, nonicteric sclera  Neck: supple, no JVD  Lungs: CTA  Heart: RRR, normal S1 and S2  Abdomen: soft, nondistended  Extremities: no edema  Neuro: Awake alert and moving all extremities    Scheduled Meds:     atorvastatin, 10 mg, Oral, Daily  calcitriol, 0.5 mcg,  Oral, Daily  cefTRIAXone, 2,000 mg, Intravenous, Q24H  heparin (porcine), 5,000 Units, Subcutaneous, Q8H  insulin lispro, 2-7 Units, Subcutaneous, 4x Daily AC & at Bedtime  senna-docusate sodium, 2 tablet, Oral, BID  sevelamer, 1,600 mg, Oral, TID With Meals  sodium chloride, 10 mL, Intravenous, Q12H      IV Meds:          Results Reviewed:   I have personally reviewed the results from the time of this admission to 1/18/2024 09:37 EST     Results from last 7 days   Lab Units 01/18/24 0352 01/17/24  0847 01/16/24 2022 01/16/24  0646   SODIUM mmol/L 142 139  --  140   POTASSIUM mmol/L 4.9 4.4  --  4.7   CHLORIDE mmol/L 102 99  --  101   CO2 mmol/L 24.0 21.0*  --  17.0*   BUN mg/dL 100* 89* 73* 150*   CREATININE mg/dL 7.68* 6.73*  --  10.52*   CALCIUM mg/dL 8.3* 7.9*  --  8.3*   BILIRUBIN mg/dL 0.2 0.3  --  0.2   ALK PHOS U/L 93 122*  --  94   ALT (SGPT) U/L 20 24  --  20   AST (SGOT) U/L 21 24  --  28   GLUCOSE mg/dL 78 140*  --  39*     Estimated Creatinine Clearance: 9.9 mL/min (A) (by C-G formula based on SCr of 7.68 mg/dL (H)).  Results from last 7 days   Lab Units 01/18/24 0352 01/17/24 0847 01/16/24  0646   MAGNESIUM mg/dL 1.9 1.8 1.9   PHOSPHORUS mg/dL 9.3* 8.0* 11.0*         Results from last 7 days   Lab Units 01/18/24 0352 01/17/24  0848 01/16/24  0646 01/15/24  1125   WBC 10*3/mm3 21.20* 17.20* 15.60* 23.10*   HEMOGLOBIN g/dL 9.0* 8.6* 9.1* 10.4*   PLATELETS 10*3/mm3 210 198 260 274           Assessment / Plan     ASSESSMENT:  End-stage renal disease.  Patient has advanced chronic kidney disease and presented today with generalized weakness, uremic symptoms, worsened renal function along with hyperkalemia and metabolic acidosis.  status post hemodialysis 1/14 and 1/15  Obstructive uropathy.  Status post bilateral ureteral stents placement  Hyperkalemia.  Secondary to renal failure.  Improved  Metabolic acidosis.  Increased anion gap, secondary to renal failure.  Improving with dialysis  Anemia in  chronic kidney disease.   Bradycardia.  Most likely due to hyperkalemia.  Improved  hyperphosphatemia.   DVT.  Hematology following.  Possible transition to low-dose Eliquis  Urinary tract infection.  Patient is on IV Rocephin.  Urine culture growing Serratia Marcescans    PLAN:  Hemodialysis today  S/p fistulogram and angioplasty for stenosis  Increase Renvela dose  On IV Rocephin.Urine culture growing Serratia Marcescans  Arrange for outpatient dialysis  Possible transition to Eliquis for DVTs.  Hematology following      Atul Fowler MD  24  09:37 EST    [facility]  [phone]               Electronically signed by Atul Fowler MD at 24 0946       Moi Liz MD at 24 0854            Name: Gabriel De Souza ADMIT: 1/15/2024   : 1945  PCP: Waylon Johnson MD    MRN: [id number] LOS: 3 days   AGE/SEX: 78 y.o. male  [facility]: [facility]    Billin, subsequent hospital care, including subsequent note later today    History:  78 y.o. male with end-stage renal disease now on hemodialysis via left brachiocephalic AV fistula.  Had bleeding following dialysis run.  No further bleeding overnight.  No upper extremity symptoms.    Scheduled Medications:   [MAR Hold] atorvastatin, 10 mg, Oral, Daily  [MAR Hold] calcitriol, 0.5 mcg, Oral, Daily  cefTRIAXone, 2,000 mg, Intravenous, Q24H  [MAR Hold] heparin (porcine), 5,000 Units, Subcutaneous, Q8H  [MAR Hold] insulin lispro, 2-7 Units, Subcutaneous, 4x Daily AC & at Bedtime  [MAR Hold] senna-docusate sodium, 2 tablet, Oral, BID  [MAR Hold] sevelamer, 1,600 mg, Oral, TID With Meals  [MAR Hold] sodium chloride, 10 mL, Intravenous, Q12H    Vital Signs  Vital Signs Patient Vitals for the past 24 hrs:   BP Temp Temp src Pulse Resp SpO2 Weight   24 0807 136/77 -- -- 65 20 97 % --   24 0512 133/68 97.7 °F (36.5 °C) Oral -- 16 -- 97.3 kg (214 lb 8.1 oz)   24 0000 128/69 97.3 °F (36.3 °C) Oral  67 16 94 % --   01/17/24 2330 108/53 -- -- 62 -- -- --   01/17/24 2300 133/71 97.4 °F (36.3 °C) Oral 69 -- 96 % --   01/17/24 2200 96/54 -- -- 86 -- -- --   01/17/24 2130 104/54 -- -- 64 -- 93 % --   01/17/24 2100 101/49 -- -- 61 -- 90 % --   01/17/24 2000 123/67 98.4 °F (36.9 °C) Oral 65 16 96 % --   01/17/24 1930 128/67 -- -- 64 -- 96 % --   01/17/24 1900 131/70 -- -- 64 -- 98 % --   01/17/24 1830 128/63 -- -- 69 -- 96 % --   01/17/24 1800 127/67 -- -- 67 -- 96 % --   01/17/24 1700 123/68 -- -- 68 -- 95 % --   01/17/24 1600 118/69 98.2 °F (36.8 °C) Oral 69 16 97 % --   01/17/24 1500 130/65 -- -- 71 -- 97 % --   01/17/24 1400 118/68 -- -- 61 -- 95 % --   01/17/24 1300 129/67 -- -- 62 -- 96 % --   01/17/24 1200 112/57 -- -- 65 16 95 % --   01/17/24 1150 -- 97.8 °F (36.6 °C) Oral -- -- -- --   01/17/24 1100 119/64 -- -- 63 -- 95 % --   01/17/24 1021 -- 98 °F (36.7 °C) Axillary -- -- -- --   01/17/24 1000 116/62 -- -- 66 -- 94 % --   01/17/24 0900 127/70 -- -- 65 -- 96 % --     I/O:  I/O last 3 completed shifts:  In: 3285 [P.O.:390; I.V.:2895]  Out: 3350 [Urine:3350]  Physical Exam: Patent fistula with pulsatile thrill and bruit.  No upper extremity edema.    CBC    Results from last 7 days   Lab Units 01/18/24  0352 01/17/24  0848 01/16/24  0646 01/15/24  1125   WBC 10*3/mm3 21.20* 17.20* 15.60* 23.10*   HEMOGLOBIN g/dL 9.0* 8.6* 9.1* 10.4*   PLATELETS 10*3/mm3 210 198 260 274     BMP   Results from last 7 days   Lab Units 01/18/24  0352 01/17/24  0847 01/16/24  2022 01/16/24  0646 01/15/24  2304 01/15/24  1719 01/15/24  1223   SODIUM mmol/L 142 139  --  140  --  140 140   POTASSIUM mmol/L 4.9 4.4  --  4.7 4.1 6.7* 7.3*   CHLORIDE mmol/L 102 99  --  101  --  102 100   CO2 mmol/L 24.0 21.0*  --  17.0*  --  8.0* 9.0*   BUN mg/dL 100* 89* 73* 150*  --  250* 256*   CREATININE mg/dL 7.68* 6.73*  --  10.52*  --  15.98* 16.11*   GLUCOSE mg/dL 78 140*  --  39*  --  87 111*   MAGNESIUM mg/dL 1.9 1.8  --  1.9  --   --  2.9*    PHOSPHORUS mg/dL 9.3* 8.0*  --  11.0*  --   --   --      Cr Clearance Estimated Creatinine Clearance: 9.9 mL/min (A) (by C-G formula based on SCr of 7.68 mg/dL (H)).    Assessment & Plan   Assessment & Plan    Hyperkalemia    Mechanical complication of arteriovenous fistula surgically created    End stage renal disease on dialysis    78 y.o. male with ESRD/now HD, with bleeding from AV fistula.  Has had recent scans documenting satisfactory volume flow and no stenoses.  Given history, suspect outflow stenosis.  Plan fistulogram with possible balloon angioplasty.  Patient agreeable.  Informed consent.    Moi Liz MD  24  08:54 EST    Please call my office with any question: [phone]    Active Hospital Problems    Diagnosis  POA    **Hyperkalemia [E87.5]  Yes    Mechanical complication of arteriovenous fistula surgically created [T82.590A]  Yes    End stage renal disease on dialysis [N18.6, Z99.2]  Not Applicable      Resolved Hospital Problems   No resolved problems to display.           Electronically signed by Moi Liz MD at 24 1157       Tyrel Avitia MD at 24 163            [facility] DAILY PROGRESS NOTE    Patient Identification  Name: Gabriel De Souza  Age: 78 y.o.  Sex: male  :  1945  MRN: [id number]    Date: 2024             Subjective:  Interval History: Recovering in ICU    Objective:    Scheduled Meds:atorvastatin, 10 mg, Oral, Daily  calcitriol, 0.5 mcg, Oral, Daily  cefTRIAXone, 2,000 mg, Intravenous, Q24H  heparin (porcine), 5,000 Units, Subcutaneous, Q8H  senna-docusate sodium, 2 tablet, Oral, BID  sevelamer, 1,600 mg, Oral, TID With Meals  sodium chloride, 10 mL, Intravenous, Q12H      Continuous Infusions:   PRN Meds:  acetaminophen    senna-docusate sodium **AND** polyethylene glycol **AND** bisacodyl **AND** bisacodyl    Calcium Replacement - Follow Nurse / BPA Driven Protocol    dextrose    dextrose    dextrose    dextrose    glucagon  "(human recombinant)    glucagon (human recombinant)    Magnesium Standard Dose Replacement - Follow Nurse / BPA Driven Protocol    nitroglycerin    ondansetron    ondansetron ODT    Phosphorus Replacement - Follow Nurse / BPA Driven Protocol    Potassium Replacement - Follow Nurse / BPA Driven Protocol    [COMPLETED] Insert Peripheral IV **AND** sodium chloride    sodium chloride    sodium chloride    Vital signs in last 24 hours:  Temp:  [93 °F (33.9 °C)-98.2 °F (36.8 °C)] 98.2 °F (36.8 °C)  Heart Rate:  [58-77] 69  Resp:  [14-17] 16  BP: ()/(43-77) 118/69    Intake/Output:    Intake/Output Summary (Last 24 hours) at 1/17/2024 1637  Last data filed at 1/17/2024 1600  Gross per 24 hour   Intake 3285 ml   Output 2050 ml   Net 1235 ml       Exam:  /69   Pulse 69   Temp 98.2 °F (36.8 °C) (Oral)   Resp 16   Ht 185.4 cm (73\")   Wt 96.7 kg (213 lb 3 oz)   SpO2 97%   BMI 28.13 kg/m²     General Appearance:    Alert, cooperative, NAD   Lungs:     Respirations unlabored, no audible wheezing    Heart:    No cyanosis   Abdomen:     Soft, ND    :    No suprapubic distention, urine pink            Data Review:  All labs (24hrs):   Recent Results (from the past 24 hour(s))   POC Glucose Once    Collection Time: 01/16/24  6:29 PM    Specimen: Blood   Result Value Ref Range    Glucose 135 (H) 70 - 105 mg/dL   BUN    Collection Time: 01/16/24  8:22 PM    Specimen: Arm, Left; Blood   Result Value Ref Range    BUN 73 (H) 8 - 23 mg/dL   POC Glucose Once    Collection Time: 01/17/24 12:37 AM    Specimen: Blood   Result Value Ref Range    Glucose 153 (H) 70 - 105 mg/dL   POC Glucose Once    Collection Time: 01/17/24  6:32 AM    Specimen: Blood   Result Value Ref Range    Glucose 135 (H) 70 - 105 mg/dL   Duplex Venous Lower Extremity - Left CAR    Collection Time: 01/17/24  7:16 AM   Result Value Ref Range    Left External Iliac Spont 1.0     Left Common Femoral Spont 1.0     Left Profunda Femoral Spont 1.0     Left " Proximal Femoral Spont 1.0     Left Popliteal Spont 1.0     Left Gastronemius Vessel 1.0     Left Lesser Saph Vessel 1.0     Right Common Femoral Spont Y     Right Common Femoral Competent Y     Right Common Femoral Phasic Y     Right Common Femoral Compress C     Right Common Femoral Augment Y     Left External Iliac Compress P     Left Common Femoral Spont D     Left Common Femoral Competent D     Left Common Femoral Phasic D     Left Common Femoral Compress P     Left Common Femoral Augment D     Left Saphenofemoral Junction Compress C     Left Profunda Femoral Compress N     Left Proximal Femoral Spont D     Left Proximal Femoral Competent D     Left Proximal Femoral Phasic D     Left Proximal Femoral Compress P     Left Proximal Femoral Augment D     Left Mid Femoral Spont Y     Left Mid Femoral Competent Y     Left Mid Femoral Phasic Y     Left Mid Femoral Compress C     Left Mid Femoral Augment Y     Left Distal Femoral Compress C     Left Popliteal Spont D     Left Popliteal Competent D     Left Popliteal Phasic D     Left Popliteal Compress P     Left Popliteal Augment D     Left Popliteal Thrombus C     Left Posterior Tibial Compress C     Left Peroneal Compress C     Left Gastronemius Compress P     Left Greater Saph AK Compress C     Left Greater Saph BK Compress C     Left Lesser Saph Compress P     Left Lesser Saph Thrombus C     Left External Iliac Thrombus S     Left Common Femoral Thrombus C     Left Profunda Femoral Thrombus S     Left Proximal Femoral Thrombus C     Left Gastronemius Thrombus C    Comprehensive Metabolic Panel    Collection Time: 01/17/24  8:47 AM    Specimen: Blood   Result Value Ref Range    Glucose 140 (H) 65 - 99 mg/dL    BUN 89 (H) 8 - 23 mg/dL    Creatinine 6.73 (H) 0.76 - 1.27 mg/dL    Sodium 139 136 - 145 mmol/L    Potassium 4.4 3.5 - 5.2 mmol/L    Chloride 99 98 - 107 mmol/L    CO2 21.0 (L) 22.0 - 29.0 mmol/L    Calcium 7.9 (L) 8.6 - 10.5 mg/dL    Total Protein 6.0 6.0 -  8.5 g/dL    Albumin 2.9 (L) 3.5 - 5.2 g/dL    ALT (SGPT) 24 1 - 41 U/L    AST (SGOT) 24 1 - 40 U/L    Alkaline Phosphatase 122 (H) 39 - 117 U/L    Total Bilirubin 0.3 0.0 - 1.2 mg/dL    Globulin 3.1 gm/dL    A/G Ratio 0.9 g/dL    BUN/Creatinine Ratio 13.2 7.0 - 25.0    Anion Gap 19.0 (H) 5.0 - 15.0 mmol/L    eGFR 7.9 (L) >60.0 mL/min/1.73   Magnesium    Collection Time: 01/17/24  8:47 AM    Specimen: Blood   Result Value Ref Range    Magnesium 1.8 1.6 - 2.4 mg/dL   Phosphorus    Collection Time: 01/17/24  8:47 AM    Specimen: Blood   Result Value Ref Range    Phosphorus 8.0 (H) 2.5 - 4.5 mg/dL   Procalcitonin    Collection Time: 01/17/24  8:47 AM    Specimen: Blood   Result Value Ref Range    Procalcitonin 0.03 0.00 - 0.25 ng/mL   CBC Auto Differential    Collection Time: 01/17/24  8:48 AM    Specimen: Blood   Result Value Ref Range    WBC 17.20 (H) 3.40 - 10.80 10*3/mm3    RBC 2.95 (L) 4.14 - 5.80 10*6/mm3    Hemoglobin 8.6 (L) 13.0 - 17.7 g/dL    Hematocrit 25.5 (L) 37.5 - 51.0 %    MCV 86.3 79.0 - 97.0 fL    MCH 29.3 26.6 - 33.0 pg    MCHC 34.0 31.5 - 35.7 g/dL    RDW 15.2 12.3 - 15.4 %    RDW-SD 45.5 37.0 - 54.0 fl    MPV 8.2 6.0 - 12.0 fL    Platelets 198 140 - 450 10*3/mm3    Neutrophil % 82.1 (H) 42.7 - 76.0 %    Lymphocyte % 7.4 (L) 19.6 - 45.3 %    Monocyte % 10.5 5.0 - 12.0 %    Eosinophil % 0.0 (L) 0.3 - 6.2 %    Basophil % 0.0 0.0 - 1.5 %    Neutrophils, Absolute 14.10 (H) 1.70 - 7.00 10*3/mm3    Lymphocytes, Absolute 1.30 0.70 - 3.10 10*3/mm3    Monocytes, Absolute 1.80 (H) 0.10 - 0.90 10*3/mm3    Eosinophils, Absolute 0.00 0.00 - 0.40 10*3/mm3    Basophils, Absolute 0.00 0.00 - 0.20 10*3/mm3    nRBC 0.0 0.0 - 0.2 /100 WBC   POC Glucose Once    Collection Time: 01/17/24 11:26 AM    Specimen: Blood   Result Value Ref Range    Glucose 132 (H) 70 - 105 mg/dL   Urinalysis With Culture If Indicated - Indwelling Urethral Catheter    Collection Time: 01/17/24  3:01 PM    Specimen: Indwelling Urethral Catheter;  Urine   Result Value Ref Range    Color, UA Red (A) Yellow, Straw    Appearance, UA Cloudy (A) Clear    pH, UA 8.5 (H) 5.0 - 8.0    Specific Gravity, UA 1.007 1.005 - 1.030    Glucose, UA Negative Negative    Ketones, UA Negative Negative    Bilirubin, UA Small (1+) (A) Negative    Blood, UA Large (3+) (A) Negative    Protein,  mg/dL (2+) (A) Negative    Leuk Esterase, UA Large (3+) (A) Negative    Nitrite, UA Negative Negative    Urobilinogen, UA 0.2 E.U./dL 0.2 - 1.0 E.U./dL   Urinalysis, Microscopic Only - Indwelling Urethral Catheter    Collection Time: 24  3:01 PM    Specimen: Indwelling Urethral Catheter; Urine   Result Value Ref Range    RBC, UA Too Numerous to Count (A) None Seen, 0-2 /HPF    WBC, UA Too Numerous to Count (A) None Seen, 0-2 /HPF    Bacteria, UA None Seen None Seen /HPF    Squamous Epithelial Cells, UA 0-2 None Seen, 0-2 /HPF    Hyaline Casts, UA 0-2 None Seen /LPF    Methodology Automated Microscopy       Imaging Results (Last 24 Hours)       ** No results found for the last 24 hours. **             Assessment:    Hyperkalemia    Mechanical complication of arteriovenous fistula surgically created    End stage renal disease on dialysis      Bilateral obstructing stones status post stent  NAEL  UTI    Plan:    Good urine output status post stent placement  Okay to remove Peterson once creatinine cat and confusion improved as early as tomorrow morning  We will arrange for bilateral ureteroscopy to clear his stones in approximately 2 to 3 weeks once he is fully recovered   Call with questions or concerns    Tyrel Avitia MD  [facility]  [address] [address], [address]  [place] [address]  Office: [phone]  Available via [employer] Secure Chat  24  16:37 EST       Electronically signed by Tyrel Avitia MD at 24 5369       Licha Clinton MD at 24 [address] [place] [address] MEDICINE SERVICE  DAILY PROGRESS NOTE    NAME: Gabriel De Souza  : 1945  MRN:  [id number]      LOS: 2 days     PROVIDER OF SERVICE: Licha Clinton MD    Chief Complaint: Hyperkalemia    Subjective:     Interval History:  History taken from: patient  Patient seen and examined, he is doing much better.  Bradycardia and hypotension have resolved.  He did have low blood sugars in the morning with hypoglycemia protocol was activated.  He is currently resting comfortably.  Has  sandy adamgger on    1/17 patient seen and examined, he is lying in bed comfortably.  Eating drinking okay.  Having bowel movements as well.  Repeat blood cultures were sent by nephrology today    Review of Systems:   Review of Systems  10 point ROS is negative other than what is stated positive above  Objective:     Vital Signs  Temp:  [93 °F (33.9 °C)-98 °F (36.7 °C)] 98 °F (36.7 °C)  Heart Rate:  [58-80] 66  Resp:  [14-17] 14  BP: ()/(43-77) 116/62  Flow (L/min):  [2] 2   Body mass index is 28.13 kg/m².    Physical Exam  Physical Exam  Exam, awake and alert, hard of hearing  HEENT normocephalic atraumatic  Chest clear to auscultation bilaterally  CVs S1-S2 normal, regular rhythm, bradycardia  Abdomen soft nontender nondistended bowel sounds positive  Extremities warm to touch 2+ pulses no edema  Neuro AOx3, grossly normal     Scheduled Meds   atorvastatin, 10 mg, Oral, Daily  calcitriol, 0.5 mcg, Oral, Daily  cefTRIAXone, 2,000 mg, Intravenous, Q24H  heparin (porcine), 5,000 Units, Subcutaneous, Q8H  senna-docusate sodium, 2 tablet, Oral, BID  sevelamer, 1,600 mg, Oral, TID With Meals  sodium chloride, 10 mL, Intravenous, Q12H       PRN Meds     acetaminophen    senna-docusate sodium **AND** polyethylene glycol **AND** bisacodyl **AND** bisacodyl    Calcium Replacement - Follow Nurse / BPA Driven Protocol    dextrose    dextrose    dextrose    dextrose    glucagon (human recombinant)    glucagon (human recombinant)    Magnesium Standard Dose Replacement - Follow Nurse / BPA Driven Protocol    nitroglycerin     ondansetron    ondansetron ODT    Phosphorus Replacement - Follow Nurse / BPA Driven Protocol    Potassium Replacement - Follow Nurse / BPA Driven Protocol    [COMPLETED] Insert Peripheral IV **AND** sodium chloride    sodium chloride    sodium chloride   Infusions           Diagnostic Data    Results from last 7 days   Lab Units 01/17/24  0848 01/17/24  0847   WBC 10*3/mm3 17.20*  --    HEMOGLOBIN g/dL 8.6*  --    HEMATOCRIT % 25.5*  --    PLATELETS 10*3/mm3 198  --    GLUCOSE mg/dL  --  140*   CREATININE mg/dL  --  6.73*   BUN mg/dL  --  89*   SODIUM mmol/L  --  139   POTASSIUM mmol/L  --  4.4   AST (SGOT) U/L  --  24   ALT (SGPT) U/L  --  24   ALK PHOS U/L  --  122*   BILIRUBIN mg/dL  --  0.3   ANION GAP mmol/L  --  19.0*       CT Abdomen Pelvis Without Contrast    Result Date: 1/15/2024  Impression: Moderate bilateral hydronephrosis secondary to obstructing right mid ureteral calculus measuring 9 mm and left mid ureteral calculus measuring 8 mm. 3.1 cm complex cyst in the lower pole of the right kidney containing either mural nodularity or small volume hemorrhage. If possible, correlation with contrast-enhanced renal protocol CT or MRI is suggested. If there is not possible due to renal function, close follow-up findings of noncontrast CT or renal ultrasound is suggested. Consider urologic evaluation. Cholelithiasis. Additional findings per body of the report. Electronically Signed: James Monsalve MD  1/15/2024 3:12 PM EST  Workstation ID: [id number]    XR Chest 1 View    Result Date: 1/15/2024  Impression: Low lung volumes without definite acute cardiopulmonary abnormality. Electronically Signed: Madelyn Vaughn  1/15/2024 12:13 PM EST  Workstation ID: [id number]       I reviewed the patient's new clinical results.    Assessment/Plan:     Active and Resolved Problems  Active Hospital Problems    Diagnosis  POA    **Hyperkalemia [E87.5]  Yes      Resolved Hospital Problems   No resolved problems to display.     CKD  stage IV, now end-stage renal disease, hemodialysis has been started.  Patient has a fistula already   However it appears that child did receive bleeding from fistula, vascular surgery consult per nephrology.  Patient will likely get dialyzed tomorrow again and has been dialyzed 2 days in a row now.  Today he is having a dialysis break  Still has anion gap metabolic acidosis.  But improving gradually.    Hyperkalemia  Resolved        Bilateral renal stones with hydronephrosis likely contributing to deterioration of the CKD  Urology was consulted, patient s/p placement of bilateral ureteral stents.  Continue Rocephin 2 g IV daily for now.  Monitor blood and urine cultures.--Blood cultures negative for 24 hours, urine cultures growing Serratia, sensitive to Rocephin.  Continue  Patient currently has a Peterson in.  Leukocytosis are worsening, check procalcitonin.  If continues to worsen, will consult ID but for now continue current antibiotics         Bradycardia and hypotension  Due to hyperkalemia  Resolved  Vitals now stable    Hypoglycemia  Blood glucose better now, encourage p.o. intake    DVT prophylaxis:  Medical and mechanical DVT prophylaxis orders are present.     Code status is   Code Status and Medical Interventions:   Ordered at: 01/15/24 8460     Level Of Support Discussed With:    Patient    Health Care Surrogate    Next of Kin (If No Surrogate)     Code Status (Patient has no pulse and is not breathing):    CPR (Attempt to Resuscitate)     Medical Interventions (Patient has pulse or is breathing):    Full Support       Plan for disposition: 1-2 days     Time: 30 minutes    Signature: Electronically signed by Licha Clinton MD, 24, 11:26 EST.  [facility] Hospitalist Team    Electronically signed by Licha Clinton MD at 24 1822       Atul Fowler MD at 24 1002              [facility] Progress Note      Patient Name: Gabriel De Souza  : 1945  MRN:  [id number]  Primary Care Physician:  Waylon Johnson MD  Date of admission: 1/15/2024    Subjective     Interval History:     Patient somewhat confused this morning.  Patient awake and able to answer some questions    Review of Systems:   As noted above    Objective     Vitals:   Temp:  [93 °F (33.9 °C)-97.9 °F (36.6 °C)] 95.1 °F (35.1 °C)  Heart Rate:  [58-80] 58  Resp:  [14-17] 14  BP: ()/(43-77) 121/64  Flow (L/min):  [2] 2    Intake/Output Summary (Last 24 hours) at 1/17/2024 1002  Last data filed at 1/17/2024 0827  Gross per 24 hour   Intake 3165 ml   Output 1700 ml   Net 1465 ml       Physical Exam:    General Appearance: chronically ill-appearing, somewhat confused  HEENT: oral mucosa normal, nonicteric sclera  Neck: supple, no JVD  Lungs: CTA  Heart: RRR, normal S1 and S2  Abdomen: soft, nondistended  Extremities: no edema  Neuro: Awake alert and moving all extremities    Scheduled Meds:     atorvastatin, 10 mg, Oral, Daily  calcitriol, 0.5 mcg, Oral, Daily  cefTRIAXone, 2,000 mg, Intravenous, Q24H  heparin (porcine), 5,000 Units, Subcutaneous, Q8H  senna-docusate sodium, 2 tablet, Oral, BID  sevelamer, 1,600 mg, Oral, TID With Meals  sodium chloride, 10 mL, Intravenous, Q12H      IV Meds:   dextrose, 50 mL/hr, Last Rate: 50 mL/hr (01/17/24 0654)        Results Reviewed:   I have personally reviewed the results from the time of this admission to 1/17/2024 10:02 EST     Results from last 7 days   Lab Units 01/17/24  0847 01/16/24 2022 01/16/24  0646 01/15/24  2304 01/15/24  1719 01/15/24  1223   SODIUM mmol/L 139  --  140  --  140 140   POTASSIUM mmol/L 4.4  --  4.7 4.1 6.7* 7.3*   CHLORIDE mmol/L 99  --  101  --  102 100   CO2 mmol/L 21.0*  --  17.0*  --  8.0* 9.0*   BUN mg/dL 89* 73* 150*  --  250* 256*   CREATININE mg/dL 6.73*  --  10.52*  --  15.98* 16.11*   CALCIUM mg/dL 7.9*  --  8.3*  --  9.1 9.4   BILIRUBIN mg/dL 0.3  --  0.2  --   --  0.2   ALK PHOS U/L 122*  --  94  --   --  102    ALT (SGPT) U/L 24  --  20  --   --  17   AST (SGOT) U/L 24  --  28  --   --  18   GLUCOSE mg/dL 140*  --  39*  --  87 111*     Estimated Creatinine Clearance: 11.3 mL/min (A) (by C-G formula based on SCr of 6.73 mg/dL (H)).  Results from last 7 days   Lab Units 24  0847 24  0646 01/15/24  1223   MAGNESIUM mg/dL 1.8 1.9 2.9*   PHOSPHORUS mg/dL 8.0* 11.0*  --          Results from last 7 days   Lab Units 24  0848 24  0646 01/15/24  1125   WBC 10*3/mm3 17.20* 15.60* 23.10*   HEMOGLOBIN g/dL 8.6* 9.1* 10.4*   PLATELETS 10*3/mm3 198 260 274           Assessment / Plan     ASSESSMENT:  New end-stage renal disease.  Patient has advanced chronic kidney disease and presented today with generalized weakness, uremic symptoms, worsened renal function along with hyperkalemia and metabolic acidosis.  status post hemodialysis  and 1/15  Obstructive uropathy.  CT abdomen showed bilateral ureteral stones and hydronephrosis.  Status post bilateral ureteral stents placement  Hyperkalemia.  Secondary to renal failure.  Improved  Metabolic acidosis.  Increased anion gap, secondary to renal failure.  Improving with dialysis  Anemia in chronic kidney disease.   Bradycardia.  Most likely due to hyperkalemia.  Improved  hyperphosphatemia.  Improving.  On Renvela    PLAN:  Hemodialysis tomorrow  Discontinue D10W drip  Repeat labs in a.m.  Dialysis nurse reported prolonged bleeding from AV fistula after dialysis.  I will consult vascular for evaluation for possible stenosis    Atul Fowler MD  24  10:02 [facility]    [facility]  [phone]               Electronically signed by Atul Fowler MD at 24 1007       Atul Fowler MD at 24 3400              [facility] Progress Note      Patient Name: Gabriel De Souza  : 1945  MRN: [id number]  Primary Care Physician:  Waylon Johnson MD  Date of admission: 1/15/2024    Subjective     Interval History:      Patient resting comfortably.  Feeling little better  CT abdomen showed bilateral ureteral stones and hydronephrosis.  Patient was evaluated by urology and underwent bilateral ureteral stents placement    Review of Systems:   As noted above    Objective     Vitals:   Temp:  [89.7 °F (32.1 °C)-97.5 °F (36.4 °C)] 97.5 °F (36.4 °C)  Heart Rate:  [40-85] 77  Resp:  [14-24] 16  BP: ()/(50-76) 128/68  Flow (L/min):  [2-6] 2    Intake/Output Summary (Last 24 hours) at 1/16/2024 1218  Last data filed at 1/16/2024 1142  Gross per 24 hour   Intake 1840 ml   Output 275 ml   Net 1565 ml       Physical Exam:    General Appearance: chronically ill-appearing, NAD  HEENT: oral mucosa normal, nonicteric sclera  Neck: supple, no JVD  Lungs: CTA  Heart: RRR, normal S1 and S2  Abdomen: soft, nondistended  Extremities: no edema  Neuro: Awake alert and moving all extremities    Scheduled Meds:     atorvastatin, 10 mg, Oral, Daily  calcitriol, 0.5 mcg, Oral, Daily  cefTRIAXone, 2,000 mg, Intravenous, Q24H  heparin (porcine), 5,000 Units, Subcutaneous, Q8H  senna-docusate sodium, 2 tablet, Oral, BID  sevelamer, 800 mg, Oral, TID With Meals  sodium chloride, 10 mL, Intravenous, Q12H      IV Meds:   dextrose, 50 mL/hr, Last Rate: 50 mL/hr (01/16/24 1046)        Results Reviewed:   I have personally reviewed the results from the time of this admission to 1/16/2024 12:18 EST     Results from last 7 days   Lab Units 01/16/24  0646 01/15/24  2304 01/15/24  1719 01/15/24  1223   SODIUM mmol/L 140  --  140 140   POTASSIUM mmol/L 4.7 4.1 6.7* 7.3*   CHLORIDE mmol/L 101  --  102 100   CO2 mmol/L 17.0*  --  8.0* 9.0*   BUN mg/dL 150*  --  250* 256*   CREATININE mg/dL 10.52*  --  15.98* 16.11*   CALCIUM mg/dL 8.3*  --  9.1 9.4   BILIRUBIN mg/dL 0.2  --   --  0.2   ALK PHOS U/L 94  --   --  102   ALT (SGPT) U/L 20  --   --  17   AST (SGOT) U/L 28  --   --  18   GLUCOSE mg/dL 39*  --  87 111*     Estimated Creatinine Clearance: 7.2 mL/min (A)  (by C-G formula based on SCr of 10.52 mg/dL (H)).  Results from last 7 days   Lab Units 24  0646 01/15/24  1223   MAGNESIUM mg/dL 1.9 2.9*   PHOSPHORUS mg/dL 11.0*  --          Results from last 7 days   Lab Units 24  0646 01/15/24  1125   WBC 10*3/mm3 15.60* 23.10*   HEMOGLOBIN g/dL 9.1* 10.4*   PLATELETS 10*3/mm3 260 274           Assessment / Plan     ASSESSMENT:  New end-stage renal disease.  Patient has advanced chronic kidney disease and presented today with generalized weakness, uremic symptoms, worsened renal function along with hyperkalemia and metabolic acidosis.  status post hemodialysis yesterday  Obstructive uropathy.  CT abdomen showed bilateral ureteral stones and hydronephrosis.  Status post bilateral ureteral stents placement  Hyperkalemia.  Secondary to renal failure  Metabolic acidosis.  Increased anion gap, secondary to renal failure.  Improving with dialysis  Anemia in chronic kidney disease.   Bradycardia.  Most likely due to hyperkalemia.  Improved  hyperphosphatemia    PLAN:  Repeat hemodialysis today  Discontinued bicarb drip  On D10W drip for hypoglycemia  Increase phosphorus binders    Atul Fowler MD  24  12:18 [facility]    [facility]  [phone]               Electronically signed by Atul Fowler MD at 24 1223          Consult Notes (last 48 hours)        Moi Liz MD at 24 1451        Consult Orders    1. Inpatient Vascular Surgery Consult [[id number]] ordered by Atul Fowler MD at 24 1007                   Name: Gabriel De Suoza ADMIT: 1/15/2024   : 1945  PCP: Waylon Johnson MD    MRN: [id number] LOS: 2 days   AGE/SEX: 78 y.o. male  ROOM: [facility]/[address] [place] [address]    Patient Care Team:  Waylon Johnson MD as PCP - General  ChemchirianLali MD as Consulting Physician (Cardiology)  Atul Fowler MD as Consulting Physician (Nephrology)  Chief Complaint   Patient presents with    Hypotension     CC: AV fistula  dysfunction    History of Present Illness  78-year-old gentleman with history of chronic kidney disease, now at end-stage, admitted to the hospital for initiation of hemodialysis.  Is known to my partner Dr. Andrew.  Underwent creation left brachiocephalic AV fistula September, 2022.  Last evaluated in our office June, 2023, at which time fistula was widely patent with volume flows 6679-9250 mL/min, with adequate depth and diameters.  Patient given the go ahead for fistula cannulation.  Patient underwent dialysis successfully, but there was report of prolonged bleeding, suggesting possible outflow stenosis.  Patient does not take any anticoagulants.  Takes only aspirin.    Review of Systems   Constitutional:  Positive for activity change, appetite change and fatigue.   Gastrointestinal:  Positive for nausea and vomiting.   Genitourinary:  Positive for dysuria.   Neurological:  Positive for dizziness.     Past Medical History:   Diagnosis Date    Cancer 01/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    Coronary artery disease     Deep vein thrombosis 04/1990    Diabetes mellitus     Dyslipidemia     Erectile dysfunction 51 years old    Heart murmur 10/4/2023    HL (hearing loss) 7 year old    right    Hyperlipidemia 1970    Hypertension     Neuropathy     Renal insufficiency 2020    Stage 4 chronic kidney disease     3-4     Past Surgical History:   Procedure Laterality Date    ARTERIOVENOUS FISTULA/SHUNT SURGERY Left 9/22/2022    Procedure: BRACHIAL CEPHALIC FISTULA FORMATION;  Surgeon: Edy Vega MD;  Location: [facility] MAIN OR;  Service: Vascular;  Laterality: Left;    BACK SURGERY      BASAL CELL CARCINOMA EXCISION  01/2019    CARDIAC CATHETERIZATION      TURP / TRANSURETHRAL INCISION / DRAINAGE PROSTATE       Family History   Problem Relation Age of Onset    Heart disease Mother     Heart disease Brother     Heart attack Maternal Grandfather     Hypertension Father     Hearing loss Father      Social History     Tobacco Use     Smoking status: Former     Types: Cigarettes     Quit date:      Years since quittin.0     Passive exposure: Past    Smokeless tobacco: Never   Vaping Use    Vaping Use: Never used   Substance Use Topics    Alcohol use: No    Drug use: No     Medications Prior to Admission   Medication Sig Dispense Refill Last Dose    amLODIPine (NORVASC) 5 MG tablet Take 1 tablet by mouth Daily.   2024    aspirin 81 MG tablet Take 1 tablet by mouth Daily.   2024    atenolol (TENORMIN) 50 MG tablet Take 1 tablet by mouth Daily.   2024    calcitriol (ROCALTROL) 0.25 MCG capsule Take 2 capsules by mouth Daily.   2024    ciprofloxacin (CIPRO) 250 MG tablet Take 1 tablet by mouth 2 (Two) Times a Day.   2024    Insulin Glargine-Lixisenatide (SOLIQUA) 100-33 UNT-MCG/ML solution pen-injector injection Inject 20 Units under the skin into the appropriate area as directed Daily.       sevelamer (RENVELA) 800 MG tablet Take 1 tablet by mouth 3 (Three) Times a Day As Needed.   2024    simvastatin (ZOCOR) 20 MG tablet Take 1 tablet by mouth Every Night.       torsemide (DEMADEX) 20 MG tablet Take 1 tablet by mouth Daily.        atorvastatin, 10 mg, Oral, Daily  calcitriol, 0.5 mcg, Oral, Daily  cefTRIAXone, 2,000 mg, Intravenous, Q24H  heparin (porcine), 5,000 Units, Subcutaneous, Q8H  senna-docusate sodium, 2 tablet, Oral, BID  sevelamer, 1,600 mg, Oral, TID With Meals  sodium chloride, 10 mL, Intravenous, Q12H    Tylenol with codeine #3 [acetaminophen-codeine]    Vital Signs and Labs:  Vital Signs Patient Vitals for the past 24 hrs:   BP Temp Temp src Pulse Resp SpO2   24 1400 118/68 -- -- 61 -- 95 %   24 1300 129/67 -- -- 62 -- 96 %   24 1200 112/57 -- -- 65 16 95 %   24 1150 -- 97.8 °F (36.6 °C) Oral -- -- --   24 1100 119/64 -- -- 63 -- 95 %   24 1021 -- 98 °F (36.7 °C) Axillary -- -- --   24 1000 116/62 -- -- 66 -- 94 %   24 0900 127/70 -- -- 65 -- 96 %    01/17/24 0800 121/64 95.1 °F (35.1 °C) Oral 58 16 99 %   01/17/24 0700 116/68 -- -- 65 -- 96 %   01/17/24 0630 121/69 -- -- 60 -- 98 %   01/17/24 0600 118/68 -- -- 67 -- 99 %   01/17/24 0530 126/69 -- -- 62 -- 99 %   01/17/24 0500 124/74 -- -- 67 -- 97 %   01/17/24 0430 126/77 -- -- 63 -- 98 %   01/17/24 0400 120/67 93.7 °F (34.3 °C) Oral 69 14 98 %   01/17/24 0330 122/65 -- -- 68 -- 98 %   01/17/24 0300 119/67 -- -- 68 -- 100 %   01/17/24 0230 117/63 -- -- 72 -- 100 %   01/17/24 0200 122/65 -- -- 71 -- 98 %   01/17/24 0130 111/60 -- -- 63 -- 96 %   01/17/24 0100 123/61 -- -- 66 -- 96 %   01/17/24 0030 129/75 -- -- 71 -- 97 %   01/17/24 0000 127/73 94.7 °F (34.8 °C) Oral 63 17 99 %   01/16/24 2330 130/75 -- -- 66 -- 97 %   01/16/24 2300 108/52 -- -- 73 -- 98 %   01/16/24 2235 111/75 -- -- 63 -- 97 %   01/16/24 2230 -- -- -- 66 -- 97 %   01/16/24 2205 91/54 -- -- 66 -- 97 %   01/16/24 2200 -- -- -- 69 -- 99 %   01/16/24 2130 99/43 -- -- 64 -- 97 %   01/16/24 2100 112/54 -- -- 70 -- 97 %   01/16/24 2030 121/67 93 °F (33.9 °C) Oral 66 16 96 %   01/16/24 2000 125/64 -- -- 69 15 97 %   01/16/24 1930 122/64 -- -- 72 15 97 %   01/16/24 1900 120/60 -- -- 68 16 97 %   01/16/24 1830 -- -- -- -- 16 --   01/16/24 1800 116/57 -- -- 74 17 96 %   01/16/24 1730 -- -- -- -- 16 --   01/16/24 1700 115/60 -- -- 77 16 96 %   01/16/24 1630 -- -- -- -- 16 --   01/16/24 1600 122/62 97.8 °F (36.6 °C) Oral 70 16 96 %   01/16/24 1500 122/67 -- -- 80 -- 98 %     BMI:  Body mass index is 28.13 kg/m².    Physical Exam  Vitals reviewed.   Constitutional:       General: He is not in acute distress.     Appearance: He is ill-appearing. He is not toxic-appearing or diaphoretic.   HENT:      Head: Normocephalic and atraumatic.      Right Ear: External ear normal.      Left Ear: External ear normal.      Nose: Nose normal.      Mouth/Throat:      Mouth: Mucous membranes are dry.   Eyes:      General: No scleral icterus.     Extraocular Movements:  Extraocular movements intact.      Conjunctiva/sclera: Conjunctivae normal.      Pupils: Pupils are equal, round, and reactive to light.   Cardiovascular:      Comments: Regular cardiac rhythm.  Patent left brachiocephalic AV fistula, with suitable diameter.  Seems to have pulsatile nature to thrill.  Radial artery pulse palpated.  No upper extremity edema.  Pulmonary:      Effort: Pulmonary effort is normal. No respiratory distress.      Breath sounds: No stridor.   Skin:     General: Skin is warm and dry.      Coloration: Skin is not jaundiced or pale.   Neurological:      General: No focal deficit present.      Mental Status: He is alert and oriented to person, place, and time.      Cranial Nerves: No cranial nerve deficit.   Psychiatric:         Mood and Affect: Mood normal.         Behavior: Behavior normal.         Thought Content: Thought content normal.         Judgment: Judgment normal.     CBC    Results from last 7 days   Lab Units 01/17/24  0848 01/16/24  0646 01/15/24  1125   WBC 10*3/mm3 17.20* 15.60* 23.10*   HEMOGLOBIN g/dL 8.6* 9.1* 10.4*   PLATELETS 10*3/mm3 198 260 274     BMP   Results from last 7 days   Lab Units 01/17/24  0847 01/16/24  2022 01/16/24  0646 01/15/24  2304 01/15/24  1719 01/15/24  1223   SODIUM mmol/L 139  --  140  --  140 140   POTASSIUM mmol/L 4.4  --  4.7 4.1 6.7* 7.3*   CHLORIDE mmol/L 99  --  101  --  102 100   CO2 mmol/L 21.0*  --  17.0*  --  8.0* 9.0*   BUN mg/dL 89* 73* 150*  --  250* 256*   CREATININE mg/dL 6.73*  --  10.52*  --  15.98* 16.11*   GLUCOSE mg/dL 140*  --  39*  --  87 111*   MAGNESIUM mg/dL 1.8  --  1.9  --   --  2.9*   PHOSPHORUS mg/dL 8.0*  --  11.0*  --   --   --      Cr Clearance Estimated Creatinine Clearance: 11.3 mL/min (A) (by C-G formula based on SCr of 6.73 mg/dL (H)).  Coag       Active Hospital Problems    Diagnosis  POA    **Hyperkalemia [E87.5]  Yes    Mechanical complication of arteriovenous fistula surgically created [T87.337A]  Yes    End  stage renal disease on dialysis [N18.6, Z99.2]  Not Applicable      Resolved Hospital Problems   No resolved problems to display.     Problem Points:  4:  Patient has a new problem, with additional work-up planned  Total problem points:4 or more    Data Points:  1:  I have reviewed or order clinical lab test  1:  I have personally decided to obtain of records  Total data points:2    Risk Points:  Moderate:  Physiologic testing with stress, incisional biopsy, or cardiovascular imaging with contrast without risk factors    MDM requires 2/3 (Problem points, Data points and Risk)  MDM Prob point Data point Risk   SF 1 1 Minimal   Low 2 2 Low   Mod 3 3 Moderate   High 4 4 High     Code requires 3/3 (MDM, History and Exam)  Code MDM History Exam Time   20530 SF/Low Detailed Detailed 30   69121 Mod Comprehensive Comprehensive 50   05359 High Comprehensive Comprehensive 70     Detailed history:  4 elements HPI or status of 3 chronic problems; 2-9 system ROS  Comprehensive:  4 elements HPI or status of 3 chronic problems;  10 system ROS    Detailed Exam:  12 findings from any organ system  Comprehensive Exam:  2 findings from each of 9 systems.   52166    Assessment & Plan       Hyperkalemia    Mechanical complication of arteriovenous fistula surgically created    End stage renal disease on dialysis    78 y.o. male with history of progressive chronic kidney disease, now at end-stage, admitted to hospital for initiation of hemodialysis.  Underwent hemodialysis yesterday, complicated by prolonged bleeding.  Asked to see regarding possible access stenosis.  Patient not treated with anticoagulants.  Recent scan June, 2023 with adequate volume flows, no stenosis and adequate diameters and depth.  Given the history and physical findings, I speculate that he has outflow stenosis and is a candidate for AV fistulogram, possible angioplasty.  Discussed with patient and wife at bedside.  Reviewed plan.  They agree to proceed.  Will try  "to schedule for tomorrow.    I discussed the patients findings and my recommendations with patient, family, and nursing staff.    Moi Liz MD  24  14:58 EST    Please call my office with any question: [phone]        Electronically signed by Moi Liz MD at 24 2184       Hanane Cotto MD at 24 1427        Consult Orders    1. Hematology & Oncology Inpatient Consult [[id number]] ordered by Licha Clinton MD at 24 1359                         Hematology/Oncology Inpatient Consultation    Patient name: Gabriel De Souza  : 1945  MRN: [id number]  Referring Provider: Dr. Clinton   Reason for Consultation: Venous doppler results    Chief complaint: Hypotension, bradycardia    History of present illness:    Gabriel De Souza is a 78 y.o. male who presented to [facility] on 1/15/2024 with complaints of hypotension and low blood sugar. Patient reports he was sent to ED from nephrology office due bradycardia. Patient reports he has been \"feeling bad\" since . He was prescribed antibiotics for UTI by PCP 1 week ago but reported no improvement. Patient reports nausea, vomiting, and abdominal discomfort.     ED course: Patient was found to have potassium of 7.3, systolic blood pressure of 80, heart rate around 44.  EKG showed sinus bradycardia.  BUN up to 56, creatinine of 16.11. WBC 23.10, Hgb 10.4, Plt 274.     01/15/2024 CXR showed low lung volumes without definite acute cardiopulmonary abnormality.     01/15/2024: CT ab/pelvis wo contrast:  Impression:  Moderate bilateral hydronephrosis secondary to obstructing right mid ureteral calculus measuring 9 mm and left mid ureteral calculus measuring 8 mm.   3.1 cm complex cyst in the lower pole of the right kidney containing either mural nodularity or small volume hemorrhage. If possible, correlation with contrast-enhanced renal protocol CT or MRI is suggested. If there is not possible due to renal   function, close " follow-up findings of noncontrast CT or renal ultrasound is suggested. Consider urologic evaluation.   Cholelithiasis.    Patient underwent urgent bilateral stent placement per urology and was started on hemodialysis per nephrology.     01/17/2024: Doppler LLE    Chronic left lower extremity deep vein thrombosis noted in the common femoral, proximal femoral, popliteal and gastrocnemius.    Sub-acute left lower extremity deep vein thrombosis noted in the external iliac and deep femoral.    Chronic left lower extremity superficial thrombophlebitis noted in the small saphenous.    All other left sided veins appeared normal.    01/17/24  Hematology/Oncology was consulted for further recommendations. Patient reports no known history of blood clots. Previously, only on aspirin, no anticoagulation.     He/She  has a past medical history of Cancer (01/2019), Coronary artery disease, Deep vein thrombosis (04/1990), Diabetes mellitus, Dyslipidemia, Erectile dysfunction (51 years old), Heart murmur (10/4/2023), HL (hearing loss) (7 year old), Hyperlipidemia (1970), Hypertension, Neuropathy, Renal insufficiency (2020), and Stage 4 chronic kidney disease.    PCP: Waylon Johnson MD    History:  Past Medical History:   Diagnosis Date    Cancer 01/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    Coronary artery disease     Deep vein thrombosis 04/1990    Diabetes mellitus     Dyslipidemia     Erectile dysfunction 51 years old    Heart murmur 10/4/2023    HL (hearing loss) 7 year old    right    Hyperlipidemia 1970    Hypertension     Neuropathy     Renal insufficiency 2020    Stage 4 chronic kidney disease     3-4   ,   Past Surgical History:   Procedure Laterality Date    ARTERIOVENOUS FISTULA/SHUNT SURGERY Left 9/22/2022    Procedure: BRACHIAL CEPHALIC FISTULA FORMATION;  Surgeon: Edy Vega MD;  Location: [facility] OR;  Service: Vascular;  Laterality: Left;    BACK SURGERY      BASAL CELL CARCINOMA EXCISION  01/2019    CARDIAC  CATHETERIZATION      TURP / TRANSURETHRAL INCISION / DRAINAGE PROSTATE     ,   Family History   Problem Relation Age of Onset    Heart disease Mother     Heart disease Brother     Heart attack Maternal Grandfather     Hypertension Father     Hearing loss Father    ,   Social History     Tobacco Use    Smoking status: Former     Types: Cigarettes     Quit date:      Years since quittin.0     Passive exposure: Past    Smokeless tobacco: Never   Vaping Use    Vaping Use: Never used   Substance Use Topics    Alcohol use: No    Drug use: No   ,   Medications Prior to Admission   Medication Sig Dispense Refill Last Dose    amLODIPine (NORVASC) 5 MG tablet Take 1 tablet by mouth Daily.   2024    aspirin 81 MG tablet Take 1 tablet by mouth Daily.   2024    atenolol (TENORMIN) 50 MG tablet Take 1 tablet by mouth Daily.   2024    calcitriol (ROCALTROL) 0.25 MCG capsule Take 2 capsules by mouth Daily.   2024    ciprofloxacin (CIPRO) 250 MG tablet Take 1 tablet by mouth 2 (Two) Times a Day.   2024    Insulin Glargine-Lixisenatide (SOLIQUA) 100-33 UNT-MCG/ML solution pen-injector injection Inject 20 Units under the skin into the appropriate area as directed Daily.       sevelamer (RENVELA) 800 MG tablet Take 1 tablet by mouth 3 (Three) Times a Day As Needed.   2024    simvastatin (ZOCOR) 20 MG tablet Take 1 tablet by mouth Every Night.       torsemide (DEMADEX) 20 MG tablet Take 1 tablet by mouth Daily.      , Scheduled Meds:  atorvastatin, 10 mg, Oral, Daily  calcitriol, 0.5 mcg, Oral, Daily  cefTRIAXone, 2,000 mg, Intravenous, Q24H  heparin (porcine), 5,000 Units, Subcutaneous, Q8H  senna-docusate sodium, 2 tablet, Oral, BID  sevelamer, 1,600 mg, Oral, TID With Meals  sodium chloride, 10 mL, Intravenous, Q12H    , Continuous Infusions:   , PRN Meds:    acetaminophen    senna-docusate sodium **AND** polyethylene glycol **AND** bisacodyl **AND** bisacodyl    Calcium Replacement - Follow  "Nurse / BPA Driven Protocol    dextrose    dextrose    dextrose    dextrose    glucagon (human recombinant)    glucagon (human recombinant)    Magnesium Standard Dose Replacement - Follow Nurse / BPA Driven Protocol    nitroglycerin    ondansetron    ondansetron ODT    Phosphorus Replacement - Follow Nurse / BPA Driven Protocol    Potassium Replacement - Follow Nurse / BPA Driven Protocol    [COMPLETED] Insert Peripheral IV **AND** sodium chloride    sodium chloride    sodium chloride   Allergies:  Tylenol with codeine #3 [acetaminophen-codeine]    Subjective     ROS:  Review of Systems   Constitutional:  Negative for fatigue and fever.   HENT:  Negative for congestion and nosebleeds.    Eyes:  Negative for pain.   Respiratory:  Negative for cough and shortness of breath.    Cardiovascular:  Positive for leg swelling. Negative for chest pain.   Gastrointestinal:  Negative for abdominal pain, blood in stool, diarrhea, nausea and vomiting.   Endocrine: Negative for cold intolerance and heat intolerance.   Genitourinary:  Negative for difficulty urinating.   Musculoskeletal:  Negative for arthralgias.   Skin:  Negative for rash.   Neurological:  Negative for dizziness and headaches.   Hematological:  Does not bruise/bleed easily.   Psychiatric/Behavioral:  Negative for behavioral problems.         Objective   Vital Signs:   /68   Pulse 61   Temp 97.8 °F (36.6 °C) (Oral)   Resp 16   Ht 185.4 cm (73\")   Wt 96.7 kg (213 lb 3 oz)   SpO2 95%   BMI 28.13 kg/m²     Physical Exam: (performed by MD)  Physical Exam  Constitutional:       Appearance: Normal appearance.   HENT:      Head: Normocephalic and atraumatic.   Eyes:      Pupils: Pupils are equal, round, and reactive to light.   Cardiovascular:      Rate and Rhythm: Normal rate and regular rhythm.      Pulses: Normal pulses.      Heart sounds: No murmur heard.  Pulmonary:      Effort: Pulmonary effort is normal.      Breath sounds: Normal breath sounds. " "  Abdominal:      General: There is no distension.      Palpations: Abdomen is soft. There is no mass.      Tenderness: There is no abdominal tenderness.   Musculoskeletal:         General: Normal range of motion.      Cervical back: Normal range of motion.      Left lower leg: Edema present.   Skin:     General: Skin is warm.   Neurological:      General: No focal deficit present.      Mental Status: He is alert.   Psychiatric:         Mood and Affect: Mood normal.         Results Review:  Lab Results (last 48 hours)       Procedure Component Value Units Date/Time    Blood Culture - Blood, Arm, Left [[id number]] Collected: 01/17/24 1203    Specimen: Blood from Arm, Left Updated: 01/17/24 1220    Blood Culture - Blood, Arm, Left [[id number]] Collected: 01/17/24 1205    Specimen: Blood from Arm, Left Updated: 01/17/24 1220    Procalcitonin [[id number]]  (Normal) Collected: 01/17/24 0847    Specimen: Blood Updated: 01/17/24 1157     Procalcitonin 0.03 ng/mL     Narrative:      As a Marker for Sepsis (Non-Neonates):    1. <0.5 ng/mL represents a low risk of severe sepsis and/or septic shock.  2. >2 ng/mL represents a high risk of severe sepsis and/or septic shock.    As a Marker for Lower Respiratory Tract Infections that require antibiotic therapy:    PCT on Admission    Antibiotic Therapy       6-12 Hrs later    >0.5                Strongly Recommended  >0.25 - <0.5        Recommended   0.1 - 0.25          Discouraged              Remeasure/reassess PCT  <0.1                Strongly Discouraged     Remeasure/reassess PCT    As 28 day mortality risk marker: \"Change in Procalcitonin Result\" (>80% or <=80%) if Day 0 (or Day 1) and Day 4 values are available. Refer to http://www."[employer]s-pct-calculator.com    Change in PCT <=80%  A decrease of PCT levels below or equal to 80% defines a positive change in PCT test result representing a higher risk for 28-day all-cause mortality of patients diagnosed with severe sepsis for " septic shock.    Change in PCT >80%  A decrease of PCT levels of more than 80% defines a negative change in PCT result representing a lower risk for 28-day all-cause mortality of patients diagnosed with severe sepsis or septic shock.       POC Glucose Once [[id number]]  (Abnormal) Collected: 01/17/24 1126    Specimen: Blood Updated: 01/17/24 1144     Glucose 132 mg/dL      Comment: Serial Number: [id number]:  [id number]       Blood Culture - Blood, Arm, Left [[id number]]  (Normal) Collected: 01/15/24 1125    Specimen: Blood from Arm, Left Updated: 01/17/24 1130     Blood Culture No growth at 2 days    Narrative:      Less than seven (7) mL's of blood was collected.  Insufficient quantity may yield false negative results.    Blood Culture - Blood, Arm, Right [[id number]]  (Normal) Collected: 01/15/24 1126    Specimen: Blood from Arm, Right Updated: 01/17/24 1130     Blood Culture No growth at 2 days    Narrative:      Less than seven (7) mL's of blood was collected.  Insufficient quantity may yield false negative results.    Phosphorus [[id number]]  (Abnormal) Collected: 01/17/24 0847    Specimen: Blood Updated: 01/17/24 1000     Phosphorus 8.0 mg/dL     Comprehensive Metabolic Panel [[id number]]  (Abnormal) Collected: 01/17/24 0847    Specimen: Blood Updated: 01/17/24 0950     Glucose 140 mg/dL      BUN 89 mg/dL      Creatinine 6.73 mg/dL      Sodium 139 mmol/L      Potassium 4.4 mmol/L      Chloride 99 mmol/L      CO2 21.0 mmol/L      Calcium 7.9 mg/dL      Total Protein 6.0 g/dL      Albumin 2.9 g/dL      ALT (SGPT) 24 U/L      AST (SGOT) 24 U/L      Alkaline Phosphatase 122 U/L      Total Bilirubin 0.3 mg/dL      Globulin 3.1 gm/dL      A/G Ratio 0.9 g/dL      BUN/Creatinine Ratio 13.2     Anion Gap 19.0 mmol/L      eGFR 7.9 mL/min/1.73      Comment: <15 Indicative of kidney failure       Narrative:      GFR Normal >60  Chronic Kidney Disease <60  Kidney Failure <15    The GFR formula is only valid for adults  with stable renal function between ages 18 and 70.    Magnesium [[id number]]  (Normal) Collected: 01/17/24 0847    Specimen: Blood Updated: 01/17/24 0950     Magnesium 1.8 mg/dL     CBC & Differential [[id number]]  (Abnormal) Collected: 01/17/24 0848    Specimen: Blood Updated: 01/17/24 0925    Narrative:      The following orders were created for panel order CBC & Differential.  Procedure                               Abnormality         Status                     ---------                               -----------         ------                     CBC Auto Differential[[id number]]        Abnormal            Final result                 Please view results for these tests on the individual orders.    CBC Auto Differential [[id number]]  (Abnormal) Collected: 01/17/24 0848    Specimen: Blood Updated: 01/17/24 0925     WBC 17.20 10*3/mm3      RBC 2.95 10*6/mm3      Hemoglobin 8.6 g/dL      Hematocrit 25.5 %      MCV 86.3 fL      MCH 29.3 pg      MCHC 34.0 g/dL      RDW 15.2 %      RDW-SD 45.5 fl      MPV 8.2 fL      Platelets 198 10*3/mm3      Neutrophil % 82.1 %      Lymphocyte % 7.4 %      Monocyte % 10.5 %      Eosinophil % 0.0 %      Basophil % 0.0 %      Neutrophils, Absolute 14.10 10*3/mm3      Lymphocytes, Absolute 1.30 10*3/mm3      Monocytes, Absolute 1.80 10*3/mm3      Eosinophils, Absolute 0.00 10*3/mm3      Basophils, Absolute 0.00 10*3/mm3      nRBC 0.0 /100 WBC     Urine Culture - Urine, Urine, Clean Catch [[id number]]  (Abnormal)  (Susceptibility) Collected: 01/15/24 1145    Specimen: Urine, Clean Catch Updated: 01/17/24 0833     Urine Culture 25,000 CFU/mL Serratia marcescens     Comment: Pip/Tazo to follow       Narrative:      Colonization of the urinary tract without infection is common. Treatment is discouraged unless the patient is symptomatic, pregnant, or undergoing an invasive urologic procedure.    Susceptibility        Serratia marcescens      ZOILA (Preliminary)      Amoxicillin + Clavulanate  Resistant      Cefazolin Resistant      Cefepime Susceptible      Ceftazidime Susceptible      Ceftriaxone Susceptible      Gentamicin Susceptible      Levofloxacin Susceptible      Nitrofurantoin Resistant      Trimethoprim + Sulfamethoxazole Susceptible                           POC Glucose Once [[id number]]  (Abnormal) Collected: 01/17/24 0632    Specimen: Blood Updated: 01/17/24 0807     Glucose 135 mg/dL      Comment: Serial Number: [id number]:  [id number]       POC Glucose Once [[id number]]  (Abnormal) Collected: 01/17/24 0037    Specimen: Blood Updated: 01/17/24 0039     Glucose 153 mg/dL      Comment: Serial Number: [id number]:  [id number]       BUN [[id number]]  (Abnormal) Collected: 01/16/24 2022    Specimen: Blood from Arm, Left Updated: 01/16/24 2051     BUN 73 mg/dL     POC Glucose Once [[id number]]  (Abnormal) Collected: 01/16/24 1829    Specimen: Blood Updated: 01/16/24 1833     Glucose 135 mg/dL      Comment: Serial Number: [id number]:  [id number]       POC Glucose Once [[id number]]  (Abnormal) Collected: 01/16/24 1352    Specimen: Blood Updated: 01/16/24 1354     Glucose 125 mg/dL      Comment: Serial Number: [id number]:  [id number]       POC Glucose Once [[id number]]  (Abnormal) Collected: 01/16/24 1114    Specimen: Blood Updated: 01/16/24 1116     Glucose 122 mg/dL      Comment: Serial Number: [id number]:  [id number]       POC Glucose Once [[id number]]  (Normal) Collected: 01/16/24 1004    Specimen: Blood Updated: 01/16/24 1006     Glucose 76 mg/dL      Comment: Serial Number: [id number]:  [id number]       POC Glucose Once [[id number]]  (Normal) Collected: 01/16/24 0923    Specimen: Blood Updated: 01/16/24 0925     Glucose 87 mg/dL      Comment: Serial Number: [id number]:  [id number]       POC Glucose Once [[id number]]  (Abnormal) Collected: 01/16/24 0831    Specimen: Blood Updated: 01/16/24 0834     Glucose 154 mg/dL      Comment: Serial Number:  [id number]:  [id number]       POC Glucose Once [[id number]]  (Abnormal) Collected: 01/16/24 0816    Specimen: Blood Updated: 01/16/24 0819     Glucose 67 mg/dL      Comment: Serial Number: [id number]:  [id number]       POC Glucose Once [[id number]]  (Normal) Collected: 01/16/24 0754    Specimen: Blood Updated: 01/16/24 0756     Glucose 96 mg/dL      Comment: Serial Number: [id number]:  [id number]       POC Glucose Once [[id number]]  (Abnormal) Collected: 01/16/24 0723    Specimen: Blood Updated: 01/16/24 0725     Glucose 27 mg/dL      Comment: Serial Number: [id number]:  [id number]       Magnesium [[id number]]  (Normal) Collected: 01/16/24 0646    Specimen: Blood Updated: 01/16/24 0720     Magnesium 1.9 mg/dL     Comprehensive Metabolic Panel [[id number]]  (Abnormal) Collected: 01/16/24 0646    Specimen: Blood Updated: 01/16/24 0720     Glucose 39 mg/dL       mg/dL      Creatinine 10.52 mg/dL      Sodium 140 mmol/L      Potassium 4.7 mmol/L      Chloride 101 mmol/L      CO2 17.0 mmol/L      Calcium 8.3 mg/dL      Total Protein 5.9 g/dL      Albumin 2.9 g/dL      ALT (SGPT) 20 U/L      AST (SGOT) 28 U/L      Alkaline Phosphatase 94 U/L      Total Bilirubin 0.2 mg/dL      Globulin 3.0 gm/dL      A/G Ratio 1.0 g/dL      BUN/Creatinine Ratio 14.3     Anion Gap 22.0 mmol/L      eGFR 4.6 mL/min/1.73      Comment: <15 Indicative of kidney failure       Narrative:      GFR Normal >60  Chronic Kidney Disease <60  Kidney Failure <15    The GFR formula is only valid for adults with stable renal function between ages 18 and 70.    Phosphorus [[id number]]  (Abnormal) Collected: 01/16/24 0646    Specimen: Blood Updated: 01/16/24 0712     Phosphorus 11.0 mg/dL     CBC (No Diff) [[id number]]  (Abnormal) Collected: 01/16/24 0646    Specimen: Blood Updated: 01/16/24 0653     WBC 15.60 10*3/mm3      RBC 3.16 10*6/mm3      Hemoglobin 9.1 g/dL      Hematocrit 27.4 %      MCV 86.9 fL      MCH 28.8 pg      MCHC  33.2 g/dL      RDW 15.9 %      RDW-SD 47.7 fl      MPV 7.5 fL      Platelets 260 10*3/mm3     Potassium [[id number]]  (Normal) Collected: 01/15/24 2304    Specimen: Blood Updated: 01/15/24 2332     Potassium 4.1 mmol/L     Basic Metabolic Panel [[id number]]  (Abnormal) Collected: 01/15/24 1719    Specimen: Blood Updated: 01/15/24 1759     Glucose 87 mg/dL       mg/dL      Creatinine 15.98 mg/dL      Sodium 140 mmol/L      Potassium 6.7 mmol/L      Chloride 102 mmol/L      CO2 8.0 mmol/L      Calcium 9.1 mg/dL      BUN/Creatinine Ratio 15.6     Anion Gap 30.0 mmol/L      eGFR 2.8 mL/min/1.73      Comment: <15 Indicative of kidney failure       Narrative:      GFR Normal >60  Chronic Kidney Disease <60  Kidney Failure <15    The GFR formula is only valid for adults with stable renal function between ages 18 and 70.    POC Glucose Once [[id number]]  (Normal) Collected: 01/15/24 1717    Specimen: Blood Updated: 01/15/24 1719     Glucose 75 mg/dL      Comment: Serial Number: [id number]:  [id number]       COVID-19 and FLU A/B PCR, 1 HR TAT - Swab, Nasopharynx [[id number]]  (Normal) Collected: 01/15/24 1602    Specimen: Swab from Nasopharynx Updated: 01/15/24 1624     COVID19 Not Detected     Influenza A PCR Not Detected     Influenza B PCR Not Detected    Narrative:      Fact sheet for providers: https://www.fda.gov/media/569050/download    Fact sheet for patients: https://www.fda.gov/media/028249/download    Test performed by PCR.    Hepatitis B Surface Antigen [[id number]]  (Normal) Collected: 01/15/24 1125    Specimen: Blood Updated: 01/15/24 1613     Hepatitis B Surface Ag Non-Reactive    POC Glucose Once [[id number]]  (Abnormal) Collected: 01/15/24 1600    Specimen: Blood Updated: 01/15/24 1604     Glucose 119 mg/dL      Comment: Serial Number: [id number]:  [id number]       POC Glucose Once [[id number]]  (Abnormal) Collected: 01/15/24 1519    Specimen: Blood Updated: 01/15/24 1521     Glucose 147 mg/dL       Comment: Serial Number: [id number]:  [id number]                Pending Results: Urine and blood cultures    Imaging Reviewed:   CT Abdomen Pelvis Without Contrast    Result Date: 1/15/2024  Impression: Moderate bilateral hydronephrosis secondary to obstructing right mid ureteral calculus measuring 9 mm and left mid ureteral calculus measuring 8 mm. 3.1 cm complex cyst in the lower pole of the right kidney containing either mural nodularity or small volume hemorrhage. If possible, correlation with contrast-enhanced renal protocol CT or MRI is suggested. If there is not possible due to renal function, close follow-up findings of noncontrast CT or renal ultrasound is suggested. Consider urologic evaluation. Cholelithiasis. Additional findings per body of the report. Electronically Signed: James Monsalve MD  1/15/2024 3:12 PM EST  Workstation ID: [id number]    XR Chest 1 View    Result Date: 1/15/2024  Impression: Low lung volumes without definite acute cardiopulmonary abnormality. Electronically Signed: Madelyn Vaughn  1/15/2024 12:13 PM EST  Workstation ID: [id number]         Assessment & Plan     Patient is a 78 y.o. male admitted with weakness, bradycardia secondary to hyperkalemia, acute on chronic kidney injury. He was found to have obstructive uropathy. S/p bilateral stent placement and initiation of dialysis. Now found to have left lower extremity VTE.     ASSESSMENT/PLAN    Chronic DVT noted in left common femoral, proximal femoral, popliteal and gastrocnemius  Chronic LLE superficial thrombophlebitis in  small saphenous  Sub-acute DVT noted in left external iliac, deep femoral  - Patient has several risk factors. Patient is on heparin. Can continue and will transition to oral.    Anemia  -Likely related to chronic disease, renal insufficiency.      Acute on chronic renal failure with hyperkalemia  - Longstanding CKD Stage IV. Started on dialysis 1/15  - Hyperkalemia secondary to renal failure, improved    - Nephrology managing    Obstructive uropathy  -Secondary to ureteral stones. S/p urgent stent placement on .   - Initial blood cultures with no growth. Repeat blood cultures pending. Procal is normal. IV Rocephin  - Likely contributing to renal deterioration  - Urology following      Electronically signed by Mona Parker PA-C, 24    Patient seen and examined agree with assessment plan    Patient is a 78-year-old male admitted with acute on chronic kidney injury, obstructive uropathy status post bilateral stent placement initiation on dialysis, hyperkalemia.  Patient has had persistent left leg swelling for several years and he was told previously that he has a blood clot in his left leg.  He does not remember being on anticoagulation.  There is no prior ultrasound to compare.  Now with left leg swelling ultrasound Doppler was obtained.  This showed chronic DVT in the left common femoral, proximal femoral, popliteal and gastrocnemius.  Chronic superficial thrombophlebitis in the small saphenous.  Subacute DVT in the left external iliac, deep femoral  He does have risk factors with poor mobility, chronic kidney disease.  There is no family history of DVT no indication for hypercoagulable workup however he will need anticoagulation.  He is currently on heparin subcutaneous for prophylaxis.  He has had hemodialysis yesterday complicated with prolonged bleeding.  Plan for AV fistulogram, possible angioplasty tomorrow.  Given bleeding risk continue heparin subcutaneous for prophylaxis.  Postop we can consider anticoagulation. Patient does have high bleeding risk.     Thank you for this consult. We will be happy to follow along with you.             Electronically signed by Hanane Cotto MD at 24 1781          Physical Therapy Notes (most recent note)        Giselle Ventura, PT at 24 1615  Version 1 of 1         Patient Name: Gabriel De Souza  : 1945    MRN: [id number]                               Today's Date: 1/17/2024       Admit Date: 1/15/2024    Visit Dx:     ICD-10-CM ICD-9-CM   1. Acute renal failure, unspecified acute renal failure type  N17.9 584.9   2. Acute pancreatitis, unspecified complication status, unspecified pancreatitis type  K85.90 577.0   3. Weakness  R53.1 780.79   4. Hyperkalemia  E87.5 276.7   5. Ureterolithiasis  N20.1 592.1   6. Mechanical complication of arteriovenous fistula surgically created, initial encounter  T82.590A 996.1     Patient Active Problem List   Diagnosis    Angina pectoris    Arteriosclerosis    Dyslipidemia    Hypertension    Sinus bradycardia    Type 2 diabetes mellitus    Encounter for screening for malignant neoplasm of prostate    Hip pain, right    Anemia in chronic kidney disease    Benign prostatic hyperplasia    Bronchitis    Deafness in right ear    Diabetic peripheral neuropathy associated with type 2 diabetes mellitus    Goiter    History of thrombosis    Hypercholesterolemia    Hypogonadism    Low back pain    Peripheral neuropathy    Proteinuria    Secondary hyperparathyroidism of renal origin    Renal cyst    Vitamin D deficiency    Hyperphosphatemia due to chronic kidney disease    Heart murmur    Hyperkalemia    Mechanical complication of arteriovenous fistula surgically created    End stage renal disease on dialysis     Past Medical History:   Diagnosis Date    Cancer 01/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    Coronary artery disease     Deep vein thrombosis 04/1990    Diabetes mellitus     Dyslipidemia     Erectile dysfunction 51 years old    Heart murmur 10/4/2023    HL (hearing loss) 7 year old    right    Hyperlipidemia 1970    Hypertension     Neuropathy     Renal insufficiency 2020    Stage 4 chronic kidney disease     3-4     Past Surgical History:   Procedure Laterality Date    ARTERIOVENOUS FISTULA/SHUNT SURGERY Left 9/22/2022    Procedure: BRACHIAL CEPHALIC FISTULA FORMATION;  Surgeon: Edy Vega MD;  Location: [facility] OR;   Service: Vascular;  Laterality: Left;    BACK SURGERY      BASAL CELL CARCINOMA EXCISION  01/2019    CARDIAC CATHETERIZATION      TURP / TRANSURETHRAL INCISION / DRAINAGE PROSTATE        General Information       Row Name 01/17/24 1604          Physical Therapy Time and Intention    Document Type evaluation  -CL     Mode of Treatment physical therapy  -CL       Row Name 01/17/24 1604          General Information    Patient Profile Reviewed yes  -CL     Prior Level of Function independent:;all household mobility  -CL     Existing Precautions/Restrictions fall  -CL     Barriers to Rehab impaired sensation  -CL       Row Name 01/17/24 1604          Living Environment    People in Home spouse  -CL     Name(s) of People in Home Wife: Pat  -CL       Row Name 01/17/24 1604          Home Main Entrance    Number of Stairs, Main Entrance none  -CL       Row Name 01/17/24 1604          Stairs Within Home, Primary    Number of Stairs, Within Home, Primary twelve  -CL     Stairs Comment, Within Home, Primary Can stay on 1st floor but shower is in basement.  -CL       Row Name 01/17/24 1604          Cognition    Orientation Status (Cognition) oriented x 3  -CL       Row Name 01/17/24 1604          Safety Issues, Functional Mobility    Impairments Affecting Function (Mobility) endurance/activity tolerance  -CL               User Key  (r) = Recorded By, (t) = Taken By, (c) = Cosigned By      Initials Name Provider Type    CL Giselle Ventura PT Physical Therapist                   Mobility       Row Name 01/17/24 1606          Bed Mobility    Bed Mobility supine-sit  -CL     Supine-Sit [place] (Bed Mobility) minimum assist (75% patient effort)  -CL     Assistive Device (Bed Mobility) head of bed elevated;bed rails  -CL       Row Name 01/17/24 1606          Bed-Chair Transfer    Bed-Chair [place] (Transfers) minimum assist (75% patient effort)  -CL       Row Name 01/17/24 1606          Sit-Stand Transfer    Sit-Stand  [place] (Transfers) minimum assist (75% patient effort)  -CL       Row Name 01/17/24 1606          Gait/Stairs (Locomotion)    [place] Level (Gait) minimum assist (75% patient effort)  -CL     Distance in Feet (Gait) 3' to chair  -CL               User Key  (r) = Recorded By, (t) = Taken By, (c) = Cosigned By      Initials Name Provider Type    CL Giselle Ventura, PT Physical Therapist                   Obj/Interventions       Row Name 01/17/24 1607          Range of Motion Comprehensive    General Range of Motion bilateral lower extremity ROM WFL  -CL       Row Name 01/17/24 1607          Strength Comprehensive (MMT)    Comment, General Manual Muscle Testing (MMT) Assessment BLEs grossly 4/5  -CL       Row Name 01/17/24 1607          Balance    Balance Assessment sitting static balance;sitting dynamic balance;standing static balance;standing dynamic balance  -CL     Static Sitting Balance standby assist  -CL     Dynamic Sitting Balance minimal assist  -CL     Position, Sitting Balance sitting edge of bed  -CL     Static Standing Balance minimal assist  -CL     Dynamic Standing Balance minimal assist  -CL     Position/Device Used, Standing Balance unsupported  -CL       Row Name 01/17/24 1607          Sensory Assessment (Somatosensory)    Sensory Assessment (Somatosensory) --  light touch intact, pt and wife report neuropathy  -CL               User Key  (r) = Recorded By, (t) = Taken By, (c) = Cosigned By      Initials Name Provider Type    CL Giselle Ventura, PT Physical Therapist                   Goals/Plan       Row Name 01/17/24 1613          Bed Mobility Goal 1 (PT)    Activity/Assistive Device (Bed Mobility Goal 1, PT) bed mobility activities, all  -CL     [place] Level/Cues Needed (Bed Mobility Goal 1, PT) modified independence  -CL     Time Frame (Bed Mobility Goal 1, PT) long term goal (LTG);2 weeks  -CL       Row Name 01/17/24 1613          Transfer Goal 1 (PT)    Activity/Assistive  Device (Transfer Goal 1, PT) sit-to-stand/stand-to-sit;bed-to-chair/chair-to-bed  -CL     [place] Level/Cues Needed (Transfer Goal 1, PT) modified independence  -CL     Time Frame (Transfer Goal 1, PT) long term goal (LTG);2 weeks  -CL       Row Name 01/17/24 1613          Gait Training Goal 1 (PT)    Activity/Assistive Device (Gait Training Goal 1, PT) gait (walking locomotion);assistive device use  -CL     [place] Level (Gait Training Goal 1, PT) modified independence  -CL     Distance (Gait Training Goal 1, PT) 150'  -CL     Time Frame (Gait Training Goal 1, PT) long term goal (LTG);2 weeks  -CL       Row Name 01/17/24 1613          Stairs Goal 1 (PT)    Activity/Assistive Device (Stairs Goal 1, PT) ascending stairs;descending stairs  -CL     [place] Level/Cues Needed (Stairs Goal 1, PT) modified independence  -CL     Number of Stairs (Stairs Goal 1, PT) 12  -CL     Time Frame (Stairs Goal 1, PT) long term goal (LTG);2 weeks  -CL       Row Name 01/17/24 1613          Therapy Assessment/Plan (PT)    Planned Therapy Interventions (PT) balance training;bed mobility training;gait training;patient/family education;stair training;strengthening;transfer training  -CL               User Key  (r) = Recorded By, (t) = Taken By, (c) = Cosigned By      Initials Name Provider Type    CL Giselle Ventura, PT Physical Therapist                   Clinical Impression       Row Name 01/17/24 1608          Pain    Pretreatment Pain Rating 3/10  -CL     Posttreatment Pain Rating 3/10  -CL       Row Name 01/17/24 1608          Plan of Care Review    Plan of Care Reviewed With patient;spouse;family  -CL     Outcome Evaluation Gabriel De Souza is a 78 y.o. male admitted with Hyperkalemia, bradycardia,Hypotension as well as bilateral kidney stones. He underwent cystoscopy with bilateral uretral stents placed by Dr. Avitia on 1/16/24  PMH: DM, HLD, HTN, CKD. Pt lives with wife in a [facility] with basement. Shower is in basement.  Pt is typically independent with moblility without AD. Pt reports neuropathy in feet,denies any falls but states he balance is affected.  At time of PT evaluation he is A&O x 3 with c/o soreness from surgery.  He requires min A for bed mobility, min A for transfers and short distance ambulation.   He fatigues with transfer from bed to chair.  He would benefit from SNF for therapy at discharge  -       Row Name 01/17/24 1604          Therapy Assessment/Plan (PT)    Rehab Potential (PT) good, to achieve stated therapy goals  -CL     Criteria for Skilled Interventions Met (PT) yes;skilled treatment is necessary  -CL     Therapy Frequency (PT) 5 times/wk  -CL     Predicted Duration of Therapy Intervention (PT) Until discharge  -CL       Row Name 01/17/24 1600          Vital Signs    Pre Systolic BP Rehab 123  -CL     Pre Treatment Diastolic BP 68  -CL     Intra Systolic BP Rehab 128  -CL     Intra Treatment Diastolic BP 65  -CL     Post Systolic BP Rehab 128  -CL     Post Treatment Diastolic BP 65  -CL     Pretreatment Heart Rate (beats/min) 98  -CL     Intratreatment Heart Rate (beats/min) 68  -CL     Posttreatment Heart Rate (beats/min) 64  -CL     Pretreatment Resp Rate (breaths/min) 14  -CL     Intratreatment Resp Rate (breaths/min) 15  -CL     Posttreatment Resp Rate (breaths/min) 13  -CL     Pre SpO2 (%) 97  -CL     O2 Delivery Pre Treatment room air  -CL     Intra SpO2 (%) 96  -CL     O2 Delivery Intra Treatment room air  -CL     Post SpO2 (%) 96  -CL     O2 Delivery Post Treatment room air  -CL     Pre Patient Position Supine  -CL     Intra Patient Position Sitting  -CL     Post Patient Position Sitting  -CL       Row Name 01/17/24 5263          Positioning and Restraints    Pre-Treatment Position in bed  -CL     Post Treatment Position chair  -CL     In Chair notified nsg;reclined;call light within reach;exit alarm on;with family/caregiver  -CL               User Key  (r) = Recorded By, (t) = Taken By, (c) =  Cosigned By      Initials Name Provider Type    CL Giselle Ventura, PT Physical Therapist                   Outcome Measures       Row Name 01/17/24 1613 01/17/24 0827       How much help from another person do you currently need...    Turning from your back to your side while in flat bed without using bedrails? 4  -CL 4  -CLA    Moving from lying on back to sitting on the side of a flat bed without bedrails? 3  -CL 4  -CLA    Moving to and from a bed to a chair (including a wheelchair)? 3  -CL 2  -CLA    Standing up from a chair using your arms (e.g., wheelchair, bedside chair)? 3  -CL 2  -CLA    Climbing 3-5 steps with a railing? 2  -CL 2  -CLA    To walk in hospital room? 3  -CL 2  -CLA    AM-PAC 6 Clicks Score (PT) 18  -CL 16  -CLA    Highest Level of Mobility Goal 6 --> Walk 10 steps or more  -CL 5 --> Static standing  -CLA              User Key  (r) = Recorded By, (t) = Taken By, (c) = Cosigned By      Initials Name Provider Type    CLA Maggy Keith, RN Registered Nurse     Giselle Ventura, PT Physical Therapist                                 Physical Therapy Education       Title: PT OT SLP Therapies (Done)       Topic: Physical Therapy (Done)       Point: Mobility training (Done)       Learning Progress Summary             Patient Acceptance, E,TB, VU by  at 1/17/2024 1614                                         User Key       Initials Effective Dates Name Provider Type Discipline     03/02/22 -  Giselle Ventura, PT Physical Therapist PT                  PT Recommendation and Plan  Planned Therapy Interventions (PT): balance training, bed mobility training, gait training, patient/family education, stair training, strengthening, transfer training  Plan of Care Reviewed With: patient, spouse, family  Outcome Evaluation: Gabriel De Souza is a 78 y.o. male admitted with Hyperkalemia, bradycardia,Hypotension as well as bilateral kidney stones. He underwent cystoscopy with bilateral uretral stents  placed by Dr. Avitia on 1/16/24  PMH: DM, HLD, HTN, CKD. Pt lives with wife in a [facility] with basement. Shower is in basement. Pt is typically independent with moblility without AD. Pt reports neuropathy in feet,denies any falls but states he balance is affected.  At time of PT evaluation he is A&O x 3 with c/o soreness from surgery.  He requires min A for bed mobility, min A for transfers and short distance ambulation.   He fatigues with transfer from bed to chair.  He would benefit from SNF for therapy at discharge     Time Calculation:   PT Evaluation Complexity  History, PT Evaluation Complexity: 3 or more personal factors and/or comorbidities  Examination of Body Systems (PT Eval Complexity): total of 3 or more elements  Clinical Presentation (PT Evaluation Complexity): evolving  Clinical Decision Making (PT Evaluation Complexity): moderate complexity  Overall Complexity (PT Evaluation Complexity): moderate complexity     PT Charges       Row Name 01/17/24 1614             Time Calculation    Start Time 1433  -CL      Stop Time 1453  -CL      Time Calculation (min) 20 min  -CL      PT Received On 01/17/24  -CL      PT - Next Appointment 01/18/24  -CL      PT Goal Re-Cert Due Date 01/31/24  -CL         Time Calculation- PT    Total Timed Code Minutes- PT 0 minute(s)  -CL                User Key  (r) = Recorded By, (t) = Taken By, (c) = Cosigned By      Initials Name Provider Type    CL Giselle Ventura, PT Physical Therapist                  Therapy Charges for Today       Code Description Service Date Service Provider Modifiers Qty    17910571737 HC PT EVAL MOD COMPLEXITY 4 1/17/2024 Giselle Ventura, PT GP 1            PT G-Codes  AM-PAC 6 Clicks Score (PT): 18  PT Discharge Summary  Anticipated Discharge Disposition (PT): skilled nursing facility    Giselle Ventura PT  1/17/2024      Electronically signed by Giselle Ventura, PT at 01/17/24 3489

## 2024-01-18 NOTE — OP NOTE
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  Given clinical indication and recent AV fistula scan findings, did not shoot a retrograde just anastomotic picture.  80% focal cephalic arch stenosis with extensive collateral vein formation.  80% focal left subclavian vein stenosis adjacent to the junction of the axillary and subclavian veins.  Successful balloon angioplasty of cephalic arch and subclavian vein stenoses with 20% residual stenosis in both lesions.  Persistent opacification of collateral veins, but given that this was the first treatment, did not place stent.    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  Had prolonged time to hemostasis associated with his fistula.  Recent scan demonstrated satisfactory volume flow and no stenosis.  Given clinical problems with fistula, submits now for fistulogram, expecting central fistula or vein stenosis.       Procedure:  The patient was positioned supine with his  left arm extended onto an arm table.  After satisfactory prep and drape, local anesthetic was infiltrated over the fistula in the antecubital area.  The fistula was easily cannulated with a micropuncture kit and a micropuncture wire was used to exchange the needle for catheter.  Fistulogram was obtained by injection of contrast material with spot imaging over the arm.  This demonstrated an 80% focal cephalic arch stenosis with collateral vein development.  There was also a stenosis at the the distal left subclavian vein.  I felt both were amenable to angioplasty.  An angled Glidewire was passed and used to exchange the micropuncture catheter for a 6 Yi sheath.  An 8 x 40 mm balloon was used to perform balloon angioplasty of both the cephalic arch and left subclavian vein stenoses to burst pressure for 90 seconds each.  There was angiographic waist demonstrated during balloon inflation of each, with complete effacement during inflation.  Follow-up study demonstrated 20% residual stenosis in both lesions, but the result was accepted.  A bolster suture was placed at the puncture site and the sheath was removed.  The puncture site was hemostatic.  A sterile dressing was applied.  At its conclusion the patient had tolerated the procedure well, and without apparent complications.  The patient was taken to the recovery area in stable condition.    Moi Liz MD     Date: 1/18/2024  Time: 08:59 EST

## 2024-01-18 NOTE — PLAN OF CARE
Problem: Adult Inpatient Plan of Care  Goal: Plan of Care Review  Outcome: Ongoing, Progressing  Goal: Patient-Specific Goal (Individualized)  Outcome: Ongoing, Progressing  Goal: Absence of Hospital-Acquired Illness or Injury  Outcome: Ongoing, Progressing  Intervention: Identify and Manage Fall Risk  Recent Flowsheet Documentation  Taken 1/18/2024 1620 by Airam Campuzano LPN  Safety Promotion/Fall Prevention: patient off unit  Taken 1/18/2024 1245 by Airam Campuzano LPN  Safety Promotion/Fall Prevention: patient off unit  Taken 1/18/2024 0930 by Airam Campuzano LPN  Safety Promotion/Fall Prevention:   activity supervised   assistive device/personal items within reach   clutter free environment maintained   lighting adjusted   room organization consistent   safety round/check completed  Taken 1/18/2024 0800 by Airam Campuzano LPN  Safety Promotion/Fall Prevention: patient off unit  Intervention: Prevent Skin Injury  Recent Flowsheet Documentation  Taken 1/18/2024 0930 by Airam Campuzano LPN  Skin Protection:   adhesive use limited   incontinence pads utilized   tubing/devices free from skin contact  Intervention: Prevent and Manage VTE (Venous Thromboembolism) Risk  Recent Flowsheet Documentation  Taken 1/18/2024 0930 by Airam Campuzano LPN  VTE Prevention/Management:   bilateral   sequential compression devices off   patient refused intervention  Range of Motion: active ROM (range of motion) encouraged  Intervention: Prevent Infection  Recent Flowsheet Documentation  Taken 1/18/2024 0930 by Airam Campuzano LPN  Infection Prevention:   cohorting utilized   environmental surveillance performed   hand hygiene promoted   personal protective equipment utilized   rest/sleep promoted   single patient room provided   visitors restricted/screened  Goal: Optimal Comfort and Wellbeing  Outcome: Ongoing, Progressing  Intervention: Monitor Pain and Promote Comfort  Recent Flowsheet Documentation  Taken 1/18/2024 0930  by Tatianna, Airam, LPN  Pain Management Interventions:   care clustered   diversional activity provided   position adjusted   pillow support provided  Intervention: Provide Person-Centered Care  Recent Flowsheet Documentation  Taken 1/18/2024 0930 by Airam Campuzano LPN  Trust Relationship/Rapport:   care explained   choices provided   thoughts/feelings acknowledged  Goal: Readiness for Transition of Care  Outcome: Ongoing, Progressing     Problem: Skin Injury Risk Increased  Goal: Skin Health and Integrity  Outcome: Ongoing, Progressing  Intervention: Promote and Optimize Oral Intake  Recent Flowsheet Documentation  Taken 1/18/2024 0930 by Airam Campuzano LPN  Oral Nutrition Promotion: rest periods promoted  Intervention: Optimize Skin Protection  Recent Flowsheet Documentation  Taken 1/18/2024 0930 by Airam Campuzano LPN  Pressure Reduction Techniques:   frequent weight shift encouraged   weight shift assistance provided  Pressure Reduction Devices:   pressure-redistributing mattress utilized   positioning supports utilized  Skin Protection:   adhesive use limited   incontinence pads utilized   tubing/devices free from skin contact     Problem: Diabetes Comorbidity  Goal: Blood Glucose Level Within Targeted Range  Outcome: Ongoing, Progressing  Intervention: Monitor and Manage Glycemia  Recent Flowsheet Documentation  Taken 1/18/2024 0930 by Airam Campuzano LPN  Glycemic Management: blood glucose monitored     Problem: Hypertension Comorbidity  Goal: Blood Pressure in Desired Range  Outcome: Ongoing, Progressing  Intervention: Maintain Blood Pressure Management  Recent Flowsheet Documentation  Taken 1/18/2024 0930 by Airam Campuzano LPN  Medication Review/Management: medications reviewed     Problem: Adjustment to Illness (Sepsis/Septic Shock)  Goal: Optimal Coping  Outcome: Ongoing, Progressing  Intervention: Optimize Psychosocial Adjustment to Illness  Recent Flowsheet Documentation  Taken 1/18/2024 0930  by Airam Campuzano LPN  Supportive Measures: active listening utilized  Family/Support System Care:   self-care encouraged   support provided     Problem: Bleeding (Sepsis/Septic Shock)  Goal: Absence of Bleeding  Outcome: Ongoing, Progressing     Problem: Glycemic Control Impaired (Sepsis/Septic Shock)  Goal: Blood Glucose Level Within Desired Range  Outcome: Ongoing, Progressing  Intervention: Optimize Glycemic Control  Recent Flowsheet Documentation  Taken 1/18/2024 0930 by Airam Campuzano LPN  Glycemic Management: blood glucose monitored     Problem: Infection Progression (Sepsis/Septic Shock)  Goal: Absence of Infection Signs and Symptoms  Outcome: Ongoing, Progressing  Intervention: Initiate Sepsis Management  Recent Flowsheet Documentation  Taken 1/18/2024 0930 by Airam Campuzano LPN  Infection Prevention:   cohorting utilized   environmental surveillance performed   hand hygiene promoted   personal protective equipment utilized   rest/sleep promoted   single patient room provided   visitors restricted/screened  Intervention: Promote Recovery  Recent Flowsheet Documentation  Taken 1/18/2024 0930 by Airam Campuzano LPN  Airway/Ventilation Support: comfort measures provided  Sleep/Rest Enhancement: awakenings minimized     Problem: Nutrition Impaired (Sepsis/Septic Shock)  Goal: Optimal Nutrition Intake  Outcome: Ongoing, Progressing     Problem: Fall Injury Risk  Goal: Absence of Fall and Fall-Related Injury  Outcome: Ongoing, Progressing  Intervention: Identify and Manage Contributors  Recent Flowsheet Documentation  Taken 1/18/2024 0930 by Airam Campuzano LPN  Medication Review/Management: medications reviewed  Intervention: Promote Injury-Free Environment  Recent Flowsheet Documentation  Taken 1/18/2024 1620 by Airam Campuzano LPN  Safety Promotion/Fall Prevention: patient off unit  Taken 1/18/2024 1245 by Airam Campuzano LPN  Safety Promotion/Fall Prevention: patient off unit  Taken 1/18/2024 0930  by Tatianna, Airam, LPN  Safety Promotion/Fall Prevention:   activity supervised   assistive device/personal items within reach   clutter free environment maintained   lighting adjusted   room organization consistent   safety round/check completed  Taken 1/18/2024 0800 by Airam Campuzano LPN  Safety Promotion/Fall Prevention: patient off unit   Goal Outcome Evaluation:       Patient is down at dialysis at this time. Patient went for  down for a left arm AV Fistulogram. Patient had stitch placed where the fistulas was accessed for fistulogram with dry dressing placed prior to return to floor. Patient has good bruit & thrill. Patient family at bedside. Call light within reach.

## 2024-01-19 ENCOUNTER — APPOINTMENT (OUTPATIENT)
Dept: INTERVENTIONAL RADIOLOGY/VASCULAR | Facility: HOSPITAL | Age: 79
End: 2024-01-19
Payer: MEDICARE

## 2024-01-19 LAB
ALBUMIN SERPL-MCNC: 2.8 G/DL (ref 3.5–5.2)
ANION GAP SERPL CALCULATED.3IONS-SCNC: 13 MMOL/L (ref 5–15)
ANISOCYTOSIS BLD QL: ABNORMAL
BUN SERPL-MCNC: 47 MG/DL (ref 8–23)
BUN/CREAT SERPL: 11.1 (ref 7–25)
CALCIUM SPEC-SCNC: 8.4 MG/DL (ref 8.6–10.5)
CHLORIDE SERPL-SCNC: 101 MMOL/L (ref 98–107)
CO2 SERPL-SCNC: 25 MMOL/L (ref 22–29)
CREAT SERPL-MCNC: 4.23 MG/DL (ref 0.76–1.27)
DACRYOCYTES BLD QL SMEAR: ABNORMAL
DEPRECATED RDW RBC AUTO: 48.1 FL (ref 37–54)
EGFRCR SERPLBLD CKD-EPI 2021: 13.7 ML/MIN/1.73
ERYTHROCYTE [DISTWIDTH] IN BLOOD BY AUTOMATED COUNT: 15.6 % (ref 12.3–15.4)
GLUCOSE BLDC GLUCOMTR-MCNC: 106 MG/DL (ref 70–105)
GLUCOSE BLDC GLUCOMTR-MCNC: 129 MG/DL (ref 70–105)
GLUCOSE BLDC GLUCOMTR-MCNC: 156 MG/DL (ref 70–105)
GLUCOSE BLDC GLUCOMTR-MCNC: 80 MG/DL (ref 70–105)
GLUCOSE BLDC GLUCOMTR-MCNC: 82 MG/DL (ref 70–105)
GLUCOSE BLDC GLUCOMTR-MCNC: 87 MG/DL (ref 70–105)
GLUCOSE SERPL-MCNC: 70 MG/DL (ref 65–99)
HCT VFR BLD AUTO: 27.7 % (ref 37.5–51)
HGB BLD-MCNC: 9 G/DL (ref 13–17.7)
LYMPHOCYTES # BLD MANUAL: 0.54 10*3/MM3 (ref 0.7–3.1)
LYMPHOCYTES NFR BLD MANUAL: 6 % (ref 5–12)
MCH RBC QN AUTO: 28.4 PG (ref 26.6–33)
MCHC RBC AUTO-ENTMCNC: 32.6 G/DL (ref 31.5–35.7)
MCV RBC AUTO: 87.2 FL (ref 79–97)
MICROCYTES BLD QL: ABNORMAL
MONOCYTES # BLD: 1.62 10*3/MM3 (ref 0.1–0.9)
NEUTROPHILS # BLD AUTO: 24.84 10*3/MM3 (ref 1.7–7)
NEUTROPHILS NFR BLD MANUAL: 90 % (ref 42.7–76)
NEUTS BAND NFR BLD MANUAL: 2 % (ref 0–5)
PHOSPHATE SERPL-MCNC: 4.4 MG/DL (ref 2.5–4.5)
PLATELET # BLD AUTO: 189 10*3/MM3 (ref 140–450)
PMV BLD AUTO: 8.5 FL (ref 6–12)
POIKILOCYTOSIS BLD QL SMEAR: ABNORMAL
POTASSIUM SERPL-SCNC: 4.2 MMOL/L (ref 3.5–5.2)
RBC # BLD AUTO: 3.18 10*6/MM3 (ref 4.14–5.8)
SCAN SLIDE: NORMAL
SMALL PLATELETS BLD QL SMEAR: ADEQUATE
SODIUM SERPL-SCNC: 139 MMOL/L (ref 136–145)
VARIANT LYMPHS NFR BLD MANUAL: 1 % (ref 0–5)
VARIANT LYMPHS NFR BLD MANUAL: 1 % (ref 19.6–45.3)
WBC MORPH BLD: NORMAL
WBC NRBC COR # BLD AUTO: 27 10*3/MM3 (ref 3.4–10.8)

## 2024-01-19 PROCEDURE — 25010000002 LIDOCAINE 1 % SOLUTION: Performed by: RADIOLOGY

## 2024-01-19 PROCEDURE — 82948 REAGENT STRIP/BLOOD GLUCOSE: CPT | Performed by: SURGERY

## 2024-01-19 PROCEDURE — C1887 CATHETER, GUIDING: HCPCS

## 2024-01-19 PROCEDURE — 25010000002 FENTANYL CITRATE (PF) 50 MCG/ML SOLUTION: Performed by: RADIOLOGY

## 2024-01-19 PROCEDURE — 76937 US GUIDE VASCULAR ACCESS: CPT

## 2024-01-19 PROCEDURE — C1769 GUIDE WIRE: HCPCS

## 2024-01-19 PROCEDURE — 25010000002 ERTAPENEM PER 500 MG: Performed by: NURSE PRACTITIONER

## 2024-01-19 PROCEDURE — 99232 SBSQ HOSP IP/OBS MODERATE 35: CPT | Performed by: INTERNAL MEDICINE

## 2024-01-19 PROCEDURE — 82948 REAGENT STRIP/BLOOD GLUCOSE: CPT

## 2024-01-19 PROCEDURE — 06H03DZ INSERTION OF INTRALUMINAL DEVICE INTO INFERIOR VENA CAVA, PERCUTANEOUS APPROACH: ICD-10-PCS | Performed by: RADIOLOGY

## 2024-01-19 PROCEDURE — C1894 INTRO/SHEATH, NON-LASER: HCPCS

## 2024-01-19 PROCEDURE — C1880 VENA CAVA FILTER: HCPCS

## 2024-01-19 RX ORDER — LIDOCAINE HYDROCHLORIDE 10 MG/ML
INJECTION, SOLUTION INFILTRATION; PERINEURAL AS NEEDED
Status: COMPLETED | OUTPATIENT
Start: 2024-01-19 | End: 2024-01-19

## 2024-01-19 RX ORDER — FENTANYL CITRATE 50 UG/ML
INJECTION, SOLUTION INTRAMUSCULAR; INTRAVENOUS AS NEEDED
Status: COMPLETED | OUTPATIENT
Start: 2024-01-19 | End: 2024-01-19

## 2024-01-19 RX ADMIN — Medication 10 ML: at 21:46

## 2024-01-19 RX ADMIN — SENNOSIDES AND DOCUSATE SODIUM 2 TABLET: 50; 8.6 TABLET ORAL at 08:12

## 2024-01-19 RX ADMIN — CALCITRIOL CAPSULES 0.25 MCG 0.5 MCG: 0.25 CAPSULE ORAL at 08:12

## 2024-01-19 RX ADMIN — ERTAPENEM SODIUM 500 MG: 1 INJECTION, POWDER, LYOPHILIZED, FOR SOLUTION INTRAMUSCULAR; INTRAVENOUS at 17:42

## 2024-01-19 RX ADMIN — DEXTROSE MONOHYDRATE 25 G: 25 INJECTION, SOLUTION INTRAVENOUS at 00:39

## 2024-01-19 RX ADMIN — SEVELAMER CARBONATE 2400 MG: 800 TABLET, FILM COATED ORAL at 08:12

## 2024-01-19 RX ADMIN — SEVELAMER CARBONATE 2400 MG: 800 TABLET, FILM COATED ORAL at 17:42

## 2024-01-19 RX ADMIN — APIXABAN 2.5 MG: 2.5 TABLET, FILM COATED ORAL at 08:12

## 2024-01-19 RX ADMIN — LIDOCAINE HYDROCHLORIDE 5 ML: 10 INJECTION, SOLUTION INFILTRATION; PERINEURAL at 10:55

## 2024-01-19 RX ADMIN — ATORVASTATIN CALCIUM 10 MG: 10 TABLET, FILM COATED ORAL at 08:12

## 2024-01-19 RX ADMIN — Medication 10 ML: at 08:12

## 2024-01-19 RX ADMIN — FENTANYL CITRATE 50 MCG: 50 INJECTION, SOLUTION INTRAMUSCULAR; INTRAVENOUS at 10:42

## 2024-01-19 RX ADMIN — APIXABAN 2.5 MG: 2.5 TABLET, FILM COATED ORAL at 20:05

## 2024-01-19 NOTE — PROGRESS NOTES
Infectious Diseases Progress Note      LOS: 4 days   Patient Care Team:  Waylon Johnson MD as PCP - General  Chemchirian, MD Lali as Consulting Physician (Cardiology)  Atul Fowler MD as Consulting Physician (Nephrology)    Chief Complaint: Fatigue    Subjective       The patient has been afebrile for the last 24 hours.  The patient is on room air, hemodynamically stable, and is tolerating antimicrobial therapy.  Patient is suspected from having an IVC filter placed      Review of Systems:   Review of Systems   Constitutional:  Positive for fatigue.   HENT: Negative.     Eyes: Negative.    Respiratory: Negative.     Cardiovascular: Negative.    Gastrointestinal: Negative.    Endocrine: Negative.    Genitourinary: Negative.    Musculoskeletal: Negative.    Skin: Negative.    Neurological: Negative.    Psychiatric/Behavioral: Negative.     All other systems reviewed and are negative.       Objective     Vital Signs  Temp:  [97.6 °F (36.4 °C)-98.2 °F (36.8 °C)] 98 °F (36.7 °C)  Heart Rate:  [71-94] 71  Resp:  [11-23] 17  BP: ()/(30-71) 97/52    Physical Exam:  Physical Exam  Vitals and nursing note reviewed.   Constitutional:       General: He is not in acute distress.     Appearance: He is well-developed and normal weight. He is ill-appearing. He is not diaphoretic.   HENT:      Head: Normocephalic and atraumatic.   Eyes:      Conjunctiva/sclera: Conjunctivae normal.      Pupils: Pupils are equal, round, and reactive to light.   Cardiovascular:      Rate and Rhythm: Normal rate and regular rhythm.      Heart sounds: Normal heart sounds, S1 normal and S2 normal.   Pulmonary:      Effort: Pulmonary effort is normal. No respiratory distress.      Breath sounds: Normal breath sounds. No stridor. No wheezing or rales.   Abdominal:      General: Bowel sounds are normal. There is no distension.      Palpations: Abdomen is soft. There is no mass.      Tenderness: There is no abdominal tenderness. There is no  guarding.   Genitourinary:     Comments: Peterson catheter  Musculoskeletal:         General: No deformity. Normal range of motion.      Cervical back: Neck supple.   Skin:     General: Skin is warm and dry.      Coloration: Skin is not pale.      Findings: No erythema or rash.      Comments:   Left arm fistula   Neurological:      Mental Status: He is alert and oriented to person, place, and time.      Cranial Nerves: No cranial nerve deficit.   Psychiatric:         Mood and Affect: Mood normal.          Results Review:    I have reviewed all clinical data, test, lab, and imaging results.     Radiology  IR IVC Filter Placement    Result Date: 1/19/2024  IR IVC FILTER PLACEMENT Date of Exam: 1/19/2024 10:41 AM EST Indication: History of lower extremity deep venous thrombosis, patient need of IVC filter placement. Comparison: None available. Procedure:  A detailed explanation of the procedure including risks, benefits, and alternatives was provided.  A procedure timeout was performed for patient identification/verification. The patient was placed on the fluoroscopic table in the supine position. Ultrasound confirmed patency of the right internal jugular vein. The right neck was prepped and draped utilizing maximum sterile barrier technique. Utilizing ultrasound guidance and  local lidocaine anesthetic, a skin incision was made and a micropuncture needle system was utilized to access the right internal jugular vein. After placement of a guidewire, an introducer sheath was advanced into the inferior aspect of the infrarenal inferior vena cava. Contrast media was injected for an inferior venacavogram. An Option Elite filter was then deployed into the infrarenal IVC. Contrast was injected which confirmed that the filter is in good position. The sheath was removed and hemostasis was achieved. A dressing was placed at the skin incision site. The patient tolerated the procedure well without immediate complications.  Fluoroscopic Time: 7 minutes and 56 seconds Contrast Media: 20 cc of [id number] contrast Findings: The inferior vena cava is widely patent. The final image shows that the filter is in good position located within the infrarenal IVC.     Impression: Technically successful placement of a Option Elite inferior vena cava filter within the infrarenal IVC as discussed above. Electronically Signed: Miguelito Bruno MD  1/19/2024 12:38 PM EST  Workstation ID: [id number]     Cardiology    Laboratory    Results from last 7 days   Lab Units 01/18/24  2332 01/18/24  0352 01/17/24  0848 01/16/24  0646 01/15/24  1125   WBC 10*3/mm3 27.00* 21.20* 17.20* 15.60* 23.10*   HEMOGLOBIN g/dL 9.0* 9.0* 8.6* 9.1* 10.4*   HEMATOCRIT % 27.7* 27.3* 25.5* 27.4* 32.4*   PLATELETS 10*3/mm3 189 210 198 260 274     Results from last 7 days   Lab Units 01/18/24  2332 01/18/24  0352 01/17/24  0847 01/16/24  2022 01/16/24  0646 01/15/24  2304 01/15/24  1719 01/15/24  1223   SODIUM mmol/L 139 142 139  --  140  --  140 140   POTASSIUM mmol/L 4.2 4.9 4.4  --  4.7 4.1 6.7* 7.3*   CHLORIDE mmol/L 101 102 99  --  101  --  102 100   CO2 mmol/L 25.0 24.0 21.0*  --  17.0*  --  8.0* 9.0*   BUN mg/dL 47* 100* 89* 73* 150*  --  250* 256*   CREATININE mg/dL 4.23* 7.68* 6.73*  --  10.52*  --  15.98* 16.11*   GLUCOSE mg/dL 70 78 140*  --  39*  --  87 111*   ALBUMIN g/dL 2.8* 2.8* 2.9*  --  2.9*  --   --  3.2*   BILIRUBIN mg/dL  --  0.2 0.3  --  0.2  --   --  0.2   ALK PHOS U/L  --  93 122*  --  94  --   --  102   AST (SGOT) U/L  --  21 24  --  28  --   --  18   ALT (SGPT) U/L  --  20 24  --  20  --   --  17   LIPASE U/L  --   --   --   --   --   --   --  >3,000*   CALCIUM mg/dL 8.4* 8.3* 7.9*  --  8.3*  --  9.1 9.4     Results from last 7 days   Lab Units 01/15/24  1223   CK TOTAL U/L 44             Microbiology   Microbiology Results (last 10 days)       Procedure Component Value - Date/Time    Urine Culture - Urine, Indwelling Urethral Catheter [[id number]]  (Normal)  Collected: 01/17/24 1501    Lab Status: Final result Specimen: Urine from Indwelling Urethral Catheter Updated: 01/18/24 1939     Urine Culture No growth    Blood Culture - Blood, Arm, Left [[id number]]  (Normal) Collected: 01/17/24 1205    Lab Status: Preliminary result Specimen: Blood from Arm, Left Updated: 01/19/24 1230     Blood Culture No growth at 2 days    Blood Culture - Blood, Arm, Left [[id number]]  (Normal) Collected: 01/17/24 1203    Lab Status: Preliminary result Specimen: Blood from Arm, Left Updated: 01/19/24 1230     Blood Culture No growth at 2 days    COVID-19 and FLU A/B PCR, 1 HR TAT - Swab, Nasopharynx [[id number]]  (Normal) Collected: 01/15/24 1602    Lab Status: Final result Specimen: Swab from Nasopharynx Updated: 01/15/24 1624     COVID19 Not Detected     Influenza A PCR Not Detected     Influenza B PCR Not Detected    Narrative:      Fact sheet for providers: https://www.fda.gov/media/629465/download    Fact sheet for patients: https://www.fda.gov/media/676534/download    Test performed by PCR.    Urine Culture - Urine, Urine, Clean Catch [[id number]]  (Abnormal)  (Susceptibility) Collected: 01/15/24 1145    Lab Status: Final result Specimen: Urine, Clean Catch Updated: 01/18/24 0837     Urine Culture 25,000 CFU/mL Serratia marcescens    Narrative:      Colonization of the urinary tract without infection is common. Treatment is discouraged unless the patient is symptomatic, pregnant, or undergoing an invasive urologic procedure.    Susceptibility        Serratia marcescens      ZOILA      Amoxicillin + Clavulanate Resistant      Cefazolin Resistant      Cefepime Susceptible      Ceftazidime Susceptible      Ceftriaxone Susceptible      Gentamicin Susceptible      Levofloxacin Susceptible      Nitrofurantoin Resistant      Piperacillin + Tazobactam Susceptible  [1]       Trimethoprim + Sulfamethoxazole Susceptible                   [1]  Appended report. These results have been appended to a  previously preliminary verified report.                   Blood Culture - Blood, Arm, Right [[id number]]  (Normal) Collected: 01/15/24 1126    Lab Status: Preliminary result Specimen: Blood from Arm, Right Updated: 01/19/24 1130     Blood Culture No growth at 4 days    Narrative:      Less than seven (7) mL's of blood was collected.  Insufficient quantity may yield false negative results.    Blood Culture - Blood, Arm, Left [[id number]]  (Normal) Collected: 01/15/24 1125    Lab Status: Preliminary result Specimen: Blood from Arm, Left Updated: 01/19/24 1130     Blood Culture No growth at 4 days    Narrative:      Less than seven (7) mL's of blood was collected.  Insufficient quantity may yield false negative results.            Medication Review:       Schedule Meds  apixaban, 2.5 mg, Oral, Q12H  atorvastatin, 10 mg, Oral, Daily  calcitriol, 0.5 mcg, Oral, Daily  ertapenem, 500 mg, Intravenous, Q24H  insulin lispro, 2-7 Units, Subcutaneous, 4x Daily AC & at Bedtime  iopamidol, 100 mL, Intravenous, Once in imaging  senna-docusate sodium, 2 tablet, Oral, BID  sevelamer, 2,400 mg, Oral, TID With Meals  sodium chloride, 10 mL, Intravenous, Q12H        Infusion Meds       PRN Meds    acetaminophen    senna-docusate sodium **AND** polyethylene glycol **AND** bisacodyl **AND** bisacodyl    Calcium Replacement - Follow Nurse / BPA Driven Protocol    dextrose    dextrose    glucagon (human recombinant)    Magnesium Standard Dose Replacement - Follow Nurse / BPA Driven Protocol    nitroglycerin    ondansetron    ondansetron ODT    Phosphorus Replacement - Follow Nurse / BPA Driven Protocol    Potassium Replacement - Follow Nurse / BPA Driven Protocol    [COMPLETED] Insert Peripheral IV **AND** sodium chloride    sodium chloride    sodium chloride        Assessment & Plan       Antimicrobial Therapy   1.  IV ertapenem        2.        3.        4.        5.          Assessment     Persistent leukocytosis.  Concerning for  persistent infection or renal abscess.  Worsening.  Patient denies diarrhea     Probable complicated UTI.  Urine culture grew Serratia marcescens from January 15 2024.  Patient had prior urine culture before admission on January 8, 2024 and grew the same organism.  CT scan of abdomen pelvis done without contrast and showed bilateral hydronephrosis with possible complex cyst on the right kidney but cannot rule out renal abscess     End-stage renal disease-patient was started on dialysis this admission.  Patient followed by nephrology     History of left AV fistula placement on 9/22/2022 which required a fistulogram and angioplasty on 1/18/2024.  Patient followed by vascular surgery     Acute left leg DVTs.  Hematology following.  Patient has IVC filter placement on 1/19/2024     Type 2 diabetes     History of prostate cancer     Plan     Continue IV ertapenem 500 mg every 24 hours for at least 7 days  Case discussed with nephrology to approve IV contrast-will plan for CT-renal protocol with IV contrast later today to rule out a renal abscess  Continue supportive care  A.m. labs  Case discussed with patient and family members at bedside  Case discussed with RN and hospitalist      Ann Beavers, APRN  01/19/24  16:51 EST    Note is dictated utilizing voice recognition software/Dragon

## 2024-01-19 NOTE — SIGNIFICANT NOTE
01/19/24 1058   OTHER   Discipline occupational therapist   Rehab Time/Intention   Session Not Performed patient unavailable for treatment  (SHAI for IVC filter)   Recommendation   OT - Next Appointment 01/22/24

## 2024-01-19 NOTE — PROGRESS NOTES
"      Hematology/Oncology Inpatient Progress Note    PATIENT NAME: Gabriel De Souza  : 1945  MRN: [id number]    CHIEF COMPLAINT: Hypotension, bradycardia    HISTORY OF PRESENT ILLNESS:    Gabriel De Souza is a 78 y.o. male who presented to [facility] on 1/15/2024 with complaints of hypotension and low blood sugar. Patient reports he was sent to ED from nephrology office due bradycardia. Patient reports he has been \"feeling bad\" since Chari. He was prescribed antibiotics for UTI by PCP 1 week ago but reported no improvement. Patient reports nausea, vomiting, and abdominal discomfort.      ED course: Patient was found to have potassium of 7.3, systolic blood pressure of 80, heart rate around 44.  EKG showed sinus bradycardia.  BUN up to 56, creatinine of 16.11. WBC 23.10, Hgb 10.4, Plt 274.      01/15/2024 CXR showed low lung volumes without definite acute cardiopulmonary abnormality.      01/15/2024: CT ab/pelvis wo contrast:  Impression:  Moderate bilateral hydronephrosis secondary to obstructing right mid ureteral calculus measuring 9 mm and left mid ureteral calculus measuring 8 mm.   3.1 cm complex cyst in the lower pole of the right kidney containing either mural nodularity or small volume hemorrhage. If possible, correlation with contrast-enhanced renal protocol CT or MRI is suggested. If there is not possible due to renal   function, close follow-up findings of noncontrast CT or renal ultrasound is suggested. Consider urologic evaluation.   Cholelithiasis.     Patient underwent urgent bilateral stent placement per urology and was started on hemodialysis per nephrology.      2024: Doppler LLE    Chronic left lower extremity deep vein thrombosis noted in the common femoral, proximal femoral, popliteal and gastrocnemius.    Sub-acute left lower extremity deep vein thrombosis noted in the external iliac and deep femoral.    Chronic left lower extremity superficial thrombophlebitis noted in " the small saphenous.    All other left sided veins appeared normal.     01/17/24  Hematology/Oncology was consulted for further recommendations. Patient has had persistent left leg swelling for several years and he was told previously that he has a blood clot in his left leg.  He does not remember being on anticoagulation. There is no prior ultrasound to compare.    01/18/2024 -ID consulted for persistent leukocytosis.  Repeat blood and urine cultures with no growth to date.    01/19/2024: s/p IVC filter    Subjective     ROS:  Review of Systems   Constitutional:  Negative for fatigue and fever.   HENT:  Negative for congestion and nosebleeds.    Eyes:  Negative for pain.   Respiratory:  Negative for cough and shortness of breath.    Cardiovascular:  Positive for leg swelling. Negative for chest pain.   Gastrointestinal:  Negative for abdominal pain, blood in stool, diarrhea, nausea and vomiting.   Endocrine: Negative for heat intolerance.   Genitourinary:  Negative for difficulty urinating.   Musculoskeletal:  Negative for arthralgias.   Skin:  Negative for rash.   Neurological:  Negative for dizziness and headaches.   Hematological:  Does not bruise/bleed easily.   Psychiatric/Behavioral:  Negative for behavioral problems.         MEDICATIONS:    Scheduled Meds:  apixaban, 2.5 mg, Oral, Q12H  atorvastatin, 10 mg, Oral, Daily  calcitriol, 0.5 mcg, Oral, Daily  ertapenem, 500 mg, Intravenous, Q24H  insulin lispro, 2-7 Units, Subcutaneous, 4x Daily AC & at Bedtime  iopamidol, 100 mL, Intravenous, Once in imaging  senna-docusate sodium, 2 tablet, Oral, BID  sevelamer, 2,400 mg, Oral, TID With Meals  sodium chloride, 10 mL, Intravenous, Q12H       Continuous Infusions:      PRN Meds:    acetaminophen    senna-docusate sodium **AND** polyethylene glycol **AND** bisacodyl **AND** bisacodyl    Calcium Replacement - Follow Nurse / BPA Driven Protocol    dextrose    dextrose    glucagon (human recombinant)    Magnesium  "Standard Dose Replacement - Follow Nurse / BPA Driven Protocol    nitroglycerin    ondansetron    ondansetron ODT    Phosphorus Replacement - Follow Nurse / BPA Driven Protocol    Potassium Replacement - Follow Nurse / BPA Driven Protocol    [COMPLETED] Insert Peripheral IV **AND** sodium chloride    sodium chloride    sodium chloride     ALLERGIES:    Allergies   Allergen Reactions    Tylenol With Codeine #3 [Acetaminophen-Codeine] Nausea Only and GI Intolerance       Objective    VITALS:   /71 (BP Location: Right arm, Patient Position: Lying)   Pulse 71   Temp 98.2 °F (36.8 °C) (Oral)   Resp 17   Ht 185.4 cm (73\")   Wt 90.2 kg (198 lb 13.7 oz)   SpO2 94%   BMI 26.24 kg/m²     PHYSICAL EXAM: (performed by MD)  Physical Exam      RECENT LABS:  Lab Results (last 24 hours)       Procedure Component Value Units Date/Time    POC Glucose Once [[id number]]  (Normal) Collected: 01/19/24 1403    Specimen: Blood Updated: 01/19/24 1404     Glucose 87 mg/dL      Comment: Serial Number: [id number]:  [id number]       Blood Culture - Blood, Arm, Left [[id number]]  (Normal) Collected: 01/17/24 1203    Specimen: Blood from Arm, Left Updated: 01/19/24 1230     Blood Culture No growth at 2 days    Blood Culture - Blood, Arm, Left [[id number]]  (Normal) Collected: 01/17/24 1205    Specimen: Blood from Arm, Left Updated: 01/19/24 1230     Blood Culture No growth at 2 days    Blood Culture - Blood, Arm, Left [[id number]]  (Normal) Collected: 01/15/24 1125    Specimen: Blood from Arm, Left Updated: 01/19/24 1130     Blood Culture No growth at 4 days    Narrative:      Less than seven (7) mL's of blood was collected.  Insufficient quantity may yield false negative results.    Blood Culture - Blood, Arm, Right [[id number]]  (Normal) Collected: 01/15/24 1126    Specimen: Blood from Arm, Right Updated: 01/19/24 1130     Blood Culture No growth at 4 days    Narrative:      Less than seven (7) mL's of blood was collected.  " Insufficient quantity may yield false negative results.    POC Glucose 4x Daily Before Meals & at Bedtime [[id number]]  (Normal) Collected: 01/19/24 0730    Specimen: Blood Updated: 01/19/24 0733     Glucose 80 mg/dL      Comment: Serial Number: [id number]:  [id number]       POC Glucose Once [[id number]]  (Normal) Collected: 01/19/24 0528    Specimen: Blood Updated: 01/19/24 0530     Glucose 82 mg/dL      Comment: Serial Number: [id number]:  [id number]       CBC & Differential [[id number]]  (Abnormal) Collected: 01/18/24 2332    Specimen: Blood Updated: 01/19/24 0107    Narrative:      The following orders were created for panel order CBC & Differential.  Procedure                               Abnormality         Status                     ---------                               -----------         ------                     CBC Auto Differential[[id number]]        Abnormal            Final result               Scan Slide[[id number]]                                       Final result                 Please view results for these tests on the individual orders.    CBC Auto Differential [[id number]]  (Abnormal) Collected: 01/18/24 2332    Specimen: Blood Updated: 01/19/24 0107     WBC 27.00 10*3/mm3      RBC 3.18 10*6/mm3      Hemoglobin 9.0 g/dL      Hematocrit 27.7 %      MCV 87.2 fL      MCH 28.4 pg      MCHC 32.6 g/dL      RDW 15.6 %      RDW-SD 48.1 fl      MPV 8.5 fL      Platelets 189 10*3/mm3     Narrative:      The previously reported component NRBC is no longer being reported. Previous result was 0.0 /100 WBC (Reference Range: 0.0-0.2 /100 WBC) on 1/19/2024 at 0015 EST.    Scan Slide [[id number]] Collected: 01/18/24 2332    Specimen: Blood Updated: 01/19/24 0107     Scan Slide --     Comment: See Manual Differential Results       Manual Differential [[id number]]  (Abnormal) Collected: 01/18/24 2332    Specimen: Blood Updated: 01/19/24 0107     Neutrophil % 90.0 %      Lymphocyte % 1.0 %      Monocyte %  6.0 %      Bands %  2.0 %      Atypical Lymphocyte % 1.0 %      Neutrophils Absolute 24.84 10*3/mm3      Lymphocytes Absolute 0.54 10*3/mm3      Monocytes Absolute 1.62 10*3/mm3      Anisocytosis Slight/1+     Dacrocytes Slight/1+     Microcytes Slight/1+     Poikilocytes Slight/1+     WBC Morphology Normal     Platelet Estimate Adequate    POC Glucose Finger Q6H [[id number]]  (Abnormal) Collected: 01/19/24 0100    Specimen: Blood from Finger Updated: 01/19/24 0102     Glucose 156 mg/dL      Comment: Serial Number: [id number]:  [id number]       Renal Function Panel [[id number]]  (Abnormal) Collected: 01/18/24 2332    Specimen: Blood Updated: 01/19/24 0032     Glucose 70 mg/dL      BUN 47 mg/dL      Comment: Result checked          Creatinine 4.23 mg/dL      Sodium 139 mmol/L      Potassium 4.2 mmol/L      Comment: Slight hemolysis detected by analyzer. Result may be falsely elevated.        Chloride 101 mmol/L      CO2 25.0 mmol/L      Calcium 8.4 mg/dL      Albumin 2.8 g/dL      Phosphorus 4.4 mg/dL      Comment: Result checked          Anion Gap 13.0 mmol/L      BUN/Creatinine Ratio 11.1     eGFR 13.7 mL/min/1.73      Comment: <15 Indicative of kidney failure       Narrative:      GFR Normal >60  Chronic Kidney Disease <60  Kidney Failure <15    The GFR formula is only valid for adults with stable renal function between ages 18 and 70.    Protime-INR [[id number]]  (Abnormal) Collected: 01/18/24 1907    Specimen: Blood Updated: 01/18/24 2308     Protime 16.1 Seconds      INR 1.27    POC Glucose Once [[id number]]  (Normal) Collected: 01/18/24 2040    Specimen: Blood Updated: 01/18/24 2042     Glucose 84 mg/dL      Comment: Serial Number: [id number]:  [id number]       aPTT [[id number]]  (Abnormal) Collected: 01/18/24 1907    Specimen: Blood Updated: 01/18/24 2021     PTT 41.3 seconds     Urine Culture - Urine, Indwelling Urethral Catheter [[id number]]  (Normal) Collected: 01/17/24 1501    Specimen: Urine from  Indwelling Urethral Catheter Updated: 01/18/24 1939     Urine Culture No growth    POC Glucose 4x Daily Before Meals & at Bedtime [[id number]]  (Normal) Collected: 01/18/24 1736    Specimen: Blood Updated: 01/18/24 1737     Glucose 73 mg/dL      Comment: Serial Number: [id number]:  [id number]       aPTT [[id number]] Collected: 01/18/24 1432    Specimen: Blood Updated: 01/18/24 1544     PTT --     Comment: Questionable results; no specimen requested  Corrected result. Previous result was 38.2 seconds on 1/18/2024 at 1508 EST.               PENDING RESULTS: CT ab/pelv w contrast,     IMAGING REVIEWED:  IR IVC Filter Placement    Result Date: 1/19/2024  Impression: Technically successful placement of a Option Elite inferior vena cava filter within the infrarenal IVC as discussed above. Electronically Signed: Miguelito Bruno MD  1/19/2024 12:38 PM EST  Workstation ID: [id number]     Assessment & Plan     Patient is a 78 y.o. male admitted with weakness, bradycardia secondary to hyperkalemia, acute on chronic kidney injury. He was found to have obstructive uropathy. S/p bilateral stent placement and initiation of dialysis. Now found to have left lower extremity VTE.      ASSESSMENT/PLAN     Chronic DVT noted in left common femoral, proximal femoral, popliteal and gastrocnemius  Chronic LLE superficial thrombophlebitis in  small saphenous  Sub-acute DVT noted in left external iliac, deep femoral  - Patient has several risk factors for clots including poor mobility, chronic kidney disease.  -no family history of DVT, no indication for hypercoagulable workup  - IVC filter placed, started eliquis 2.5 mg BID. Continue eliquis     Anemia  -Likely related to chronic disease, renal insufficiency.    - continue to monitor.    Leukocytosis  - ID consulted. Started IV ertapenem for probable complicated UTI.  - Initial blood cultures with no growth. Repeat blood culture no growth to date  - Complex renal cyst on CT imaging - CT w  contrast recommended to rule out renal abscess.     Acute on chronic renal failure with hyperkalemia  - Longstanding CKD Stage IV. Started on dialysis 1/15  - Hyperkalemia secondary to renal failure, improved   - Nephrology managing     Obstructive uropathy  -Secondary to ureteral stones. S/p urgent stent placement on 1/16.   - Likely contributing to renal dysfunction.  - Urology following        Hematology will sign off. Continue eliquis 2.5 mg BID will follow up in clinic

## 2024-01-19 NOTE — PLAN OF CARE
Problem: Adult Inpatient Plan of Care  Goal: Plan of Care Review  Outcome: Ongoing, Progressing  Flowsheets (Taken 1/19/2024 0550)  Progress: no change  Plan of Care Reviewed With: patient  Goal: Patient-Specific Goal (Individualized)  Outcome: Ongoing, Progressing  Goal: Absence of Hospital-Acquired Illness or Injury  Outcome: Ongoing, Progressing  Intervention: Identify and Manage Fall Risk  Recent Flowsheet Documentation  Taken 1/19/2024 0400 by Belinda Cedillo, RN  Safety Promotion/Fall Prevention:   activity supervised   assistive device/personal items within reach   clutter free environment maintained   fall prevention program maintained   nonskid shoes/slippers when out of bed   room organization consistent   safety round/check completed  Taken 1/19/2024 0200 by Belinda Cedillo, RN  Safety Promotion/Fall Prevention:   activity supervised   assistive device/personal items within reach   clutter free environment maintained   fall prevention program maintained   nonskid shoes/slippers when out of bed   room organization consistent   safety round/check completed  Taken 1/19/2024 0000 by Belinda Cedillo, RN  Safety Promotion/Fall Prevention:   activity supervised   assistive device/personal items within reach   clutter free environment maintained   fall prevention program maintained   nonskid shoes/slippers when out of bed   room organization consistent   safety round/check completed  Taken 1/18/2024 2200 by Belinda Cedillo, RN  Safety Promotion/Fall Prevention:   activity supervised   assistive device/personal items within reach   clutter free environment maintained   fall prevention program maintained   nonskid shoes/slippers when out of bed   room organization consistent   safety round/check completed  Taken 1/18/2024 1931 by Belinda Cedillo, RN  Safety Promotion/Fall Prevention:   activity supervised   assistive device/personal items within reach   clutter free environment maintained   fall prevention program maintained   nonskid  shoes/slippers when out of bed   room organization consistent   safety round/check completed  Intervention: Prevent Skin Injury  Recent Flowsheet Documentation  Taken 1/19/2024 0400 by Belinda Cedillo RN  Body Position: position changed independently  Taken 1/19/2024 0200 by Belinda Cedillo RN  Body Position: position changed independently  Taken 1/19/2024 0000 by Belinda Cedillo RN  Body Position: position changed independently  Taken 1/18/2024 2200 by Belinda Cedillo RN  Body Position: position changed independently  Taken 1/18/2024 1931 by Belinda Cedillo RN  Body Position: position changed independently  Intervention: Prevent and Manage VTE (Venous Thromboembolism) Risk  Recent Flowsheet Documentation  Taken 1/19/2024 0400 by Belinda Cedillo RN  Range of Motion: active ROM (range of motion) encouraged  Taken 1/19/2024 0000 by Belinda Cedillo RN  Range of Motion: active ROM (range of motion) encouraged  Taken 1/18/2024 1931 by Belinda Cedillo RN  VTE Prevention/Management:   bilateral   sequential compression devices off  Range of Motion: active ROM (range of motion) encouraged  Intervention: Prevent Infection  Recent Flowsheet Documentation  Taken 1/19/2024 0400 by Belinda Cedillo RN  Infection Prevention:   hand hygiene promoted   personal protective equipment utilized   rest/sleep promoted   single patient room provided  Taken 1/19/2024 0200 by Belinda Cedillo RN  Infection Prevention:   hand hygiene promoted   personal protective equipment utilized   rest/sleep promoted   single patient room provided  Taken 1/19/2024 0000 by Belinda Cedillo RN  Infection Prevention:   hand hygiene promoted   personal protective equipment utilized   rest/sleep promoted   single patient room provided  Taken 1/18/2024 2200 by Belinda Cedillo RN  Infection Prevention:   hand hygiene promoted   personal protective equipment utilized   rest/sleep promoted   single patient room provided  Taken 1/18/2024 1931 by Belinda Cedillo RN  Infection Prevention:   hand hygiene promoted    personal protective equipment utilized   rest/sleep promoted   single patient room provided  Goal: Optimal Comfort and Wellbeing  Outcome: Ongoing, Progressing  Intervention: Provide Person-Centered Care  Recent Flowsheet Documentation  Taken 1/19/2024 0400 by Belinda Cedillo RN  Trust Relationship/Rapport:   care explained   thoughts/feelings acknowledged  Taken 1/18/2024 1931 by Belinda Cedillo RN  Trust Relationship/Rapport:   care explained   thoughts/feelings acknowledged  Goal: Readiness for Transition of Care  Outcome: Ongoing, Progressing     Problem: Skin Injury Risk Increased  Goal: Skin Health and Integrity  Outcome: Ongoing, Progressing     Problem: Diabetes Comorbidity  Goal: Blood Glucose Level Within Targeted Range  Outcome: Ongoing, Progressing     Problem: Hypertension Comorbidity  Goal: Blood Pressure in Desired Range  Outcome: Ongoing, Progressing  Intervention: Maintain Blood Pressure Management  Recent Flowsheet Documentation  Taken 1/19/2024 0400 by Belinda Cedillo RN  Medication Review/Management: medications reviewed  Taken 1/19/2024 0200 by Belinda Cedillo RN  Medication Review/Management: medications reviewed  Taken 1/19/2024 0000 by Belinda Cedillo RN  Medication Review/Management: medications reviewed  Taken 1/18/2024 2200 by Belinda Cedillo RN  Medication Review/Management: medications reviewed  Taken 1/18/2024 1931 by Belinda Cedillo RN  Medication Review/Management: medications reviewed     Problem: Adjustment to Illness (Sepsis/Septic Shock)  Goal: Optimal Coping  Outcome: Ongoing, Progressing     Problem: Bleeding (Sepsis/Septic Shock)  Goal: Absence of Bleeding  Outcome: Ongoing, Progressing     Problem: Glycemic Control Impaired (Sepsis/Septic Shock)  Goal: Blood Glucose Level Within Desired Range  Outcome: Ongoing, Progressing     Problem: Infection Progression (Sepsis/Septic Shock)  Goal: Absence of Infection Signs and Symptoms  Outcome: Ongoing, Progressing  Intervention: Initiate Sepsis Management  Recent  Flowsheet Documentation  Taken 1/19/2024 0400 by Belinda Cedillo RN  Infection Prevention:   hand hygiene promoted   personal protective equipment utilized   rest/sleep promoted   single patient room provided  Taken 1/19/2024 0200 by Belinda Cedillo RN  Infection Prevention:   hand hygiene promoted   personal protective equipment utilized   rest/sleep promoted   single patient room provided  Taken 1/19/2024 0000 by Belinda Cedillo RN  Infection Prevention:   hand hygiene promoted   personal protective equipment utilized   rest/sleep promoted   single patient room provided  Taken 1/18/2024 2200 by Belinda Cedillo RN  Infection Prevention:   hand hygiene promoted   personal protective equipment utilized   rest/sleep promoted   single patient room provided  Taken 1/18/2024 1931 by Belinda Cedillo RN  Infection Prevention:   hand hygiene promoted   personal protective equipment utilized   rest/sleep promoted   single patient room provided     Problem: Nutrition Impaired (Sepsis/Septic Shock)  Goal: Optimal Nutrition Intake  Outcome: Ongoing, Progressing     Problem: Fall Injury Risk  Goal: Absence of Fall and Fall-Related Injury  Outcome: Ongoing, Progressing  Intervention: Identify and Manage Contributors  Recent Flowsheet Documentation  Taken 1/19/2024 0400 by Belinda Cedillo RN  Medication Review/Management: medications reviewed  Taken 1/19/2024 0200 by Belinda Cedillo RN  Medication Review/Management: medications reviewed  Taken 1/19/2024 0000 by Belinda Cedillo RN  Medication Review/Management: medications reviewed  Taken 1/18/2024 2200 by Belinda Cedillo RN  Medication Review/Management: medications reviewed  Taken 1/18/2024 1931 by Belinda Cedillo RN  Medication Review/Management: medications reviewed  Intervention: Promote Injury-Free Environment  Recent Flowsheet Documentation  Taken 1/19/2024 0400 by Belinda Cedillo RN  Safety Promotion/Fall Prevention:   activity supervised   assistive device/personal items within reach   clutter free environment  maintained   fall prevention program maintained   nonskid shoes/slippers when out of bed   room organization consistent   safety round/check completed  Taken 1/19/2024 0200 by Belinda Cedillo, SELMA  Safety Promotion/Fall Prevention:   activity supervised   assistive device/personal items within reach   clutter free environment maintained   fall prevention program maintained   nonskid shoes/slippers when out of bed   room organization consistent   safety round/check completed  Taken 1/19/2024 0000 by Belinda Cedillo, RN  Safety Promotion/Fall Prevention:   activity supervised   assistive device/personal items within reach   clutter free environment maintained   fall prevention program maintained   nonskid shoes/slippers when out of bed   room organization consistent   safety round/check completed  Taken 1/18/2024 2200 by Belinda Cedillo, SELMA  Safety Promotion/Fall Prevention:   activity supervised   assistive device/personal items within reach   clutter free environment maintained   fall prevention program maintained   nonskid shoes/slippers when out of bed   room organization consistent   safety round/check completed  Taken 1/18/2024 1931 by Belinda Cedillo, RN  Safety Promotion/Fall Prevention:   activity supervised   assistive device/personal items within reach   clutter free environment maintained   fall prevention program maintained   nonskid shoes/slippers when out of bed   room organization consistent   safety round/check completed   Goal Outcome Evaluation:  Plan of Care Reviewed With: patient        Progress: no change

## 2024-01-19 NOTE — PLAN OF CARE
Problem: Adult Inpatient Plan of Care  Goal: Plan of Care Review  Outcome: Ongoing, Progressing  Goal: Patient-Specific Goal (Individualized)  Outcome: Ongoing, Progressing  Goal: Absence of Hospital-Acquired Illness or Injury  Outcome: Ongoing, Progressing  Intervention: Identify and Manage Fall Risk  Recent Flowsheet Documentation  Taken 1/19/2024 1455 by Airam Campuzano LPN  Safety Promotion/Fall Prevention:   activity supervised   assistive device/personal items within reach   clutter free environment maintained   lighting adjusted   room organization consistent   safety round/check completed  Taken 1/19/2024 1230 by Airam Campuzano LPN  Safety Promotion/Fall Prevention:   activity supervised   assistive device/personal items within reach   clutter free environment maintained   lighting adjusted   room organization consistent   safety round/check completed  Taken 1/19/2024 1000 by Airam Campuzano LPN  Safety Promotion/Fall Prevention: patient off unit  Intervention: Prevent Infection  Recent Flowsheet Documentation  Taken 1/19/2024 1455 by Airam Campuzano LPN  Infection Prevention:   cohorting utilized   environmental surveillance performed   hand hygiene promoted   personal protective equipment utilized   single patient room provided   rest/sleep promoted   visitors restricted/screened  Taken 1/19/2024 1230 by Airam Campuzano LPN  Infection Prevention:   cohorting utilized   environmental surveillance performed   hand hygiene promoted   personal protective equipment utilized   rest/sleep promoted   single patient room provided   visitors restricted/screened  Goal: Optimal Comfort and Wellbeing  Outcome: Ongoing, Progressing  Goal: Readiness for Transition of Care  Outcome: Ongoing, Progressing     Problem: Skin Injury Risk Increased  Goal: Skin Health and Integrity  Outcome: Ongoing, Progressing     Problem: Diabetes Comorbidity  Goal: Blood Glucose Level Within Targeted Range  Outcome: Ongoing,  Progressing  Intervention: Monitor and Manage Glycemia  Recent Flowsheet Documentation  Taken 1/19/2024 1230 by Airma Campuzano LPN  Glycemic Management: blood glucose monitored     Problem: Hypertension Comorbidity  Goal: Blood Pressure in Desired Range  Outcome: Ongoing, Progressing  Intervention: Maintain Blood Pressure Management  Recent Flowsheet Documentation  Taken 1/19/2024 1455 by Airam Campuzano LPN  Medication Review/Management: medications reviewed  Taken 1/19/2024 1230 by Airam Campuzano LPN  Medication Review/Management: medications reviewed     Problem: Adjustment to Illness (Sepsis/Septic Shock)  Goal: Optimal Coping  Outcome: Ongoing, Progressing  Intervention: Optimize Psychosocial Adjustment to Illness  Recent Flowsheet Documentation  Taken 1/19/2024 1230 by Airam Campuzano LPN  Family/Support System Care: support provided     Problem: Bleeding (Sepsis/Septic Shock)  Goal: Absence of Bleeding  Outcome: Ongoing, Progressing     Problem: Glycemic Control Impaired (Sepsis/Septic Shock)  Goal: Blood Glucose Level Within Desired Range  Outcome: Ongoing, Progressing  Intervention: Optimize Glycemic Control  Recent Flowsheet Documentation  Taken 1/19/2024 1230 by Airam Campuzano LPN  Glycemic Management: blood glucose monitored     Problem: Infection Progression (Sepsis/Septic Shock)  Goal: Absence of Infection Signs and Symptoms  Outcome: Ongoing, Progressing  Intervention: Initiate Sepsis Management  Recent Flowsheet Documentation  Taken 1/19/2024 1455 by Airam Campuzano LPN  Infection Prevention:   cohorting utilized   environmental surveillance performed   hand hygiene promoted   personal protective equipment utilized   single patient room provided   rest/sleep promoted   visitors restricted/screened  Taken 1/19/2024 1230 by Airam Campuzano LPN  Infection Prevention:   cohorting utilized   environmental surveillance performed   hand hygiene promoted   personal protective equipment utilized    rest/sleep promoted   single patient room provided   visitors restricted/screened  Intervention: Promote Stabilization  Recent Flowsheet Documentation  Taken 1/19/2024 1230 by Airam Campuzano LPN  Fluid/Electrolyte Management: fluids provided     Problem: Nutrition Impaired (Sepsis/Septic Shock)  Goal: Optimal Nutrition Intake  Outcome: Ongoing, Progressing     Problem: Fall Injury Risk  Goal: Absence of Fall and Fall-Related Injury  Outcome: Ongoing, Progressing  Intervention: Identify and Manage Contributors  Recent Flowsheet Documentation  Taken 1/19/2024 1455 by Airam Campuzano LPN  Medication Review/Management: medications reviewed  Taken 1/19/2024 1230 by Airam Campuzano LPN  Medication Review/Management: medications reviewed  Intervention: Promote Injury-Free Environment  Recent Flowsheet Documentation  Taken 1/19/2024 1455 by Airam Campuzano LPN  Safety Promotion/Fall Prevention:   activity supervised   assistive device/personal items within reach   clutter free environment maintained   lighting adjusted   room organization consistent   safety round/check completed  Taken 1/19/2024 1230 by Airam Campuzano LPN  Safety Promotion/Fall Prevention:   activity supervised   assistive device/personal items within reach   clutter free environment maintained   lighting adjusted   room organization consistent   safety round/check completed  Taken 1/19/2024 1000 by Airam Campuzano LPN  Safety Promotion/Fall Prevention: patient off unit   Goal Outcome Evaluation:         Patient went down for ivc filter placement earlier this shift. Patient came back without complaints at this time. Patient noted to be sleepy upon return to the floor. Patient has dressing inplace to right IJ, dressing changed after vertified with IR that dressing could be changed due to bleeding. Patient tolerated well without any further bleeding. Patient will have CT scan of abdomen/pelvis tomorrow. Fistula with (+) bruitt/thrill. No c/o pain at  this time. Resting quietly. Call light within reach.

## 2024-01-19 NOTE — CONSULTS
Nutrition Services    Patient Name: Gabriel De Souza  YOB: 1945  MRN: [id number]  Admission date: 1/15/2024    PROGRESS NOTE      Encounter Information: Check on for PO intakes.  Patient continues with poor PO intakes.  Nephrology did not previously approve diet liberalization.  If it aligns with patient plan of care, patient would be a good candidate for nutrition support.         PO Diet: Diet: Renal Diets; Low Potassium, Low Phosphorus; Texture: Regular Texture (IDDSI 7); Fluid Consistency: Thin (IDDSI 0)   PO Supplements: Novasource Renal BID   PO Intake:  0-25%        Current nutrition support:    Nutrition support review:        Labs (reviewed below): Reviewed, management per attending         GI Function:  Last documented BM 1/17 (x 2 days ago)       Nutrition Intervention Updates: Continue current diet, supplement and encourage good PO intakes.       Results from last 7 days   Lab Units 01/18/24 2332 01/18/24 0352 01/17/24 0847 01/16/24 2022 01/16/24  0646   SODIUM mmol/L 139 142 139  --  140   POTASSIUM mmol/L 4.2 4.9 4.4  --  4.7   CHLORIDE mmol/L 101 102 99  --  101   CO2 mmol/L 25.0 24.0 21.0*  --  17.0*   BUN mg/dL 47* 100* 89*   < > 150*   CREATININE mg/dL 4.23* 7.68* 6.73*  --  10.52*   CALCIUM mg/dL 8.4* 8.3* 7.9*  --  8.3*   BILIRUBIN mg/dL  --  0.2 0.3  --  0.2   ALK PHOS U/L  --  93 122*  --  94   ALT (SGPT) U/L  --  20 24  --  20   AST (SGOT) U/L  --  21 24  --  28   GLUCOSE mg/dL 70 78 140*  --  39*    < > = values in this interval not displayed.     Results from last 7 days   Lab Units 01/18/24  2332 01/18/24 0352 01/17/24 0848 01/17/24 0847 01/16/24  0646   MAGNESIUM mg/dL  --  1.9  --  1.8 1.9   PHOSPHORUS mg/dL 4.4 9.3*  --  8.0* 11.0*   HEMOGLOBIN g/dL 9.0* 9.0*   < >  --  9.1*   HEMATOCRIT % 27.7* 27.3*   < >  --  27.4*    < > = values in this interval not displayed.     COVID19   Date Value Ref Range Status   01/15/2024 Not Detected Not Detected - Ref. Range Final      Lab Results   Component Value Date    HGBA1C 5.80 (H) 10/10/2023       RD to follow up per protocol.    Electronically signed by:  Corazon Fuentes RD  01/19/24 12:29 EST

## 2024-01-19 NOTE — H&P
.  [facility]   Interventional Radiology H&P    Patient Name: Gabriel De Souza  : 1945  MRN: [id number]  Primary Care Physician:  Waylon Johnson MD  Referring Physician: Waylon Johnson,*  Date of admission: 1/15/2024    Subjective   Subjective     HPI:  Gabriel De Souza is a 78 y.o. male with lower extremity DVT and here for IVC Filter Placement.    Review of Systems:   Constitutional no fever,  no weight loss       Otolaryngeal no difficulty swallowing   Cardiovascular no chest pain   Pulmonary no cough, no sputum production   Gastrointestinal no constipation, no diarrhea                         Personal History       Past Medical/Surgical History:   Past Medical History:   Diagnosis Date    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    Coronary artery disease     Deep vein thrombosis 1990    Diabetes mellitus     Dyslipidemia     Erectile dysfunction 51 years old    Heart murmur 10/4/2023    HL (hearing loss) 7 year old    right    Hyperlipidemia 1970    Hypertension     Neuropathy     Renal insufficiency     Stage 4 chronic kidney disease     3-4     Past Surgical History:   Procedure Laterality Date    ARTERIOVENOUS FISTULA/SHUNT SURGERY Left 2022    Procedure: BRACHIAL CEPHALIC FISTULA FORMATION;  Surgeon: Edy Vega MD;  Location: [facility];  Service: Vascular;  Laterality: Left;    BACK SURGERY      BASAL CELL CARCINOMA EXCISION  2019    CARDIAC CATHETERIZATION      CYSTOSCOPY W/ URETERAL STENT PLACEMENT Bilateral 2024    Procedure: CYSTOSCOPY, BILATERAL URETERAL STENT INSERTION;  Surgeon: Tyrel Avitia MD;  Location: [facility];  Service: Urology;  Laterality: Bilateral;    TURP / TRANSURETHRAL INCISION / DRAINAGE PROSTATE         Social History:  reports that he quit smoking about 57 years ago. His smoking use included cigarettes. He has been exposed to tobacco smoke. He has never used smokeless tobacco. He reports that he does not drink alcohol and does not  use drugs.    Medications:  Medications Prior to Admission   Medication Sig Dispense Refill Last Dose    amLODIPine (NORVASC) 5 MG tablet Take 1 tablet by mouth Daily.   2024    aspirin 81 MG tablet Take 1 tablet by mouth Daily.   2024    atenolol (TENORMIN) 50 MG tablet Take 1 tablet by mouth Daily.   2024    calcitriol (ROCALTROL) 0.25 MCG capsule Take 2 capsules by mouth Daily.   2024    [] ciprofloxacin (CIPRO) 250 MG tablet Take 1 tablet by mouth 2 (Two) Times a Day.   2024    Insulin Glargine-Lixisenatide (SOLIQUA) 100-33 UNT-MCG/ML solution pen-injector injection Inject 20 Units under the skin into the appropriate area as directed Daily.       sevelamer (RENVELA) 800 MG tablet Take 1 tablet by mouth 3 (Three) Times a Day As Needed.   2024    simvastatin (ZOCOR) 20 MG tablet Take 1 tablet by mouth Every Night.       torsemide (DEMADEX) 20 MG tablet Take 1 tablet by mouth Daily.        Current medications:  apixaban, 2.5 mg, Oral, Q12H  atorvastatin, 10 mg, Oral, Daily  calcitriol, 0.5 mcg, Oral, Daily  ertapenem, 500 mg, Intravenous, Q24H  insulin lispro, 2-7 Units, Subcutaneous, 4x Daily AC & at Bedtime  senna-docusate sodium, 2 tablet, Oral, BID  sevelamer, 2,400 mg, Oral, TID With Meals  sodium chloride, 10 mL, Intravenous, Q12H      Current IV drips:       Allergies:  Allergies   Allergen Reactions    Tylenol With Codeine #3 [Acetaminophen-Codeine] Nausea Only and GI Intolerance       Objective    Objective     Vitals:   Temp:  [97.2 °F (36.2 °C)-98.2 °F (36.8 °C)] 98.2 °F (36.8 °C)  Heart Rate:  [60-94] 89  Resp:  [15-23] 19  BP: ()/(30-74) 124/71  Flow (L/min):  [0] 0      Physical Exam:   Constitutional: Awake, alert, No acute distress    Respiratory: Clear to auscultation bilaterally, nonlabored respirations    Cardiovascular: RRR, no murmurs, rubs, or gallops, palpable pedal pulses bilaterally   Gastrointestinal: Positive bowel sounds, soft, nontender,  "nondistended        ASA SCALE ASSESSMENT:  2-Mild to moderate systemic disease, medically well controlled, with no functional limitation    MALLAMPATI CLASSIFICATION:  2-Able to visualize the soft palate, fauces, uvula. The anterior & posterior tonsilar pillars are hidden by the tongue.       Result Review        Result Review:     Sodium   Date Value Ref Range Status   01/18/2024 139 136 - 145 mmol/L Final   01/18/2024 142 136 - 145 mmol/L Final   01/17/2024 139 136 - 145 mmol/L Final       Potassium   Date Value Ref Range Status   01/18/2024 4.2 3.5 - 5.2 mmol/L Final     Comment:     Slight hemolysis detected by analyzer. Result may be falsely elevated.   01/18/2024 4.9 3.5 - 5.2 mmol/L Final     Comment:     Slight hemolysis detected by analyzer. Result may be falsely elevated.   01/17/2024 4.4 3.5 - 5.2 mmol/L Final       Chloride   Date Value Ref Range Status   01/18/2024 101 98 - 107 mmol/L Final   01/18/2024 102 98 - 107 mmol/L Final   01/17/2024 99 98 - 107 mmol/L Final       No results found for: \"PLASMABICARB\"    BUN   Date Value Ref Range Status   01/18/2024 47 (H) 8 - 23 mg/dL Final     Comment:     Result checked     01/18/2024 100 (H) 8 - 23 mg/dL Final   01/17/2024 89 (H) 8 - 23 mg/dL Final   01/16/2024 73 (H) 8 - 23 mg/dL Final       Creatinine   Date Value Ref Range Status   01/18/2024 4.23 (H) 0.76 - 1.27 mg/dL Final   01/18/2024 7.68 (H) 0.76 - 1.27 mg/dL Final   01/17/2024 6.73 (H) 0.76 - 1.27 mg/dL Final       Calcium   Date Value Ref Range Status   01/18/2024 8.4 (L) 8.6 - 10.5 mg/dL Final   01/18/2024 8.3 (L) 8.6 - 10.5 mg/dL Final   01/17/2024 7.9 (L) 8.6 - 10.5 mg/dL Final           No components found for: \"GLUCOSE.*\"  Results from last 7 days   Lab Units 01/18/24  2332   WBC 10*3/mm3 27.00*   HEMOGLOBIN g/dL 9.0*   HEMATOCRIT % 27.7*   PLATELETS 10*3/mm3 189      Results from last 7 days   Lab Units 01/18/24  1907   INR  1.27*           Assessment / Plan     Assesment:  Lower extremity " deep venous thrombosis.      Plan:   IVC filter placement.    The risks and benefits of the procedure were discussed with the patient.    Electronically signed by Miguelito Bruno MD, 01/19/24, 10:29 AM EST.

## 2024-01-19 NOTE — CASE MANAGEMENT/SOCIAL WORK
Continued Stay Note  [facility]     Patient Name: Gabriel De Souza  MRN: [id number]  Today's Date: 1/19/2024    Admit Date: 1/15/2024    Plan: [facility], accepted, bed available 1/21 or 1/22. No precert needed. PASRR approved.  OP HD [facility] (holding a MWF 1255 spot)   Discharge Plan       Row Name 01/19/24 1651       Plan    Plan [facility], accepted, bed available 1/21 or 1/22. No precert needed. PASRR approved.  OP HD [facility] (holding a MWF 1255 spot)    Patient/Family in Agreement with Plan yes    Plan Comments [facility] accepted.   Will have a bed Sunday or Monday.  Per MD, plan DC Monday or Tuesday.   Barriers to discharge: WBC elevated.  CT scan today.  IVC filter 1/19               Expected Discharge Date and Time       Expected Discharge Date Expected Discharge Time    Jan 22, 2024           Niurka Copeland RN     Office Phone [phone]  Office Cell [phone]

## 2024-01-19 NOTE — PROGRESS NOTES
[facility] MEDICINE SERVICE  DAILY PROGRESS NOTE    NAME: Gabriel De Souza  : 1945  MRN: [id number]      LOS: 4 days     PROVIDER OF SERVICE: Licha Clinton MD    Chief Complaint: Hyperkalemia    Subjective:     Interval History:  History taken from: patient  Patient seen and examined, he is doing much better.  Bradycardia and hypotension have resolved.  He did have low blood sugars in the morning with hypoglycemia protocol was activated.  He is currently resting comfortably.  Has  sandy griffihter on     patient seen and examined, he is lying in bed comfortably.  Eating drinking okay.  Having bowel movements as well.  Repeat blood cultures were sent by nephrology today     patient seen and examined,resting comfortably , denies any leg pain     patient seen and examined, just came back from IVC filter placement, resting comfortably    Review of Systems:   Review of Systems  10 point ROS is negative other than what is stated positive above  Objective:     Vital Signs  Temp:  [97.2 °F (36.2 °C)-98.2 °F (36.8 °C)] 98.2 °F (36.8 °C)  Heart Rate:  [60-94] 89  Resp:  [15-23] 19  BP: ()/(30-74) 124/71  Flow (L/min):  [0] 0   Body mass index is 26.24 kg/m².    Physical Exam  Physical Exam  Exam, awake and alert, hard of hearing  HEENT normocephalic atraumatic  Chest clear to auscultation bilaterally  CVs S1-S2 normal, regular rhythm, bradycardia  Abdomen soft nontender nondistended bowel sounds positive  Extremities warm to touch 2+ pulses no edema  Neuro AOx3, grossly normal     Scheduled Meds   apixaban, 2.5 mg, Oral, Q12H  atorvastatin, 10 mg, Oral, Daily  calcitriol, 0.5 mcg, Oral, Daily  ertapenem, 500 mg, Intravenous, Q24H  insulin lispro, 2-7 Units, Subcutaneous, 4x Daily AC & at Bedtime  senna-docusate sodium, 2 tablet, Oral, BID  sevelamer, 2,400 mg, Oral, TID With Meals  sodium chloride, 10 mL, Intravenous, Q12H       PRN Meds     acetaminophen    senna-docusate sodium **AND** polyethylene  glycol **AND** bisacodyl **AND** bisacodyl    Calcium Replacement - Follow Nurse / BPA Driven Protocol    dextrose    dextrose    glucagon (human recombinant)    Magnesium Standard Dose Replacement - Follow Nurse / BPA Driven Protocol    nitroglycerin    ondansetron    ondansetron ODT    Phosphorus Replacement - Follow Nurse / BPA Driven Protocol    Potassium Replacement - Follow Nurse / BPA Driven Protocol    [COMPLETED] Insert Peripheral IV **AND** sodium chloride    sodium chloride    sodium chloride   Infusions           Diagnostic Data    Results from last 7 days   Lab Units 01/18/24  2332 01/18/24  0352   WBC 10*3/mm3 27.00* 21.20*   HEMOGLOBIN g/dL 9.0* 9.0*   HEMATOCRIT % 27.7* 27.3*   PLATELETS 10*3/mm3 189 210   GLUCOSE mg/dL 70 78   CREATININE mg/dL 4.23* 7.68*   BUN mg/dL 47* 100*   SODIUM mmol/L 139 142   POTASSIUM mmol/L 4.2 4.9   AST (SGOT) U/L  --  21   ALT (SGPT) U/L  --  20   ALK PHOS U/L  --  93   BILIRUBIN mg/dL  --  0.2   ANION GAP mmol/L 13.0 16.0*       No radiology results for the last day      I reviewed the patient's new clinical results.    Assessment/Plan:     Active and Resolved Problems  Active Hospital Problems    Diagnosis  POA    **Hyperkalemia [E87.5]  Yes    Mechanical complication of arteriovenous fistula surgically created [T82.590A]  Yes    End stage renal disease on dialysis [N18.6, Z99.2]  Not Applicable    Sinus bradycardia [R00.1]  Yes      Resolved Hospital Problems   No resolved problems to display.     CKD stage IV, now end-stage renal disease, hemodialysis has been started.  Patient has a fistula already however it was bleeding status post fistulogram and angioplasty on 1/18/2024.  S/p dialysis third time on 1/19.  Further dialysis per nephrology, appreciate recommendations.  Renvela dose increased      Hyperkalemia  Resolved        Bilateral renal stones with hydronephrosis likely contributing to deterioration of the CKD  Urology was consulted, patient s/p placement of  bilateral ureteral stents.  Continue Rocephin 2 g IV daily for now.  Monitor blood and urine cultures.--Blood cultures negative for 24 hours, urine cultures growing Serratia, sensitive to Rocephin.  Continue  Patient currently has a Peterson in.  Urology following, plan to remove Peterson once creatinine improves and confusion improves, likely today.    Leukocytosis  continues to worsen.  Pro-Suraj was negative.  Leukocytosis continues to worsen today.  ID was consulted.  They have discontinued Rocephin and put the patient on Invanz, plan to do a CT repeated today   Repeat UA and blood cultures were done which were so far negative.      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  Started Eliquis 2.5 mg p.o. twice daily and now status postplacement of IVC filter, bleeding noted in the IJ site.  Will discuss with IR if Eliquis needs to be held for tonight.  He did not in the morning.           Bradycardia and hypotension  Due to hyperkalemia  Resolved  Vitals now stable    Hypoglycemia  Blood glucose better now, encourage p.o. intake    DVT prophylaxis:  Medical and mechanical DVT prophylaxis orders are present.     Code status is   Code Status and Medical Interventions:   Ordered at: 01/15/24 1093     Level Of Support Discussed With:    Patient    Health Care Surrogate    Next of Kin (If No Surrogate)     Code Status (Patient has no pulse and is not breathing):    CPR (Attempt to Resuscitate)     Medical Interventions (Patient has pulse or is breathing):    Full Support       Plan for disposition: 1-2 days     Time: 30 minutes    Signature: Electronically signed by Licha Clinton MD, 01/19/24, 10:29 EST.  [facility] Hospitalist Team

## 2024-01-19 NOTE — PROGRESS NOTES
[facility] Progress Note      Patient Name: Gabriel De Souza  : 1945  MRN: [id number]  Primary Care Physician:  Waylon Johnson MD  Date of admission: 1/15/2024    Subjective     Interval History:     Patient resting comfortably.  S/p hemodialysis yesterday.  As per family, patient was confused for some time after dialysis but improved overnight.  Denies any chest pain, nausea or vomiting    Review of Systems:   As noted above    Objective     Vitals:   Temp:  [97.2 °F (36.2 °C)-98.2 °F (36.8 °C)] 98.2 °F (36.8 °C)  Heart Rate:  [60-94] 89  Resp:  [15-23] 19  BP: ()/(30-74) 124/71  Flow (L/min):  [0] 0    Intake/Output Summary (Last 24 hours) at 2024 0959  Last data filed at 2024 0700  Gross per 24 hour   Intake 0 ml   Output 1200 ml   Net -1200 ml       Physical Exam:    General Appearance: Ill-appearing, NAD  HEENT: oral mucosa normal, nonicteric sclera  Neck: supple, no JVD  Lungs: CTA  Heart: RRR, normal S1 and S2  Abdomen: soft, nondistended  Extremities: no edema  Neuro: Awake alert and moving all extremities    Scheduled Meds:     apixaban, 2.5 mg, Oral, Q12H  atorvastatin, 10 mg, Oral, Daily  calcitriol, 0.5 mcg, Oral, Daily  ertapenem, 500 mg, Intravenous, Q24H  insulin lispro, 2-7 Units, Subcutaneous, 4x Daily AC & at Bedtime  senna-docusate sodium, 2 tablet, Oral, BID  sevelamer, 2,400 mg, Oral, TID With Meals  sodium chloride, 10 mL, Intravenous, Q12H      IV Meds:          Results Reviewed:   I have personally reviewed the results from the time of this admission to 2024 09:59 EST     Results from last 7 days   Lab Units 24  2332 24  0352 24  0847 24  2022 24  0646   SODIUM mmol/L 139 142 139  --  140   POTASSIUM mmol/L 4.2 4.9 4.4  --  4.7   CHLORIDE mmol/L 101 102 99  --  101   CO2 mmol/L 25.0 24.0 21.0*  --  17.0*   BUN mg/dL 47* 100* 89*   < > 150*   CREATININE mg/dL 4.23* 7.68* 6.73*  --  10.52*   CALCIUM  mg/dL 8.4* 8.3* 7.9*  --  8.3*   BILIRUBIN mg/dL  --  0.2 0.3  --  0.2   ALK PHOS U/L  --  93 122*  --  94   ALT (SGPT) U/L  --  20 24  --  20   AST (SGOT) U/L  --  21 24  --  28   GLUCOSE mg/dL 70 78 140*  --  39*    < > = values in this interval not displayed.     Estimated Creatinine Clearance: 18.7 mL/min (A) (by C-G formula based on SCr of 4.23 mg/dL (H)).  Results from last 7 days   Lab Units 01/18/24  2332 01/18/24  0352 01/17/24  0847 01/16/24  0646   MAGNESIUM mg/dL  --  1.9 1.8 1.9   PHOSPHORUS mg/dL 4.4 9.3* 8.0* 11.0*         Results from last 7 days   Lab Units 01/18/24  2332 01/18/24  0352 01/17/24  0848 01/16/24  0646 01/15/24  1125   WBC 10*3/mm3 27.00* 21.20* 17.20* 15.60* 23.10*   HEMOGLOBIN g/dL 9.0* 9.0* 8.6* 9.1* 10.4*   PLATELETS 10*3/mm3 189 210 198 260 274     Results from last 7 days   Lab Units 01/18/24  1907   INR  1.27*       Assessment / Plan     ASSESSMENT:  End-stage renal disease.  Patient has advanced chronic kidney disease and presented today with generalized weakness, uremic symptoms, worsened renal function along with hyperkalemia and metabolic acidosis.  S/p hemodialysis yesterday.  Electrolytes, volume status okay  Obstructive uropathy.  Status post bilateral ureteral stents placement.  Patient also has complex left renal cyst.  Patient will go for CT abdominal for further evaluation  Hyperkalemia.  Secondary to renal failure.  Improved  Metabolic acidosis.  Increased anion gap, secondary to renal failure.  Improving with dialysis  Anemia in chronic kidney disease.   Bradycardia.  Most likely due to hyperkalemia.  Improved  hyperphosphatemia.   DVT.  Hematology, vascular following following.  Patient will go for IVC filter placement.  Will need anticoagulation.  Urinary tract infection.  On IV [employer] per infectious disease following    PLAN:  Hemodialysis tomorrow  Patient started on low-dose Eliquis  Patient going for IVC filter placement.    Atul Fowler MD  01/19/24  09:59  EST    [facility]  [phone]

## 2024-01-20 ENCOUNTER — APPOINTMENT (OUTPATIENT)
Dept: CT IMAGING | Facility: HOSPITAL | Age: 79
End: 2024-01-20
Payer: MEDICARE

## 2024-01-20 LAB
ALBUMIN SERPL-MCNC: 2.7 G/DL (ref 3.5–5.2)
ALBUMIN/GLOB SERPL: 0.9 G/DL
ALP SERPL-CCNC: 92 U/L (ref 39–117)
ALT SERPL W P-5'-P-CCNC: 17 U/L (ref 1–41)
AMYLASE SERPL-CCNC: 122 U/L (ref 28–100)
ANION GAP SERPL CALCULATED.3IONS-SCNC: 15 MMOL/L (ref 5–15)
ANISOCYTOSIS BLD QL: ABNORMAL
AST SERPL-CCNC: 10 U/L (ref 1–40)
BACTERIA SPEC AEROBE CULT: NORMAL
BACTERIA SPEC AEROBE CULT: NORMAL
BILIRUB SERPL-MCNC: 0.4 MG/DL (ref 0–1.2)
BUN SERPL-MCNC: 69 MG/DL (ref 8–23)
BUN/CREAT SERPL: 12.5 (ref 7–25)
CALCIUM SPEC-SCNC: 8.6 MG/DL (ref 8.6–10.5)
CHLORIDE SERPL-SCNC: 101 MMOL/L (ref 98–107)
CO2 SERPL-SCNC: 25 MMOL/L (ref 22–29)
CREAT SERPL-MCNC: 5.5 MG/DL (ref 0.76–1.27)
DEPRECATED RDW RBC AUTO: 50.3 FL (ref 37–54)
EGFRCR SERPLBLD CKD-EPI 2021: 10 ML/MIN/1.73
EOSINOPHIL # BLD MANUAL: 0.56 10*3/MM3 (ref 0–0.4)
EOSINOPHIL NFR BLD MANUAL: 3 % (ref 0.3–6.2)
ERYTHROCYTE [DISTWIDTH] IN BLOOD BY AUTOMATED COUNT: 15.5 % (ref 12.3–15.4)
GLOBULIN UR ELPH-MCNC: 3.1 GM/DL
GLUCOSE BLDC GLUCOMTR-MCNC: 113 MG/DL (ref 70–105)
GLUCOSE BLDC GLUCOMTR-MCNC: 113 MG/DL (ref 70–105)
GLUCOSE BLDC GLUCOMTR-MCNC: 96 MG/DL (ref 70–105)
GLUCOSE SERPL-MCNC: 119 MG/DL (ref 65–99)
HCT VFR BLD AUTO: 27.6 % (ref 37.5–51)
HGB BLD-MCNC: 8.8 G/DL (ref 13–17.7)
LARGE PLATELETS: ABNORMAL
LIPASE SERPL-CCNC: 248 U/L (ref 13–60)
LYMPHOCYTES # BLD MANUAL: 0.74 10*3/MM3 (ref 0.7–3.1)
LYMPHOCYTES NFR BLD MANUAL: 7 % (ref 5–12)
MAGNESIUM SERPL-MCNC: 1.9 MG/DL (ref 1.6–2.4)
MCH RBC QN AUTO: 27.7 PG (ref 26.6–33)
MCHC RBC AUTO-ENTMCNC: 31.8 G/DL (ref 31.5–35.7)
MCV RBC AUTO: 87.3 FL (ref 79–97)
MONOCYTES # BLD: 1.3 10*3/MM3 (ref 0.1–0.9)
NEUTROPHILS # BLD AUTO: 16 10*3/MM3 (ref 1.7–7)
NEUTROPHILS NFR BLD MANUAL: 85 % (ref 42.7–76)
NEUTS BAND NFR BLD MANUAL: 1 % (ref 0–5)
PHOSPHATE SERPL-MCNC: 6 MG/DL (ref 2.5–4.5)
PLATELET # BLD AUTO: 158 10*3/MM3 (ref 140–450)
PMV BLD AUTO: 9 FL (ref 6–12)
POTASSIUM SERPL-SCNC: 3.9 MMOL/L (ref 3.5–5.2)
PROT SERPL-MCNC: 5.8 G/DL (ref 6–8.5)
RBC # BLD AUTO: 3.16 10*6/MM3 (ref 4.14–5.8)
SCAN SLIDE: NORMAL
SODIUM SERPL-SCNC: 141 MMOL/L (ref 136–145)
VARIANT LYMPHS NFR BLD MANUAL: 4 % (ref 19.6–45.3)
WBC MORPH BLD: NORMAL
WBC NRBC COR # BLD AUTO: 18.6 10*3/MM3 (ref 3.4–10.8)

## 2024-01-20 PROCEDURE — 84100 ASSAY OF PHOSPHORUS: CPT | Performed by: HOSPITALIST

## 2024-01-20 PROCEDURE — 82150 ASSAY OF AMYLASE: CPT | Performed by: INTERNAL MEDICINE

## 2024-01-20 PROCEDURE — 74177 CT ABD & PELVIS W/CONTRAST: CPT

## 2024-01-20 PROCEDURE — 85025 COMPLETE CBC W/AUTO DIFF WBC: CPT | Performed by: HOSPITALIST

## 2024-01-20 PROCEDURE — 25510000001 IOPAMIDOL PER 1 ML: Performed by: HOSPITALIST

## 2024-01-20 PROCEDURE — 82948 REAGENT STRIP/BLOOD GLUCOSE: CPT

## 2024-01-20 PROCEDURE — 83735 ASSAY OF MAGNESIUM: CPT | Performed by: HOSPITALIST

## 2024-01-20 PROCEDURE — 82948 REAGENT STRIP/BLOOD GLUCOSE: CPT | Performed by: SURGERY

## 2024-01-20 PROCEDURE — 25010000002 ERTAPENEM PER 500 MG: Performed by: NURSE PRACTITIONER

## 2024-01-20 PROCEDURE — 80053 COMPREHEN METABOLIC PANEL: CPT | Performed by: HOSPITALIST

## 2024-01-20 PROCEDURE — 83690 ASSAY OF LIPASE: CPT | Performed by: INTERNAL MEDICINE

## 2024-01-20 PROCEDURE — 99232 SBSQ HOSP IP/OBS MODERATE 35: CPT | Performed by: NURSE PRACTITIONER

## 2024-01-20 PROCEDURE — 85007 BL SMEAR W/DIFF WBC COUNT: CPT | Performed by: HOSPITALIST

## 2024-01-20 RX ADMIN — CALCITRIOL CAPSULES 0.25 MCG 0.5 MCG: 0.25 CAPSULE ORAL at 09:50

## 2024-01-20 RX ADMIN — ATORVASTATIN CALCIUM 10 MG: 10 TABLET, FILM COATED ORAL at 09:51

## 2024-01-20 RX ADMIN — SEVELAMER CARBONATE 2400 MG: 800 TABLET, FILM COATED ORAL at 09:51

## 2024-01-20 RX ADMIN — Medication 10 ML: at 21:12

## 2024-01-20 RX ADMIN — IOPAMIDOL 100 ML: 755 INJECTION, SOLUTION INTRAVENOUS at 12:58

## 2024-01-20 RX ADMIN — SEVELAMER CARBONATE 2400 MG: 800 TABLET, FILM COATED ORAL at 18:28

## 2024-01-20 RX ADMIN — APIXABAN 2.5 MG: 2.5 TABLET, FILM COATED ORAL at 21:12

## 2024-01-20 RX ADMIN — SENNOSIDES AND DOCUSATE SODIUM 2 TABLET: 50; 8.6 TABLET ORAL at 09:50

## 2024-01-20 RX ADMIN — APIXABAN 2.5 MG: 2.5 TABLET, FILM COATED ORAL at 09:51

## 2024-01-20 RX ADMIN — ERTAPENEM SODIUM 500 MG: 1 INJECTION, POWDER, LYOPHILIZED, FOR SOLUTION INTRAMUSCULAR; INTRAVENOUS at 18:28

## 2024-01-20 RX ADMIN — Medication 10 ML: at 09:52

## 2024-01-20 NOTE — NURSING NOTE
Dialysis note:  Dialysis machine alarming high venous pressure, infiltration noted above venous needle site. Blood returned via arteria needle and machine placed on standby. Venouse needle removed and ice applied to L upper arm. New site below former arterial needle accessed with 17G needle and blood flow rate reduced to 200.

## 2024-01-20 NOTE — PROGRESS NOTES
[facility] MEDICINE SERVICE  DAILY PROGRESS NOTE    NAME: Gabriel De Souza  : 1945  MRN: [id number]      LOS: 5 days     PROVIDER OF SERVICE: Licha Clinton MD    Chief Complaint: Hyperkalemia    Subjective:     Interval History:  History taken from: patient  Patient seen and examined, he is doing much better.  Bradycardia and hypotension have resolved.  He did have low blood sugars in the morning with hypoglycemia protocol was activated.  He is currently resting comfortably.  Has  sandy adamgger on     patient seen and examined, he is lying in bed comfortably.  Eating drinking okay.  Having bowel movements as well.  Repeat blood cultures were sent by nephrology today     patient seen and examined,resting comfortably , denies any leg pain     patient seen and examined, just came back from IVC filter placement, resting comfortably     patient seen and examined, denies any complaints.  Resting comfortably.    Review of Systems:   Review of Systems  10 point ROS is negative other than what is stated positive above  Objective:     Vital Signs  Temp:  [97.6 °F (36.4 °C)-98.3 °F (36.8 °C)] 98.3 °F (36.8 °C)  Heart Rate:  [69-82] 69  Resp:  [16-20] 17  BP: ()/(52-66) 128/63   Body mass index is 26.24 kg/m².    Physical Exam  Physical Exam  Exam, awake and alert, hard of hearing  HEENT normocephalic atraumatic  Chest clear to auscultation bilaterally  CVs S1-S2 normal, regular rhythm, bradycardia  Abdomen soft nontender nondistended bowel sounds positive  Extremities warm to touch 2+ pulses no edema  Neuro AOx3, grossly normal     Scheduled Meds   apixaban, 2.5 mg, Oral, Q12H  atorvastatin, 10 mg, Oral, Daily  calcitriol, 0.5 mcg, Oral, Daily  ertapenem, 500 mg, Intravenous, Q24H  insulin lispro, 2-7 Units, Subcutaneous, 4x Daily AC & at Bedtime  iopamidol, 100 mL, Intravenous, Once in imaging  senna-docusate sodium, 2 tablet, Oral, BID  sevelamer, 2,400 mg, Oral, TID With Meals  sodium  chloride, 10 mL, Intravenous, Q12H       PRN Meds     acetaminophen    senna-docusate sodium **AND** polyethylene glycol **AND** bisacodyl **AND** bisacodyl    Calcium Replacement - Follow Nurse / BPA Driven Protocol    dextrose    dextrose    glucagon (human recombinant)    Magnesium Standard Dose Replacement - Follow Nurse / BPA Driven Protocol    nitroglycerin    ondansetron    ondansetron ODT    Phosphorus Replacement - Follow Nurse / BPA Driven Protocol    Potassium Replacement - Follow Nurse / BPA Driven Protocol    [COMPLETED] Insert Peripheral IV **AND** sodium chloride    sodium chloride    sodium chloride   Infusions           Diagnostic Data    Results from last 7 days   Lab Units 01/20/24  0105   WBC 10*3/mm3 18.60*   HEMOGLOBIN g/dL 8.8*   HEMATOCRIT % 27.6*   PLATELETS 10*3/mm3 158   GLUCOSE mg/dL 119*   CREATININE mg/dL 5.50*   BUN mg/dL 69*   SODIUM mmol/L 141   POTASSIUM mmol/L 3.9   AST (SGOT) U/L 10   ALT (SGPT) U/L 17   ALK PHOS U/L 92   BILIRUBIN mg/dL 0.4   ANION GAP mmol/L 15.0       IR IVC Filter Placement    Result Date: 1/19/2024  Impression: Technically successful placement of a Option Elite inferior vena cava filter within the infrarenal IVC as discussed above. Electronically Signed: Miguelito Bruno MD  1/19/2024 12:38 PM EST  Workstation ID: [id number]       I reviewed the patient's new clinical results.    Assessment/Plan:     Active and Resolved Problems  Active Hospital Problems    Diagnosis  POA    **Hyperkalemia [E87.5]  Yes    Mechanical complication of arteriovenous fistula surgically created [T82.590A]  Yes    End stage renal disease on dialysis [N18.6, Z99.2]  Not Applicable    Sinus bradycardia [R00.1]  Yes      Resolved Hospital Problems   No resolved problems to display.     CKD stage IV, now end-stage renal disease, hemodialysis has been started.  Patient has a fistula already however it was bleeding status post fistulogram and angioplasty on 1/18/2024.  S/p dialysis third time on  1/19.  Further dialysis per nephrology, appreciate recommendations.  Renvela dose increased  Plan for dialysis today      Hyperkalemia  Resolved        Bilateral renal stones with hydronephrosis likely contributing to deterioration of the CKD  Urology was consulted, patient s/p placement of bilateral ureteral stents.  Now on ertapenem  Monitor blood and urine cultures.--Blood cultures negative for 24 hours, urine cultures growing Serratia, sensitive to Rocephin.  Continue  Patient currently has a Peterson in.  Urology following, plan to remove Peterson once creatinine improves and confusion improves, likely today.    Leukocytosis  Now improving after switching antibiotics to ertapenem from Rocephin.  ID was consulted.    Plan for repeat CT given concern for abscess   repeat UA and blood cultures were done which were so far negative.      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  Started Eliquis 2.5 mg p.o. twice daily and now status postplacement of IVC filter       Bradycardia and hypotension  Due to hyperkalemia  Resolved  Vitals now stable    Hypoglycemia  Blood glucose better now, encourage p.o. intake    DVT prophylaxis:  Medical and mechanical DVT prophylaxis orders are present.     Code status is   Code Status and Medical Interventions:   Ordered at: 01/15/24 0222     Level Of Support Discussed With:    Patient    Health Care Surrogate    Next of Kin (If No Surrogate)     Code Status (Patient has no pulse and is not breathing):    CPR (Attempt to Resuscitate)     Medical Interventions (Patient has pulse or is breathing):    Full Support       Plan for disposition: 1-2 days     Time: 30 minutes    Signature: Electronically signed by Licha Clinton MD, 01/20/24, 12:26 EST.  [facility]ist Team

## 2024-01-20 NOTE — PROGRESS NOTES
[facility] [place], [place] [facility]   PROGRESS NOTE      Referring Provider: Licha Clinton MD    Reason for follow-up: hypotension     Patient Care Team:  Waylon Johnson MD as PCP - General  Chemchirian, MD Lali as Consulting Physician (Cardiology)  Atul Fowler MD as Consulting Physician (Nephrology)      SUBJECTIVE    Patient doing better today. Confusion appears to be improving.     Review of Systems   Constitutional: Negative for fever.   Cardiovascular:  Negative for chest pain and palpitations.   Respiratory:  Negative for cough and shortness of breath.    Gastrointestinal:  Negative for nausea and vomiting.   Neurological:  Negative for dizziness and light-headedness.   All other systems reviewed and are negative.      Allergies:  Tylenol with codeine #3 [acetaminophen-codeine]    Personal History:    Past Medical History:   Diagnosis Date    Cancer 01/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    Coronary artery disease     Deep vein thrombosis 04/1990    Diabetes mellitus     Dyslipidemia     Erectile dysfunction 51 years old    Heart murmur 10/4/2023    HL (hearing loss) 7 year old    right    Hyperlipidemia 1970    Hypertension     Neuropathy     Renal insufficiency 2020    Stage 4 chronic kidney disease     3-4       Past Surgical History:   Procedure Laterality Date    ARTERIOVENOUS FISTULA/SHUNT SURGERY Left 9/22/2022    Procedure: BRACHIAL CEPHALIC FISTULA FORMATION;  Surgeon: Edy Vega MD;  Location: [facility] MAIN OR;  Service: Vascular;  Laterality: Left;    BACK SURGERY      BASAL CELL CARCINOMA EXCISION  01/2019    CARDIAC CATHETERIZATION      CYSTOSCOPY W/ URETERAL STENT PLACEMENT Bilateral 1/16/2024    Procedure: CYSTOSCOPY, BILATERAL URETERAL STENT INSERTION;  Surgeon: Tyrel Avitia MD;  Location: [facility] OR;  Service: Urology;  Laterality: Bilateral;    TURP / TRANSURETHRAL INCISION / DRAINAGE PROSTATE         Family History   Problem Relation Age of Onset    Heart disease Mother      "Heart disease Brother     Heart attack Maternal Grandfather     Hypertension Father     Hearing loss Father        Social History     Tobacco Use    Smoking status: Former     Types: Cigarettes     Quit date: 1967     Years since quittin.0     Passive exposure: Past    Smokeless tobacco: Never   Vaping Use    Vaping Use: Never used   Substance Use Topics    Alcohol use: No    Drug use: No        Medications Prior to Admission   Medication Sig Dispense Refill Last Dose    amLODIPine (NORVASC) 5 MG tablet Take 1 tablet by mouth Daily.   2024    aspirin 81 MG tablet Take 1 tablet by mouth Daily.   2024    atenolol (TENORMIN) 50 MG tablet Take 1 tablet by mouth Daily.   2024    calcitriol (ROCALTROL) 0.25 MCG capsule Take 2 capsules by mouth Daily.   2024    [] ciprofloxacin (CIPRO) 250 MG tablet Take 1 tablet by mouth 2 (Two) Times a Day.   2024    Insulin Glargine-Lixisenatide (SOLIQUA) 100-33 UNT-MCG/ML solution pen-injector injection Inject 20 Units under the skin into the appropriate area as directed Daily.       sevelamer (RENVELA) 800 MG tablet Take 1 tablet by mouth 3 (Three) Times a Day As Needed.   2024    simvastatin (ZOCOR) 20 MG tablet Take 1 tablet by mouth Every Night.       torsemide (DEMADEX) 20 MG tablet Take 1 tablet by mouth Daily.            OBJECTIVE    VITAL SIGNS  Vitals:    24 1112 24 1500 24 2341 24 0426   BP:  97/52 123/57 129/66   BP Location:  Right arm Right arm Right arm   Patient Position:  Lying Lying Lying   Pulse: 71  82 72   Resp: 17 17 16 20   Temp:  98 °F (36.7 °C) 98 °F (36.7 °C) 97.6 °F (36.4 °C)   TempSrc:  Oral Oral Oral   SpO2: 94% 93% 94%    Weight:       Height:         Flowsheet Rows      Flowsheet Row First Filed Value   Admission Height 185.4 cm (73\") Documented at 01/15/2024 1047   Admission Weight 97.5 kg (215 lb) Documented at 01/15/2024 1047             TELEMETRY: sinus rhythm    Physical Exam:  Vitals " reviewed.   Constitutional:       General: Awake.      Appearance: Overweight and not in distress.   Eyes:      Pupils: Pupils are equal, round, and reactive to light.   Pulmonary:      Effort: Pulmonary effort is normal.      Breath sounds: Normal breath sounds.   Cardiovascular:      Normal rate. Regular rhythm. Normal S1. Normal S2.    Pulses:     Intact distal pulses.   Edema:     Peripheral edema absent.   Abdominal:      General: Bowel sounds are normal.   Musculoskeletal: Normal range of motion.      Cervical back: Normal range of motion. Skin:     General: Skin is warm and dry.   Neurological:      Mental Status: Alert.      Comments: Oriented x 2   Psychiatric:         Behavior: Behavior is cooperative.          LAB RESULTS (LAST 7 DAYS)  I have reviewed new clinical results.    CMP   Results from last 7 days   Lab Units 01/20/24  0105 01/18/24  2332 01/18/24  0352 01/17/24  0847 01/16/24  2022 01/16/24  0646 01/15/24  2304 01/15/24  1719 01/15/24  1223   SODIUM mmol/L 141 139 142 139  --  140  --  140 140   POTASSIUM mmol/L 3.9 4.2 4.9 4.4  --  4.7 4.1 6.7* 7.3*   CHLORIDE mmol/L 101 101 102 99  --  101  --  102 100   CO2 mmol/L 25.0 25.0 24.0 21.0*  --  17.0*  --  8.0* 9.0*   BUN mg/dL 69* 47* 100* 89* 73* 150*  --  250* 256*   CREATININE mg/dL 5.50* 4.23* 7.68* 6.73*  --  10.52*  --  15.98* 16.11*   GLUCOSE mg/dL 119* 70 78 140*  --  39*  --  87 111*   ALBUMIN g/dL 2.7* 2.8* 2.8* 2.9*  --  2.9*  --   --  3.2*   BILIRUBIN mg/dL 0.4  --  0.2 0.3  --  0.2  --   --  0.2   ALK PHOS U/L 92  --  93 122*  --  94  --   --  102   AST (SGOT) U/L 10  --  21 24  --  28  --   --  18   ALT (SGPT) U/L 17  --  20 24  --  20  --   --  17   LIPASE U/L  --   --   --   --   --   --   --   --  >3,000*     CBC  Results from last 7 days   Lab Units 01/20/24  0105 01/18/24  2332 01/18/24  0352 01/17/24  0848 01/16/24  0646 01/15/24  1125   WBC 10*3/mm3 18.60* 27.00* 21.20* 17.20* 15.60* 23.10*   RBC 10*6/mm3 3.16* 3.18* 3.13*  2.95* 3.16* 3.69*   HEMOGLOBIN g/dL 8.8* 9.0* 9.0* 8.6* 9.1* 10.4*   HEMATOCRIT % 27.6* 27.7* 27.3* 25.5* 27.4* 32.4*   MCV fL 87.3 87.2 87.3 86.3 86.9 87.7   PLATELETS 10*3/mm3 158 189 210 198 260 274     ProBNP      TROPONIN  Results from last 7 days   Lab Units 01/15/24  1223   CK TOTAL U/L 44     CoAg  Results from last 7 days   Lab Units 01/18/24  1907   INR  1.27*   APTT seconds 41.3*     Creatinine Clearance  Estimated Creatinine Clearance: 14.4 mL/min (A) (by C-G formula based on SCr of 5.5 mg/dL (H)).    Radiology  IR IVC Filter Placement    Result Date: 1/19/2024  Impression: Technically successful placement of a Option Elite inferior vena cava filter within the infrarenal IVC as discussed above. Electronically Signed: Miguelito Bruno MD  1/19/2024 12:38 PM EST  Workstation ID: [id number]     EKG    I personally viewed and interpreted the patient's EKG/Telemetry data:  ECG 12 Lead Other; Hypotension, bradycardia   Final Result   HEART RATE= 43  bpm   RR Interval= 1408  ms   ID Interval= 339  ms   P Horizontal Axis=   deg   P Front Axis= 62  deg   QRSD Interval= 136  ms   QT Interval= 612  ms   QTcB= 516  ms   QRS Axis= 59  deg   T Wave Axis= 75  deg   - ABNORMAL ECG -   Sinus bradycardia   Prolonged ID interval   Nonspecific intraventricular conduction delay   Nonspecific T abnormalities, lateral leads   Prolonged QT interval   When compared with ECG of 14-Sep-2022 9:10:35,   New conduction abnormality   Significant repolarization change   Electronically Signed By: Raleigh Serrato (VIKTORIYA) 15-Dinh-2024 17:19:44   Date and Time of Study: 2024-01-15 11:06:38      SCANNED - TELEMETRY     Final Result      SCANNED - TELEMETRY     Final Result      SCANNED - TELEMETRY     Final Result      SCANNED - TELEMETRY     Final Result      SCANNED - TELEMETRY     Final Result      SCANNED - TELEMETRY     Final Result      SCANNED - TELEMETRY     Final Result      SCANNED - TELEMETRY     Final Result      SCANNED - TELEMETRY      Final Result      SCANNED - TELEMETRY     Final Result      SCANNED - TELEMETRY     Final Result      SCANNED - TELEMETRY     Final Result      SCANNED - TELEMETRY     Final Result          ECHOCARDIOGRAM:      STRESS MYOVIEW:      CARDIAC CATHETERIZATION:  No results found for this or any previous visit.      OTHER:     Pertinent cardiac workup     EKG 10/4/2023 sinus rhythm with first-degree AV block  EKG 1/15/2024 sinus bradycardia at 43 bpm, first-degree AV block, IN interval 339 ms, IVCD.    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  Patient was initially bradycardic upon arrival.  That however has resolved with dialysis.  Due to the ongoing noncardiac issues, I would definitely hold off on pacemaker for now.  If he shows bradycardia after his renal function has recovered or steady dialysis regimen has been implemented, then we can think about pacemaker if indicated, but as of now there is no indication for pacemaker.  Patient is not experiencing any anginal symptoms or CHF symptoms either.     1/20/2024  Patient doing better from cardiac standpoint since undergoing dialysis. Scheduled for CT abdomen with contrast today and will have HD afterwards. Patient has had no more bradycardic episodes therefore we will not do a pacemaker at this time. If patient has future bradycardic episodes, we may consider one at that time. Patient and family in agreement with plan.        I discussed the patients findings and my recommendations with patient and nurse.      DG Mercado  01/20/24  10:25 EST  Electronically signed by DG Mercado, 01/20/24, 10:32 AM EST.

## 2024-01-20 NOTE — PLAN OF CARE
Problem: Adult Inpatient Plan of Care  Goal: Plan of Care Review  Outcome: Ongoing, Progressing  Flowsheets (Taken 1/20/2024 0356)  Progress: no change  Plan of Care Reviewed With: patient  Goal: Patient-Specific Goal (Individualized)  Outcome: Ongoing, Progressing  Goal: Absence of Hospital-Acquired Illness or Injury  Outcome: Ongoing, Progressing  Intervention: Identify and Manage Fall Risk  Recent Flowsheet Documentation  Taken 1/20/2024 0200 by Belinda Cedillo, SELMA  Safety Promotion/Fall Prevention:   activity supervised   assistive device/personal items within reach   clutter free environment maintained   fall prevention program maintained   nonskid shoes/slippers when out of bed   room organization consistent   safety round/check completed  Taken 1/20/2024 0000 by Belinda Cedillo RN  Safety Promotion/Fall Prevention:   activity supervised   assistive device/personal items within reach   clutter free environment maintained   fall prevention program maintained   nonskid shoes/slippers when out of bed   room organization consistent   safety round/check completed  Taken 1/19/2024 2200 by Belinda Cedillo RN  Safety Promotion/Fall Prevention:   activity supervised   assistive device/personal items within reach   clutter free environment maintained   fall prevention program maintained   nonskid shoes/slippers when out of bed   room organization consistent   safety round/check completed  Taken 1/19/2024 2000 by Belinda Cedillo RN  Safety Promotion/Fall Prevention:   activity supervised   assistive device/personal items within reach   clutter free environment maintained   fall prevention program maintained   nonskid shoes/slippers when out of bed   room organization consistent   safety round/check completed  Intervention: Prevent Skin Injury  Recent Flowsheet Documentation  Taken 1/20/2024 0200 by Belinda Cedillo, RN  Body Position: position changed independently  Taken 1/20/2024 0000 by Belinda Cedillo, RN  Body Position: position changed  independently  Taken 1/19/2024 2200 by Belinda Cedillo RN  Body Position: position changed independently  Taken 1/19/2024 2000 by Belinda Cedillo RN  Body Position: position changed independently  Intervention: Prevent and Manage VTE (Venous Thromboembolism) Risk  Recent Flowsheet Documentation  Taken 1/20/2024 0000 by Belinda Cedillo RN  Range of Motion: active ROM (range of motion) encouraged  Taken 1/19/2024 2000 by Belinda Cedillo RN  VTE Prevention/Management:   bilateral   sequential compression devices off  Range of Motion: active ROM (range of motion) encouraged  Intervention: Prevent Infection  Recent Flowsheet Documentation  Taken 1/20/2024 0200 by Belinda Cedillo RN  Infection Prevention:   hand hygiene promoted   personal protective equipment utilized   rest/sleep promoted   single patient room provided  Taken 1/20/2024 0000 by Belinda Cedillo RN  Infection Prevention:   hand hygiene promoted   personal protective equipment utilized   rest/sleep promoted   single patient room provided  Taken 1/19/2024 2200 by Belinda Cedillo RN  Infection Prevention:   hand hygiene promoted   personal protective equipment utilized   rest/sleep promoted   single patient room provided  Taken 1/19/2024 2000 by Belinda Cedillo RN  Infection Prevention:   hand hygiene promoted   personal protective equipment utilized   rest/sleep promoted   single patient room provided  Goal: Optimal Comfort and Wellbeing  Outcome: Ongoing, Progressing  Intervention: Provide Person-Centered Care  Recent Flowsheet Documentation  Taken 1/20/2024 0000 by Belinda Cedillo RN  Trust Relationship/Rapport:   care explained   thoughts/feelings acknowledged  Taken 1/19/2024 2000 by Belinda Cedillo RN  Trust Relationship/Rapport:   care explained   thoughts/feelings acknowledged  Goal: Readiness for Transition of Care  Outcome: Ongoing, Progressing     Problem: Skin Injury Risk Increased  Goal: Skin Health and Integrity  Outcome: Ongoing, Progressing     Problem: Diabetes Comorbidity  Goal:  Blood Glucose Level Within Targeted Range  Outcome: Ongoing, Progressing     Problem: Hypertension Comorbidity  Goal: Blood Pressure in Desired Range  Outcome: Ongoing, Progressing  Intervention: Maintain Blood Pressure Management  Recent Flowsheet Documentation  Taken 1/20/2024 0200 by Belinda Cedillo RN  Medication Review/Management: medications reviewed  Taken 1/20/2024 0000 by Belinda Cedillo RN  Medication Review/Management: medications reviewed  Taken 1/19/2024 2200 by Belinda Cedillo RN  Medication Review/Management: medications reviewed  Taken 1/19/2024 2000 by Belinda Cedillo RN  Medication Review/Management: medications reviewed     Problem: Adjustment to Illness (Sepsis/Septic Shock)  Goal: Optimal Coping  Outcome: Ongoing, Progressing  Intervention: Optimize Psychosocial Adjustment to Illness  Recent Flowsheet Documentation  Taken 1/19/2024 2000 by Belinda Cedillo RN  Family/Support System Care: caregiver stress acknowledged     Problem: Bleeding (Sepsis/Septic Shock)  Goal: Absence of Bleeding  Outcome: Ongoing, Progressing     Problem: Glycemic Control Impaired (Sepsis/Septic Shock)  Goal: Blood Glucose Level Within Desired Range  Outcome: Ongoing, Progressing     Problem: Infection Progression (Sepsis/Septic Shock)  Goal: Absence of Infection Signs and Symptoms  Outcome: Ongoing, Progressing  Intervention: Initiate Sepsis Management  Recent Flowsheet Documentation  Taken 1/20/2024 0200 by Belinda Cedillo RN  Infection Prevention:   hand hygiene promoted   personal protective equipment utilized   rest/sleep promoted   single patient room provided  Taken 1/20/2024 0000 by Belinda Cedillo RN  Infection Prevention:   hand hygiene promoted   personal protective equipment utilized   rest/sleep promoted   single patient room provided  Taken 1/19/2024 2200 by Belinda Cedillo RN  Infection Prevention:   hand hygiene promoted   personal protective equipment utilized   rest/sleep promoted   single patient room provided  Taken 1/19/2024 2000 by  Cedillo, Belinda, RN  Infection Prevention:   hand hygiene promoted   personal protective equipment utilized   rest/sleep promoted   single patient room provided     Problem: Nutrition Impaired (Sepsis/Septic Shock)  Goal: Optimal Nutrition Intake  Outcome: Ongoing, Progressing     Problem: Fall Injury Risk  Goal: Absence of Fall and Fall-Related Injury  Outcome: Ongoing, Progressing  Intervention: Identify and Manage Contributors  Recent Flowsheet Documentation  Taken 1/20/2024 0200 by Belinda Cedillo RN  Medication Review/Management: medications reviewed  Taken 1/20/2024 0000 by Belinda Cedillo RN  Medication Review/Management: medications reviewed  Taken 1/19/2024 2200 by Belinda Cedillo RN  Medication Review/Management: medications reviewed  Taken 1/19/2024 2000 by Belinda Cedillo RN  Medication Review/Management: medications reviewed  Intervention: Promote Injury-Free Environment  Recent Flowsheet Documentation  Taken 1/20/2024 0200 by Belinda Cedillo RN  Safety Promotion/Fall Prevention:   activity supervised   assistive device/personal items within reach   clutter free environment maintained   fall prevention program maintained   nonskid shoes/slippers when out of bed   room organization consistent   safety round/check completed  Taken 1/20/2024 0000 by Belinda Cedillo RN  Safety Promotion/Fall Prevention:   activity supervised   assistive device/personal items within reach   clutter free environment maintained   fall prevention program maintained   nonskid shoes/slippers when out of bed   room organization consistent   safety round/check completed  Taken 1/19/2024 2200 by Belinda Cedillo RN  Safety Promotion/Fall Prevention:   activity supervised   assistive device/personal items within reach   clutter free environment maintained   fall prevention program maintained   nonskid shoes/slippers when out of bed   room organization consistent   safety round/check completed  Taken 1/19/2024 2000 by Belinda Cedillo RN  Safety Promotion/Fall  Prevention:   activity supervised   assistive device/personal items within reach   clutter free environment maintained   fall prevention program maintained   nonskid shoes/slippers when out of bed   room organization consistent   safety round/check completed   Goal Outcome Evaluation:  Plan of Care Reviewed With: patient        Progress: no change

## 2024-01-20 NOTE — PROGRESS NOTES
Infectious Diseases Progress Note      LOS: 5 days   Patient Care Team:  Waylon Johnson MD as PCP - General  Chemchirian, MD Lali as Consulting Physician (Cardiology)  Atul Fowler MD as Consulting Physician (Nephrology)    Chief Complaint: Fatigue    Subjective       The patient has been afebrile for the last 24 hours.  The patient is on room air, hemodynamically stable, and is tolerating antimicrobial therapy.  Denies abdominal pain      Review of Systems:   Review of Systems   Constitutional:  Positive for fatigue.   HENT: Negative.     Eyes: Negative.    Respiratory: Negative.     Cardiovascular: Negative.    Gastrointestinal: Negative.    Endocrine: Negative.    Genitourinary: Negative.    Musculoskeletal: Negative.    Skin: Negative.    Neurological: Negative.    Psychiatric/Behavioral: Negative.     All other systems reviewed and are negative.       Objective     Vital Signs  Temp:  [97.6 °F (36.4 °C)-98.3 °F (36.8 °C)] 98.3 °F (36.8 °C)  Heart Rate:  [67-83] 67  Resp:  [16-20] 18  BP: (121-132)/(57-67) 121/67    Physical Exam:  Physical Exam  Vitals and nursing note reviewed.   Constitutional:       General: He is not in acute distress.     Appearance: He is well-developed and normal weight. He is ill-appearing. He is not diaphoretic.   HENT:      Head: Normocephalic and atraumatic.   Eyes:      Conjunctiva/sclera: Conjunctivae normal.      Pupils: Pupils are equal, round, and reactive to light.   Cardiovascular:      Rate and Rhythm: Normal rate and regular rhythm.      Heart sounds: Normal heart sounds, S1 normal and S2 normal.   Pulmonary:      Effort: Pulmonary effort is normal. No respiratory distress.      Breath sounds: Normal breath sounds. No stridor. No wheezing or rales.   Abdominal:      General: Bowel sounds are normal. There is no distension.      Palpations: Abdomen is soft. There is no mass.      Tenderness: There is no abdominal tenderness. There is no guarding.   Genitourinary:      Comments: Peterson catheter  Musculoskeletal:         General: No deformity. Normal range of motion.      Cervical back: Neck supple.   Skin:     General: Skin is warm and dry.      Coloration: Skin is not pale.      Findings: No erythema or rash.      Comments:   Left arm fistula   Neurological:      Mental Status: He is alert and oriented to person, place, and time.      Cranial Nerves: No cranial nerve deficit.   Psychiatric:         Mood and Affect: Mood normal.          Results Review:    I have reviewed all clinical data, test, lab, and imaging results.     Radiology  CT Abdomen Pelvis With Contrast    Result Date: 1/20/2024  CT ABDOMEN PELVIS W CONTRAST Date of Exam: 1/20/2024 11:46 AM CST Indication: leukocytosis - concern for renal abscess. Comparison: 1/15/2024 Technique: Axial CT images were obtained of the abdomen and pelvis following the uneventful intravenous administration of 100 cc of Isovue-370. Sagittal and coronal reconstructions were performed.  Automated exposure control and iterative reconstruction methods were used. Findings: LUNG BASES: Trace right pleural effusion with minimal right basal atelectasis. LIVER:  Unremarkable parenchyma without focal lesion. BILIARY/GALLBLADDER: Gallbladder is distended with multiple stones.. There is an irregular area of increased density which appears to be denser than previous noncontrast CT, measuring approximate 4.3 x 2.4 cm (image 46, series 7). Cholangiocarcinoma not excluded. This may represent more tumefactive sludge. Ultrasound might be useful as initial study to further assess. SPLEEN:  Unremarkable PANCREAS: There is mild peripancreatic inflammatory changes suggestive of acute interstitial pancreatitis. No evidence of necrosis. No pseudocyst formation. No duct dilation. No well-defined pancreatic lesion. No calcifications ADRENAL:  Unremarkable KIDNEYS: Interval placement of bilateral ureteral stents which appear to be in satisfactory position with  resolution of hydronephrosis. There is a 7 mm calculus within the proximal right ureter in a similar location compared to prior examination. There is a 6 mm left ureteral calculus which also appears to be similarly positioned within the proximal left ureter. No additional calculus identified. There is bilateral renal cortical atrophy with numerous cysts. This includes a 3.3 cm complex cyst versus partially hemorrhagic cyst arising from the lower pole of the right kidney. There is a 1.9 cm complex cyst versus nodule rising from the posterior lower pole of the right kidney as well. These are similar prior examination. No findings to suggest renal abscess. GASTROINTESTINAL/MESENTERY: There is colonic diverticulosis with no evidence of acute diverticulitis. No evidence of obstruction nor inflammation.  The appendix is normal. MESENTERIC VESSELS:  Patent. AORTA/IVC:  Normal caliber. There is a new IVC filter. There is calcification within the left common femoral/proximal superficial femoral vein consistent with chronic thrombosis. RETROPERITONEUM/LYMPH NODES:  Unremarkable REPRODUCTIVE:  Unremarkable BLADDER: Decompressed by Peterson catheter. OSSEUS STRUCTURES:  Typical for age with no acute process identified.     Impression: 1.Findings are consistent with acute interstitial pancreatitis. Correlate with history and symptoms. 2.Complex right renal cysts versus nodules. Dedicated nonemergent renal mass protocol MRI or recommended. 3.Cholelithiasis with generalized gallbladder distention, similar prior examination. There is either tumefactive sludge or potential gallbladder neoplasm. This can be further assessed with ultrasound or at time of renal MRI/CT. 4.Interval placement of bilateral ureteral stents with resolution of hydronephrosis. Relatively stable position of bilateral proximal ureteral calculi. 5.New IVC filter. 6.New trace right pleural effusion with minimal right basal atelectasis. 7.No evidence of renal abscess.  8.Other stable findings. Electronically Signed: Sudhir Topete MD  1/20/2024 12:32 PM [facility]  Workstation ID: [id number]     Cardiology    Laboratory    Results from last 7 days   Lab Units 01/20/24 0105 01/18/24 2332 01/18/24 0352 01/17/24  0848 01/16/24  0646 01/15/24  1125   WBC 10*3/mm3 18.60* 27.00* 21.20* 17.20* 15.60* 23.10*   HEMOGLOBIN g/dL 8.8* 9.0* 9.0* 8.6* 9.1* 10.4*   HEMATOCRIT % 27.6* 27.7* 27.3* 25.5* 27.4* 32.4*   PLATELETS 10*3/mm3 158 189 210 198 260 274     Results from last 7 days   Lab Units 01/20/24 0105 01/18/24 2332 01/18/24 0352 01/17/24  0847 01/16/24 2022 01/16/24  0646 01/15/24  2304 01/15/24  1719 01/15/24  1223   SODIUM mmol/L 141 139 142 139  --  140  --  140 140   POTASSIUM mmol/L 3.9 4.2 4.9 4.4  --  4.7 4.1 6.7* 7.3*   CHLORIDE mmol/L 101 101 102 99  --  101  --  102 100   CO2 mmol/L 25.0 25.0 24.0 21.0*  --  17.0*  --  8.0* 9.0*   BUN mg/dL 69* 47* 100* 89* 73* 150*  --  250* 256*   CREATININE mg/dL 5.50* 4.23* 7.68* 6.73*  --  10.52*  --  15.98* 16.11*   GLUCOSE mg/dL 119* 70 78 140*  --  39*  --  87 111*   ALBUMIN g/dL 2.7* 2.8* 2.8* 2.9*  --  2.9*  --   --  3.2*   BILIRUBIN mg/dL 0.4  --  0.2 0.3  --  0.2  --   --  0.2   ALK PHOS U/L 92  --  93 122*  --  94  --   --  102   AST (SGOT) U/L 10  --  21 24  --  28  --   --  18   ALT (SGPT) U/L 17  --  20 24  --  20  --   --  17   LIPASE U/L  --   --   --   --   --   --   --   --  >3,000*   CALCIUM mg/dL 8.6 8.4* 8.3* 7.9*  --  8.3*  --  9.1 9.4     Results from last 7 days   Lab Units 01/15/24  1223   CK TOTAL U/L 44             Microbiology   Microbiology Results (last 10 days)       Procedure Component Value - Date/Time    Urine Culture - Urine, Indwelling Urethral Catheter [[id number]]  (Normal) Collected: 01/17/24 1501    Lab Status: Final result Specimen: Urine from Indwelling Urethral Catheter Updated: 01/18/24 1939     Urine Culture No growth    Blood Culture - Blood, Arm, Left [[id number]]  (Normal) Collected: 01/17/24 1205     Lab Status: Preliminary result Specimen: Blood from Arm, Left Updated: 01/20/24 1230     Blood Culture No growth at 3 days    Blood Culture - Blood, Arm, Left [[id number]]  (Normal) Collected: 01/17/24 1203    Lab Status: Preliminary result Specimen: Blood from Arm, Left Updated: 01/20/24 1230     Blood Culture No growth at 3 days    COVID-19 and FLU A/B PCR, 1 HR TAT - Swab, Nasopharynx [[id number]]  (Normal) Collected: 01/15/24 1602    Lab Status: Final result Specimen: Swab from Nasopharynx Updated: 01/15/24 1624     COVID19 Not Detected     Influenza A PCR Not Detected     Influenza B PCR Not Detected    Narrative:      Fact sheet for providers: https://www.fda.gov/media/328713/download    Fact sheet for patients: https://www.fda.gov/media/506560/download    Test performed by PCR.    Urine Culture - Urine, Urine, Clean Catch [[id number]]  (Abnormal)  (Susceptibility) Collected: 01/15/24 1145    Lab Status: Final result Specimen: Urine, Clean Catch Updated: 01/18/24 0837     Urine Culture 25,000 CFU/mL Serratia marcescens    Narrative:      Colonization of the urinary tract without infection is common. Treatment is discouraged unless the patient is symptomatic, pregnant, or undergoing an invasive urologic procedure.    Susceptibility        Serratia marcescens      ZOILA      Amoxicillin + Clavulanate Resistant      Cefazolin Resistant      Cefepime Susceptible      Ceftazidime Susceptible      Ceftriaxone Susceptible      Gentamicin Susceptible      Levofloxacin Susceptible      Nitrofurantoin Resistant      Piperacillin + Tazobactam Susceptible  [1]       Trimethoprim + Sulfamethoxazole Susceptible                   [1]  Appended report. These results have been appended to a previously preliminary verified report.                   Blood Culture - Blood, Arm, Right [[id number]]  (Normal) Collected: 01/15/24 1126    Lab Status: Final result Specimen: Blood from Arm, Right Updated: 01/20/24 1130     Blood Culture No  growth at 5 days    Narrative:      Less than seven (7) mL's of blood was collected.  Insufficient quantity may yield false negative results.    Blood Culture - Blood, Arm, Left [[id number]]  (Normal) Collected: 01/15/24 1125    Lab Status: Final result Specimen: Blood from Arm, Left Updated: 01/20/24 1130     Blood Culture No growth at 5 days    Narrative:      Less than seven (7) mL's of blood was collected.  Insufficient quantity may yield false negative results.            Medication Review:       Schedule Meds  apixaban, 2.5 mg, Oral, Q12H  atorvastatin, 10 mg, Oral, Daily  calcitriol, 0.5 mcg, Oral, Daily  ertapenem, 500 mg, Intravenous, Q24H  insulin lispro, 2-7 Units, Subcutaneous, 4x Daily AC & at Bedtime  senna-docusate sodium, 2 tablet, Oral, BID  sevelamer, 2,400 mg, Oral, TID With Meals  sodium chloride, 10 mL, Intravenous, Q12H        Infusion Meds       PRN Meds    acetaminophen    senna-docusate sodium **AND** polyethylene glycol **AND** bisacodyl **AND** bisacodyl    Calcium Replacement - Follow Nurse / BPA Driven Protocol    dextrose    dextrose    glucagon (human recombinant)    Magnesium Standard Dose Replacement - Follow Nurse / BPA Driven Protocol    nitroglycerin    ondansetron    ondansetron ODT    Phosphorus Replacement - Follow Nurse / BPA Driven Protocol    Potassium Replacement - Follow Nurse / BPA Driven Protocol    [COMPLETED] Insert Peripheral IV **AND** sodium chloride    sodium chloride    sodium chloride        Assessment & Plan       Antimicrobial Therapy   1.  IV ertapenem        2.        3.        4.        5.          Assessment     Severe reactive leukocytosis.  Started to improve     Probable complicated UTI.  Urine culture grew Serratia marcescens from January 15 2024.  Patient had prior urine culture before admission on January 8, 2024 and grew the same organism.  CT scan of abdomen pelvis done without contrast and showed bilateral hydronephrosis with possible complex cyst  on the right kidney.  CT scan of abdomen pelvis with IV contrast showed complex right renal cysts versus nodules  Incidentally there was a finding consistent with interstitial pancreatitis.  But patient has no abdominal pain or tenderness  The patient is s/p bilateral ureteral stent placement on January 16, 2024     End-stage renal disease-patient was started on dialysis this admission.  Patient followed by nephrology     History of left AV fistula placement on 9/22/2022 which required a fistulogram and angioplasty on 1/18/2024.  Patient followed by vascular surgery     Acute left leg DVTs.  Hematology following.  Patient has IVC filter placement on 1/19/2024     Type 2 diabetes     History of prostate cancer         Plan     Continue IV ertapenem 500 mg every 24 hours for 14 days  Request lipase and amylase  May need to consult GI service  Continue supportive care  A.mJustin Sanchez MD  01/20/24  16:09 EST    Note is dictated utilizing voice recognition software/Dragon

## 2024-01-20 NOTE — PROGRESS NOTES
[facility] Progress Note      Patient Name: Gabriel De Souza  : 1945  MRN: [id number]  Primary Care Physician:  Waylon Johnson MD  Date of admission: 1/15/2024    Subjective     Interval History:     Patient resting comfortably.  No acute distress overnight    Review of Systems:   As noted above    Objective     Vitals:   Temp:  [97.6 °F (36.4 °C)-98.3 °F (36.8 °C)] 98.3 °F (36.8 °C)  Heart Rate:  [69-82] 69  Resp:  [16-20] 17  BP: ()/(52-66) 128/63    Intake/Output Summary (Last 24 hours) at 2024 1206  Last data filed at 2024 0600  Gross per 24 hour   Intake --   Output 1400 ml   Net -1400 ml       Physical Exam:    General Appearance: Ill-appearing, NAD  HEENT: oral mucosa normal, nonicteric sclera  Neck: supple, no JVD  Lungs: CTA  Heart: RRR, normal S1 and S2  Abdomen: soft, nondistended  Extremities: no edema  Neuro: Awake alert and moving all extremities    Scheduled Meds:     apixaban, 2.5 mg, Oral, Q12H  atorvastatin, 10 mg, Oral, Daily  calcitriol, 0.5 mcg, Oral, Daily  ertapenem, 500 mg, Intravenous, Q24H  insulin lispro, 2-7 Units, Subcutaneous, 4x Daily AC & at Bedtime  iopamidol, 100 mL, Intravenous, Once in imaging  senna-docusate sodium, 2 tablet, Oral, BID  sevelamer, 2,400 mg, Oral, TID With Meals  sodium chloride, 10 mL, Intravenous, Q12H      IV Meds:          Results Reviewed:   I have personally reviewed the results from the time of this admission to 2024 12:06 EST     Results from last 7 days   Lab Units 24  0105 24  2332 24  0352 24  0847   SODIUM mmol/L 141 139 142 139   POTASSIUM mmol/L 3.9 4.2 4.9 4.4   CHLORIDE mmol/L 101 101 102 99   CO2 mmol/L 25.0 25.0 24.0 21.0*   BUN mg/dL 69* 47* 100* 89*   CREATININE mg/dL 5.50* 4.23* 7.68* 6.73*   CALCIUM mg/dL 8.6 8.4* 8.3* 7.9*   BILIRUBIN mg/dL 0.4  --  0.2 0.3   ALK PHOS U/L 92  --  93 122*   ALT (SGPT) U/L 17  --  20 24   AST (SGOT) U/L 10  --  21 24    GLUCOSE mg/dL 119* 70 78 140*     Estimated Creatinine Clearance: 14.4 mL/min (A) (by C-G formula based on SCr of 5.5 mg/dL (H)).  Results from last 7 days   Lab Units 01/20/24  0105 01/18/24  2332 01/18/24  0352 01/17/24  0847   MAGNESIUM mg/dL 1.9  --  1.9 1.8   PHOSPHORUS mg/dL 6.0* 4.4 9.3* 8.0*         Results from last 7 days   Lab Units 01/20/24  0105 01/18/24  2332 01/18/24  0352 01/17/24  0848 01/16/24  0646   WBC 10*3/mm3 18.60* 27.00* 21.20* 17.20* 15.60*   HEMOGLOBIN g/dL 8.8* 9.0* 9.0* 8.6* 9.1*   PLATELETS 10*3/mm3 158 189 210 198 260     Results from last 7 days   Lab Units 01/18/24  1907   INR  1.27*       Assessment / Plan     ASSESSMENT:  End-stage renal disease.  Patient receiving hemodialysis every other day.  Electrolytes, volume status okay  Obstructive uropathy.  Status post bilateral ureteral stents placement.  Patient also has complex left renal cyst.  Patient will go for CT abdominal for further evaluation  Hyperkalemia.  Secondary to renal failure.  Improved  Metabolic acidosis.  Increased anion gap, secondary to renal failure.  Improving with dialysis  Anemia in chronic kidney disease.   Bradycardia.  Most likely due to hyperkalemia.  Improved  hyperphosphatemia.   DVT.  Hematology, vascular following following.  S/p IVC filter placement.  Will need anticoagulation.  Urinary tract infection.  on IV antibiotics    PLAN:  Hemodialysis today  Patient started on low-dose Eliquis  Outpatient hemodialysis being arranged    Atul Fowler MD  01/20/24  12:06 [facility]    [facility]  [phone]

## 2024-01-21 ENCOUNTER — INPATIENT HOSPITAL (AMBULATORY)
Dept: URBAN - METROPOLITAN AREA HOSPITAL 84 | Facility: HOSPITAL | Age: 79
End: 2024-01-21

## 2024-01-21 DIAGNOSIS — K85.80 OTHER ACUTE PANCREATITIS WITHOUT NECROSIS OR INFECTION: ICD-10-CM

## 2024-01-21 DIAGNOSIS — D64.9 ANEMIA, UNSPECIFIED: ICD-10-CM

## 2024-01-21 DIAGNOSIS — R74.8 ABNORMAL LEVELS OF OTHER SERUM ENZYMES: ICD-10-CM

## 2024-01-21 DIAGNOSIS — D72.829 ELEVATED WHITE BLOOD CELL COUNT, UNSPECIFIED: ICD-10-CM

## 2024-01-21 DIAGNOSIS — Z99.2 DEPENDENCE ON RENAL DIALYSIS: ICD-10-CM

## 2024-01-21 DIAGNOSIS — N18.6 END STAGE RENAL DISEASE: ICD-10-CM

## 2024-01-21 LAB
ALBUMIN SERPL-MCNC: 2.9 G/DL (ref 3.5–5.2)
ALBUMIN/GLOB SERPL: 0.9 G/DL
ALP SERPL-CCNC: 118 U/L (ref 39–117)
ALT SERPL W P-5'-P-CCNC: 17 U/L (ref 1–41)
ANION GAP SERPL CALCULATED.3IONS-SCNC: 13 MMOL/L (ref 5–15)
AST SERPL-CCNC: 13 U/L (ref 1–40)
BASOPHILS # BLD AUTO: 0.1 10*3/MM3 (ref 0–0.2)
BASOPHILS NFR BLD AUTO: 0.3 % (ref 0–1.5)
BILIRUB SERPL-MCNC: 0.3 MG/DL (ref 0–1.2)
BUN SERPL-MCNC: 53 MG/DL (ref 8–23)
BUN/CREAT SERPL: 12.5 (ref 7–25)
CALCIUM SPEC-SCNC: 8.8 MG/DL (ref 8.6–10.5)
CHLORIDE SERPL-SCNC: 100 MMOL/L (ref 98–107)
CO2 SERPL-SCNC: 26 MMOL/L (ref 22–29)
CREAT SERPL-MCNC: 4.23 MG/DL (ref 0.76–1.27)
DEPRECATED RDW RBC AUTO: 48.1 FL (ref 37–54)
EGFRCR SERPLBLD CKD-EPI 2021: 13.7 ML/MIN/1.73
EOSINOPHIL # BLD AUTO: 0.2 10*3/MM3 (ref 0–0.4)
EOSINOPHIL NFR BLD AUTO: 1.2 % (ref 0.3–6.2)
ERYTHROCYTE [DISTWIDTH] IN BLOOD BY AUTOMATED COUNT: 15.1 % (ref 12.3–15.4)
GLOBULIN UR ELPH-MCNC: 3.1 GM/DL
GLUCOSE BLDC GLUCOMTR-MCNC: 116 MG/DL (ref 70–105)
GLUCOSE BLDC GLUCOMTR-MCNC: 122 MG/DL (ref 70–105)
GLUCOSE BLDC GLUCOMTR-MCNC: 133 MG/DL (ref 70–105)
GLUCOSE BLDC GLUCOMTR-MCNC: 145 MG/DL (ref 70–105)
GLUCOSE SERPL-MCNC: 138 MG/DL (ref 65–99)
HCT VFR BLD AUTO: 28.7 % (ref 37.5–51)
HGB BLD-MCNC: 9.2 G/DL (ref 13–17.7)
LYMPHOCYTES # BLD AUTO: 2 10*3/MM3 (ref 0.7–3.1)
LYMPHOCYTES NFR BLD AUTO: 9.8 % (ref 19.6–45.3)
MAGNESIUM SERPL-MCNC: 1.7 MG/DL (ref 1.6–2.4)
MCH RBC QN AUTO: 28.1 PG (ref 26.6–33)
MCHC RBC AUTO-ENTMCNC: 32.2 G/DL (ref 31.5–35.7)
MCV RBC AUTO: 87.3 FL (ref 79–97)
MONOCYTES # BLD AUTO: 2.9 10*3/MM3 (ref 0.1–0.9)
MONOCYTES NFR BLD AUTO: 14.6 % (ref 5–12)
NEUTROPHILS NFR BLD AUTO: 14.9 10*3/MM3 (ref 1.7–7)
NEUTROPHILS NFR BLD AUTO: 74.1 % (ref 42.7–76)
NRBC BLD AUTO-RTO: 0 /100 WBC (ref 0–0.2)
PHOSPHATE SERPL-MCNC: 4.8 MG/DL (ref 2.5–4.5)
PLATELET # BLD AUTO: 180 10*3/MM3 (ref 140–450)
PMV BLD AUTO: 9.7 FL (ref 6–12)
POTASSIUM SERPL-SCNC: 4.1 MMOL/L (ref 3.5–5.2)
PROT SERPL-MCNC: 6 G/DL (ref 6–8.5)
RBC # BLD AUTO: 3.29 10*6/MM3 (ref 4.14–5.8)
SODIUM SERPL-SCNC: 139 MMOL/L (ref 136–145)
WBC NRBC COR # BLD AUTO: 20.1 10*3/MM3 (ref 3.4–10.8)

## 2024-01-21 PROCEDURE — 25010000002 ERTAPENEM PER 500 MG: Performed by: NURSE PRACTITIONER

## 2024-01-21 PROCEDURE — 85025 COMPLETE CBC W/AUTO DIFF WBC: CPT | Performed by: HOSPITALIST

## 2024-01-21 PROCEDURE — 82948 REAGENT STRIP/BLOOD GLUCOSE: CPT

## 2024-01-21 PROCEDURE — 97530 THERAPEUTIC ACTIVITIES: CPT

## 2024-01-21 PROCEDURE — 80053 COMPREHEN METABOLIC PANEL: CPT | Performed by: HOSPITALIST

## 2024-01-21 PROCEDURE — 83735 ASSAY OF MAGNESIUM: CPT | Performed by: HOSPITALIST

## 2024-01-21 PROCEDURE — 82948 REAGENT STRIP/BLOOD GLUCOSE: CPT | Performed by: SURGERY

## 2024-01-21 PROCEDURE — 25810000003 SODIUM CHLORIDE 0.9 % SOLUTION: Performed by: HOSPITALIST

## 2024-01-21 PROCEDURE — 97110 THERAPEUTIC EXERCISES: CPT

## 2024-01-21 PROCEDURE — 86301 IMMUNOASSAY TUMOR CA 19-9: CPT | Performed by: NURSE PRACTITIONER

## 2024-01-21 PROCEDURE — 84100 ASSAY OF PHOSPHORUS: CPT | Performed by: HOSPITALIST

## 2024-01-21 PROCEDURE — 82105 ALPHA-FETOPROTEIN SERUM: CPT | Performed by: NURSE PRACTITIONER

## 2024-01-21 PROCEDURE — 99222 1ST HOSP IP/OBS MODERATE 55: CPT | Mod: FS | Performed by: NURSE PRACTITIONER

## 2024-01-21 PROCEDURE — 97112 NEUROMUSCULAR REEDUCATION: CPT

## 2024-01-21 RX ORDER — SODIUM CHLORIDE 9 MG/ML
100 INJECTION, SOLUTION INTRAVENOUS CONTINUOUS
Status: DISCONTINUED | OUTPATIENT
Start: 2024-01-21 | End: 2024-01-25 | Stop reason: HOSPADM

## 2024-01-21 RX ADMIN — APIXABAN 2.5 MG: 2.5 TABLET, FILM COATED ORAL at 09:26

## 2024-01-21 RX ADMIN — ATORVASTATIN CALCIUM 10 MG: 10 TABLET, FILM COATED ORAL at 09:26

## 2024-01-21 RX ADMIN — SEVELAMER CARBONATE 2400 MG: 800 TABLET, FILM COATED ORAL at 17:42

## 2024-01-21 RX ADMIN — Medication 10 ML: at 21:19

## 2024-01-21 RX ADMIN — Medication 10 ML: at 09:27

## 2024-01-21 RX ADMIN — SODIUM CHLORIDE 100 ML/HR: 9 INJECTION, SOLUTION INTRAVENOUS at 23:58

## 2024-01-21 RX ADMIN — SENNOSIDES AND DOCUSATE SODIUM 2 TABLET: 50; 8.6 TABLET ORAL at 09:26

## 2024-01-21 RX ADMIN — SODIUM CHLORIDE 100 ML/HR: 9 INJECTION, SOLUTION INTRAVENOUS at 12:28

## 2024-01-21 RX ADMIN — CALCITRIOL CAPSULES 0.25 MCG 0.5 MCG: 0.25 CAPSULE ORAL at 09:26

## 2024-01-21 RX ADMIN — APIXABAN 2.5 MG: 2.5 TABLET, FILM COATED ORAL at 21:19

## 2024-01-21 RX ADMIN — SEVELAMER CARBONATE 2400 MG: 800 TABLET, FILM COATED ORAL at 12:26

## 2024-01-21 RX ADMIN — SEVELAMER CARBONATE 2400 MG: 800 TABLET, FILM COATED ORAL at 09:26

## 2024-01-21 RX ADMIN — ERTAPENEM SODIUM 500 MG: 1 INJECTION, POWDER, LYOPHILIZED, FOR SOLUTION INTRAMUSCULAR; INTRAVENOUS at 16:27

## 2024-01-21 NOTE — PLAN OF CARE
Assessment: Gabriel De Souza presents with functional mobility impairments which indicate the need for skilled intervention. Tolerating session today without incident. Pt needed to use bs commode, req min assist and was dependent to get cleaned up. Has catheter. Plans on rehab at [place]. Will continue to follow and progress as tolerated.

## 2024-01-21 NOTE — PLAN OF CARE
Problem: Adult Inpatient Plan of Care  Goal: Plan of Care Review  Outcome: Ongoing, Progressing  Goal: Patient-Specific Goal (Individualized)  Outcome: Ongoing, Progressing  Goal: Absence of Hospital-Acquired Illness or Injury  Outcome: Ongoing, Progressing  Intervention: Identify and Manage Fall Risk  Recent Flowsheet Documentation  Taken 1/21/2024 0400 by Kaleigh Chin RN  Safety Promotion/Fall Prevention: safety round/check completed  Taken 1/21/2024 0200 by Kaleigh Chin RN  Safety Promotion/Fall Prevention: safety round/check completed  Taken 1/21/2024 0000 by Kaleigh Chin RN  Safety Promotion/Fall Prevention: safety round/check completed  Taken 1/20/2024 2200 by Kaleigh Chin RN  Safety Promotion/Fall Prevention: safety round/check completed  Taken 1/20/2024 2000 by Kaleigh Chin RN  Safety Promotion/Fall Prevention: safety round/check completed  Goal: Optimal Comfort and Wellbeing  Outcome: Ongoing, Progressing  Intervention: Provide Person-Centered Care  Recent Flowsheet Documentation  Taken 1/20/2024 2000 by Kaleigh Chin RN  Trust Relationship/Rapport:   care explained   questions answered   questions encouraged  Goal: Readiness for Transition of Care  Outcome: Ongoing, Progressing     Problem: Skin Injury Risk Increased  Goal: Skin Health and Integrity  Outcome: Ongoing, Progressing     Problem: Diabetes Comorbidity  Goal: Blood Glucose Level Within Targeted Range  Outcome: Ongoing, Progressing     Problem: Hypertension Comorbidity  Goal: Blood Pressure in Desired Range  Outcome: Ongoing, Progressing  Intervention: Maintain Blood Pressure Management  Recent Flowsheet Documentation  Taken 1/20/2024 2000 by Kaleigh Chin RN  Medication Review/Management: medications reviewed     Problem: Adjustment to Illness (Sepsis/Septic Shock)  Goal: Optimal Coping  Outcome: Ongoing, Progressing     Problem: Bleeding (Sepsis/Septic Shock)  Goal: Absence of Bleeding  Outcome:  Ongoing, Progressing     Problem: Glycemic Control Impaired (Sepsis/Septic Shock)  Goal: Blood Glucose Level Within Desired Range  Outcome: Ongoing, Progressing     Problem: Infection Progression (Sepsis/Septic Shock)  Goal: Absence of Infection Signs and Symptoms  Outcome: Ongoing, Progressing     Problem: Nutrition Impaired (Sepsis/Septic Shock)  Goal: Optimal Nutrition Intake  Outcome: Ongoing, Progressing     Problem: Fall Injury Risk  Goal: Absence of Fall and Fall-Related Injury  Outcome: Ongoing, Progressing  Intervention: Identify and Manage Contributors  Recent Flowsheet Documentation  Taken 1/20/2024 2000 by Kaleigh Chin RN  Medication Review/Management: medications reviewed  Intervention: Promote Injury-Free Environment  Recent Flowsheet Documentation  Taken 1/21/2024 0400 by Kaleigh Chin RN  Safety Promotion/Fall Prevention: safety round/check completed  Taken 1/21/2024 0200 by Kaleigh Chin RN  Safety Promotion/Fall Prevention: safety round/check completed  Taken 1/21/2024 0000 by Kaleigh Chin RN  Safety Promotion/Fall Prevention: safety round/check completed  Taken 1/20/2024 2200 by Kaleigh Chin RN  Safety Promotion/Fall Prevention: safety round/check completed  Taken 1/20/2024 2000 by Kaleigh Chin RN  Safety Promotion/Fall Prevention: safety round/check completed   Goal Outcome Evaluation:

## 2024-01-21 NOTE — CONSULTS
GI CONSULT  NOTE:    Referring Provider:    Dr Clinton      Chief complaint:    Acute pancreatitis     Subjective     Patient sent from kidney doctor's office for low blood pressure and heart rate.    History of present illness:     Patient is a 78-year-old male with chronic kidney disease, CAD/DVT, diabetes, hypertension who presented to the ER from nephrology's office due to low blood pressure and heart rate.  Patient was evaluated in the ER heart rate was noted to be 45.  Patient was admitted for acute renal failure, acute pancreatitis, hyperkalemia, ureterolithiasis and weakness.  Patient was noted to have a lipase of greater than 3000.  CT without contrast did not show any pancreatitis though.  Repeat CT on 1/20/2024 did show acute interstitial pancreatitis.  Lipase is down to 248.  GI was consulted today for acute pancreatitis.  Patient did undergo cystoscopy with bilateral ureteral stent placements on 1/16/2024.  Patient also underwent left brachiocephalic AV fistulogram with balloon angioplasty, fistulogram with central vein left subclavian angioplasty on 1/18/2024 with Dr. Liz.  Had IVC filter placed on 1/19/2024.  Patient has been started on hemodialysis during this admission.  Patient denies any abdominal pain.  Family states he has not eaten the most he has eaten in a week over the last 2 days.  He denies any nausea or vomiting.  He is having bowel movements he is denies any constipation or diarrhea.  CT reviewed with family.    Endo History:  2016 colonoscopy (Dr. Peoples) severe diverticulosis of the sigmoid colon and distal descending colon.  Grade 1 internal and external hemorrhoids.  2008 colonoscopy (Dr. Peoples) HP polyp.  Diverticulosis of the sigmoid colon and mid descending colon.  Grade 1 internal hemorrhoids.    Past Medical History:  Past Medical History:   Diagnosis Date    Cancer 01/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    Coronary artery disease     Deep vein thrombosis 04/1990    Diabetes mellitus      Dyslipidemia     Erectile dysfunction 51 years old    Heart murmur 10/4/2023    HL (hearing loss) 7 year old    right    Hyperlipidemia 1970    Hypertension     Neuropathy     Renal insufficiency     Stage 4 chronic kidney disease     3-4       Past Surgical History:  Past Surgical History:   Procedure Laterality Date    ARTERIOVENOUS FISTULA/SHUNT SURGERY Left 2022    Procedure: BRACHIAL CEPHALIC FISTULA FORMATION;  Surgeon: Edy Vega MD;  Location: [facility] MAIN OR;  Service: Vascular;  Laterality: Left;    BACK SURGERY      BASAL CELL CARCINOMA EXCISION  2019    CARDIAC CATHETERIZATION      CYSTOSCOPY W/ URETERAL STENT PLACEMENT Bilateral 2024    Procedure: CYSTOSCOPY, BILATERAL URETERAL STENT INSERTION;  Surgeon: Tyrel Avitia MD;  Location: [facility] MAIN OR;  Service: Urology;  Laterality: Bilateral;    TURP / TRANSURETHRAL INCISION / DRAINAGE PROSTATE         Social History:  Social History     Tobacco Use    Smoking status: Former     Types: Cigarettes     Quit date:      Years since quittin.0     Passive exposure: Past    Smokeless tobacco: Never   Vaping Use    Vaping Use: Never used   Substance Use Topics    Alcohol use: No    Drug use: No       Family History:  Family History   Problem Relation Age of Onset    Heart disease Mother     Heart disease Brother     Heart attack Maternal Grandfather     Hypertension Father     Hearing loss Father        Medications:  Medications Prior to Admission   Medication Sig Dispense Refill Last Dose    amLODIPine (NORVASC) 5 MG tablet Take 1 tablet by mouth Daily.   2024    aspirin 81 MG tablet Take 1 tablet by mouth Daily.   2024    atenolol (TENORMIN) 50 MG tablet Take 1 tablet by mouth Daily.   2024    calcitriol (ROCALTROL) 0.25 MCG capsule Take 2 capsules by mouth Daily.   2024    [] ciprofloxacin (CIPRO) 250 MG tablet Take 1 tablet by mouth 2 (Two) Times a Day.   2024    Insulin Glargine-Lixisenatide  (SOLIQUA) 100-33 UNT-MCG/ML solution pen-injector injection Inject 20 Units under the skin into the appropriate area as directed Daily.       sevelamer (RENVELA) 800 MG tablet Take 1 tablet by mouth 3 (Three) Times a Day As Needed.   1/14/2024    simvastatin (ZOCOR) 20 MG tablet Take 1 tablet by mouth Every Night.       torsemide (DEMADEX) 20 MG tablet Take 1 tablet by mouth Daily.          Scheduled Meds:apixaban, 2.5 mg, Oral, Q12H  atorvastatin, 10 mg, Oral, Daily  calcitriol, 0.5 mcg, Oral, Daily  ertapenem, 500 mg, Intravenous, Q24H  insulin lispro, 2-7 Units, Subcutaneous, 4x Daily AC & at Bedtime  senna-docusate sodium, 2 tablet, Oral, BID  sevelamer, 2,400 mg, Oral, TID With Meals  sodium chloride, 10 mL, Intravenous, Q12H      Continuous Infusions:sodium chloride, 100 mL/hr, Last Rate: 100 mL/hr (01/21/24 1228)      PRN Meds:.  acetaminophen    senna-docusate sodium **AND** polyethylene glycol **AND** bisacodyl **AND** bisacodyl    Calcium Replacement - Follow Nurse / BPA Driven Protocol    dextrose    dextrose    glucagon (human recombinant)    Magnesium Standard Dose Replacement - Follow Nurse / BPA Driven Protocol    nitroglycerin    ondansetron    ondansetron ODT    Phosphorus Replacement - Follow Nurse / BPA Driven Protocol    Potassium Replacement - Follow Nurse / BPA Driven Protocol    [COMPLETED] Insert Peripheral IV **AND** sodium chloride    sodium chloride    sodium chloride    ALLERGIES:  Tylenol with codeine #3 [acetaminophen-codeine]    ROS:  Review of Systems   Gastrointestinal:  Negative for abdominal distention, abdominal pain, anal bleeding, blood in stool, constipation, diarrhea, nausea and vomiting.       The following systems were reviewed and negative;    Constitution:  No fevers, chills, no unintentional weight loss  Skin: no rash, no jaundice  Eyes:  No blurry vision, no eye pain  HENT:  No change in hearing or smell  Resp:  No dyspnea or cough  CV:  No chest pain or  palpitations  :  No dysuria, hematuria  Musculoskeletal:  No leg cramps or arthralgias  Neuro:  No tremor, no numbness  Psych:  No depression or confusion    Objective chronically ill-appearing in bed.  NAD.  Family present.    Vital Signs:   Vitals:    01/20/24 2341 01/21/24 0444 01/21/24 0721 01/21/24 1133   BP: 137/52 143/58 143/62 142/98   BP Location: Right arm Right arm Left arm Right arm   Patient Position: Lying Lying Lying Lying   Pulse: 73 70 73 82   Resp: 16 20 16 20   Temp: 97.2 °F (36.2 °C) 97.9 °F (36.6 °C) 97.7 °F (36.5 °C) 97.9 °F (36.6 °C)   TempSrc: Oral Oral Oral Oral   SpO2: 95% 96% 98% 97%   Weight:       Height:           Physical Exam:   General Appearance:    Awake and alert, in no acute distress   Head:    Normocephalic, without obvious abnormality, atraumatic   Eyes:            Conjunctivae normal, anicteric sclerae, pupils equal   Ears:    Ears appear intact with no abnormalities noted   Throat:   No oral lesions, no thrush, oral mucosa moist   Neck:   Supple, no JVD   Lungs:      respirations regular, even and unlabored       Chest Wall:    No abnormalities observed   Abdomen:      soft, nontender, no rebound or guarding, nondistended, no hepatosplenomegaly   Rectal:     Deferred   Extremities:   Moves all extremities, no edema, no cyanosis   Pulses:   Pulses palpable and equal bilaterally   Skin:   No rash, no jaundice, normal palpation        Neurologic:   Cranial nerves 2 - 12 grossly intact, no asterixis       Results Review:   I reviewed the patient's labs and imaging.  CBC    Results from last 7 days   Lab Units 01/21/24  0003 01/20/24  0105 01/18/24  2332 01/18/24  0352 01/17/24  0848 01/16/24  0646 01/15/24  1125   WBC 10*3/mm3 20.10* 18.60* 27.00* 21.20* 17.20* 15.60* 23.10*   HEMOGLOBIN g/dL 9.2* 8.8* 9.0* 9.0* 8.6* 9.1* 10.4*   PLATELETS 10*3/mm3 180 158 189 210 198 260 274     CMP   Results from last 7 days   Lab Units 01/21/24  0003 01/20/24  0105 01/18/24  1343  01/18/24  0352 01/17/24  0847 01/16/24 2022 01/16/24  0646 01/15/24  2304 01/15/24  1719 01/15/24  1223   SODIUM mmol/L 139 141 139 142 139  --  140  --  140 140   POTASSIUM mmol/L 4.1 3.9 4.2 4.9 4.4  --  4.7 4.1 6.7* 7.3*   CHLORIDE mmol/L 100 101 101 102 99  --  101  --  102 100   CO2 mmol/L 26.0 25.0 25.0 24.0 21.0*  --  17.0*  --  8.0* 9.0*   BUN mg/dL 53* 69* 47* 100* 89* 73* 150*  --  250* 256*   CREATININE mg/dL 4.23* 5.50* 4.23* 7.68* 6.73*  --  10.52*  --  15.98* 16.11*   GLUCOSE mg/dL 138* 119* 70 78 140*  --  39*  --  87 111*   ALBUMIN g/dL 2.9* 2.7* 2.8* 2.8* 2.9*  --  2.9*  --   --  3.2*   BILIRUBIN mg/dL 0.3 0.4  --  0.2 0.3  --  0.2  --   --  0.2   ALK PHOS U/L 118* 92  --  93 122*  --  94  --   --  102   AST (SGOT) U/L 13 10  --  21 24  --  28  --   --  18   ALT (SGPT) U/L 17 17  --  20 24  --  20  --   --  17   MAGNESIUM mg/dL 1.7 1.9  --  1.9 1.8  --  1.9  --   --  2.9*   PHOSPHORUS mg/dL 4.8* 6.0* 4.4 9.3* 8.0*  --  11.0*  --   --   --    AMYLASE U/L  --  122*  --   --   --   --   --   --   --   --    LIPASE U/L  --  248*  --   --   --   --   --   --   --  >3,000*     Cr Clearance Estimated Creatinine Clearance: 18.7 mL/min (A) (by C-G formula based on SCr of 4.23 mg/dL (H)).  Coag   Results from last 7 days   Lab Units 01/18/24  1907   INR  1.27*   APTT seconds 41.3*     HbA1C   Lab Results   Component Value Date    HGBA1C 5.80 (H) 10/10/2023    HGBA1C 6.30 (H) 04/06/2023    HGBA1C 6.6 (H) 07/06/2022     Blood Glucose   Glucose   Date/Time Value Ref Range Status   01/21/2024 1132 145 (H) 70 - 105 mg/dL Final     Comment:     Serial Number: [id number]:  [id number]   01/21/2024 0733 116 (H) 70 - 105 mg/dL Final     Comment:     Serial Number: [id number]:  [id number]   01/20/2024 1933 113 (H) 70 - 105 mg/dL Final     Comment:     Serial Number: [id number]:  [id number]   01/20/2024 1803 113 (H) 70 - 105 mg/dL Final     Comment:     Serial Number: [id number]:  [id number]    01/20/2024 0745 96 70 - 105 mg/dL Final     Comment:     Serial Number: [id number]:  [id number]   01/19/2024 1924 129 (H) 70 - 105 mg/dL Final     Comment:     Serial Number: [id number]:  [id number]   01/19/2024 1735 106 (H) 70 - 105 mg/dL Final     Comment:     Serial Number: [id number]:  [id number]   01/19/2024 1403 87 70 - 105 mg/dL Final     Comment:     Serial Number: [id number]:  [id number]     Infection   Results from last 7 days   Lab Units 01/17/24  1501 01/17/24  1205 01/17/24  1203 01/17/24  0847 01/15/24  1145 01/15/24  1126 01/15/24  1125   BLOODCX   --  No growth at 4 days No growth at 4 days  --   --  No growth at 5 days No growth at 5 days   URINECX  No growth  --   --   --  25,000 CFU/mL Serratia marcescens*  --   --    PROCALCITONIN ng/mL  --   --   --  0.03  --   --   --      UA    Results from last 7 days   Lab Units 01/17/24  1501   NITRITE UA  Negative   WBC UA /HPF Too Numerous to Count*   BACTERIA UA /HPF None Seen   SQUAM EPITHEL UA /HPF 0-2   URINECX  No growth     Radiology(recent) CT Abdomen Pelvis With Contrast    Result Date: 1/20/2024  Impression: 1.Findings are consistent with acute interstitial pancreatitis. Correlate with history and symptoms. 2.Complex right renal cysts versus nodules. Dedicated nonemergent renal mass protocol MRI or recommended. 3.Cholelithiasis with generalized gallbladder distention, similar prior examination. There is either tumefactive sludge or potential gallbladder neoplasm. This can be further assessed with ultrasound or at time of renal MRI/CT. 4.Interval placement of bilateral ureteral stents with resolution of hydronephrosis. Relatively stable position of bilateral proximal ureteral calculi. 5.New IVC filter. 6.New trace right pleural effusion with minimal right basal atelectasis. 7.No evidence of renal abscess. 8.Other stable findings. Electronically Signed: Sudhir Topete MD  1/20/2024 12:32 PM CST  Workstation ID: [id number]        ASSESSMENT:  Acute interstitial pancreatitis with elevated lipase  Sinus bradycardia with first-degree AV block  Leukocytosis related to UTI  UTI Serratia marcescens   Chronic kidney disease now on hemodialysis  AV fistula s/p angioplasty 1/18/24  Bilateral obstructing stones status post bilateral ureteral stent placements 1/16/2024  IVC filter placed 1/19/2024  CAD  History of DVT  Diabetes  Hypertension  Isolated elevated alk phos   Normocytic anemia consider recent surgeries, blood draws and anemia of chronic kidney disease      PLAN:  Would like to proceed with MRCP with contrast.  Will ask nephrology if this is okay, if not will do without.   Okay to proceed with diet if he is not going to have MRCP today.  Patient's symptoms and exam are not congruent with pancreatitis.  Want to rule out carcinoma.  Check tumor markers.   Continue supportive care.    I discussed the patient's findings and my recommendations with the patient.  Sharonda Herring, APRN  01/21/24  12:49 EST    Time:

## 2024-01-21 NOTE — PROGRESS NOTES
[facility] Progress Note      Patient Name: Gabriel De Souza  : 1945  MRN: [id number]  Primary Care Physician:  Waylon Johnson MD  Date of admission: 1/15/2024    Subjective     Interval History:     Patient resting comfortably.    S/p hemodialysis yesterday.  Denies any chest pain, shortness of breath, nausea or vomiting    Review of Systems:   As noted above    Objective     Vitals:   Temp:  [97.2 °F (36.2 °C)-98.9 °F (37.2 °C)] 97.7 °F (36.5 °C)  Heart Rate:  [67-83] 73  Resp:  [16-20] 16  BP: (100-164)/(52-68) 143/62  Flow (L/min):  [4] 4    Intake/Output Summary (Last 24 hours) at 2024 1032  Last data filed at 2024 0800  Gross per 24 hour   Intake 440 ml   Output 1000 ml   Net -560 ml       Physical Exam:    General Appearance: Awake, alert, NAD  HEENT: oral mucosa normal, nonicteric sclera  Neck: supple, no JVD  Lungs: CTA  Heart: RRR, normal S1 and S2  Abdomen: soft, nondistended  Extremities: no edema  Neuro: Awake alert and moving all extremities    Scheduled Meds:     apixaban, 2.5 mg, Oral, Q12H  atorvastatin, 10 mg, Oral, Daily  calcitriol, 0.5 mcg, Oral, Daily  ertapenem, 500 mg, Intravenous, Q24H  insulin lispro, 2-7 Units, Subcutaneous, 4x Daily AC & at Bedtime  senna-docusate sodium, 2 tablet, Oral, BID  sevelamer, 2,400 mg, Oral, TID With Meals  sodium chloride, 10 mL, Intravenous, Q12H      IV Meds:          Results Reviewed:   I have personally reviewed the results from the time of this admission to 2024 10:32 EST     Results from last 7 days   Lab Units 24  0003 24  0105 24  2332 24  0352   SODIUM mmol/L 139 141 139 142   POTASSIUM mmol/L 4.1 3.9 4.2 4.9   CHLORIDE mmol/L 100 101 101 102   CO2 mmol/L 26.0 25.0 25.0 24.0   BUN mg/dL 53* 69* 47* 100*   CREATININE mg/dL 4.23* 5.50* 4.23* 7.68*   CALCIUM mg/dL 8.8 8.6 8.4* 8.3*   BILIRUBIN mg/dL 0.3 0.4  --  0.2   ALK PHOS U/L 118* 92  --  93   ALT (SGPT) U/L 17 17   --  20   AST (SGOT) U/L 13 10  --  21   GLUCOSE mg/dL 138* 119* 70 78     Estimated Creatinine Clearance: 18.7 mL/min (A) (by C-G formula based on SCr of 4.23 mg/dL (H)).  Results from last 7 days   Lab Units 01/21/24  0003 01/20/24  0105 01/18/24  2332 01/18/24  0352   MAGNESIUM mg/dL 1.7 1.9  --  1.9   PHOSPHORUS mg/dL 4.8* 6.0* 4.4 9.3*         Results from last 7 days   Lab Units 01/21/24  0003 01/20/24  0105 01/18/24  2332 01/18/24  0352 01/17/24  0848   WBC 10*3/mm3 20.10* 18.60* 27.00* 21.20* 17.20*   HEMOGLOBIN g/dL 9.2* 8.8* 9.0* 9.0* 8.6*   PLATELETS 10*3/mm3 180 158 189 210 198     Results from last 7 days   Lab Units 01/18/24  1907   INR  1.27*       Assessment / Plan     ASSESSMENT:  End-stage renal disease.  S/p hemodialysis yesterday.  Electrolytes, volume status okay  Obstructive uropathy.  Status post bilateral ureteral stents placement.  Patient also has complex left renal cyst.  Repeat CT scan showed resolution of hydronephrosis.  Complex right renal cysts.  Hyperkalemia.  Secondary to renal failure.  Improved  Metabolic acidosis.  Increased anion gap, secondary to renal failure.  Improving with dialysis  Anemia in chronic kidney disease.   hyperphosphatemia.  Improved  DVT.  Hematology, vascular following following.  S/p IVC filter placement.    Urinary tract infection.  on IV antibiotics    PLAN:  Continue dialysis Tuesday Thursday and Saturday  Outpatient hemodialysis being arranged    Atul Fowler MD  01/21/24  10:32 [facility]    [facility]  [phone]

## 2024-01-21 NOTE — PROGRESS NOTES
Infectious Diseases Progress Note      LOS: 6 days   Patient Care Team:  Waylon Johnson MD as PCP - General  Chemchirian, MD Lali as Consulting Physician (Cardiology)  Atul Fowler MD as Consulting Physician (Nephrology)    Chief Complaint: Fatigue    Subjective       The patient has been afebrile for the last 24 hours.  The patient is on room air, hemodynamically stable, and is tolerating antimicrobial therapy.  Denies abdominal pain.  Main complaint is fatigue      Review of Systems:   Review of Systems   Constitutional:  Positive for fatigue.   HENT: Negative.     Eyes: Negative.    Respiratory: Negative.     Cardiovascular: Negative.    Gastrointestinal: Negative.    Endocrine: Negative.    Genitourinary: Negative.    Musculoskeletal: Negative.    Skin: Negative.    Neurological: Negative.    Psychiatric/Behavioral: Negative.     All other systems reviewed and are negative.       Objective     Vital Signs  Temp:  [97.2 °F (36.2 °C)-98.9 °F (37.2 °C)] 97.9 °F (36.6 °C)  Heart Rate:  [67-82] 82  Resp:  [16-20] 20  BP: (100-164)/(52-98) 142/98    Physical Exam:  Physical Exam  Vitals and nursing note reviewed.   Constitutional:       General: He is not in acute distress.     Appearance: He is well-developed and normal weight. He is ill-appearing. He is not diaphoretic.   HENT:      Head: Normocephalic and atraumatic.   Eyes:      Conjunctiva/sclera: Conjunctivae normal.      Pupils: Pupils are equal, round, and reactive to light.   Cardiovascular:      Rate and Rhythm: Normal rate and regular rhythm.      Heart sounds: Normal heart sounds, S1 normal and S2 normal.   Pulmonary:      Effort: Pulmonary effort is normal. No respiratory distress.      Breath sounds: Normal breath sounds. No stridor. No wheezing or rales.   Abdominal:      General: Bowel sounds are normal. There is no distension.      Palpations: Abdomen is soft. There is no mass.      Tenderness: There is no abdominal tenderness. There is no  guarding.      Comments: Abdomen is nontender at all   Genitourinary:     Comments: Peterson catheter  Musculoskeletal:         General: No deformity. Normal range of motion.      Cervical back: Neck supple.   Skin:     General: Skin is warm and dry.      Coloration: Skin is not pale.      Findings: No erythema or rash.      Comments:   Left arm fistula   Neurological:      Mental Status: He is alert and oriented to person, place, and time.      Cranial Nerves: No cranial nerve deficit.   Psychiatric:         Mood and Affect: Mood normal.          Results Review:    I have reviewed all clinical data, test, lab, and imaging results.     Radiology  No Radiology Exams Resulted Within Past 24 Hours    Cardiology    Laboratory    Results from last 7 days   Lab Units 01/21/24  0003 01/20/24  0105 01/18/24  2332 01/18/24  0352 01/17/24  0848 01/16/24  0646 01/15/24  1125   WBC 10*3/mm3 20.10* 18.60* 27.00* 21.20* 17.20* 15.60* 23.10*   HEMOGLOBIN g/dL 9.2* 8.8* 9.0* 9.0* 8.6* 9.1* 10.4*   HEMATOCRIT % 28.7* 27.6* 27.7* 27.3* 25.5* 27.4* 32.4*   PLATELETS 10*3/mm3 180 158 189 210 198 260 274     Results from last 7 days   Lab Units 01/21/24  0003 01/20/24  0105 01/18/24  2332 01/18/24  0352 01/17/24  0847 01/16/24  2022 01/16/24  0646 01/15/24  2304 01/15/24  1719 01/15/24  1223   SODIUM mmol/L 139 141 139 142 139  --  140  --  140 140   POTASSIUM mmol/L 4.1 3.9 4.2 4.9 4.4  --  4.7 4.1 6.7* 7.3*   CHLORIDE mmol/L 100 101 101 102 99  --  101  --  102 100   CO2 mmol/L 26.0 25.0 25.0 24.0 21.0*  --  17.0*  --  8.0* 9.0*   BUN mg/dL 53* 69* 47* 100* 89* 73* 150*  --  250* 256*   CREATININE mg/dL 4.23* 5.50* 4.23* 7.68* 6.73*  --  10.52*  --  15.98* 16.11*   GLUCOSE mg/dL 138* 119* 70 78 140*  --  39*  --  87 111*   ALBUMIN g/dL 2.9* 2.7* 2.8* 2.8* 2.9*  --  2.9*  --   --  3.2*   BILIRUBIN mg/dL 0.3 0.4  --  0.2 0.3  --  0.2  --   --  0.2   ALK PHOS U/L 118* 92  --  93 122*  --  94  --   --  102   AST (SGOT) U/L 13 10  --  21 24   --  28  --   --  18   ALT (SGPT) U/L 17 17  --  20 24  --  20  --   --  17   AMYLASE U/L  --  122*  --   --   --   --   --   --   --   --    LIPASE U/L  --  248*  --   --   --   --   --   --   --  >3,000*   CALCIUM mg/dL 8.8 8.6 8.4* 8.3* 7.9*  --  8.3*  --  9.1 9.4     Results from last 7 days   Lab Units 01/15/24  1223   CK TOTAL U/L 44             Microbiology   Microbiology Results (last 10 days)       Procedure Component Value - Date/Time    Urine Culture - Urine, Indwelling Urethral Catheter [[id number]]  (Normal) Collected: 01/17/24 1501    Lab Status: Final result Specimen: Urine from Indwelling Urethral Catheter Updated: 01/18/24 1939     Urine Culture No growth    Blood Culture - Blood, Arm, Left [[id number]]  (Normal) Collected: 01/17/24 1205    Lab Status: Preliminary result Specimen: Blood from Arm, Left Updated: 01/21/24 1230     Blood Culture No growth at 4 days    Blood Culture - Blood, Arm, Left [[id number]]  (Normal) Collected: 01/17/24 1203    Lab Status: Preliminary result Specimen: Blood from Arm, Left Updated: 01/21/24 1230     Blood Culture No growth at 4 days    COVID-19 and FLU A/B PCR, 1 HR TAT - Swab, Nasopharynx [[id number]]  (Normal) Collected: 01/15/24 1602    Lab Status: Final result Specimen: Swab from Nasopharynx Updated: 01/15/24 1624     COVID19 Not Detected     Influenza A PCR Not Detected     Influenza B PCR Not Detected    Narrative:      Fact sheet for providers: https://www.fda.gov/media/526042/download    Fact sheet for patients: https://www.fda.gov/media/405087/download    Test performed by PCR.    Urine Culture - Urine, Urine, Clean Catch [[id number]]  (Abnormal)  (Susceptibility) Collected: 01/15/24 1145    Lab Status: Final result Specimen: Urine, Clean Catch Updated: 01/18/24 0837     Urine Culture 25,000 CFU/mL Serratia marcescens    Narrative:      Colonization of the urinary tract without infection is common. Treatment is discouraged unless the patient is symptomatic,  pregnant, or undergoing an invasive urologic procedure.    Susceptibility        Serratia marcescens      ZOILA      Amoxicillin + Clavulanate Resistant      Cefazolin Resistant      Cefepime Susceptible      Ceftazidime Susceptible      Ceftriaxone Susceptible      Gentamicin Susceptible      Levofloxacin Susceptible      Nitrofurantoin Resistant      Piperacillin + Tazobactam Susceptible  [1]       Trimethoprim + Sulfamethoxazole Susceptible                   [1]  Appended report. These results have been appended to a previously preliminary verified report.                   Blood Culture - Blood, Arm, Right [[id number]]  (Normal) Collected: 01/15/24 1126    Lab Status: Final result Specimen: Blood from Arm, Right Updated: 01/20/24 1130     Blood Culture No growth at 5 days    Narrative:      Less than seven (7) mL's of blood was collected.  Insufficient quantity may yield false negative results.    Blood Culture - Blood, Arm, Left [[id number]]  (Normal) Collected: 01/15/24 1125    Lab Status: Final result Specimen: Blood from Arm, Left Updated: 01/20/24 1130     Blood Culture No growth at 5 days    Narrative:      Less than seven (7) mL's of blood was collected.  Insufficient quantity may yield false negative results.            Medication Review:       Schedule Meds  apixaban, 2.5 mg, Oral, Q12H  atorvastatin, 10 mg, Oral, Daily  calcitriol, 0.5 mcg, Oral, Daily  ertapenem, 500 mg, Intravenous, Q24H  insulin lispro, 2-7 Units, Subcutaneous, 4x Daily AC & at Bedtime  senna-docusate sodium, 2 tablet, Oral, BID  sevelamer, 2,400 mg, Oral, TID With Meals  sodium chloride, 10 mL, Intravenous, Q12H        Infusion Meds  sodium chloride, 100 mL/hr, Last Rate: 100 mL/hr (01/21/24 1228)        PRN Meds    acetaminophen    senna-docusate sodium **AND** polyethylene glycol **AND** bisacodyl **AND** bisacodyl    Calcium Replacement - Follow Nurse / BPA Driven Protocol    dextrose    dextrose    glucagon (human recombinant)     Magnesium Standard Dose Replacement - Follow Nurse / BPA Driven Protocol    nitroglycerin    ondansetron    ondansetron ODT    Phosphorus Replacement - Follow Nurse / BPA Driven Protocol    Potassium Replacement - Follow Nurse / BPA Driven Protocol    [COMPLETED] Insert Peripheral IV **AND** sodium chloride    sodium chloride    sodium chloride        Assessment & Plan       Antimicrobial Therapy   1.  IV ertapenem        2.        3.        4.        5.          Assessment     Severe reactive leukocytosis.  Started to improve     Probable complicated UTI.  Urine culture grew Serratia marcescens from January 15 2024.  Patient had prior urine culture before admission on January 8, 2024 and grew the same organism.  CT scan of abdomen pelvis done without contrast and showed bilateral hydronephrosis with possible complex cyst on the right kidney.  CT scan of abdomen pelvis with IV contrast showed complex right renal cysts versus nodules  -The patient is s/p bilateral ureteral stent placement on January 16, 2024    Incidentally there was a finding consistent with interstitial pancreatitis.  But patient has no abdominal pain or tenderness.  Patient has no history of chronic pancreatitis.  Amylase and lipase are elevated.  GI now following     End-stage renal disease-patient was started on dialysis this admission.  Patient followed by nephrology     History of left AV fistula placement on 9/22/2022 which required a fistulogram and angioplasty on 1/18/2024.  Patient followed by vascular surgery     Acute left leg DVTs.  Hematology following.  Patient has IVC filter placement on 1/19/2024     Type 2 diabetes     History of prostate cancer         Plan     Continue IV ertapenem 500 mg every 24 hours for 14 days  GI is requesting MRCP and apparently is concerned for carcinoma  Continue supportive care  A.m. labs  Case discussed with patient and family members at bedside      DG Arthur  01/21/24  15:08  EST    Note is dictated utilizing voice recognition software/Dragon

## 2024-01-21 NOTE — THERAPY TREATMENT NOTE
"Subjective: Pt agreeable to therapeutic plan of care.    Objective:     Bed mobility - Min-A to L so he didn't have to use R arm per family request since having probs keeping good IV's.  Transfers - Min-A  Ambulation -  feet N/A or Not attempted.    Therapeutic Exercise - 10 Reps B LE AROM unsupported sitting / EOB    Vitals: WNL    Pain: 0 VAS       Education: Provided education on the importance of mobility in the acute care setting, Verbal/Tactile Cues, ADL training, and Transfer Training and vc's to PLB,     Assessment: Gabriel De Souza presents with functional mobility impairments which indicate the need for skilled intervention. Tolerating session today without incident. Pt needed to use bs commode, req min assist and was dependent to get cleaned up. Has catheter. Plans on rehab at OR. Will continue to follow and progress as tolerated.     Plan/Recommendations:   If medically appropriate, Moderate Intensity Therapy recommended post-acute care. This is recommended as therapy feels the patient would require 3-4 days per week and wouldn't tolerate \"3 hour daily\" rehab intensity. SNF would be the preferred choice. If the patient does not agree to SNF, arrange HH or OP depending on home bound status. If patient is medically complex, consider LTACH. Pt requires no DME at discharge.     Pt desires Skilled Rehab placement at discharge. Pt cooperative; agreeable to therapeutic recommendations and plan of care.       Basic Mobility 6-click:  Rollin = Total, A lot = 2, A little = 3; 4 = None  Supine>Sit:   1 = Total, A lot = 2, A little = 3; 4 = None   Sit>Stand with arms:  1 = Total, A lot = 2, A little = 3; 4 = None  Bed>Chair:   1 = Total, A lot = 2, A little = 3; 4 = None  Ambulate in room:  1 = Total, A lot = 2, A little = 3; 4 = None  3-5 Steps with railin = Total, A lot = 2, A little = 3; 4 = None  Score: 16    Post-Tx Position: Supine with HOB Elevated, Alarms activated, and Call light and personal " items within reach  PPE: gloves

## 2024-01-21 NOTE — PLAN OF CARE
Goal Outcome Evaluation:  Plan of Care Reviewed With: patient, spouse, son        Progress: improving  Outcome Evaluation: MRCP ordered today, will be done tommorow am, new orders received today, continues abx with no ASE

## 2024-01-21 NOTE — PROGRESS NOTES
[facility] MEDICINE SERVICE  DAILY PROGRESS NOTE    NAME: Gabriel De Souza  : 1945  MRN: [id number]      LOS: 6 days     PROVIDER OF SERVICE: Licha Clinton MD    Chief Complaint: Hyperkalemia    Subjective:     Interval History:  History taken from: patient  Patient seen and examined, he is doing much better.  Bradycardia and hypotension have resolved.  He did have low blood sugars in the morning with hypoglycemia protocol was activated.  He is currently resting comfortably.  Has  sandy adamgger on     patient seen and examined, he is lying in bed comfortably.  Eating drinking okay.  Having bowel movements as well.  Repeat blood cultures were sent by nephrology today     patient seen and examined,resting comfortably , denies any leg pain     patient seen and examined, just came back from IVC filter placement, resting comfortably     patient seen and examined, denies any complaints.  Resting comfortably.     patient seen and examined, no abdominal pain nausea or vomiting      Review of Systems:   Review of Systems  10 point ROS is negative other than what is stated positive above  Objective:     Vital Signs  Temp:  [97.2 °F (36.2 °C)-98.9 °F (37.2 °C)] 97.9 °F (36.6 °C)  Heart Rate:  [67-83] 82  Resp:  [16-20] 20  BP: (100-164)/(52-98) 142/98  Flow (L/min):  [4] 4   Body mass index is 26.24 kg/m².    Physical Exam  Physical Exam  Exam, awake and alert, hard of hearing  HEENT normocephalic atraumatic  Chest clear to auscultation bilaterally  CVs S1-S2 normal, regular rhythm, bradycardia  Abdomen soft nontender nondistended bowel sounds positive  Extremities warm to touch 2+ pulses no edema  Neuro AOx3, grossly normal     Scheduled Meds   apixaban, 2.5 mg, Oral, Q12H  atorvastatin, 10 mg, Oral, Daily  calcitriol, 0.5 mcg, Oral, Daily  ertapenem, 500 mg, Intravenous, Q24H  insulin lispro, 2-7 Units, Subcutaneous, 4x Daily AC & at Bedtime  senna-docusate sodium, 2 tablet, Oral,  BID  sevelamer, 2,400 mg, Oral, TID With Meals  sodium chloride, 10 mL, Intravenous, Q12H       PRN Meds     acetaminophen    senna-docusate sodium **AND** polyethylene glycol **AND** bisacodyl **AND** bisacodyl    Calcium Replacement - Follow Nurse / BPA Driven Protocol    dextrose    dextrose    glucagon (human recombinant)    Magnesium Standard Dose Replacement - Follow Nurse / BPA Driven Protocol    nitroglycerin    ondansetron    ondansetron ODT    Phosphorus Replacement - Follow Nurse / BPA Driven Protocol    Potassium Replacement - Follow Nurse / BPA Driven Protocol    [COMPLETED] Insert Peripheral IV **AND** sodium chloride    sodium chloride    sodium chloride   Infusions  sodium chloride, 100 mL/hr            Diagnostic Data    Results from last 7 days   Lab Units 01/21/24  0003   WBC 10*3/mm3 20.10*   HEMOGLOBIN g/dL 9.2*   HEMATOCRIT % 28.7*   PLATELETS 10*3/mm3 180   GLUCOSE mg/dL 138*   CREATININE mg/dL 4.23*   BUN mg/dL 53*   SODIUM mmol/L 139   POTASSIUM mmol/L 4.1   AST (SGOT) U/L 13   ALT (SGPT) U/L 17   ALK PHOS U/L 118*   BILIRUBIN mg/dL 0.3   ANION GAP mmol/L 13.0       CT Abdomen Pelvis With Contrast    Result Date: 1/20/2024  Impression: 1.Findings are consistent with acute interstitial pancreatitis. Correlate with history and symptoms. 2.Complex right renal cysts versus nodules. Dedicated nonemergent renal mass protocol MRI or recommended. 3.Cholelithiasis with generalized gallbladder distention, similar prior examination. There is either tumefactive sludge or potential gallbladder neoplasm. This can be further assessed with ultrasound or at time of renal MRI/CT. 4.Interval placement of bilateral ureteral stents with resolution of hydronephrosis. Relatively stable position of bilateral proximal ureteral calculi. 5.New IVC filter. 6.New trace right pleural effusion with minimal right basal atelectasis. 7.No evidence of renal abscess. 8.Other stable findings. Electronically Signed: Sudhir  MD Efrem  1/20/2024 12:32 PM CST  Workstation ID: [id number]       I reviewed the patient's new clinical results.    Assessment/Plan:     Active and Resolved Problems  Active Hospital Problems    Diagnosis  POA    **Hyperkalemia [E87.5]  Yes    Mechanical complication of arteriovenous fistula surgically created [T82.590A]  Yes    End stage renal disease on dialysis [N18.6, Z99.2]  Not Applicable    Sinus bradycardia [R00.1]  Yes      Resolved Hospital Problems   No resolved problems to display.     Hospital summary : patient is a 78-year-old male who presented to the hospital with complaints of hypotension bradycardia hyperkalemia and worsening CKD stage IV.  Hypertension bradycardia were deemed to be secondary to hyperkalemia, he was taken for emergent dialysis.  Hypertension bradycardia resolved after that.  Hyperkalemia resolved.  He was found to have bilateral renal stones, urology was consulted and he has no stents in.  He was also put on Rocephin for UTI.  Urine cultures grew Serratia, blood cultures did not grow anything.  He was doing well however his white count continued to worsen, at that point of time ID was consulted.  They recommended switching antibiotics to ertapenem.  Recommended repeating a CT to check to look for abscess.  CT did not show any abscess but showed pancreatitis.  Now switching his diet to n.p.o., consult GI.  Of note he was also found to have DVT left lower extremity, s/p IVC filter placement and is on Eliquis 2.5 mg p.o. twice daily.  Oncology also following.        CKD stage IV, now end-stage renal disease, hemodialysis has been started.  Patient has a fistula already however it was bleeding status post fistulogram and angioplasty on 1/18/2024.  S/p dialysis third time on 1/19.  Further dialysis per nephrology, appreciate recommendations.  Renvela dose increased        Hyperkalemia  Resolved        Bilateral renal stones with hydronephrosis likely contributing to deterioration of  the CKD  Urology was consulted, patient s/p placement of bilateral ureteral stents.  Was initially on Rocephin however white count worsened so was switched to ertapenem-ID following  Monitor blood and urine cultures.--Blood cultures negative for 24 hours, urine cultures growing Serratia, sensitive to Rocephin.  Continue  Patient currently has a Peterson in.  Urology following, plan to remove Peterson once creatinine improves and confusion improves      Leukocytosis  Now improving after switching antibiotics to ertapenem from Rocephin.  ID was consulted.    Repeat CT was done since it was concern for abscess, however did not show any abscess but showed acute interstitial pancreatitis.    Acute interstitial pancreatitis per CT scan  Patient denies any abdominal pain is having nausea vomiting, manage lipase slightly elevated.  Switch to n.p.o. diet for now, start IV fluids  Consulted GI  .      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  Started Eliquis 2.5 mg p.o. twice daily and now status postplacement of IVC filter       Bradycardia and hypotension  Due to hyperkalemia  Resolved  Vitals now stable    Hypoglycemia  Blood glucose better now, encourage p.o. intake    DVT prophylaxis:  Medical and mechanical DVT prophylaxis orders are present.     Code status is   Code Status and Medical Interventions:   Ordered at: 01/15/24 2605     Level Of Support Discussed With:    Patient    Health Care Surrogate    Next of Kin (If No Surrogate)     Code Status (Patient has no pulse and is not breathing):    CPR (Attempt to Resuscitate)     Medical Interventions (Patient has pulse or is breathing):    Full Support       Plan for disposition: 3 to 4 days    Time: 30 minutes    Signature: Electronically signed by Licha Clinton MD, 01/21/24, 11:39  EST.  [facility] Hospitalist Team

## 2024-01-21 NOTE — NURSING NOTE
Call placed to Dr. Fowler's office to request clearance for MRCP with contrast per Sharonda CONTE. Spoke with on call. Awaiting a call back

## 2024-01-22 ENCOUNTER — ANESTHESIA EVENT (OUTPATIENT)
Dept: GASTROENTEROLOGY | Facility: HOSPITAL | Age: 79
End: 2024-01-22
Payer: MEDICARE

## 2024-01-22 ENCOUNTER — INPATIENT HOSPITAL (AMBULATORY)
Dept: URBAN - METROPOLITAN AREA HOSPITAL 84 | Facility: HOSPITAL | Age: 79
End: 2024-01-22

## 2024-01-22 ENCOUNTER — APPOINTMENT (OUTPATIENT)
Dept: MRI IMAGING | Facility: HOSPITAL | Age: 79
End: 2024-01-22
Payer: MEDICARE

## 2024-01-22 DIAGNOSIS — K85.80 OTHER ACUTE PANCREATITIS WITHOUT NECROSIS OR INFECTION: ICD-10-CM

## 2024-01-22 DIAGNOSIS — R74.8 ABNORMAL LEVELS OF OTHER SERUM ENZYMES: ICD-10-CM

## 2024-01-22 DIAGNOSIS — D64.9 ANEMIA, UNSPECIFIED: ICD-10-CM

## 2024-01-22 DIAGNOSIS — N18.9 CHRONIC KIDNEY DISEASE, UNSPECIFIED: ICD-10-CM

## 2024-01-22 DIAGNOSIS — Z99.2 DEPENDENCE ON RENAL DIALYSIS: ICD-10-CM

## 2024-01-22 PROBLEM — K80.50 CHOLEDOCHOLITHIASIS: Status: ACTIVE | Noted: 2024-01-15

## 2024-01-22 LAB
ALBUMIN SERPL-MCNC: 2.3 G/DL (ref 3.5–5.2)
ALBUMIN/GLOB SERPL: 0.7 G/DL
ALP SERPL-CCNC: 119 U/L (ref 39–117)
ALPHA-FETOPROTEIN: <2 NG/ML (ref 0–8.3)
ALT SERPL W P-5'-P-CCNC: 16 U/L (ref 1–41)
ANION GAP SERPL CALCULATED.3IONS-SCNC: 14 MMOL/L (ref 5–15)
AST SERPL-CCNC: 17 U/L (ref 1–40)
BACTERIA SPEC AEROBE CULT: NORMAL
BACTERIA SPEC AEROBE CULT: NORMAL
BASOPHILS # BLD AUTO: 0.1 10*3/MM3 (ref 0–0.2)
BASOPHILS NFR BLD AUTO: 0.4 % (ref 0–1.5)
BILIRUB SERPL-MCNC: 0.3 MG/DL (ref 0–1.2)
BUN SERPL-MCNC: 72 MG/DL (ref 8–23)
BUN/CREAT SERPL: 12.5 (ref 7–25)
CALCIUM SPEC-SCNC: 8.7 MG/DL (ref 8.6–10.5)
CANCER AG19-9 SERPL-ACNC: 95.3 U/ML
CHLORIDE SERPL-SCNC: 102 MMOL/L (ref 98–107)
CO2 SERPL-SCNC: 21 MMOL/L (ref 22–29)
CREAT SERPL-MCNC: 5.76 MG/DL (ref 0.76–1.27)
DEPRECATED RDW RBC AUTO: 48.6 FL (ref 37–54)
EGFRCR SERPLBLD CKD-EPI 2021: 9.5 ML/MIN/1.73
EOSINOPHIL # BLD AUTO: 0.5 10*3/MM3 (ref 0–0.4)
EOSINOPHIL NFR BLD AUTO: 3 % (ref 0.3–6.2)
ERYTHROCYTE [DISTWIDTH] IN BLOOD BY AUTOMATED COUNT: 15.2 % (ref 12.3–15.4)
GLOBULIN UR ELPH-MCNC: 3.3 GM/DL
GLUCOSE BLDC GLUCOMTR-MCNC: 126 MG/DL (ref 70–105)
GLUCOSE BLDC GLUCOMTR-MCNC: 154 MG/DL (ref 70–105)
GLUCOSE BLDC GLUCOMTR-MCNC: 96 MG/DL (ref 70–105)
GLUCOSE SERPL-MCNC: 140 MG/DL (ref 65–99)
HCT VFR BLD AUTO: 28.8 % (ref 37.5–51)
HGB BLD-MCNC: 9.3 G/DL (ref 13–17.7)
LYMPHOCYTES # BLD AUTO: 2.1 10*3/MM3 (ref 0.7–3.1)
LYMPHOCYTES NFR BLD AUTO: 12.6 % (ref 19.6–45.3)
MAGNESIUM SERPL-MCNC: 2.1 MG/DL (ref 1.6–2.4)
MCH RBC QN AUTO: 29 PG (ref 26.6–33)
MCHC RBC AUTO-ENTMCNC: 32.4 G/DL (ref 31.5–35.7)
MCV RBC AUTO: 89.7 FL (ref 79–97)
MONOCYTES # BLD AUTO: 2.4 10*3/MM3 (ref 0.1–0.9)
MONOCYTES NFR BLD AUTO: 14.4 % (ref 5–12)
NEUTROPHILS NFR BLD AUTO: 11.6 10*3/MM3 (ref 1.7–7)
NEUTROPHILS NFR BLD AUTO: 69.6 % (ref 42.7–76)
NRBC BLD AUTO-RTO: 0.1 /100 WBC (ref 0–0.2)
PHOSPHATE SERPL-MCNC: 6.1 MG/DL (ref 2.5–4.5)
PLATELET # BLD AUTO: 216 10*3/MM3 (ref 140–450)
PMV BLD AUTO: 9.4 FL (ref 6–12)
POTASSIUM SERPL-SCNC: 4.4 MMOL/L (ref 3.5–5.2)
PROT SERPL-MCNC: 5.6 G/DL (ref 6–8.5)
RBC # BLD AUTO: 3.21 10*6/MM3 (ref 4.14–5.8)
SODIUM SERPL-SCNC: 137 MMOL/L (ref 136–145)
WBC NRBC COR # BLD AUTO: 16.6 10*3/MM3 (ref 3.4–10.8)

## 2024-01-22 PROCEDURE — 85025 COMPLETE CBC W/AUTO DIFF WBC: CPT | Performed by: HOSPITALIST

## 2024-01-22 PROCEDURE — 63710000001 INSULIN LISPRO (HUMAN) PER 5 UNITS: Performed by: SURGERY

## 2024-01-22 PROCEDURE — 84100 ASSAY OF PHOSPHORUS: CPT | Performed by: HOSPITALIST

## 2024-01-22 PROCEDURE — 74181 MRI ABDOMEN W/O CONTRAST: CPT

## 2024-01-22 PROCEDURE — 80053 COMPREHEN METABOLIC PANEL: CPT | Performed by: HOSPITALIST

## 2024-01-22 PROCEDURE — 82948 REAGENT STRIP/BLOOD GLUCOSE: CPT

## 2024-01-22 PROCEDURE — 82948 REAGENT STRIP/BLOOD GLUCOSE: CPT | Performed by: SURGERY

## 2024-01-22 PROCEDURE — 83735 ASSAY OF MAGNESIUM: CPT | Performed by: HOSPITALIST

## 2024-01-22 PROCEDURE — 25810000003 SODIUM CHLORIDE 0.9 % SOLUTION: Performed by: HOSPITALIST

## 2024-01-22 PROCEDURE — 25010000002 ERTAPENEM PER 500 MG: Performed by: NURSE PRACTITIONER

## 2024-01-22 PROCEDURE — 99232 SBSQ HOSP IP/OBS MODERATE 35: CPT | Mod: FS | Performed by: NURSE PRACTITIONER

## 2024-01-22 RX ADMIN — ATORVASTATIN CALCIUM 10 MG: 10 TABLET, FILM COATED ORAL at 08:35

## 2024-01-22 RX ADMIN — INSULIN LISPRO 2 UNITS: 100 INJECTION, SOLUTION INTRAVENOUS; SUBCUTANEOUS at 21:28

## 2024-01-22 RX ADMIN — CALCITRIOL CAPSULES 0.25 MCG 0.5 MCG: 0.25 CAPSULE ORAL at 08:35

## 2024-01-22 RX ADMIN — SODIUM CHLORIDE 100 ML/HR: 9 INJECTION, SOLUTION INTRAVENOUS at 19:13

## 2024-01-22 RX ADMIN — SEVELAMER CARBONATE 2400 MG: 800 TABLET, FILM COATED ORAL at 19:13

## 2024-01-22 RX ADMIN — SEVELAMER CARBONATE 2400 MG: 800 TABLET, FILM COATED ORAL at 08:35

## 2024-01-22 RX ADMIN — Medication 10 ML: at 21:28

## 2024-01-22 RX ADMIN — ERTAPENEM SODIUM 500 MG: 1 INJECTION, POWDER, LYOPHILIZED, FOR SOLUTION INTRAMUSCULAR; INTRAVENOUS at 19:13

## 2024-01-22 RX ADMIN — Medication 10 ML: at 08:38

## 2024-01-22 RX ADMIN — SENNOSIDES AND DOCUSATE SODIUM 2 TABLET: 50; 8.6 TABLET ORAL at 08:35

## 2024-01-22 RX ADMIN — APIXABAN 2.5 MG: 2.5 TABLET, FILM COATED ORAL at 08:35

## 2024-01-22 NOTE — PROGRESS NOTES
" LOS: 7 days   Patient Care Team:  Waylon Johnson MD as PCP - General  Chemchirian, MD Lali as Consulting Physician (Cardiology)  Atul Fowler MD as Consulting Physician (Nephrology)      Subjective \"I'm doing alright\"    Interval History:     Subjective: Denies abdominal pain.  Reports admitted for near syncopal episode as well as worsening renal failure.  Denies nausea or vomiting.    ROS:   No chest pain, shortness of breath, or cough.      Medication Review:     Current Facility-Administered Medications:     acetaminophen (TYLENOL) suppository 650 mg, 650 mg, Rectal, Q6H PRN, Moi Liz MD, 650 mg at 01/16/24 0550    [Held by provider] apixaban (ELIQUIS) tablet 2.5 mg, 2.5 mg, Oral, Q12H, Licha Clinton MD, 2.5 mg at 01/22/24 0835    atorvastatin (LIPITOR) tablet 10 mg, 10 mg, Oral, Daily, Moi Liz MD, 10 mg at 01/22/24 0835    sennosides-docusate (PERICOLACE) 8.6-50 MG per tablet 2 tablet, 2 tablet, Oral, BID, 2 tablet at 01/22/24 0835 **AND** polyethylene glycol (MIRALAX) packet 17 g, 17 g, Oral, Daily PRN **AND** bisacodyl (DULCOLAX) EC tablet 5 mg, 5 mg, Oral, Daily PRN **AND** bisacodyl (DULCOLAX) suppository 10 mg, 10 mg, Rectal, Daily PRN, Moi Liz MD    calcitriol (ROCALTROL) capsule 0.5 mcg, 0.5 mcg, Oral, Daily, Moi Liz MD, 0.5 mcg at 01/22/24 0835    Calcium Replacement - Follow Nurse / BPA Driven Protocol, , Does not apply, PRN, Moi Liz MD    dextrose (D50W) (25 g/50 mL) IV injection 25 g, 25 g, Intravenous, Q15 Min PRN, Moi Liz MD, 25 g at 01/19/24 0039    dextrose (GLUTOSE) oral gel 15 g, 15 g, Oral, Q15 Min PRN, Moi Liz MD    ertapenem (INVanz) 500 mg in sodium chloride 0.9 % 50 mL IVPB, 500 mg, Intravenous, Q24H, Ann Beavers, APRN, Last Rate: 100 mL/hr at 01/21/24 1627, 500 mg at 01/21/24 1627    glucagon (GLUCAGEN) injection 1 mg, 1 mg, Subcutaneous, Q15 Min PRN, Moi Liz " MD Rashawn    insulin lispro (HUMALOG/ADMELOG) injection 2-7 Units, 2-7 Units, Subcutaneous, 4x Daily AC & at Bedtime, Moi Liz MD    Magnesium Standard Dose Replacement - Follow Nurse / BPA Driven Protocol, , Does not apply, Shalonda THAYER David Anthony, MD    nitroglycerin (NITROSTAT) SL tablet 0.4 mg, 0.4 mg, Sublingual, Q5 Min PRN, Moi Liz MD    ondansetron (ZOFRAN) injection 4 mg, 4 mg, Intravenous, Q6H PRN, Moi Liz MD, 4 mg at 01/17/24 1456    ondansetron ODT (ZOFRAN-ODT) disintegrating tablet 4 mg, 4 mg, Oral, Q6H PRN, Moi Liz MD    Phosphorus Replacement - Follow Nurse / BPA Driven Protocol, , Does not apply, PRShalonda MARCIAL David Anthony, MD    Potassium Replacement - Follow Nurse / BPA Driven Protocol, , Does not apply, Shalonda THAYER David Anthony, MD    sevelamer (RENVELA) tablet 2,400 mg, 2,400 mg, Oral, TID With Meals, Atul Fowler MD, 2,400 mg at 01/22/24 0835    [COMPLETED] Insert Peripheral IV, , , Once **AND** sodium chloride 0.9 % flush 10 mL, 10 mL, Intravenous, PRN, Moi Liz MD    sodium chloride 0.9 % flush 10 mL, 10 mL, Intravenous, Q12H, Moi Liz MD, 10 mL at 01/22/24 0838    sodium chloride 0.9 % flush 10 mL, 10 mL, Intravenous, PRN, Moi Liz MD    sodium chloride 0.9 % infusion 40 mL, 40 mL, Intravenous, PRShalonda MARCIAL David Anthony, MD    sodium chloride 0.9 % infusion, 100 mL/hr, Intravenous, Continuous, Licha Clinton MD, Last Rate: 100 mL/hr at 01/21/24 2358, 100 mL/hr at 01/21/24 2358      Objective chronically ill-appearing but no acute distress, family at bedside    Vital Signs  Vitals:    01/22/24 0800 01/22/24 1129 01/22/24 1230 01/22/24 1300   BP: 146/68 152/65 148/64 142/64   BP Location:   Right arm Right arm   Patient Position:   Lying Lying   Pulse: 74 81 74 74   Resp: 16 17 19 18   Temp: 97.5 °F (36.4 °C) 97.8 °F (36.6 °C) 97.7 °F (36.5 °C)    TempSrc: Oral Oral Oral    SpO2: 93% 97% 97%  97%   Weight:       Height:           Physical Exam:     General Appearance:    Awake and alert, in no acute distress, hard of hearing   Head:    Normocephalic, without obvious abnormality   Eyes:          Conjunctivae normal, anicteric sclera   Throat:   No oral lesions, no thrush, oral mucosa moist   Neck:   supple, no JVD   Lungs:     respirations regular, even and unlabored   Abdomen:     Soft, non-tender, nondistended   Rectal:     Deferred   Extremities:   no cyanosis   Skin:   No bruising or rash, no jaundice        Results Review:    CBC    Results from last 7 days   Lab Units 01/22/24  0108 01/21/24  0003 01/20/24  0105 01/18/24  2332 01/18/24  0352 01/17/24  0848 01/16/24  0646   WBC 10*3/mm3 16.60* 20.10* 18.60* 27.00* 21.20* 17.20* 15.60*   HEMOGLOBIN g/dL 9.3* 9.2* 8.8* 9.0* 9.0* 8.6* 9.1*   PLATELETS 10*3/mm3 216 180 158 189 210 198 260     CMP   Results from last 7 days   Lab Units 01/22/24  0108 01/21/24  0003 01/20/24  0105 01/18/24  2332 01/18/24  0352 01/17/24  0847 01/16/24  2022 01/16/24  0646   SODIUM mmol/L 137 139 141 139 142 139  --  140   POTASSIUM mmol/L 4.4 4.1 3.9 4.2 4.9 4.4  --  4.7   CHLORIDE mmol/L 102 100 101 101 102 99  --  101   CO2 mmol/L 21.0* 26.0 25.0 25.0 24.0 21.0*  --  17.0*   BUN mg/dL 72* 53* 69* 47* 100* 89* 73* 150*   CREATININE mg/dL 5.76* 4.23* 5.50* 4.23* 7.68* 6.73*  --  10.52*   GLUCOSE mg/dL 140* 138* 119* 70 78 140*  --  39*   ALBUMIN g/dL 2.3* 2.9* 2.7* 2.8* 2.8* 2.9*  --  2.9*   BILIRUBIN mg/dL 0.3 0.3 0.4  --  0.2 0.3  --  0.2   ALK PHOS U/L 119* 118* 92  --  93 122*  --  94   AST (SGOT) U/L 17 13 10  --  21 24  --  28   ALT (SGPT) U/L 16 17 17  --  20 24  --  20   MAGNESIUM mg/dL 2.1 1.7 1.9  --  1.9 1.8  --  1.9   PHOSPHORUS mg/dL 6.1* 4.8* 6.0* 4.4 9.3* 8.0*  --  11.0*   AMYLASE U/L  --   --  122*  --   --   --   --   --    LIPASE U/L  --   --  248*  --   --   --   --   --      Cr Clearance Estimated Creatinine Clearance: 13.6 mL/min (A) (by C-G formula  based on SCr of 5.76 mg/dL (H)).  Coag   Results from last 7 days   Lab Units 01/18/24  1907   INR  1.27*   APTT seconds 41.3*     HbA1C   Lab Results   Component Value Date    HGBA1C 5.80 (H) 10/10/2023    HGBA1C 6.30 (H) 04/06/2023    HGBA1C 6.6 (H) 07/06/2022         Infection   Results from last 7 days   Lab Units 01/17/24  1501 01/17/24  1205 01/17/24  1203 01/17/24  0847   BLOODCX   --  No growth at 5 days No growth at 5 days  --    URINECX  No growth  --   --   --    PROCALCITONIN ng/mL  --   --   --  0.03     UA    Results from last 7 days   Lab Units 01/17/24  1501   NITRITE UA  Negative   WBC UA /HPF Too Numerous to Count*   BACTERIA UA /HPF None Seen   SQUAM EPITHEL UA /HPF 0-2   URINECX  No growth     Microbiology Results (last 10 days)       Procedure Component Value - Date/Time    Urine Culture - Urine, Indwelling Urethral Catheter [[id number]]  (Normal) Collected: 01/17/24 1501    Lab Status: Final result Specimen: Urine from Indwelling Urethral Catheter Updated: 01/18/24 1939     Urine Culture No growth    Blood Culture - Blood, Arm, Left [[id number]]  (Normal) Collected: 01/17/24 1205    Lab Status: Final result Specimen: Blood from Arm, Left Updated: 01/22/24 1231     Blood Culture No growth at 5 days    Blood Culture - Blood, Arm, Left [[id number]]  (Normal) Collected: 01/17/24 1203    Lab Status: Final result Specimen: Blood from Arm, Left Updated: 01/22/24 1231     Blood Culture No growth at 5 days    COVID-19 and FLU A/B PCR, 1 HR TAT - Swab, Nasopharynx [[id number]]  (Normal) Collected: 01/15/24 1602    Lab Status: Final result Specimen: Swab from Nasopharynx Updated: 01/15/24 1624     COVID19 Not Detected     Influenza A PCR Not Detected     Influenza B PCR Not Detected    Narrative:      Fact sheet for providers: https://www.fda.gov/media/931922/download    Fact sheet for patients: https://www.fda.gov/media/929300/download    Test performed by PCR.    Urine Culture - Urine, Urine, Clean Catch  [[id number]]  (Abnormal)  (Susceptibility) Collected: 01/15/24 1145    Lab Status: Final result Specimen: Urine, Clean Catch Updated: 01/18/24 0837     Urine Culture 25,000 CFU/mL Serratia marcescens    Narrative:      Colonization of the urinary tract without infection is common. Treatment is discouraged unless the patient is symptomatic, pregnant, or undergoing an invasive urologic procedure.    Susceptibility        Serratia marcescens      ZOILA      Amoxicillin + Clavulanate Resistant      Cefazolin Resistant      Cefepime Susceptible      Ceftazidime Susceptible      Ceftriaxone Susceptible      Gentamicin Susceptible      Levofloxacin Susceptible      Nitrofurantoin Resistant      Piperacillin + Tazobactam Susceptible  [1]       Trimethoprim + Sulfamethoxazole Susceptible                   [1]  Appended report. These results have been appended to a previously preliminary verified report.                   Blood Culture - Blood, Arm, Right [[id number]]  (Normal) Collected: 01/15/24 1126    Lab Status: Final result Specimen: Blood from Arm, Right Updated: 01/20/24 1130     Blood Culture No growth at 5 days    Narrative:      Less than seven (7) mL's of blood was collected.  Insufficient quantity may yield false negative results.    Blood Culture - Blood, Arm, Left [[id number]]  (Normal) Collected: 01/15/24 1125    Lab Status: Final result Specimen: Blood from Arm, Left Updated: 01/20/24 1130     Blood Culture No growth at 5 days    Narrative:      Less than seven (7) mL's of blood was collected.  Insufficient quantity may yield false negative results.          Imaging Results (Last 72 Hours)       Procedure Component Value Units Date/Time    MRI abdomen wo contrast mrcp [[id number]] Collected: 01/22/24 0818     Updated: 01/22/24 0836    Narrative:      MRI ABDOMEN WO CONTRAST MRCP    Date of Exam: 1/22/2024 6:55 AM EST    Indication: abnormal CT, elevated lipase.     Comparison: CT abdomen and pelvis with contrast  1/20/2024. CT abdomen and pelvis without contrast 1/15/2024.     Technique:  Routine multiplanar/multisequence images of the abdomen were obtained with MRCP sequences without contrast administration.      Findings:  Numerous gallstones are present. The gallbladder is mildly distended. 3.6 x 4.7 cm masslike density is seen within the mid gallbladder lumen (series 4 image 18), which could represent tumefactive sludge, although gallbladder malignancy can have a similar   noncontrast imaging appearance.    On series 2 image 15, a 5 mm filling defect is seen within the downstream common bile duct, suggesting choledocholithiasis. Additional questionable stone in the mid CBD measuring 3 mm on series 12 image 14. Choledocholithiasis is also suggested on thick   slab MRCP imaging series 18 images 41 and 42. There is appearance of small 3 mm stone within the cystic duct on series 18 image 15 and series 5 image 21..    No intrahepatic or extrahepatic biliary dilation is identified. No biliary stricture is evident.    No pancreatic ductal dilation or evidence of pancreatic divisum configuration. Numerous tiny T2 hyperintense cystic foci are scattered throughout the pancreatic parenchyma, measuring 5 mm or less in size, which could represent tiny cysts or multiple   sidebranch duct intraductal papillary mucinous neoplasm (IPMN). No focal well-defined drainable pseudocyst is seen. There is subtle stranding surrounding the pancreatic parenchyma suggesting mild diffuse acute interstitial pancreatitis.    The spleen size is within normal limits. No focal splenic lesion is identified.    The liver size is within normal limits, 17.8 cm craniocaudal. The liver does not appear grossly steatotic or grossly cirrhotic. There are scattered tiny T2 hyperintense foci within the liver parenchyma, favored represent tiny cysts, measuring 5 mm or   less in size.    The adrenal glands are normal.    The kidneys bilaterally are atrophic with  multiple T2 hyperintense lesions consistent with the appearance of cysts. Prior CT examination describe the presence of a complex cystic lesion at the right lower renal pole, which is not completely included in   the imaging field-of-view on this.    The stomach, and the large and small bowel, do not appear abnormally thickened, dilated, or inflamed. No adenopathy is identified. Normal caliber of the abdominal aorta. No gross ascites is seen.    Trace right basilar pleural effusion is present. There is mild to moderate right hemidiaphragm elevation with passive right basilar atelectasis.    No suspicious osseous abnormalities are identified.          Impression:      1. Findings consistent with acute diffuse interstitial pancreatitis.  2. Multiple less than 5 mm cystic foci are scattered throughout the pancreatic parenchyma which could represent sidebranch IPMNs, or pancreatic cysts. No well-defined pseudocyst is seen. Normal caliber of the pancreatic duct. No convincing evidence of   degraded divisum.  3. Choledocholithiasis is suggested in the mid to distal CBD. Suspected small stone within the cystic duct, as well. However, there is no indication of acute cholecystitis.  4. Masslike density is seen within the gallbladder, which could represent tumefactive sludge or gallbladder neoplasm.  5. Multiple gallstones.  6. Small right basilar pleural effusion. Mild right lower lobe atelectasis.  7. Complex cystic lesion described at the right lower renal pole is not included completely in the imaging field-of-view of this examination. The kidneys appear atrophic with multiple cystic changes suggesting chronic medical renal disease.  8. Multiple tiny hepatic cysts.    Electronically Signed: Roseline Bear MD    1/22/2024 8:33 AM EST    Workstation ID: [id number]    CT Abdomen Pelvis With Contrast [[id number]] Collected: 01/20/24 1317     Updated: 01/20/24 1334    Narrative:      CT ABDOMEN PELVIS W CONTRAST    Date of Exam:  1/20/2024 11:46 AM CST    Indication: leukocytosis - concern for renal abscess.    Comparison: 1/15/2024    Technique: Axial CT images were obtained of the abdomen and pelvis following the uneventful intravenous administration of 100 cc of Isovue-370. Sagittal and coronal reconstructions were performed.  Automated exposure control and iterative reconstruction   methods were used.        Findings:  LUNG BASES: Trace right pleural effusion with minimal right basal atelectasis.    LIVER:  Unremarkable parenchyma without focal lesion.  BILIARY/GALLBLADDER: Gallbladder is distended with multiple stones.. There is an irregular area of increased density which appears to be denser than previous noncontrast CT, measuring approximate 4.3 x 2.4 cm (image 46, series 7). Cholangiocarcinoma not   excluded. This may represent more tumefactive sludge. Ultrasound might be useful as initial study to further assess.  SPLEEN:  Unremarkable  PANCREAS: There is mild peripancreatic inflammatory changes suggestive of acute interstitial pancreatitis. No evidence of necrosis. No pseudocyst formation. No duct dilation. No well-defined pancreatic lesion. No calcifications  ADRENAL:  Unremarkable  KIDNEYS: Interval placement of bilateral ureteral stents which appear to be in satisfactory position with resolution of hydronephrosis. There is a 7 mm calculus within the proximal right ureter in a similar location compared to prior examination. There   is a 6 mm left ureteral calculus which also appears to be similarly positioned within the proximal left ureter. No additional calculus identified. There is bilateral renal cortical atrophy with numerous cysts. This includes a 3.3 cm complex cyst versus   partially hemorrhagic cyst arising from the lower pole of the right kidney. There is a 1.9 cm complex cyst versus nodule rising from the posterior lower pole of the right kidney as well. These are similar prior examination. No findings to suggest  renal   abscess.  GASTROINTESTINAL/MESENTERY: There is colonic diverticulosis with no evidence of acute diverticulitis. No evidence of obstruction nor inflammation.  The appendix is normal.  MESENTERIC VESSELS:  Patent.  AORTA/IVC:  Normal caliber. There is a new IVC filter. There is calcification within the left common femoral/proximal superficial femoral vein consistent with chronic thrombosis.    RETROPERITONEUM/LYMPH NODES:  Unremarkable    REPRODUCTIVE:  Unremarkable  BLADDER: Decompressed by Peterson catheter.    OSSEUS STRUCTURES:  Typical for age with no acute process identified.        Impression:      Impression:  1.Findings are consistent with acute interstitial pancreatitis. Correlate with history and symptoms.  2.Complex right renal cysts versus nodules. Dedicated nonemergent renal mass protocol MRI or recommended.  3.Cholelithiasis with generalized gallbladder distention, similar prior examination. There is either tumefactive sludge or potential gallbladder neoplasm. This can be further assessed with ultrasound or at time of renal MRI/CT.  4.Interval placement of bilateral ureteral stents with resolution of hydronephrosis. Relatively stable position of bilateral proximal ureteral calculi.  5.New IVC filter.  6.New trace right pleural effusion with minimal right basal atelectasis.  7.No evidence of renal abscess.  8.Other stable findings.            Electronically Signed: Sudhir Topete MD    1/20/2024 12:32 PM CST    Workstation ID: [id number]            Assessment & Plan   Acute pancreatitis  Abnormal MRCP  Elevated alkaline phosphatase  Sinus bradycardia with first-degree AV block  UTI -Serratia marcescens   Chronic kidney disease now on hemodialysis  AV fistula s/p angioplasty 1/18/24  Bilateral obstructing stones status post bilateral ureteral stent placements 1/16/2024  IVC filter placed 1/19/2024/ History of DVT  CAD  Diabetes  Normocytic anemia -likely multifactorial     PLAN:  Patient is a  78-year-old male who presented on 1/15/2024 with worsening renal failure, near syncopal episode and hypotension.  He was found to have acute renal failure and obstructive uropathy with need for urgent bilateral stent placement.    MRCP this morning with changes of acute diffuse pancreatitis with multiple cystic lesions throughout the pancreas, possible sidebranch IPMN's or pancreatic cysts.  No evidence of pancreatic divisum.  Choledocholithiasis suggestion with possible stone in the cystic duct as well.  There is also concern for masslike density within the gallbladder and multiple gallstones.  Alk phos 119 but liver enzymes otherwise unremarkable.  CA 19-9 95.3 with AFP less than 2.  Unfortunately, he was given anticoagulation this morning so ERCP was postponed.  We will plan tomorrow, n.p.o. after midnight.  Anticoagulation on hold.  Risks and benefits for ERCP discussed in detail at bedside with patient and family by Dr. Peoples this morning on rounds.  Hemoglobin 9.3 without evidence of overt GI bleeding.  Continue supportive care we will monitor closely.    Creatinine 5.76 with BUN 72.  Phos 6.1 and potassium 4.4.  Dialysis planned Tuesday/Thursday/Saturday.    Infectious disease following and currently on ertapenem.    Electronically signed by DG Srivastava, 01/22/24, 1:20 PM EST.

## 2024-01-22 NOTE — SIGNIFICANT NOTE
01/22/24 1428   OTHER   Discipline occupational therapist   Rehab Time/Intention   Session Not Performed patient/family declined treatment  (Patient eating on attempt, requeting to hold therapy until later time. Will return as able.)   Recommendation   OT - Next Appointment 01/23/24

## 2024-01-22 NOTE — SIGNIFICANT NOTE
01/22/24 1400   OTHER   Discipline physical therapist   Rehab Time/Intention   Session Not Performed patient/family declined treatment  (This AM pt declined treatment as he was tired due to testing this AM, and also had just gotten breakfast.  PT not able to return this date.  Will f/u 1/23.)   Therapy Assessment/Plan (PT)   Criteria for Skilled Interventions Met (PT) yes;skilled treatment is necessary   Recommendation   PT - Next Appointment 01/23/24

## 2024-01-22 NOTE — PLAN OF CARE
Goal Outcome Evaluation:  Plan of Care Reviewed With: patient        Progress: improving  Outcome Evaluation: Pt rested well in bed through the shift. MRCP ordered for today, pt currently NPO. Gi following. Plan to discharge to [facility].

## 2024-01-22 NOTE — PROGRESS NOTES
[facility] Progress Note      Patient Name: Gabriel De Souza  : 1945  MRN: [id number]  Primary Care Physician:  Waylon Johnson MD  Date of admission: 1/15/2024    Subjective     Interval History:     Patient resting comfortably.    Denies any chest pain, shortness of breath, nausea or vomiting    Review of Systems:   As noted above    Objective     Vitals:   Temp:  [97.5 °F (36.4 °C)-98.6 °F (37 °C)] 97.5 °F (36.4 °C)  Heart Rate:  [73-82] 74  Resp:  [12-20] 16  BP: (131-151)/(58-98) 146/68    Intake/Output Summary (Last 24 hours) at 2024 0959  Last data filed at 2024 0525  Gross per 24 hour   Intake 100 ml   Output 1100 ml   Net -1000 ml       Physical Exam:    General Appearance: Awake, alert, NAD  HEENT: oral mucosa normal, nonicteric sclera  Neck: supple, no JVD  Lungs: CTA  Heart: RRR, normal S1 and S2  Abdomen: soft, nondistended  Extremities: no edema  Neuro: Awake alert and moving all extremities    Scheduled Meds:     [Held by provider] apixaban, 2.5 mg, Oral, Q12H  atorvastatin, 10 mg, Oral, Daily  calcitriol, 0.5 mcg, Oral, Daily  ertapenem, 500 mg, Intravenous, Q24H  insulin lispro, 2-7 Units, Subcutaneous, 4x Daily AC & at Bedtime  senna-docusate sodium, 2 tablet, Oral, BID  sevelamer, 2,400 mg, Oral, TID With Meals  sodium chloride, 10 mL, Intravenous, Q12H      IV Meds:   sodium chloride, 100 mL/hr, Last Rate: 100 mL/hr (24 1944)          Results Reviewed:   I have personally reviewed the results from the time of this admission to 2024 09:59 EST     Results from last 7 days   Lab Units 24  0108 24  0003 24  0105   SODIUM mmol/L 137 139 141   POTASSIUM mmol/L 4.4 4.1 3.9   CHLORIDE mmol/L 102 100 101   CO2 mmol/L 21.0* 26.0 25.0   BUN mg/dL 72* 53* 69*   CREATININE mg/dL 5.76* 4.23* 5.50*   CALCIUM mg/dL 8.7 8.8 8.6   BILIRUBIN mg/dL 0.3 0.3 0.4   ALK PHOS U/L 119* 118* 92   ALT (SGPT) U/L 16 17 17   AST (SGOT) U/L 17  13 10   GLUCOSE mg/dL 140* 138* 119*     Estimated Creatinine Clearance: 13.6 mL/min (A) (by C-G formula based on SCr of 5.76 mg/dL (H)).  Results from last 7 days   Lab Units 01/22/24  0108 01/21/24  0003 01/20/24  0105   MAGNESIUM mg/dL 2.1 1.7 1.9   PHOSPHORUS mg/dL 6.1* 4.8* 6.0*         Results from last 7 days   Lab Units 01/22/24  0108 01/21/24  0003 01/20/24  0105 01/18/24  2332 01/18/24  0352   WBC 10*3/mm3 16.60* 20.10* 18.60* 27.00* 21.20*   HEMOGLOBIN g/dL 9.3* 9.2* 8.8* 9.0* 9.0*   PLATELETS 10*3/mm3 216 180 158 189 210     Results from last 7 days   Lab Units 01/18/24  1907   INR  1.27*       Assessment / Plan     ASSESSMENT:  End-stage renal disease.  patient receiving dialysis every other day. Electrolytes, volume status okay  Obstructive uropathy.  Status post bilateral ureteral stents placement.  Patient also has complex left renal cyst.  Repeat CT scan showed resolution of hydronephrosis.  Complex right renal cysts.  Hyperkalemia.  Secondary to renal failure.  Improved  Metabolic acidosis.  Increased anion gap, secondary to renal failure.  Improving with dialysis  Anemia in chronic kidney disease.   hyperphosphatemia.    DVT.  Hematology, vascular following following.  S/p IVC filter placement.    Urinary tract infection.  on IV antibiotics  Pancreatitis.  Gastroenterology following    PLAN:  Hemodialysis today.  Outpatient dialysis has been arranged Monday Wednesday and Friday  Surveillance labs    Atul Fowler MD  01/22/24  09:59 EST    [facility]  [phone]

## 2024-01-22 NOTE — CASE MANAGEMENT/SOCIAL WORK
Continued Stay Note  [facility]     Patient Name: Gabriel De Souza  MRN: [id number]  Today's Date: 1/22/2024    Admit Date: 1/15/2024    Plan: [facility], accepted, pending bed available. No precert needed. PASRR approved. OP HD [facility] (holding a MWF 1255 spot)   Discharge Plan       Row Name 01/22/24 1446       Plan    Plan [facility], accepted, pending bed available. No precert needed. PASRR approved. OP HD [facility] (holding a MWF 1255 spot)    Patient/Family in Agreement with Plan yes    Plan Comments [facility] accepted, plan was to have a bed today.  Updated facility on new [facility] in 2 days.   BArriers to discharge: MRCP today.  ERCP toimorrow.   WBC elevated.             Expected Discharge Date and Time       Expected Discharge Date Expected Discharge Time    Jan 24, 2024           Phone communication or documentation only - no physical contact with patient or family.   Niurka Copeland RN     Office Phone [phone]  Office Cell [phone]

## 2024-01-22 NOTE — CONSULTS
Nutrition Services    Patient Name: Gabriel De Souza  YOB: 1945  MRN: [id number]  Admission date: 1/15/2024    PROGRESS NOTE      Encounter Information: Check on for PO intakes.  Nephrology did not previously approve diet liberalization.  If it aligns with patient plan of care, patient would be a good candidate for nutrition support.         PO Diet: Diet: Cardiac Diets, Renal Diets; Healthy Heart (2-3 Na+); Low Sodium (2-3g), Low Phosphorus; Texture: Regular Texture (IDDSI 7); Fluid Consistency: Thin (IDDSI 0)  NPO Diet NPO Type: Strict NPO   PO Supplements: Novasource Renal BID   PO Intake:  75% x 3 meals in a row        Current nutrition support:    Nutrition support review:        Labs (reviewed below): Reviewed, management per attending         GI Function:  Last documented BM 1/22 (today)       Nutrition Intervention Updates: Continue current diet, supplement and encourage good PO intakes.       Results from last 7 days   Lab Units 01/22/24  0108 01/21/24  0003 01/20/24  0105   SODIUM mmol/L 137 139 141   POTASSIUM mmol/L 4.4 4.1 3.9   CHLORIDE mmol/L 102 100 101   CO2 mmol/L 21.0* 26.0 25.0   BUN mg/dL 72* 53* 69*   CREATININE mg/dL 5.76* 4.23* 5.50*   CALCIUM mg/dL 8.7 8.8 8.6   BILIRUBIN mg/dL 0.3 0.3 0.4   ALK PHOS U/L 119* 118* 92   ALT (SGPT) U/L 16 17 17   AST (SGOT) U/L 17 13 10   GLUCOSE mg/dL 140* 138* 119*     Results from last 7 days   Lab Units 01/22/24  0108 01/21/24  0003 01/20/24  0105   MAGNESIUM mg/dL 2.1 1.7 1.9   PHOSPHORUS mg/dL 6.1* 4.8* 6.0*   HEMOGLOBIN g/dL 9.3* 9.2* 8.8*   HEMATOCRIT % 28.8* 28.7* 27.6*     COVID19   Date Value Ref Range Status   01/15/2024 Not Detected Not Detected - Ref. Range Final     Lab Results   Component Value Date    HGBA1C 5.80 (H) 10/10/2023       RD to follow up per protocol.    Electronically signed by:  Corazon Fuentes RD  01/22/24 13:09 EST

## 2024-01-22 NOTE — PROGRESS NOTES
Infectious Diseases Progress Note      LOS: 7 days   Patient Care Team:  Waylon Johnson MD as PCP - General  Chemchirian, MD Lali as Consulting Physician (Cardiology)  Atul Fowler MD as Consulting Physician (Nephrology)    Chief Complaint: Fatigue    Subjective       The patient has been afebrile for the last 24 hours.  The patient is on room air, hemodynamically stable, and is tolerating antimicrobial therapy.  Denies abdominal pain.  Main complaint is fatigue.  Family feels that he is more alert and awake today with a better appetite      Review of Systems:   Review of Systems   Constitutional:  Positive for fatigue.   HENT: Negative.     Eyes: Negative.    Respiratory: Negative.     Cardiovascular: Negative.    Gastrointestinal: Negative.    Endocrine: Negative.    Genitourinary: Negative.    Musculoskeletal: Negative.    Skin: Negative.    Neurological: Negative.    Psychiatric/Behavioral: Negative.     All other systems reviewed and are negative.       Objective     Vital Signs  Temp:  [97.5 °F (36.4 °C)-98.6 °F (37 °C)] 97.8 °F (36.6 °C)  Heart Rate:  [73-81] 81  Resp:  [12-19] 17  BP: (131-152)/(58-68) 152/65    Physical Exam:  Physical Exam  Vitals and nursing note reviewed.   Constitutional:       General: He is not in acute distress.     Appearance: He is well-developed and normal weight. He is ill-appearing. He is not diaphoretic.   HENT:      Head: Normocephalic and atraumatic.   Eyes:      Conjunctiva/sclera: Conjunctivae normal.      Pupils: Pupils are equal, round, and reactive to light.   Cardiovascular:      Rate and Rhythm: Normal rate and regular rhythm.      Heart sounds: Normal heart sounds, S1 normal and S2 normal.   Pulmonary:      Effort: Pulmonary effort is normal. No respiratory distress.      Breath sounds: Normal breath sounds. No stridor. No wheezing or rales.   Abdominal:      General: Bowel sounds are normal. There is no distension.      Palpations: Abdomen is soft. There is  no mass.      Tenderness: There is no abdominal tenderness. There is no guarding.      Comments: Abdomen is nontender   Genitourinary:     Comments: Peterson catheter  Musculoskeletal:         General: No deformity. Normal range of motion.      Cervical back: Neck supple.   Skin:     General: Skin is warm and dry.      Coloration: Skin is not pale.      Findings: No erythema or rash.      Comments:   Left arm fistula   Neurological:      Mental Status: He is alert and oriented to person, place, and time.      Cranial Nerves: No cranial nerve deficit.   Psychiatric:         Mood and Affect: Mood normal.          Results Review:    I have reviewed all clinical data, test, lab, and imaging results.     Radiology  MRI abdomen wo contrast mrcp    Result Date: 1/22/2024  MRI ABDOMEN WO CONTRAST MRCP Date of Exam: 1/22/2024 6:55 AM EST Indication: abnormal CT, elevated lipase.  Comparison: CT abdomen and pelvis with contrast 1/20/2024. CT abdomen and pelvis without contrast 1/15/2024. Technique:  Routine multiplanar/multisequence images of the abdomen were obtained with MRCP sequences without contrast administration. Findings: Numerous gallstones are present. The gallbladder is mildly distended. 3.6 x 4.7 cm masslike density is seen within the mid gallbladder lumen (series 4 image 18), which could represent tumefactive sludge, although gallbladder malignancy can have a similar  noncontrast imaging appearance. On series 2 image 15, a 5 mm filling defect is seen within the downstream common bile duct, suggesting choledocholithiasis. Additional questionable stone in the mid CBD measuring 3 mm on series 12 image 14. Choledocholithiasis is also suggested on thick slab MRCP imaging series 18 images 41 and 42. There is appearance of small 3 mm stone within the cystic duct on series 18 image 15 and series 5 image 21.. No intrahepatic or extrahepatic biliary dilation is identified. No biliary stricture is evident. No pancreatic ductal  dilation or evidence of pancreatic divisum configuration. Numerous tiny T2 hyperintense cystic foci are scattered throughout the pancreatic parenchyma, measuring 5 mm or less in size, which could represent tiny cysts or multiple sidebranch duct intraductal papillary mucinous neoplasm (IPMN). No focal well-defined drainable pseudocyst is seen. There is subtle stranding surrounding the pancreatic parenchyma suggesting mild diffuse acute interstitial pancreatitis. The spleen size is within normal limits. No focal splenic lesion is identified. The liver size is within normal limits, 17.8 cm craniocaudal. The liver does not appear grossly steatotic or grossly cirrhotic. There are scattered tiny T2 hyperintense foci within the liver parenchyma, favored represent tiny cysts, measuring 5 mm or less in size. The adrenal glands are normal. The kidneys bilaterally are atrophic with multiple T2 hyperintense lesions consistent with the appearance of cysts. Prior CT examination describe the presence of a complex cystic lesion at the right lower renal pole, which is not completely included in the imaging field-of-view on this. The stomach, and the large and small bowel, do not appear abnormally thickened, dilated, or inflamed. No adenopathy is identified. Normal caliber of the abdominal aorta. No gross ascites is seen. Trace right basilar pleural effusion is present. There is mild to moderate right hemidiaphragm elevation with passive right basilar atelectasis. No suspicious osseous abnormalities are identified.     1. Findings consistent with acute diffuse interstitial pancreatitis. 2. Multiple less than 5 mm cystic foci are scattered throughout the pancreatic parenchyma which could represent sidebranch IPMNs, or pancreatic cysts. No well-defined pseudocyst is seen. Normal caliber of the pancreatic duct. No convincing evidence of degraded divisum. 3. Choledocholithiasis is suggested in the mid to distal CBD. Suspected small stone  within the cystic duct, as well. However, there is no indication of acute cholecystitis. 4. Masslike density is seen within the gallbladder, which could represent tumefactive sludge or gallbladder neoplasm. 5. Multiple gallstones. 6. Small right basilar pleural effusion. Mild right lower lobe atelectasis. 7. Complex cystic lesion described at the right lower renal pole is not included completely in the imaging field-of-view of this examination. The kidneys appear atrophic with multiple cystic changes suggesting chronic medical renal disease. 8. Multiple tiny hepatic cysts. Electronically Signed: Roseline Bear MD  1/22/2024 8:33 AM EST  Workstation ID: [id number]     Cardiology    Laboratory    Results from last 7 days   Lab Units 01/22/24  0108 01/21/24  0003 01/20/24  0105 01/18/24 2332 01/18/24  0352 01/17/24  0848 01/16/24  0646   WBC 10*3/mm3 16.60* 20.10* 18.60* 27.00* 21.20* 17.20* 15.60*   HEMOGLOBIN g/dL 9.3* 9.2* 8.8* 9.0* 9.0* 8.6* 9.1*   HEMATOCRIT % 28.8* 28.7* 27.6* 27.7* 27.3* 25.5* 27.4*   PLATELETS 10*3/mm3 216 180 158 189 210 198 260     Results from last 7 days   Lab Units 01/22/24  0108 01/21/24  0003 01/20/24  0105 01/18/24 2332 01/18/24  0352 01/17/24  0847 01/16/24 2022 01/16/24  0646 01/15/24  1719 01/15/24  1223   SODIUM mmol/L 137 139 141 139 142 139  --  140   < > 140   POTASSIUM mmol/L 4.4 4.1 3.9 4.2 4.9 4.4  --  4.7   < > 7.3*   CHLORIDE mmol/L 102 100 101 101 102 99  --  101   < > 100   CO2 mmol/L 21.0* 26.0 25.0 25.0 24.0 21.0*  --  17.0*   < > 9.0*   BUN mg/dL 72* 53* 69* 47* 100* 89* 73* 150*   < > 256*   CREATININE mg/dL 5.76* 4.23* 5.50* 4.23* 7.68* 6.73*  --  10.52*   < > 16.11*   GLUCOSE mg/dL 140* 138* 119* 70 78 140*  --  39*   < > 111*   ALBUMIN g/dL 2.3* 2.9* 2.7* 2.8* 2.8* 2.9*  --  2.9*  --  3.2*   BILIRUBIN mg/dL 0.3 0.3 0.4  --  0.2 0.3  --  0.2  --  0.2   ALK PHOS U/L 119* 118* 92  --  93 122*  --  94  --  102   AST (SGOT) U/L 17 13 10  --  21 24  --  28  --  18   ALT  (SGPT) U/L 16 17 17  --  20 24  --  20  --  17   AMYLASE U/L  --   --  122*  --   --   --   --   --   --   --    LIPASE U/L  --   --  248*  --   --   --   --   --   --  >3,000*   CALCIUM mg/dL 8.7 8.8 8.6 8.4* 8.3* 7.9*  --  8.3*   < > 9.4    < > = values in this interval not displayed.     Results from last 7 days   Lab Units 01/15/24  1223   CK TOTAL U/L 44             Microbiology   Microbiology Results (last 10 days)       Procedure Component Value - Date/Time    Urine Culture - Urine, Indwelling Urethral Catheter [[id number]]  (Normal) Collected: 01/17/24 1501    Lab Status: Final result Specimen: Urine from Indwelling Urethral Catheter Updated: 01/18/24 1939     Urine Culture No growth    Blood Culture - Blood, Arm, Left [[id number]]  (Normal) Collected: 01/17/24 1205    Lab Status: Preliminary result Specimen: Blood from Arm, Left Updated: 01/21/24 1230     Blood Culture No growth at 4 days    Blood Culture - Blood, Arm, Left [[id number]]  (Normal) Collected: 01/17/24 1203    Lab Status: Preliminary result Specimen: Blood from Arm, Left Updated: 01/21/24 1230     Blood Culture No growth at 4 days    COVID-19 and FLU A/B PCR, 1 HR TAT - Swab, Nasopharynx [[id number]]  (Normal) Collected: 01/15/24 1602    Lab Status: Final result Specimen: Swab from Nasopharynx Updated: 01/15/24 1624     COVID19 Not Detected     Influenza A PCR Not Detected     Influenza B PCR Not Detected    Narrative:      Fact sheet for providers: https://www.fda.gov/media/002459/download    Fact sheet for patients: https://www.fda.gov/media/733770/download    Test performed by PCR.    Urine Culture - Urine, Urine, Clean Catch [[id number]]  (Abnormal)  (Susceptibility) Collected: 01/15/24 1145    Lab Status: Final result Specimen: Urine, Clean Catch Updated: 01/18/24 0837     Urine Culture 25,000 CFU/mL Serratia marcescens    Narrative:      Colonization of the urinary tract without infection is common. Treatment is discouraged unless the patient  is symptomatic, pregnant, or undergoing an invasive urologic procedure.    Susceptibility        Serratia marcescens      ZOILA      Amoxicillin + Clavulanate Resistant      Cefazolin Resistant      Cefepime Susceptible      Ceftazidime Susceptible      Ceftriaxone Susceptible      Gentamicin Susceptible      Levofloxacin Susceptible      Nitrofurantoin Resistant      Piperacillin + Tazobactam Susceptible  [1]       Trimethoprim + Sulfamethoxazole Susceptible                   [1]  Appended report. These results have been appended to a previously preliminary verified report.                   Blood Culture - Blood, Arm, Right [[id number]]  (Normal) Collected: 01/15/24 1126    Lab Status: Final result Specimen: Blood from Arm, Right Updated: 01/20/24 1130     Blood Culture No growth at 5 days    Narrative:      Less than seven (7) mL's of blood was collected.  Insufficient quantity may yield false negative results.    Blood Culture - Blood, Arm, Left [[id number]]  (Normal) Collected: 01/15/24 1125    Lab Status: Final result Specimen: Blood from Arm, Left Updated: 01/20/24 1130     Blood Culture No growth at 5 days    Narrative:      Less than seven (7) mL's of blood was collected.  Insufficient quantity may yield false negative results.            Medication Review:       Schedule Meds  [Held by provider] apixaban, 2.5 mg, Oral, Q12H  atorvastatin, 10 mg, Oral, Daily  calcitriol, 0.5 mcg, Oral, Daily  ertapenem, 500 mg, Intravenous, Q24H  insulin lispro, 2-7 Units, Subcutaneous, 4x Daily AC & at Bedtime  senna-docusate sodium, 2 tablet, Oral, BID  sevelamer, 2,400 mg, Oral, TID With Meals  sodium chloride, 10 mL, Intravenous, Q12H        Infusion Meds  sodium chloride, 100 mL/hr, Last Rate: 100 mL/hr (01/21/24 2060)        PRN Meds    acetaminophen    senna-docusate sodium **AND** polyethylene glycol **AND** bisacodyl **AND** bisacodyl    Calcium Replacement - Follow Nurse / BPA Driven Protocol    dextrose    dextrose     glucagon (human recombinant)    Magnesium Standard Dose Replacement - Follow Nurse / BPA Driven Protocol    nitroglycerin    ondansetron    ondansetron ODT    Phosphorus Replacement - Follow Nurse / BPA Driven Protocol    Potassium Replacement - Follow Nurse / BPA Driven Protocol    [COMPLETED] Insert Peripheral IV **AND** sodium chloride    sodium chloride    sodium chloride        Assessment & Plan       Antimicrobial Therapy   1.  IV ertapenem        2.        3.        4.        5.          Assessment     Severe reactive leukocytosis.  Started to improve     Probable complicated UTI.  Urine culture grew Serratia marcescens from January 15 2024.  Patient had prior urine culture before admission on January 8, 2024 and grew the same organism.  CT scan of abdomen pelvis done without contrast and showed bilateral hydronephrosis with possible complex cyst on the right kidney.  CT scan of abdomen pelvis with IV contrast showed complex right renal cysts versus nodules  -The patient is s/p bilateral ureteral stent placement on January 16, 2024    Incidentally there was a finding consistent with interstitial pancreatitis.  But patient has no abdominal pain or tenderness.  Patient has no history of chronic pancreatitis.  Amylase and lipase are elevated.  GI now following, MRCP continue to show interstitial pancreatitis    Cholelithiasis without findings of acute cholecystitis.  Density in the gallbladder-sludge versus neoplasm     End-stage renal disease-patient was started on dialysis this admission.  Patient followed by nephrology     History of left AV fistula placement on 9/22/2022 which required a fistulogram and angioplasty on 1/18/2024.  Patient followed by vascular surgery     Acute left leg DVTs.  Hematology following.  Patient has IVC filter placement on 1/19/2024     Type 2 diabetes     History of prostate cancer         Plan     Continue IV ertapenem 500 mg every 24 hours for 14 days-day on 1/31/2024  Patient  will need a midline before discharge-please remove when IV antibiotics are completed-will need to get approval from nephrology  ERCP is planned by GI tomorrow  Continue supportive care  Mirlande labs  Case discussed with patient and family members at bedside      Ann Beavers, DG  01/22/24  12:12 EST    Note is dictated utilizing voice recognition software/Dragon

## 2024-01-22 NOTE — PROGRESS NOTES
[facility] MEDICINE SERVICE  DAILY PROGRESS NOTE    NAME: Gabriel De Souza  : 1945  MRN: [id number]      LOS: 7 days     PROVIDER OF SERVICE: Licha Clinton MD    Chief Complaint: Hyperkalemia    Subjective:     Interval History:  History taken from: patient  Patient seen and examined, he is doing much better.  Bradycardia and hypotension have resolved.  He did have low blood sugars in the morning with hypoglycemia protocol was activated.  He is currently resting comfortably.  Has  sandyfaviola medinagger on     patient seen and examined, he is lying in bed comfortably.  Eating drinking okay.  Having bowel movements as well.  Repeat blood cultures were sent by nephrology today     patient seen and examined,resting comfortably , denies any leg pain     patient seen and examined, just came back from IVC filter placement, resting comfortably     patient seen and examined, denies any complaints.  Resting comfortably.     patient seen and examined, no abdominal pain nausea or vomiting     patient seen and examined, denying any abdominal pain nausea vomiting, diet has been started.  GI plans on doing endoscopy tomorrow    Review of Systems:   Review of Systems  10 point ROS is negative other than what is stated positive above  Objective:     Vital Signs  Temp:  [97.5 °F (36.4 °C)-98.6 °F (37 °C)] 97.5 °F (36.4 °C)  Heart Rate:  [73-82] 74  Resp:  [12-20] 16  BP: (131-151)/(58-98) 146/68   Body mass index is 25.97 kg/m².    Physical Exam  Physical Exam  Exam, awake and alert, hard of hearing  HEENT normocephalic atraumatic  Chest clear to auscultation bilaterally  CVs S1-S2 normal, regular rhythm, bradycardia  Abdomen soft nontender nondistended bowel sounds positive  Extremities warm to touch 2+ pulses no edema  Neuro AOx3, grossly normal     Scheduled Meds   [Held by provider] apixaban, 2.5 mg, Oral, Q12H  atorvastatin, 10 mg, Oral, Daily  calcitriol, 0.5 mcg, Oral, Daily  ertapenem, 500 mg,  Intravenous, Q24H  insulin lispro, 2-7 Units, Subcutaneous, 4x Daily AC & at Bedtime  senna-docusate sodium, 2 tablet, Oral, BID  sevelamer, 2,400 mg, Oral, TID With Meals  sodium chloride, 10 mL, Intravenous, Q12H       PRN Meds     acetaminophen    senna-docusate sodium **AND** polyethylene glycol **AND** bisacodyl **AND** bisacodyl    Calcium Replacement - Follow Nurse / BPA Driven Protocol    dextrose    dextrose    glucagon (human recombinant)    Magnesium Standard Dose Replacement - Follow Nurse / BPA Driven Protocol    nitroglycerin    ondansetron    ondansetron ODT    Phosphorus Replacement - Follow Nurse / BPA Driven Protocol    Potassium Replacement - Follow Nurse / BPA Driven Protocol    [COMPLETED] Insert Peripheral IV **AND** sodium chloride    sodium chloride    sodium chloride   Infusions  sodium chloride, 100 mL/hr, Last Rate: 100 mL/hr (01/21/24 2790)            Diagnostic Data    Results from last 7 days   Lab Units 01/22/24  0108   WBC 10*3/mm3 16.60*   HEMOGLOBIN g/dL 9.3*   HEMATOCRIT % 28.8*   PLATELETS 10*3/mm3 216   GLUCOSE mg/dL 140*   CREATININE mg/dL 5.76*   BUN mg/dL 72*   SODIUM mmol/L 137   POTASSIUM mmol/L 4.4   AST (SGOT) U/L 17   ALT (SGPT) U/L 16   ALK PHOS U/L 119*   BILIRUBIN mg/dL 0.3   ANION GAP mmol/L 14.0       MRI abdomen wo contrast mrcp    Result Date: 1/22/2024  1. Findings consistent with acute diffuse interstitial pancreatitis. 2. Multiple less than 5 mm cystic foci are scattered throughout the pancreatic parenchyma which could represent sidebranch IPMNs, or pancreatic cysts. No well-defined pseudocyst is seen. Normal caliber of the pancreatic duct. No convincing evidence of degraded divisum. 3. Choledocholithiasis is suggested in the mid to distal CBD. Suspected small stone within the cystic duct, as well. However, there is no indication of acute cholecystitis. 4. Masslike density is seen within the gallbladder, which could represent tumefactive sludge or gallbladder  neoplasm. 5. Multiple gallstones. 6. Small right basilar pleural effusion. Mild right lower lobe atelectasis. 7. Complex cystic lesion described at the right lower renal pole is not included completely in the imaging field-of-view of this examination. The kidneys appear atrophic with multiple cystic changes suggesting chronic medical renal disease. 8. Multiple tiny hepatic cysts. Electronically Signed: Roselnie Bear MD  1/22/2024 8:33 AM EST  Workstation ID: [id number]    CT Abdomen Pelvis With Contrast    Result Date: 1/20/2024  Impression: 1.Findings are consistent with acute interstitial pancreatitis. Correlate with history and symptoms. 2.Complex right renal cysts versus nodules. Dedicated nonemergent renal mass protocol MRI or recommended. 3.Cholelithiasis with generalized gallbladder distention, similar prior examination. There is either tumefactive sludge or potential gallbladder neoplasm. This can be further assessed with ultrasound or at time of renal MRI/CT. 4.Interval placement of bilateral ureteral stents with resolution of hydronephrosis. Relatively stable position of bilateral proximal ureteral calculi. 5.New IVC filter. 6.New trace right pleural effusion with minimal right basal atelectasis. 7.No evidence of renal abscess. 8.Other stable findings. Electronically Signed: Sudhir Topete MD  1/20/2024 12:32 PM CST  Workstation ID: [id number]       I reviewed the patient's new clinical results.    Assessment/Plan:     Active and Resolved Problems  Active Hospital Problems    Diagnosis  POA    **Hyperkalemia [E87.5]  Yes    Mechanical complication of arteriovenous fistula surgically created [T82.590A]  Yes    Choledocholithiasis [K80.50]  Unknown    End stage renal disease on dialysis [N18.6, Z99.2]  Not Applicable    Sinus bradycardia [R00.1]  Yes      Resolved Hospital Problems   No resolved problems to display.     Hospital summary : patient is a 78-year-old male who presented to the hospital with  complaints of hypotension bradycardia hyperkalemia and worsening CKD stage IV.  Hypertension bradycardia were deemed to be secondary to hyperkalemia, he was taken for emergent dialysis.  Hypertension bradycardia resolved after that.  Hyperkalemia resolved.  He was found to have bilateral renal stones, urology was consulted and he has no stents in.  He was also put on Rocephin for UTI.  Urine cultures grew Serratia, blood cultures did not grow anything.  He was doing well however his white count continued to worsen, at that point of time ID was consulted.  They recommended switching antibiotics to ertapenem.  Recommended repeating a CT to check to look for abscess.  CT did not show any abscess but showed pancreatitis.  Now switching his diet to n.p.o., consulted GI and they recommended MRCP  Off note he was also found to have DVT left lower extremity, s/p IVC filter placement and is on Eliquis 2.5 mg p.o. twice daily.  Oncology also following.        CKD stage IV, now end-stage renal disease, hemodialysis has been started.  Patient has a fistula already however it was bleeding status post fistulogram and angioplasty on 1/18/2024.  S/p dialysis third time on 1/19.  Further dialysis per nephrology, appreciate recommendations.  Renvela dose increased  Plan for hemodialysis today  Outpatient dialysis has been arranged for Monday Wednesday Friday.        Hyperkalemia  Resolved        Bilateral renal stones with hydronephrosis likely contributing to deterioration of the CKD  Urology was consulted, patient s/p placement of bilateral ureteral stents.  Was initially on Rocephin however white count worsened so was switched to ertapenem-ID following  Monitor blood and urine cultures.--Blood cultures negative for 24 hours, urine cultures growing Serratia, sensitive to Rocephin.  Continue      Leukocytosis  Now improving after switching antibiotics to ertapenem from Rocephin.  ID was consulted.    Repeat CT was done since it was  concern for abscess, however did not show any abscess but showed acute interstitial pancreatitis.    Acute interstitial pancreatitis per CT scan  Patient denies any abdominal pain is having nausea vomiting, manage lipase slightly elevated.  Started IV fluids and initially was kept n.p.o.  Consulted GI and they recommended MRCP.  Since there was a concern for neoplasm as well  -MRCP reviewed, patient has been started on diet since he does have any abdominal pain, tolerating diet okay.  GI planning on doing endoscopy tomorrow  .      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  Started Eliquis 2.5 mg p.o. twice daily and now status postplacement of IVC filter       Bradycardia and hypotension  Due to hyperkalemia  Resolved  Vitals now stable    Hypoglycemia  Blood glucose better now, encourage p.o. intake    DVT prophylaxis:  Medical and mechanical DVT prophylaxis orders are present.     Code status is   Code Status and Medical Interventions:   Ordered at: 01/15/24 9521     Level Of Support Discussed With:    Patient    Health Care Surrogate    Next of Kin (If No Surrogate)     Code Status (Patient has no pulse and is not breathing):    CPR (Attempt to Resuscitate)     Medical Interventions (Patient has pulse or is breathing):    Full Support       Plan for disposition: 3 to 4 days    Time: 30 minutes    Signature: Electronically signed by Licha Clinton MD, 01/22/24, 10:02 EST.  [facility] Hospitalist Team

## 2024-01-23 ENCOUNTER — INPATIENT HOSPITAL (AMBULATORY)
Dept: URBAN - METROPOLITAN AREA HOSPITAL 84 | Facility: HOSPITAL | Age: 79
End: 2024-01-23

## 2024-01-23 ENCOUNTER — ANESTHESIA (OUTPATIENT)
Dept: GASTROENTEROLOGY | Facility: HOSPITAL | Age: 79
End: 2024-01-23
Payer: MEDICARE

## 2024-01-23 ENCOUNTER — APPOINTMENT (OUTPATIENT)
Dept: GENERAL RADIOLOGY | Facility: HOSPITAL | Age: 79
End: 2024-01-23
Payer: MEDICARE

## 2024-01-23 DIAGNOSIS — K86.89 OTHER SPECIFIED DISEASES OF PANCREAS: ICD-10-CM

## 2024-01-23 DIAGNOSIS — R74.01 ELEVATION OF LEVELS OF LIVER TRANSAMINASE LEVELS: ICD-10-CM

## 2024-01-23 DIAGNOSIS — K29.70 GASTRITIS, UNSPECIFIED, WITHOUT BLEEDING: ICD-10-CM

## 2024-01-23 DIAGNOSIS — K31.89 OTHER DISEASES OF STOMACH AND DUODENUM: ICD-10-CM

## 2024-01-23 DIAGNOSIS — K86.1 OTHER CHRONIC PANCREATITIS: ICD-10-CM

## 2024-01-23 DIAGNOSIS — K80.63 CALCULUS OF GALLBLADDER AND BILE DUCT WITH ACUTE CHOLECYSTIT: ICD-10-CM

## 2024-01-23 DIAGNOSIS — K85.90 ACUTE PANCREATITIS WITHOUT NECROSIS OR INFECTION, UNSPECIFIE: ICD-10-CM

## 2024-01-23 DIAGNOSIS — K26.9 DUODENAL ULCER, UNSPECIFIED AS ACUTE OR CHRONIC, WITHOUT HEM: ICD-10-CM

## 2024-01-23 DIAGNOSIS — K83.8 OTHER SPECIFIED DISEASES OF BILIARY TRACT: ICD-10-CM

## 2024-01-23 LAB
ALBUMIN SERPL-MCNC: 2.5 G/DL (ref 3.5–5.2)
ALBUMIN/GLOB SERPL: 0.8 G/DL
ALP SERPL-CCNC: 136 U/L (ref 39–117)
ALT SERPL W P-5'-P-CCNC: 15 U/L (ref 1–41)
ANION GAP SERPL CALCULATED.3IONS-SCNC: 9 MMOL/L (ref 5–15)
AST SERPL-CCNC: 16 U/L (ref 1–40)
BASOPHILS # BLD AUTO: 0 10*3/MM3 (ref 0–0.2)
BASOPHILS NFR BLD AUTO: 0.1 % (ref 0–1.5)
BILIRUB SERPL-MCNC: 0.3 MG/DL (ref 0–1.2)
BUN SERPL-MCNC: 46 MG/DL (ref 8–23)
BUN/CREAT SERPL: 11.7 (ref 7–25)
CALCIUM SPEC-SCNC: 8.5 MG/DL (ref 8.6–10.5)
CHLORIDE SERPL-SCNC: 104 MMOL/L (ref 98–107)
CO2 SERPL-SCNC: 25 MMOL/L (ref 22–29)
CREAT SERPL-MCNC: 3.93 MG/DL (ref 0.76–1.27)
CRP SERPL-MCNC: 7.61 MG/DL (ref 0–0.5)
DEPRECATED RDW RBC AUTO: 51.6 FL (ref 37–54)
EGFRCR SERPLBLD CKD-EPI 2021: 15 ML/MIN/1.73
EOSINOPHIL # BLD AUTO: 0 10*3/MM3 (ref 0–0.4)
EOSINOPHIL NFR BLD AUTO: 0.4 % (ref 0.3–6.2)
ERYTHROCYTE [DISTWIDTH] IN BLOOD BY AUTOMATED COUNT: 15.5 % (ref 12.3–15.4)
GLOBULIN UR ELPH-MCNC: 3.2 GM/DL
GLUCOSE BLDC GLUCOMTR-MCNC: 123 MG/DL (ref 70–105)
GLUCOSE BLDC GLUCOMTR-MCNC: 134 MG/DL (ref 70–105)
GLUCOSE BLDC GLUCOMTR-MCNC: 143 MG/DL (ref 70–105)
GLUCOSE BLDC GLUCOMTR-MCNC: 156 MG/DL (ref 70–105)
GLUCOSE SERPL-MCNC: 155 MG/DL (ref 65–99)
HCT VFR BLD AUTO: 24.9 % (ref 37.5–51)
HGB BLD-MCNC: 8 G/DL (ref 13–17.7)
INR PPP: 1.31 (ref 0.93–1.1)
LIPASE SERPL-CCNC: 106 U/L (ref 13–60)
LYMPHOCYTES # BLD AUTO: 0.8 10*3/MM3 (ref 0.7–3.1)
LYMPHOCYTES NFR BLD AUTO: 7.2 % (ref 19.6–45.3)
MAGNESIUM SERPL-MCNC: 1.8 MG/DL (ref 1.6–2.4)
MCH RBC QN AUTO: 28.9 PG (ref 26.6–33)
MCHC RBC AUTO-ENTMCNC: 32.2 G/DL (ref 31.5–35.7)
MCV RBC AUTO: 89.8 FL (ref 79–97)
MONOCYTES # BLD AUTO: 0.5 10*3/MM3 (ref 0.1–0.9)
MONOCYTES NFR BLD AUTO: 4.1 % (ref 5–12)
NEUTROPHILS NFR BLD AUTO: 10 10*3/MM3 (ref 1.7–7)
NEUTROPHILS NFR BLD AUTO: 88.2 % (ref 42.7–76)
NRBC BLD AUTO-RTO: 0 /100 WBC (ref 0–0.2)
PHOSPHATE SERPL-MCNC: 4.4 MG/DL (ref 2.5–4.5)
PLATELET # BLD AUTO: 233 10*3/MM3 (ref 140–450)
PMV BLD AUTO: 9.1 FL (ref 6–12)
POTASSIUM SERPL-SCNC: 4.2 MMOL/L (ref 3.5–5.2)
PROT SERPL-MCNC: 5.7 G/DL (ref 6–8.5)
PROTHROMBIN TIME: 14 SECONDS (ref 9.6–11.7)
RBC # BLD AUTO: 2.78 10*6/MM3 (ref 4.14–5.8)
SODIUM SERPL-SCNC: 138 MMOL/L (ref 136–145)
WBC NRBC COR # BLD AUTO: 11.4 10*3/MM3 (ref 3.4–10.8)

## 2024-01-23 PROCEDURE — 25510000001 IOPAMIDOL 61 % SOLUTION 30 ML VIAL: Performed by: INTERNAL MEDICINE

## 2024-01-23 PROCEDURE — C2625 STENT, NON-COR, TEM W/DEL SY: HCPCS | Performed by: INTERNAL MEDICINE

## 2024-01-23 PROCEDURE — 25010000002 ONDANSETRON PER 1 MG

## 2024-01-23 PROCEDURE — C1769 GUIDE WIRE: HCPCS | Performed by: INTERNAL MEDICINE

## 2024-01-23 PROCEDURE — 85610 PROTHROMBIN TIME: CPT | Performed by: NURSE PRACTITIONER

## 2024-01-23 PROCEDURE — 25010000002 DEXAMETHASONE PER 1 MG

## 2024-01-23 PROCEDURE — 82948 REAGENT STRIP/BLOOD GLUCOSE: CPT | Performed by: SURGERY

## 2024-01-23 PROCEDURE — 43242 EGD US FINE NEEDLE BX/ASPIR: CPT | Performed by: INTERNAL MEDICINE

## 2024-01-23 PROCEDURE — 25010000002 ERTAPENEM PER 500 MG: Performed by: NURSE PRACTITIONER

## 2024-01-23 PROCEDURE — 83690 ASSAY OF LIPASE: CPT | Performed by: NURSE PRACTITIONER

## 2024-01-23 PROCEDURE — 25810000003 SODIUM CHLORIDE 0.9 % SOLUTION: Performed by: HOSPITALIST

## 2024-01-23 PROCEDURE — 84100 ASSAY OF PHOSPHORUS: CPT | Performed by: HOSPITALIST

## 2024-01-23 PROCEDURE — 86140 C-REACTIVE PROTEIN: CPT | Performed by: NURSE PRACTITIONER

## 2024-01-23 PROCEDURE — 80053 COMPREHEN METABOLIC PANEL: CPT | Performed by: NURSE PRACTITIONER

## 2024-01-23 PROCEDURE — 74330 X-RAY BILE/PANC ENDOSCOPY: CPT

## 2024-01-23 PROCEDURE — 43274 ERCP DUCT STENT PLACEMENT: CPT | Mod: 59 | Performed by: INTERNAL MEDICINE

## 2024-01-23 PROCEDURE — 25810000003 SODIUM CHLORIDE 0.9 % SOLUTION

## 2024-01-23 PROCEDURE — 0FC48ZZ EXTIRPATION OF MATTER FROM GALLBLADDER, VIA NATURAL OR ARTIFICIAL OPENING ENDOSCOPIC: ICD-10-PCS | Performed by: INTERNAL MEDICINE

## 2024-01-23 PROCEDURE — 88177 CYTP FNA EVAL EA ADDL: CPT | Performed by: INTERNAL MEDICINE

## 2024-01-23 PROCEDURE — 25010000002 METRONIDAZOLE 500 MG/100ML SOLUTION

## 2024-01-23 PROCEDURE — 25010000002 LEVOFLOXACIN PER 250 MG

## 2024-01-23 PROCEDURE — 25010000002 SUGAMMADEX 200 MG/2ML SOLUTION

## 2024-01-23 PROCEDURE — 25010000002 PROPOFOL 200 MG/20ML EMULSION

## 2024-01-23 PROCEDURE — 83735 ASSAY OF MAGNESIUM: CPT | Performed by: HOSPITALIST

## 2024-01-23 PROCEDURE — 0F7D8ZZ DILATION OF PANCREATIC DUCT, VIA NATURAL OR ARTIFICIAL OPENING ENDOSCOPIC: ICD-10-PCS | Performed by: INTERNAL MEDICINE

## 2024-01-23 PROCEDURE — 82948 REAGENT STRIP/BLOOD GLUCOSE: CPT

## 2024-01-23 PROCEDURE — 88104 CYTOPATH FL NONGYN SMEARS: CPT | Performed by: INTERNAL MEDICINE

## 2024-01-23 PROCEDURE — BF141ZZ FLUOROSCOPY OF GALLBLADDER, BILE DUCTS AND PANCREATIC DUCTS USING LOW OSMOLAR CONTRAST: ICD-10-PCS | Performed by: INTERNAL MEDICINE

## 2024-01-23 PROCEDURE — 85025 COMPLETE CBC W/AUTO DIFF WBC: CPT | Performed by: NURSE PRACTITIONER

## 2024-01-23 PROCEDURE — 88172 CYTP DX EVAL FNA 1ST EA SITE: CPT | Performed by: INTERNAL MEDICINE

## 2024-01-23 PROCEDURE — 0FD98ZX EXTRACTION OF COMMON BILE DUCT, VIA NATURAL OR ARTIFICIAL OPENING ENDOSCOPIC, DIAGNOSTIC: ICD-10-PCS | Performed by: INTERNAL MEDICINE

## 2024-01-23 DEVICE — IMPLANTABLE DEVICE: Type: IMPLANTABLE DEVICE | Site: PANCREAS | Status: FUNCTIONAL

## 2024-01-23 DEVICE — IMPLANTABLE DEVICE: Type: IMPLANTABLE DEVICE | Site: BILE DUCT | Status: FUNCTIONAL

## 2024-01-23 RX ORDER — FENTANYL CITRATE 50 UG/ML
25 INJECTION, SOLUTION INTRAMUSCULAR; INTRAVENOUS
Status: DISCONTINUED | OUTPATIENT
Start: 2024-01-23 | End: 2024-01-23 | Stop reason: HOSPADM

## 2024-01-23 RX ORDER — ALBUTEROL SULFATE 2.5 MG/3ML
2.5 SOLUTION RESPIRATORY (INHALATION) ONCE AS NEEDED
Status: DISCONTINUED | OUTPATIENT
Start: 2024-01-23 | End: 2024-01-23 | Stop reason: HOSPADM

## 2024-01-23 RX ORDER — ONDANSETRON 2 MG/ML
INJECTION INTRAMUSCULAR; INTRAVENOUS AS NEEDED
Status: DISCONTINUED | OUTPATIENT
Start: 2024-01-23 | End: 2024-01-23 | Stop reason: SURG

## 2024-01-23 RX ORDER — LEVOFLOXACIN 5 MG/ML
INJECTION, SOLUTION INTRAVENOUS AS NEEDED
Status: DISCONTINUED | OUTPATIENT
Start: 2024-01-23 | End: 2024-01-23 | Stop reason: SURG

## 2024-01-23 RX ORDER — PROPOFOL 10 MG/ML
INJECTION, EMULSION INTRAVENOUS AS NEEDED
Status: DISCONTINUED | OUTPATIENT
Start: 2024-01-23 | End: 2024-01-23 | Stop reason: SURG

## 2024-01-23 RX ORDER — LIDOCAINE HYDROCHLORIDE 20 MG/ML
INJECTION, SOLUTION EPIDURAL; INFILTRATION; INTRACAUDAL; PERINEURAL AS NEEDED
Status: DISCONTINUED | OUTPATIENT
Start: 2024-01-23 | End: 2024-01-23 | Stop reason: SURG

## 2024-01-23 RX ORDER — DEXAMETHASONE SODIUM PHOSPHATE 4 MG/ML
INJECTION, SOLUTION INTRA-ARTICULAR; INTRALESIONAL; INTRAMUSCULAR; INTRAVENOUS; SOFT TISSUE AS NEEDED
Status: DISCONTINUED | OUTPATIENT
Start: 2024-01-23 | End: 2024-01-23 | Stop reason: SURG

## 2024-01-23 RX ORDER — ACETAMINOPHEN 650 MG/1
325 SUPPOSITORY RECTAL EVERY 4 HOURS PRN
Status: DISCONTINUED | OUTPATIENT
Start: 2024-01-23 | End: 2024-01-23 | Stop reason: HOSPADM

## 2024-01-23 RX ORDER — HYDROCODONE BITARTRATE AND ACETAMINOPHEN 7.5; 325 MG/1; MG/1
1 TABLET ORAL EVERY 4 HOURS PRN
Status: DISCONTINUED | OUTPATIENT
Start: 2024-01-23 | End: 2024-01-23 | Stop reason: HOSPADM

## 2024-01-23 RX ORDER — INDOMETHACIN 100 MG
SUPPOSITORY, RECTAL RECTAL AS NEEDED
Status: DISCONTINUED | OUTPATIENT
Start: 2024-01-23 | End: 2024-01-23 | Stop reason: HOSPADM

## 2024-01-23 RX ORDER — ACETAMINOPHEN 325 MG/1
650 TABLET ORAL ONCE AS NEEDED
Status: DISCONTINUED | OUTPATIENT
Start: 2024-01-23 | End: 2024-01-23 | Stop reason: HOSPADM

## 2024-01-23 RX ORDER — PROCHLORPERAZINE EDISYLATE 5 MG/ML
10 INJECTION INTRAMUSCULAR; INTRAVENOUS ONCE AS NEEDED
Status: DISCONTINUED | OUTPATIENT
Start: 2024-01-23 | End: 2024-01-23 | Stop reason: HOSPADM

## 2024-01-23 RX ORDER — METRONIDAZOLE 500 MG/100ML
INJECTION, SOLUTION INTRAVENOUS AS NEEDED
Status: DISCONTINUED | OUTPATIENT
Start: 2024-01-23 | End: 2024-01-23 | Stop reason: SURG

## 2024-01-23 RX ORDER — FENTANYL CITRATE 50 UG/ML
50 INJECTION, SOLUTION INTRAMUSCULAR; INTRAVENOUS
Status: DISCONTINUED | OUTPATIENT
Start: 2024-01-23 | End: 2024-01-23 | Stop reason: HOSPADM

## 2024-01-23 RX ORDER — FLUMAZENIL 0.1 MG/ML
0.1 INJECTION INTRAVENOUS AS NEEDED
Status: DISCONTINUED | OUTPATIENT
Start: 2024-01-23 | End: 2024-01-23 | Stop reason: HOSPADM

## 2024-01-23 RX ORDER — HYDRALAZINE HYDROCHLORIDE 20 MG/ML
5 INJECTION INTRAMUSCULAR; INTRAVENOUS
Status: DISCONTINUED | OUTPATIENT
Start: 2024-01-23 | End: 2024-01-23 | Stop reason: HOSPADM

## 2024-01-23 RX ORDER — LABETALOL HYDROCHLORIDE 5 MG/ML
5 INJECTION, SOLUTION INTRAVENOUS
Status: DISCONTINUED | OUTPATIENT
Start: 2024-01-23 | End: 2024-01-23 | Stop reason: HOSPADM

## 2024-01-23 RX ORDER — SODIUM CHLORIDE 9 MG/ML
INJECTION, SOLUTION INTRAVENOUS CONTINUOUS PRN
Status: DISCONTINUED | OUTPATIENT
Start: 2024-01-23 | End: 2024-01-23 | Stop reason: SURG

## 2024-01-23 RX ORDER — NALOXONE HCL 0.4 MG/ML
0.4 VIAL (ML) INJECTION AS NEEDED
Status: DISCONTINUED | OUTPATIENT
Start: 2024-01-23 | End: 2024-01-23 | Stop reason: HOSPADM

## 2024-01-23 RX ORDER — ROCURONIUM BROMIDE 10 MG/ML
INJECTION, SOLUTION INTRAVENOUS AS NEEDED
Status: DISCONTINUED | OUTPATIENT
Start: 2024-01-23 | End: 2024-01-23 | Stop reason: SURG

## 2024-01-23 RX ORDER — DIPHENHYDRAMINE HYDROCHLORIDE 50 MG/ML
12.5 INJECTION INTRAMUSCULAR; INTRAVENOUS
Status: DISCONTINUED | OUTPATIENT
Start: 2024-01-23 | End: 2024-01-23 | Stop reason: HOSPADM

## 2024-01-23 RX ORDER — PHENYLEPHRINE HCL IN 0.9% NACL 1 MG/10 ML
SYRINGE (ML) INTRAVENOUS AS NEEDED
Status: DISCONTINUED | OUTPATIENT
Start: 2024-01-23 | End: 2024-01-23 | Stop reason: SURG

## 2024-01-23 RX ADMIN — METRONIDAZOLE 500 MG: 500 INJECTION, SOLUTION INTRAVENOUS at 11:37

## 2024-01-23 RX ADMIN — Medication 150 MCG: at 12:18

## 2024-01-23 RX ADMIN — ROCURONIUM BROMIDE 10 MG: 10 INJECTION, SOLUTION INTRAVENOUS at 12:04

## 2024-01-23 RX ADMIN — ONDANSETRON 4 MG: 2 INJECTION INTRAMUSCULAR; INTRAVENOUS at 13:17

## 2024-01-23 RX ADMIN — ROCURONIUM BROMIDE 20 MG: 10 INJECTION, SOLUTION INTRAVENOUS at 12:28

## 2024-01-23 RX ADMIN — LEVOFLOXACIN 500 MG: 500 INJECTION, SOLUTION INTRAVENOUS at 11:27

## 2024-01-23 RX ADMIN — Medication 200 MCG: at 11:55

## 2024-01-23 RX ADMIN — ATORVASTATIN CALCIUM 10 MG: 10 TABLET, FILM COATED ORAL at 15:23

## 2024-01-23 RX ADMIN — PROPOFOL 130 MG: 10 INJECTION, EMULSION INTRAVENOUS at 11:10

## 2024-01-23 RX ADMIN — Medication 200 MCG: at 11:46

## 2024-01-23 RX ADMIN — Medication 10 ML: at 21:06

## 2024-01-23 RX ADMIN — SEVELAMER CARBONATE 2400 MG: 800 TABLET, FILM COATED ORAL at 15:23

## 2024-01-23 RX ADMIN — SODIUM CHLORIDE 100 ML/HR: 9 INJECTION, SOLUTION INTRAVENOUS at 05:45

## 2024-01-23 RX ADMIN — SODIUM CHLORIDE: 9 INJECTION, SOLUTION INTRAVENOUS at 11:07

## 2024-01-23 RX ADMIN — Medication 200 MCG: at 12:52

## 2024-01-23 RX ADMIN — Medication 10 ML: at 08:26

## 2024-01-23 RX ADMIN — Medication 200 MCG: at 11:43

## 2024-01-23 RX ADMIN — SODIUM CHLORIDE 100 ML/HR: 9 INJECTION, SOLUTION INTRAVENOUS at 21:05

## 2024-01-23 RX ADMIN — ROCURONIUM BROMIDE 40 MG: 10 INJECTION, SOLUTION INTRAVENOUS at 11:10

## 2024-01-23 RX ADMIN — PHENOL 1 SPRAY: 1.4 SPRAY ORAL at 16:30

## 2024-01-23 RX ADMIN — Medication 150 MCG: at 12:35

## 2024-01-23 RX ADMIN — Medication 100 MCG: at 12:37

## 2024-01-23 RX ADMIN — LIDOCAINE HYDROCHLORIDE 60 MG: 20 INJECTION, SOLUTION EPIDURAL; INFILTRATION; INTRACAUDAL; PERINEURAL at 11:10

## 2024-01-23 RX ADMIN — DEXAMETHASONE SODIUM PHOSPHATE 4 MG: 4 INJECTION, SOLUTION INTRAMUSCULAR; INTRAVENOUS at 11:29

## 2024-01-23 RX ADMIN — SUGAMMADEX 200 MG: 100 INJECTION, SOLUTION INTRAVENOUS at 13:24

## 2024-01-23 RX ADMIN — SENNOSIDES AND DOCUSATE SODIUM 2 TABLET: 50; 8.6 TABLET ORAL at 15:23

## 2024-01-23 RX ADMIN — Medication 100 MCG: at 11:37

## 2024-01-23 RX ADMIN — Medication 200 MCG: at 12:39

## 2024-01-23 RX ADMIN — CALCITRIOL CAPSULES 0.25 MCG 0.5 MCG: 0.25 CAPSULE ORAL at 15:23

## 2024-01-23 RX ADMIN — ERTAPENEM SODIUM 500 MG: 1 INJECTION, POWDER, LYOPHILIZED, FOR SOLUTION INTRAMUSCULAR; INTRAVENOUS at 16:30

## 2024-01-23 NOTE — SIGNIFICANT NOTE
01/23/24 1423   OTHER   Discipline physical therapist   Rehab Time/Intention   Session Not Performed patient unavailable for treatment  (Pt off floor at ENDO when checked on x2 this date.  PT to f/u 1/24.)   Therapy Assessment/Plan (PT)   Criteria for Skilled Interventions Met (PT) yes;skilled treatment is necessary   Recommendation   PT - Next Appointment 01/24/24

## 2024-01-23 NOTE — OP NOTE
ENDOSCOPIC RETROGRADE CHOLANGIOPANCREATOGRAPHY, ESOPHAGOGASTRODUODENOSCOPY WITH ULTRASOUND AND FINE NEEDLE ASPIRATION Procedure Report    Patient Name:  Gabriel De Souza  YOB: 1945    Date of Surgery:  1/23/2024     Pre-Op Diagnosis:  Choledocholithiasis [K80.50]  78 years old male with a weight loss, jaundice elevated CA 19-9 abnormal looking MRI for gallbladder    Procedure/CPT® Codes:      Procedure(s):  ENDOSCOPIC RETROGRADE CHOLANGIOPANCREATOGRAPHY  ESOPHAGOGASTRODUODENOSCOPY WITH ULTRASOUND AND FINE NEEDLE ASPIRATION of pancreatic head lesion    Staff:  Surgeon(s):  Ollie Peoples MD      Anesthesia: General    Description of Procedure:  A description of the procedure as well as risks, benefits and alternative methods were explained to the patient who voiced understanding and signed the corresponding consent form.Specifically risks of post-ERCP pancreatitis, bleeding, perforation, failure to canulate and adverse reaction to sedation were discussed. A physical exam was performed and vital signs were monitored throughout the procedure.    Initially Pentax radial scope and subsequent Pentax linear scope was advanced to documentation from orifice, esophagus stomach second part of the duodenum.    A  film was performed which was normal. With the patient in the semi-prone position, an Olympus side viewing endoscope was placed into the mouth and proceeded through the esophagus, stomach and second portion of the duodenum without difficulty. Limited views of the esophagus and stomach were normal. The ampulla was visualized and appeared normal. A [employer] hydrotome was used to cannulate the ampulla using wire guided technique. Bile was aspirated to ensure this was the duct of interest. Contrast was injected into the bile duct.      The scope was then retroflexed and the fundus was visualized. The procedure was not difficult and there were no immediate complications.    Findings:   EUS  findings  Scanning with the radial scope showed significant changes of chronic pancreatitis in the pancreatic body and tail with a focally dilated pancreatic duct in the body and the tail area as well as few sidebranches prominent was noticed in the tail.  Hyperechoic bright foci with the lobulation was seen in the body and the tail area.  PD was close to 1.6 bili.  Third to 1.8 mm.  Scanning of the head of the pancreas showed changes of chronic pancreatitis throughout the head of the pancreatic parenchyma however right on the head area there was a stricture noted in the common bile duct with a slightly more hypodense area close to 2.6 cm.  This area was sampled using a linear array scope.  Initial cytology showed mild atypia raising possibility of underlying malignancy.  3 more passes were performed and material was saved for cell block for further staining and confirmation of malignancy.  Scanning gallbladder did show a smoother outer wall with extensive stone causing shadowing as well as extensive sludge filling the gallbladder.  This may be giving tumefactive sludge versus underlying malignancy.  Patient tolerated procedure very well no major complication was noticed.  Scanning of the gallbladder did show multiple shadowing stone as well as extensive sludge tumefactive    ERCP findings  Very long J-shaped stomach requiring repositioning to left lateral as well as multiple deep ulcer with a stricture around the junction of bulb and the D2 area navigated with slight difficulty.  Large ulcer was noticed around the ampulla as well hidden behind the ulcer site, initially long position was used wire went into the pancreatic duct which was kept in position subsequently using a Jagtome with a manipulation and slight positioning, wire was advanced into common hepatic duct area followed by the tome cholangiogram did show a stricture in the distal part around centimeter with a filling defect, common bile duct was dilated  close to 1 cm.  Multiple stones seen in the gallbladder.  Sphincterotomy was performed at 11 o'clock position with RB setting subsequently 9 to 12 mm balloon was used with multiple sweeps stone was retrieved.  Brushing cytology of the stricture was performed and 10 Malagasy 9 cm stent was placed.  Multiple large stones seen in the gallbladder as documented with the picture.    Impression:  1.  Endosonographic images highly suggestive of acute on chronic pancreatitis as well as ?? pancreatic head mass which is ill-defined close to 2.6 cm leading to stricture of common bile duct.  There is also 6 to 7 mm stone in the common bile duct area.  Extensive stones and sludge was noticed inside the gallbladder casing tumefactive sludge and gravel, possibility of underlying gallbladder mass is less likely given normal looking outer wall however it cannot be completely excluded.  2.  J-shaped stomach with multiple duodenal ulcers and a stricture navigated with left lateral position.  Successful removal of CBD stone after sphincterotomy, distal CBD stricture brushing cytology was obtained and 10 Malagasy 9 cm stent was placed.  3.  PD stent was placed prophylactically to prevent post ERCP pancreatitis and preventing of worsening of underlying acute pancreatitis.  4.  Multiple stone in gallbladder.  5.  Multiple innumerable duodenal ulcer with a stricture as well as severe gastritis with a J-shaped stomach    Recommendations:  Follow the liver function test  Continue IV fluid.  Continue antibiotic.  Follow the final biopsy and cytology report.  If the biopsy turned out to be negative we will repeat ERCP with a EUS again in 6 to 8-week after PPI therapy antibiotic for cholangitis.  Okay to start clear liquids  Follow CA 19-9      Ollie Peoples MD     Date: 1/23/2024    Time: 11:46 EST

## 2024-01-23 NOTE — PLAN OF CARE
Goal Outcome Evaluation:  Plan of Care Reviewed With: patient     Problem: Adult Inpatient Plan of Care  Goal: Plan of Care Review  Outcome: Ongoing, Progressing  Flowsheets (Taken 1/23/2024 9522)  Progress: improving  Plan of Care Reviewed With: patient        Progress: improving

## 2024-01-23 NOTE — ANESTHESIA PREPROCEDURE EVALUATION
Anesthesia Evaluation     Patient summary reviewed and Nursing notes reviewed   NPO Solid Status: > 8 hours  NPO Liquid Status: > 8 hours           Airway   Mallampati: II  TM distance: >3 FB  Neck ROM: full  No difficulty expected  Dental - normal exam     Pulmonary - normal exam   Cardiovascular - normal exam    ECG reviewed    (+) hypertension, valvular problems/murmurs, CAD, DVT, hyperlipidemia      Neuro/Psych  (+) numbness  GI/Hepatic/Renal/Endo    (+) renal disease- ESRD and dialysis, diabetes mellitus, thyroid problem     Musculoskeletal     Abdominal  - normal exam    Bowel sounds: normal.   Substance History      OB/GYN          Other      history of cancer    ROS/Med Hx Other: Sinus bradycardia  Prolonged LA interval  Nonspecific intraventricular conduction delay  Nonspecific T abnormalities, lateral leads  Prolonged QT interval        IMPRESSION:  1. Findings consistent with acute diffuse interstitial pancreatitis.  2. Multiple less than 5 mm cystic foci are scattered throughout the pancreatic parenchyma which could represent sidebranch IPMNs, or pancreatic cysts. No well-defined pseudocyst is seen. Normal caliber of the pancreatic duct. No convincing evidence of   degraded divisum.  3. Choledocholithiasis is suggested in the mid to distal CBD. Suspected small stone within the cystic duct, as well. However, there is no indication of acute cholecystitis.  4. Masslike density is seen within the gallbladder, which could represent tumefactive sludge or gallbladder neoplasm.  5. Multiple gallstones.  6. Small right basilar pleural effusion. Mild right lower lobe atelectasis.  7. Complex cystic lesion described at the right lower renal pole is not included completely in the imaging field-of-view of this examination. The kidneys appear atrophic with multiple cystic changes suggesting chronic medical renal disease.  8. Multiple tiny hepatic cysts.                    Anesthesia Plan    ASA 3     general      intravenous induction     Anesthetic plan, risks, benefits, and alternatives have been provided, discussed and informed consent has been obtained with: patient.    Plan discussed with CRNA and CAA.    CODE STATUS:    Level Of Support Discussed With: Patient; Health Care Surrogate; Next of Kin (If No Surrogate)  Code Status (Patient has no pulse and is not breathing): CPR (Attempt to Resuscitate)  Medical Interventions (Patient has pulse or is breathing): Full Support

## 2024-01-23 NOTE — PLAN OF CARE
Problem: Adult Inpatient Plan of Care  Goal: Absence of Hospital-Acquired Illness or Injury  Intervention: Identify and Manage Fall Risk  Recent Flowsheet Documentation  Taken 1/23/2024 1810 by Farrah Syed RN  Safety Promotion/Fall Prevention:   assistive device/personal items within reach   clutter free environment maintained   fall prevention program maintained   lighting adjusted   nonskid shoes/slippers when out of bed   room organization consistent   safety round/check completed  Taken 1/23/2024 1631 by Farrah Syed RN  Safety Promotion/Fall Prevention:   assistive device/personal items within reach   clutter free environment maintained   fall prevention program maintained   lighting adjusted   nonskid shoes/slippers when out of bed   room organization consistent   safety round/check completed  Taken 1/23/2024 1436 by Farrah Syed RN  Safety Promotion/Fall Prevention: patient off unit  Taken 1/23/2024 1234 by Farrah Syed RN  Safety Promotion/Fall Prevention: patient off unit  Taken 1/23/2024 1003 by Farrah Syed RN  Safety Promotion/Fall Prevention: patient off unit  Taken 1/23/2024 0748 by Farrah Syed RN  Safety Promotion/Fall Prevention:   assistive device/personal items within reach   clutter free environment maintained   fall prevention program maintained   lighting adjusted   nonskid shoes/slippers when out of bed   room organization consistent   safety round/check completed  Intervention: Prevent Skin Injury  Recent Flowsheet Documentation  Taken 1/23/2024 1810 by Farrah Syed RN  Body Position: weight shifting  Taken 1/23/2024 1631 by Farrah Syed RN  Body Position: turned  Taken 1/23/2024 0748 by Farrah Syed RN  Body Position: turned  Intervention: Prevent and Manage VTE (Venous Thromboembolism) Risk  Recent Flowsheet Documentation  Taken 1/23/2024 1810 by Farrah Syed RN  Activity Management: activity encouraged  Taken 1/23/2024 1631 by Farrah Syed RN  Activity Management: activity  encouraged  Range of Motion: active ROM (range of motion) encouraged  Taken 1/23/2024 0748 by Farrah Syed RN  Activity Management: activity encouraged  Range of Motion: active ROM (range of motion) encouraged  Intervention: Prevent Infection  Recent Flowsheet Documentation  Taken 1/23/2024 1810 by Farrah Syed RN  Infection Prevention:   hand hygiene promoted   personal protective equipment utilized  Taken 1/23/2024 1631 by Farrah Syed RN  Infection Prevention:   hand hygiene promoted   personal protective equipment utilized  Taken 1/23/2024 0748 by Farrah Syed RN  Infection Prevention:   hand hygiene promoted   personal protective equipment utilized  Goal: Optimal Comfort and Wellbeing  Intervention: Provide Person-Centered Care  Recent Flowsheet Documentation  Taken 1/23/2024 1631 by Farrah Syed RN  Trust Relationship/Rapport: care explained  Taken 1/23/2024 0748 by Farrah Syed RN  Trust Relationship/Rapport: care explained   Goal Outcome Evaluation:         Patient had ERCP this shift. GI following. IV ABX given. GI to follow up with family on findings. Patient resting in bed, call light in reach, Plan of care to continue.

## 2024-01-23 NOTE — SIGNIFICANT NOTE
01/23/24 1302   OTHER   Discipline occupational therapist   Rehab Time/Intention   Session Not Performed patient unavailable for treatment  (Off floor - ENDO)   Recommendation   OT - Next Appointment 01/24/24

## 2024-01-23 NOTE — ANESTHESIA PROCEDURE NOTES
Airway  Urgency: elective    Date/Time: 1/23/2024 11:13 AM  Airway not difficult    General Information and Staff    Patient location during procedure: OR  Anesthesiologist: Jaime Mendez MD  CRNA/CAA: Rachel Auguste CRNA    Indications and Patient Condition  Indications for airway management: airway protection    Preoxygenated: yes  MILS maintained throughout  Mask difficulty assessment: 1 - vent by mask    Final Airway Details  Final airway type: endotracheal airway      Successful airway: ETT  Cuffed: yes   Successful intubation technique: video laryngoscopy  Facilitating devices/methods: intubating stylet  Endotracheal tube insertion site: oral  Blade: Grey  Blade size: 4  ETT size (mm): 7.5  Cormack-Lehane Classification: grade I - full view of glottis  Placement verified by: chest auscultation and capnometry   Measured from: lips  ETT/EBT  to lips (cm): 23  Number of attempts at approach: 1  Assessment: lips, teeth, and gum same as pre-op and atraumatic intubation

## 2024-01-23 NOTE — PROGRESS NOTES
[facility] Progress Note      Patient Name: Gabriel De Souza  : 1945  MRN: [id number]  Primary Care Physician:  Waylon Johnson MD  Date of admission: 1/15/2024    Subjective     Interval History:     Patient resting comfortably.    Denies any chest pain, shortness of breath, nausea or vomiting    Review of Systems:   As noted above    Objective     Vitals:   Temp:  [97.1 °F (36.2 °C)-98.1 °F (36.7 °C)] 97.9 °F (36.6 °C)  Heart Rate:  [71-85] 79  Resp:  [16-25] 16  BP: (128-152)/(46-74) 152/46    Intake/Output Summary (Last 24 hours) at 2024 1204  Last data filed at 2024 1137  Gross per 24 hour   Intake 2340 ml   Output 2400 ml   Net -60 ml       Physical Exam:    General Appearance: Awake, alert, NAD  HEENT: oral mucosa normal, nonicteric sclera  Neck: supple, no JVD  Lungs: CTA  Heart: RRR, normal S1 and S2  Abdomen: soft, nondistended  Extremities: no edema  Neuro: Awake alert and moving all extremities    Scheduled Meds:     [Held by provider] apixaban, 2.5 mg, Oral, Q12H  atorvastatin, 10 mg, Oral, Daily  calcitriol, 0.5 mcg, Oral, Daily  ertapenem, 500 mg, Intravenous, Q24H  insulin lispro, 2-7 Units, Subcutaneous, 4x Daily AC & at Bedtime  senna-docusate sodium, 2 tablet, Oral, BID  sevelamer, 2,400 mg, Oral, TID With Meals  sodium chloride, 10 mL, Intravenous, Q12H      IV Meds:   sodium chloride, 100 mL/hr, Last Rate: 100 mL/hr (24 0545)          Results Reviewed:   I have personally reviewed the results from the time of this admission to 2024 12:04 EST     Results from last 7 days   Lab Units 24  0256 24  0108 24  0003   SODIUM mmol/L 138 137 139   POTASSIUM mmol/L 4.2 4.4 4.1   CHLORIDE mmol/L 104 102 100   CO2 mmol/L 25.0 21.0* 26.0   BUN mg/dL 46* 72* 53*   CREATININE mg/dL 3.93* 5.76* 4.23*   CALCIUM mg/dL 8.5* 8.7 8.8   BILIRUBIN mg/dL 0.3 0.3 0.3   ALK PHOS U/L 136* 119* 118*   ALT (SGPT) U/L 15 16 17   AST (SGOT) U/L  16 17 13   GLUCOSE mg/dL 155* 140* 138*     Estimated Creatinine Clearance: 19.9 mL/min (A) (by C-G formula based on SCr of 3.93 mg/dL (H)).  Results from last 7 days   Lab Units 01/23/24  0256 01/22/24  0108 01/21/24  0003   MAGNESIUM mg/dL 1.8 2.1 1.7   PHOSPHORUS mg/dL 4.4 6.1* 4.8*         Results from last 7 days   Lab Units 01/22/24  0108 01/21/24  0003 01/20/24  0105 01/18/24  2332 01/18/24  0352   WBC 10*3/mm3 16.60* 20.10* 18.60* 27.00* 21.20*   HEMOGLOBIN g/dL 9.3* 9.2* 8.8* 9.0* 9.0*   PLATELETS 10*3/mm3 216 180 158 189 210     Results from last 7 days   Lab Units 01/23/24  0256 01/18/24  1907   INR  1.31* 1.27*       Assessment / Plan     ASSESSMENT:  End-stage renal disease.  patient receiving dialysis every other day. Electrolytes, volume status okay  Obstructive uropathy.  Status post bilateral ureteral stents placement.  Patient also has complex left renal cyst.  Repeat CT scan showed resolution of hydronephrosis.  Complex right renal cysts.  Hyperkalemia.  Secondary to renal failure.  Improved  Metabolic acidosis.  Increased anion gap, secondary to renal failure.  Improving with dialysis  Anemia in chronic kidney disease.   hyperphosphatemia.    DVT.  Hematology, vascular following following.  S/p IVC filter placement.    Urinary tract infection.  on IV antibiotics  Pancreatitis.  Gastroenterology following.  Scheduled for MRCP today    PLAN:  Hemodialysis Monday Wednesday and Friday  Outpatient dialysis has been arranged  Okay to discharge to rehab from a standpoint    Atul Fowler MD  01/23/24  12:04 [facility]    [facility]  [phone]

## 2024-01-23 NOTE — PROGRESS NOTES
Infectious Diseases Progress Note      LOS: 8 days   Patient Care Team:  Waylon Johnson MD as PCP - General  Chemchirian, MD Lali as Consulting Physician (Cardiology)  Atul Fowler MD as Consulting Physician (Nephrology)    Chief Complaint: Fatigue    Subjective       The patient has been afebrile for the last 24 hours.  The patient is on room air, hemodynamically stable, and is tolerating antimicrobial therapy.  Denies abdominal pain.  Post ERCP today      Review of Systems:   Review of Systems   Constitutional:  Positive for fatigue.   HENT: Negative.     Eyes: Negative.    Respiratory: Negative.     Cardiovascular: Negative.    Gastrointestinal: Negative.    Endocrine: Negative.    Genitourinary: Negative.    Musculoskeletal: Negative.    Skin: Negative.    Neurological: Negative.    Psychiatric/Behavioral: Negative.     All other systems reviewed and are negative.       Objective     Vital Signs  Temp:  [97.5 °F (36.4 °C)-98.1 °F (36.7 °C)] 97.6 °F (36.4 °C)  Heart Rate:  [66-85] 66  Resp:  [15-25] 15  BP: (113-152)/(46-72) 129/56    Physical Exam:  Physical Exam  Vitals and nursing note reviewed.   Constitutional:       General: He is not in acute distress.     Appearance: He is well-developed and normal weight. He is ill-appearing. He is not diaphoretic.   HENT:      Head: Normocephalic and atraumatic.   Eyes:      Conjunctiva/sclera: Conjunctivae normal.      Pupils: Pupils are equal, round, and reactive to light.   Cardiovascular:      Rate and Rhythm: Normal rate and regular rhythm.      Heart sounds: Normal heart sounds, S1 normal and S2 normal.   Pulmonary:      Effort: Pulmonary effort is normal. No respiratory distress.      Breath sounds: Normal breath sounds. No stridor. No wheezing or rales.   Abdominal:      General: Bowel sounds are normal. There is no distension.      Palpations: Abdomen is soft. There is no mass.      Tenderness: There is no abdominal tenderness. There is no guarding.       Comments: Abdomen is nontender   Genitourinary:     Comments: Peterson catheter  Musculoskeletal:         General: No deformity. Normal range of motion.      Cervical back: Neck supple.   Skin:     General: Skin is warm and dry.      Coloration: Skin is not pale.      Findings: No erythema or rash.      Comments:   Left arm fistula   Neurological:      Mental Status: He is alert and oriented to person, place, and time.      Cranial Nerves: No cranial nerve deficit.   Psychiatric:         Mood and Affect: Mood normal.          Results Review:    I have reviewed all clinical data, test, lab, and imaging results.     Radiology  No Radiology Exams Resulted Within Past 24 Hours    Cardiology    Laboratory    Results from last 7 days   Lab Units 01/22/24  0108 01/21/24  0003 01/20/24  0105 01/18/24  2332 01/18/24  0352 01/17/24  0848   WBC 10*3/mm3 16.60* 20.10* 18.60* 27.00* 21.20* 17.20*   HEMOGLOBIN g/dL 9.3* 9.2* 8.8* 9.0* 9.0* 8.6*   HEMATOCRIT % 28.8* 28.7* 27.6* 27.7* 27.3* 25.5*   PLATELETS 10*3/mm3 216 180 158 189 210 198     Results from last 7 days   Lab Units 01/23/24  0256 01/22/24  0108 01/21/24  0003 01/20/24  0105 01/18/24  2332 01/18/24  0352 01/17/24  0847   SODIUM mmol/L 138 137 139 141 139 142 139   POTASSIUM mmol/L 4.2 4.4 4.1 3.9 4.2 4.9 4.4   CHLORIDE mmol/L 104 102 100 101 101 102 99   CO2 mmol/L 25.0 21.0* 26.0 25.0 25.0 24.0 21.0*   BUN mg/dL 46* 72* 53* 69* 47* 100* 89*   CREATININE mg/dL 3.93* 5.76* 4.23* 5.50* 4.23* 7.68* 6.73*   GLUCOSE mg/dL 155* 140* 138* 119* 70 78 140*   ALBUMIN g/dL 2.5* 2.3* 2.9* 2.7* 2.8* 2.8* 2.9*   BILIRUBIN mg/dL 0.3 0.3 0.3 0.4  --  0.2 0.3   ALK PHOS U/L 136* 119* 118* 92  --  93 122*   AST (SGOT) U/L 16 17 13 10  --  21 24   ALT (SGPT) U/L 15 16 17 17  --  20 24   AMYLASE U/L  --   --   --  122*  --   --   --    LIPASE U/L 106*  --   --  248*  --   --   --    CALCIUM mg/dL 8.5* 8.7 8.8 8.6 8.4* 8.3* 7.9*                   Microbiology   Microbiology Results (last  10 days)       Procedure Component Value - Date/Time    Urine Culture - Urine, Indwelling Urethral Catheter [[id number]]  (Normal) Collected: 01/17/24 1501    Lab Status: Final result Specimen: Urine from Indwelling Urethral Catheter Updated: 01/18/24 1939     Urine Culture No growth    Blood Culture - Blood, Arm, Left [[id number]]  (Normal) Collected: 01/17/24 1205    Lab Status: Final result Specimen: Blood from Arm, Left Updated: 01/22/24 1231     Blood Culture No growth at 5 days    Blood Culture - Blood, Arm, Left [[id number]]  (Normal) Collected: 01/17/24 1203    Lab Status: Final result Specimen: Blood from Arm, Left Updated: 01/22/24 1231     Blood Culture No growth at 5 days    COVID-19 and FLU A/B PCR, 1 HR TAT - Swab, Nasopharynx [[id number]]  (Normal) Collected: 01/15/24 1602    Lab Status: Final result Specimen: Swab from Nasopharynx Updated: 01/15/24 1624     COVID19 Not Detected     Influenza A PCR Not Detected     Influenza B PCR Not Detected    Narrative:      Fact sheet for providers: https://www.fda.gov/media/646103/download    Fact sheet for patients: https://www.fda.gov/media/190167/download    Test performed by PCR.    Urine Culture - Urine, Urine, Clean Catch [[id number]]  (Abnormal)  (Susceptibility) Collected: 01/15/24 1145    Lab Status: Final result Specimen: Urine, Clean Catch Updated: 01/18/24 0837     Urine Culture 25,000 CFU/mL Serratia marcescens    Narrative:      Colonization of the urinary tract without infection is common. Treatment is discouraged unless the patient is symptomatic, pregnant, or undergoing an invasive urologic procedure.    Susceptibility        Serratia marcescens      ZOILA      Amoxicillin + Clavulanate Resistant      Cefazolin Resistant      Cefepime Susceptible      Ceftazidime Susceptible      Ceftriaxone Susceptible      Gentamicin Susceptible      Levofloxacin Susceptible      Nitrofurantoin Resistant      Piperacillin + Tazobactam Susceptible  [1]        Trimethoprim + Sulfamethoxazole Susceptible                   [1]  Appended report. These results have been appended to a previously preliminary verified report.                   Blood Culture - Blood, Arm, Right [[id number]]  (Normal) Collected: 01/15/24 1126    Lab Status: Final result Specimen: Blood from Arm, Right Updated: 01/20/24 1130     Blood Culture No growth at 5 days    Narrative:      Less than seven (7) mL's of blood was collected.  Insufficient quantity may yield false negative results.    Blood Culture - Blood, Arm, Left [[id number]]  (Normal) Collected: 01/15/24 1125    Lab Status: Final result Specimen: Blood from Arm, Left Updated: 01/20/24 1130     Blood Culture No growth at 5 days    Narrative:      Less than seven (7) mL's of blood was collected.  Insufficient quantity may yield false negative results.            Medication Review:       Schedule Meds  [Held by provider] apixaban, 2.5 mg, Oral, Q12H  atorvastatin, 10 mg, Oral, Daily  calcitriol, 0.5 mcg, Oral, Daily  ertapenem, 500 mg, Intravenous, Q24H  insulin lispro, 2-7 Units, Subcutaneous, 4x Daily AC & at Bedtime  senna-docusate sodium, 2 tablet, Oral, BID  sevelamer, 2,400 mg, Oral, TID With Meals  sodium chloride, 10 mL, Intravenous, Q12H        Infusion Meds  sodium chloride, 100 mL/hr, Last Rate: 100 mL/hr (01/23/24 1456)        PRN Meds    acetaminophen    senna-docusate sodium **AND** polyethylene glycol **AND** bisacodyl **AND** bisacodyl    Calcium Replacement - Follow Nurse / BPA Driven Protocol    dextrose    dextrose    glucagon (human recombinant)    Magnesium Standard Dose Replacement - Follow Nurse / BPA Driven Protocol    nitroglycerin    ondansetron    ondansetron ODT    phenol    Phosphorus Replacement - Follow Nurse / BPA Driven Protocol    Potassium Replacement - Follow Nurse / BPA Driven Protocol    [COMPLETED] Insert Peripheral IV **AND** sodium chloride    sodium chloride    sodium chloride        Assessment & Plan        Antimicrobial Therapy   1.  IV ertapenem        2.        3.        4.        5.          Assessment     Severe reactive leukocytosis.  Started to improve     Probable complicated UTI.  Urine culture grew Serratia marcescens from January 15 2024.  Patient had prior urine culture before admission on January 8, 2024 and grew the same organism.  CT scan of abdomen pelvis done without contrast and showed bilateral hydronephrosis with possible complex cyst on the right kidney.  CT scan of abdomen pelvis with IV contrast showed complex right renal cysts versus nodules  -The patient is s/p bilateral ureteral stent placement on January 16, 2024    Probable interstitial pancreatitis based on imaging studies.  But patient has no abdominal pain or tenderness.  Patient has no history of chronic pancreatitis.  Amylase and lipase are elevated.  GI now following, MRCP continue to show interstitial pancreatitis.  The patient is status post ERCP on 1/23/2024-findings suggestive of chronic pancreatitis with a biopsy of a pancreatic head mass.    Cholelithiasis without findings of acute cholecystitis.  Density in the gallbladder-sludge versus neoplasm.  ERCP showed multiple gallbladder stones and sludge, removal of stones with stricture brushing and PD stent placement.  Multiple duodenal ulcers also seen with stricture and severe gastritis     End-stage renal disease-patient was started on dialysis this admission.  Patient followed by nephrology     History of left AV fistula placement on 9/22/2022 which required a fistulogram and angioplasty on 1/18/2024.  Patient followed by vascular surgery     Acute left leg DVTs.  Hematology following.  Patient has IVC filter placement on 1/19/2024     Type 2 diabetes     History of prostate cancer         Plan     Continue IV ertapenem 500 mg every 24 hours for 14 days-day on 1/31/2024  Patient will need a midline before discharge-please remove when IV antibiotics are completed-will need  to get approval from nephrology  Continue supportive care  A.m. labs  Case discussed with patient and family members at bedside    The above note was transcribed for Dr. Sanchez-physical exam and review of systems were performed by him      Ann Beavers, APRN  01/23/24  17:46 EST    Note is dictated utilizing voice recognition software/Dragon

## 2024-01-23 NOTE — PROGRESS NOTES
[facility] MEDICINE SERVICE  DAILY PROGRESS NOTE    NAME: Gabriel De Souza  : 1945  MRN: [id number]      LOS: 8 days     PROVIDER OF SERVICE: Mateo Morales MD    Chief Complaint: Hyperkalemia    Subjective:     Interval History:  History taken from: patient  Patient seen and examined, he is doing much better.  Bradycardia and hypotension have resolved.  He did have low blood sugars in the morning with hypoglycemia protocol was activated.  He is currently resting comfortably.  Has  sandyfaviola medinagger on     patient seen and examined, he is lying in bed comfortably.  Eating drinking okay.  Having bowel movements as well.  Repeat blood cultures were sent by nephrology today     patient seen and examined,resting comfortably , denies any leg pain     patient seen and examined, just came back from IVC filter placement, resting comfortably     patient seen and examined, denies any complaints.  Resting comfortably.     patient seen and examined, no abdominal pain nausea or vomiting     patient seen and examined, denying any abdominal pain nausea vomiting, diet has been started.  S/p endoscopy    Review of Systems:   Review of Systems  10 point ROS is negative other than what is stated positive above  Objective:     Vital Signs  Temp:  [97.1 °F (36.2 °C)-98.1 °F (36.7 °C)] 97.5 °F (36.4 °C)  Heart Rate:  [68-85] 68  Resp:  [15-25] 15  BP: (121-152)/(46-72) 131/67  Flow (L/min):  [6] 6   Body mass index is 25.97 kg/m².    Physical Exam  Physical Exam  Exam, awake and alert, hard of hearing  HEENT normocephalic atraumatic  Chest clear to auscultation bilaterally  CVs S1-S2 normal, regular rhythm, bradycardia  Abdomen soft nontender nondistended bowel sounds positive  Extremities warm to touch 2+ pulses no edema  Neuro AOx3, grossly normal     Scheduled Meds   [Held by provider] apixaban, 2.5 mg, Oral, Q12H  atorvastatin, 10 mg, Oral, Daily  calcitriol, 0.5 mcg, Oral, Daily  ertapenem, 500 mg,  Intravenous, Q24H  insulin lispro, 2-7 Units, Subcutaneous, 4x Daily AC & at Bedtime  senna-docusate sodium, 2 tablet, Oral, BID  sevelamer, 2,400 mg, Oral, TID With Meals  sodium chloride, 10 mL, Intravenous, Q12H       PRN Meds     acetaminophen **OR** acetaminophen    acetaminophen    albuterol    senna-docusate sodium **AND** polyethylene glycol **AND** bisacodyl **AND** bisacodyl    Calcium Replacement - Follow Nurse / BPA Driven Protocol    dextrose    dextrose    diphenhydrAMINE    fentanyl **OR** fentanyl    flumazenil    glucagon (human recombinant)    hydrALAZINE    HYDROcodone-acetaminophen    Indomethacin    labetalol    Magnesium Standard Dose Replacement - Follow Nurse / BPA Driven Protocol    naloxone    nitroglycerin    ondansetron    ondansetron ODT    Phosphorus Replacement - Follow Nurse / BPA Driven Protocol    Potassium Replacement - Follow Nurse / BPA Driven Protocol    prochlorperazine    [COMPLETED] Insert Peripheral IV **AND** sodium chloride    sodium chloride    sodium chloride    sodium chloride 3 mL with iopamidol 61 % 7 mL mixture   Infusions  sodium chloride, 100 mL/hr, Last Rate: 100 mL/hr (01/23/24 0545)            Diagnostic Data    Results from last 7 days   Lab Units 01/23/24  0256 01/22/24  0108   WBC 10*3/mm3  --  16.60*   HEMOGLOBIN g/dL  --  9.3*   HEMATOCRIT %  --  28.8*   PLATELETS 10*3/mm3  --  216   GLUCOSE mg/dL 155* 140*   CREATININE mg/dL 3.93* 5.76*   BUN mg/dL 46* 72*   SODIUM mmol/L 138 137   POTASSIUM mmol/L 4.2 4.4   AST (SGOT) U/L 16 17   ALT (SGPT) U/L 15 16   ALK PHOS U/L 136* 119*   BILIRUBIN mg/dL 0.3 0.3   ANION GAP mmol/L 9.0 14.0       MRI abdomen wo contrast mrcp    Result Date: 1/22/2024  1. Findings consistent with acute diffuse interstitial pancreatitis. 2. Multiple less than 5 mm cystic foci are scattered throughout the pancreatic parenchyma which could represent sidebranch IPMNs, or pancreatic cysts. No well-defined pseudocyst is seen. Normal caliber  of the pancreatic duct. No convincing evidence of degraded divisum. 3. Choledocholithiasis is suggested in the mid to distal CBD. Suspected small stone within the cystic duct, as well. However, there is no indication of acute cholecystitis. 4. Masslike density is seen within the gallbladder, which could represent tumefactive sludge or gallbladder neoplasm. 5. Multiple gallstones. 6. Small right basilar pleural effusion. Mild right lower lobe atelectasis. 7. Complex cystic lesion described at the right lower renal pole is not included completely in the imaging field-of-view of this examination. The kidneys appear atrophic with multiple cystic changes suggesting chronic medical renal disease. 8. Multiple tiny hepatic cysts. Electronically Signed: Roseline Bear MD  1/22/2024 8:33 AM EST  Workstation ID: [id number]       I reviewed the patient's new clinical results.    Assessment/Plan:     Active and Resolved Problems  Active Hospital Problems    Diagnosis  POA    **Hyperkalemia [E87.5]  Yes    Mechanical complication of arteriovenous fistula surgically created [T82.590A]  Yes    Choledocholithiasis [K80.50]  Unknown    End stage renal disease on dialysis [N18.6, Z99.2]  Not Applicable    Sinus bradycardia [R00.1]  Yes      Resolved Hospital Problems   No resolved problems to display.     Hospital summary : patient is a 78-year-old male who presented to the hospital with complaints of hypotension bradycardia hyperkalemia and worsening CKD stage IV.  Hypertension bradycardia were deemed to be secondary to hyperkalemia, he was taken for emergent dialysis.  Hypertension bradycardia resolved after that.  Hyperkalemia resolved.  He was found to have bilateral renal stones, urology was consulted and he has no stents in.  He was also put on Rocephin for UTI.  Urine cultures grew Serratia, blood cultures did not grow anything.  He was doing well however his white count continued to worsen, at that point of time ID was  consulted.  They recommended switching antibiotics to ertapenem.  Recommended repeating a CT to check to look for abscess.  CT did not show any abscess but showed pancreatitis.  Now switching his diet to n.p.o., consulted GI and they recommended MRCP  Off note he was also found to have DVT left lower extremity, s/p IVC filter placement and is on Eliquis 2.5 mg p.o. twice daily.  Oncology also following.        CKD stage IV, now end-stage renal disease, hemodialysis has been started.  Patient has a fistula already however it was bleeding status post fistulogram and angioplasty on 1/18/2024.  S/p dialysis third time on 1/19.  Further dialysis per nephrology, appreciate recommendations.  Renvela dose increased  Plan for hemodialysis today  Outpatient dialysis has been arranged for Monday Wednesday Friday.        Hyperkalemia  Resolved        Bilateral renal stones with hydronephrosis likely contributing to deterioration of the CKD  Urology was consulted, patient s/p placement of bilateral ureteral stents.  Was initially on Rocephin however white count worsened so was switched to ertapenem-ID following  Monitor blood and urine cultures.--Blood cultures negative for 24 hours, urine cultures growing Serratia, sensitive to Rocephin.  Continue      Leukocytosis  Now improving after switching antibiotics to ertapenem from Rocephin.  ID was consulted.    Repeat CT was done since it was concern for abscess, however did not show any abscess but showed acute interstitial pancreatitis.    Acute interstitial pancreatitis per CT scan  - S/p endo  -Follow the liver function test  Continue IV fluid.  Continue antibiotic.  Follow the final biopsy and cytology report.  If the biopsy turned out to be negative we will repeat ERCP with a EUS again in 6 to 8-week after PPI therapy antibiotic for cholangitis.  Okay to start clear liquids  Follow CA 19-9      Chronic DVT noted in left common femoral, proximal femoral, popliteal and  gastrocnemius  Chronic LLE superficial thrombophlebitis in  small saphenous  Sub-acute DVT noted in left external iliac, deep femoral  Oncology following.  Started Eliquis 2.5 mg p.o. twice daily and now status postplacement of IVC filter       Bradycardia and hypotension  Due to hyperkalemia  Resolved  Vitals now stable    Hypoglycemia  Blood glucose better now, encourage p.o. intake    DVT prophylaxis:  Medical and mechanical DVT prophylaxis orders are present.     Code status is   Code Status and Medical Interventions:   Ordered at: 01/15/24 4377     Level Of Support Discussed With:    Patient    Health Care Surrogate    Next of Kin (If No Surrogate)     Code Status (Patient has no pulse and is not breathing):    CPR (Attempt to Resuscitate)     Medical Interventions (Patient has pulse or is breathing):    Full Support       Plan for disposition: 3 to 4 days    Time: 30 minutes    Signature: Electronically signed by Mateo Morales MD, 01/23/24, 14:35 ESTJustin Saravia Hospitalist Team

## 2024-01-23 NOTE — ANESTHESIA POSTPROCEDURE EVALUATION
Patient: Gabriel De Souza    Procedure Summary       Date: 01/23/24 Room / Location: [facility] ENDOSCOPY 2 / [facility] ENDOSCOPY    Anesthesia Start: 1107 Anesthesia Stop: 1336    Procedures:       ENDOSCOPIC RETROGRADE CHOLANGIOPANCREATOGRAPHY with sphinctorotomy, brushing of bile duct, balloon sweeping of common bile duct up to 12mm, placement of 10 Fr. x 9cm biliary stent, and placement of 5 Fr. x 7cm pancreatic stent      ESOPHAGOGASTRODUODENOSCOPY WITH ULTRASOUND AND FINE NEEDLE ASPIRATION of pancreatic head lesion Diagnosis:       Choledocholithiasis      (Choledocholithiasis [K80.50])    Surgeons: Ollie Peoples MD Provider: Jaime Mendez MD    Anesthesia Type: general ASA Status: 3            Anesthesia Type: general    Vitals  Vitals Value Taken Time   /67 01/23/24 1435   Temp 97.5 °F (36.4 °C) 01/23/24 1336   Pulse 68 01/23/24 1435   Resp 16 01/23/24 1435   SpO2 100 % 01/23/24 1435           Post Anesthesia Care and Evaluation    Patient location during evaluation: PACU  Patient participation: complete - patient participated  Level of consciousness: awake  Pain scale: See nurse's notes for pain score.  Pain management: adequate    Airway patency: patent  Anesthetic complications: No anesthetic complications  PONV Status: none  Cardiovascular status: acceptable  Respiratory status: acceptable and spontaneous ventilation  Hydration status: acceptable    Comments: Patient seen and examined postoperatively; vital signs stable; SpO2 greater than or equal to 90%; cardiopulmonary status stable; nausea/vomiting adequately controlled; pain adequately controlled; no apparent anesthesia complications; patient discharged from anesthesia care when discharge criteria were met

## 2024-01-23 NOTE — PLAN OF CARE
Goal Outcome Evaluation:   Pt resting in bed, on iv abx. On room air

## 2024-01-24 ENCOUNTER — INPATIENT HOSPITAL (AMBULATORY)
Dept: URBAN - METROPOLITAN AREA HOSPITAL 84 | Facility: HOSPITAL | Age: 79
End: 2024-01-24

## 2024-01-24 DIAGNOSIS — K80.63 CALCULUS OF GALLBLADDER AND BILE DUCT WITH ACUTE CHOLECYSTIT: ICD-10-CM

## 2024-01-24 DIAGNOSIS — R74.8 ABNORMAL LEVELS OF OTHER SERUM ENZYMES: ICD-10-CM

## 2024-01-24 DIAGNOSIS — K83.8 OTHER SPECIFIED DISEASES OF BILIARY TRACT: ICD-10-CM

## 2024-01-24 DIAGNOSIS — K26.9 DUODENAL ULCER, UNSPECIFIED AS ACUTE OR CHRONIC, WITHOUT HEM: ICD-10-CM

## 2024-01-24 DIAGNOSIS — K85.90 ACUTE PANCREATITIS WITHOUT NECROSIS OR INFECTION, UNSPECIFIE: ICD-10-CM

## 2024-01-24 DIAGNOSIS — K86.1 OTHER CHRONIC PANCREATITIS: ICD-10-CM

## 2024-01-24 DIAGNOSIS — K86.89 OTHER SPECIFIED DISEASES OF PANCREAS: ICD-10-CM

## 2024-01-24 LAB
ALBUMIN SERPL-MCNC: 2.4 G/DL (ref 3.5–5.2)
ALBUMIN/GLOB SERPL: 0.7 G/DL
ALP SERPL-CCNC: 139 U/L (ref 39–117)
ALT SERPL W P-5'-P-CCNC: 16 U/L (ref 1–41)
ANION GAP SERPL CALCULATED.3IONS-SCNC: 11 MMOL/L (ref 5–15)
AST SERPL-CCNC: 22 U/L (ref 1–40)
BILIRUB SERPL-MCNC: 0.4 MG/DL (ref 0–1.2)
BUN SERPL-MCNC: 30 MG/DL (ref 8–23)
BUN/CREAT SERPL: 11.2 (ref 7–25)
CALCIUM SPEC-SCNC: 8.2 MG/DL (ref 8.6–10.5)
CHLORIDE SERPL-SCNC: 106 MMOL/L (ref 98–107)
CO2 SERPL-SCNC: 20 MMOL/L (ref 22–29)
CREAT SERPL-MCNC: 2.69 MG/DL (ref 0.76–1.27)
CRP SERPL-MCNC: 4.97 MG/DL (ref 0–0.5)
EGFRCR SERPLBLD CKD-EPI 2021: 23.6 ML/MIN/1.73
GLOBULIN UR ELPH-MCNC: 3.5 GM/DL
GLUCOSE BLDC GLUCOMTR-MCNC: 130 MG/DL (ref 70–105)
GLUCOSE BLDC GLUCOMTR-MCNC: 164 MG/DL (ref 70–105)
GLUCOSE BLDC GLUCOMTR-MCNC: 96 MG/DL (ref 70–105)
GLUCOSE SERPL-MCNC: 102 MG/DL (ref 65–99)
INR PPP: 1.32 (ref 0.93–1.1)
LIPASE SERPL-CCNC: 69 U/L (ref 13–60)
PHOSPHATE SERPL-MCNC: 3.1 MG/DL (ref 2.5–4.5)
POTASSIUM SERPL-SCNC: 4.4 MMOL/L (ref 3.5–5.2)
PROT SERPL-MCNC: 5.9 G/DL (ref 6–8.5)
PROTHROMBIN TIME: 14.1 SECONDS (ref 9.6–11.7)
SODIUM SERPL-SCNC: 137 MMOL/L (ref 136–145)

## 2024-01-24 PROCEDURE — 82948 REAGENT STRIP/BLOOD GLUCOSE: CPT

## 2024-01-24 PROCEDURE — 99232 SBSQ HOSP IP/OBS MODERATE 35: CPT | Performed by: NURSE PRACTITIONER

## 2024-01-24 PROCEDURE — 83690 ASSAY OF LIPASE: CPT | Performed by: NURSE PRACTITIONER

## 2024-01-24 PROCEDURE — 25810000003 SODIUM CHLORIDE 0.9 % SOLUTION: Performed by: HOSPITALIST

## 2024-01-24 PROCEDURE — 63710000001 INSULIN LISPRO (HUMAN) PER 5 UNITS: Performed by: SURGERY

## 2024-01-24 PROCEDURE — 25010000002 ERTAPENEM PER 500 MG: Performed by: NURSE PRACTITIONER

## 2024-01-24 PROCEDURE — 80053 COMPREHEN METABOLIC PANEL: CPT | Performed by: NURSE PRACTITIONER

## 2024-01-24 PROCEDURE — 85610 PROTHROMBIN TIME: CPT | Performed by: NURSE PRACTITIONER

## 2024-01-24 PROCEDURE — 84100 ASSAY OF PHOSPHORUS: CPT | Performed by: INTERNAL MEDICINE

## 2024-01-24 PROCEDURE — 82948 REAGENT STRIP/BLOOD GLUCOSE: CPT | Performed by: SURGERY

## 2024-01-24 PROCEDURE — 86140 C-REACTIVE PROTEIN: CPT | Performed by: NURSE PRACTITIONER

## 2024-01-24 RX ADMIN — Medication 10 ML: at 08:02

## 2024-01-24 RX ADMIN — SEVELAMER CARBONATE 2400 MG: 800 TABLET, FILM COATED ORAL at 14:09

## 2024-01-24 RX ADMIN — Medication 10 ML: at 21:20

## 2024-01-24 RX ADMIN — INSULIN LISPRO 2 UNITS: 100 INJECTION, SOLUTION INTRAVENOUS; SUBCUTANEOUS at 21:20

## 2024-01-24 RX ADMIN — CALCITRIOL CAPSULES 0.25 MCG 0.5 MCG: 0.25 CAPSULE ORAL at 14:09

## 2024-01-24 RX ADMIN — SODIUM CHLORIDE 100 ML/HR: 9 INJECTION, SOLUTION INTRAVENOUS at 06:55

## 2024-01-24 RX ADMIN — SODIUM CHLORIDE 100 ML/HR: 9 INJECTION, SOLUTION INTRAVENOUS at 18:07

## 2024-01-24 RX ADMIN — SEVELAMER CARBONATE 2400 MG: 800 TABLET, FILM COATED ORAL at 17:47

## 2024-01-24 RX ADMIN — ATORVASTATIN CALCIUM 10 MG: 10 TABLET, FILM COATED ORAL at 14:09

## 2024-01-24 RX ADMIN — ERTAPENEM SODIUM 500 MG: 1 INJECTION, POWDER, LYOPHILIZED, FOR SOLUTION INTRAMUSCULAR; INTRAVENOUS at 16:52

## 2024-01-24 NOTE — PROGRESS NOTES
Infectious Diseases Progress Note      LOS: 9 days   Patient Care Team:  Waylon Johnson MD as PCP - General  Chemchirian, MD Lali as Consulting Physician (Cardiology)  Atul Fowler MD as Consulting Physician (Nephrology)    Chief Complaint: Fatigue    Subjective       The patient has been afebrile for the last 24 hours.  The patient is on room air, hemodynamically stable, and is tolerating antimicrobial therapy.  Denies abdominal pain.  Patient is just back from dialysis and has no new complaints      Review of Systems:   Review of Systems   Constitutional:  Positive for fatigue.   HENT: Negative.     Eyes: Negative.    Respiratory: Negative.     Cardiovascular: Negative.    Gastrointestinal: Negative.    Endocrine: Negative.    Genitourinary: Negative.    Musculoskeletal: Negative.    Skin: Negative.    Neurological: Negative.    Psychiatric/Behavioral: Negative.     All other systems reviewed and are negative.       Objective     Vital Signs  Temp:  [97.4 °F (36.3 °C)-98 °F (36.7 °C)] 97.7 °F (36.5 °C)  Heart Rate:  [66-89] 89  Resp:  [14-18] 15  BP: ()/(51-67) 106/51    Physical Exam:  Physical Exam  Vitals and nursing note reviewed.   Constitutional:       General: He is not in acute distress.     Appearance: He is well-developed and normal weight. He is ill-appearing. He is not diaphoretic.   HENT:      Head: Normocephalic and atraumatic.   Eyes:      Conjunctiva/sclera: Conjunctivae normal.      Pupils: Pupils are equal, round, and reactive to light.   Cardiovascular:      Rate and Rhythm: Normal rate and regular rhythm.      Heart sounds: Normal heart sounds, S1 normal and S2 normal.   Pulmonary:      Effort: Pulmonary effort is normal. No respiratory distress.      Breath sounds: Normal breath sounds. No stridor. No wheezing or rales.   Abdominal:      General: Bowel sounds are normal. There is no distension.      Palpations: Abdomen is soft. There is no mass.      Tenderness: There is no  abdominal tenderness. There is no guarding.      Comments: Abdomen is nontender   Genitourinary:     Comments: Peterson catheter  Musculoskeletal:         General: No deformity. Normal range of motion.      Cervical back: Neck supple.   Skin:     General: Skin is warm and dry.      Coloration: Skin is not pale.      Findings: No erythema or rash.      Comments:   Left arm fistula   Neurological:      Mental Status: He is alert and oriented to person, place, and time.      Cranial Nerves: No cranial nerve deficit.   Psychiatric:         Mood and Affect: Mood normal.          Results Review:    I have reviewed all clinical data, test, lab, and imaging results.     Radiology  FL ERCP pancreatic and biliary ducts    Result Date: 1/24/2024  FL ERCP PANCREATIC AND BILIARY DUCTS Date of Exam: 1/23/2024 11:50 AM EST Indication: with sphinctorotomy, brushing of bile duct, balloon sweeping of common bile duct up to 12mm, placement of 10 Fr. x 9cm biliary stent, and placement of 5 Fr. x 7cm pancreatic stent. Comparison: MRI abdomen with MRCP 1/22/2024, CT abdomen and pelvis with contrast 1/20/2024 Technique:  A series of radiographic digital spot films were obtained in conjunction with a endoscopic catheterization of the biliary and pancreatic ductal system, performed by the gastroenterologist. Fluoroscopic Time: 8.3 minutes Number of Images: 10 spot fluoroscopic series images Findings: Initial image demonstrates percutaneous drainage catheter in the right upper quadrant and IVC filter in place. Contrast was injected retrograde into the common bile duct. There is fusiform common bile duct dilation. Image for suggests filling defects within the mid to distal CBD. There is opacification of the gallbladder lumen containing multiple round filling defects consistent with gallstones. Final procedure image demonstrates CBD stent placement in satisfactory position.     1. ERCP with fluoroscopy. Please refer to the procedure report for  findings and recommendations. Electronically Signed: Roseline Bear MD  1/24/2024 8:04 AM EST  Workstation ID: [id number]     Cardiology    Laboratory    Results from last 7 days   Lab Units 01/23/24  1704 01/22/24 0108 01/21/24 0003 01/20/24 0105 01/18/24 2332 01/18/24  0352   WBC 10*3/mm3 11.40* 16.60* 20.10* 18.60* 27.00* 21.20*   HEMOGLOBIN g/dL 8.0* 9.3* 9.2* 8.8* 9.0* 9.0*   HEMATOCRIT % 24.9* 28.8* 28.7* 27.6* 27.7* 27.3*   PLATELETS 10*3/mm3 233 216 180 158 189 210     Results from last 7 days   Lab Units 01/23/24  0256 01/22/24 0108 01/21/24 0003 01/20/24 0105 01/18/24 2332 01/18/24  0352   SODIUM mmol/L 138 137 139 141 139 142   POTASSIUM mmol/L 4.2 4.4 4.1 3.9 4.2 4.9   CHLORIDE mmol/L 104 102 100 101 101 102   CO2 mmol/L 25.0 21.0* 26.0 25.0 25.0 24.0   BUN mg/dL 46* 72* 53* 69* 47* 100*   CREATININE mg/dL 3.93* 5.76* 4.23* 5.50* 4.23* 7.68*   GLUCOSE mg/dL 155* 140* 138* 119* 70 78   ALBUMIN g/dL 2.5* 2.3* 2.9* 2.7* 2.8* 2.8*   BILIRUBIN mg/dL 0.3 0.3 0.3 0.4  --  0.2   ALK PHOS U/L 136* 119* 118* 92  --  93   AST (SGOT) U/L 16 17 13 10  --  21   ALT (SGPT) U/L 15 16 17 17  --  20   AMYLASE U/L  --   --   --  122*  --   --    LIPASE U/L 106*  --   --  248*  --   --    CALCIUM mg/dL 8.5* 8.7 8.8 8.6 8.4* 8.3*                   Microbiology   Microbiology Results (last 10 days)       Procedure Component Value - Date/Time    Urine Culture - Urine, Indwelling Urethral Catheter [[id number]]  (Normal) Collected: 01/17/24 1501    Lab Status: Final result Specimen: Urine from Indwelling Urethral Catheter Updated: 01/18/24 1939     Urine Culture No growth    Blood Culture - Blood, Arm, Left [[id number]]  (Normal) Collected: 01/17/24 1205    Lab Status: Final result Specimen: Blood from Arm, Left Updated: 01/22/24 1231     Blood Culture No growth at 5 days    Blood Culture - Blood, Arm, Left [[id number]]  (Normal) Collected: 01/17/24 1203    Lab Status: Final result Specimen: Blood from Arm, Left Updated:  01/22/24 1231     Blood Culture No growth at 5 days    COVID-19 and FLU A/B PCR, 1 HR TAT - Swab, Nasopharynx [[id number]]  (Normal) Collected: 01/15/24 1602    Lab Status: Final result Specimen: Swab from Nasopharynx Updated: 01/15/24 1624     COVID19 Not Detected     Influenza A PCR Not Detected     Influenza B PCR Not Detected    Narrative:      Fact sheet for providers: https://www.fda.gov/media/292665/download    Fact sheet for patients: https://www.fda.gov/media/424720/download    Test performed by PCR.    Urine Culture - Urine, Urine, Clean Catch [[id number]]  (Abnormal)  (Susceptibility) Collected: 01/15/24 1145    Lab Status: Final result Specimen: Urine, Clean Catch Updated: 01/18/24 0837     Urine Culture 25,000 CFU/mL Serratia marcescens    Narrative:      Colonization of the urinary tract without infection is common. Treatment is discouraged unless the patient is symptomatic, pregnant, or undergoing an invasive urologic procedure.    Susceptibility        Serratia marcescens      ZOILA      Amoxicillin + Clavulanate Resistant      Cefazolin Resistant      Cefepime Susceptible      Ceftazidime Susceptible      Ceftriaxone Susceptible      Gentamicin Susceptible      Levofloxacin Susceptible      Nitrofurantoin Resistant      Piperacillin + Tazobactam Susceptible  [1]       Trimethoprim + Sulfamethoxazole Susceptible                   [1]  Appended report. These results have been appended to a previously preliminary verified report.                   Blood Culture - Blood, Arm, Right [[id number]]  (Normal) Collected: 01/15/24 1126    Lab Status: Final result Specimen: Blood from Arm, Right Updated: 01/20/24 1130     Blood Culture No growth at 5 days    Narrative:      Less than seven (7) mL's of blood was collected.  Insufficient quantity may yield false negative results.    Blood Culture - Blood, Arm, Left [[id number]]  (Normal) Collected: 01/15/24 1125    Lab Status: Final result Specimen: Blood from Arm,  Left Updated: 01/20/24 1130     Blood Culture No growth at 5 days    Narrative:      Less than seven (7) mL's of blood was collected.  Insufficient quantity may yield false negative results.            Medication Review:       Schedule Meds  [Held by provider] apixaban, 2.5 mg, Oral, Q12H  atorvastatin, 10 mg, Oral, Daily  calcitriol, 0.5 mcg, Oral, Daily  ertapenem, 500 mg, Intravenous, Q24H  insulin lispro, 2-7 Units, Subcutaneous, 4x Daily AC & at Bedtime  senna-docusate sodium, 2 tablet, Oral, BID  sevelamer, 2,400 mg, Oral, TID With Meals  sodium chloride, 10 mL, Intravenous, Q12H        Infusion Meds  sodium chloride, 100 mL/hr, Last Rate: 100 mL/hr (01/24/24 0655)        PRN Meds    acetaminophen    senna-docusate sodium **AND** polyethylene glycol **AND** bisacodyl **AND** bisacodyl    Calcium Replacement - Follow Nurse / BPA Driven Protocol    dextrose    dextrose    glucagon (human recombinant)    Magnesium Standard Dose Replacement - Follow Nurse / BPA Driven Protocol    nitroglycerin    ondansetron    ondansetron ODT    phenol    Phosphorus Replacement - Follow Nurse / BPA Driven Protocol    Potassium Replacement - Follow Nurse / BPA Driven Protocol    [COMPLETED] Insert Peripheral IV **AND** sodium chloride    sodium chloride    sodium chloride        Assessment & Plan       Antimicrobial Therapy   1.  IV ertapenem        2.        3.        4.        5.          Assessment     Severe reactive leukocytosis.  Started to improve     Probable complicated UTI.  Urine culture grew Serratia marcescens from January 15 2024.  Patient had prior urine culture before admission on January 8, 2024 and grew the same organism.  CT scan of abdomen pelvis done without contrast and showed bilateral hydronephrosis with possible complex cyst on the right kidney.  CT scan of abdomen pelvis with IV contrast showed complex right renal cysts versus nodules  -The patient is s/p bilateral ureteral stent placement on January 16,  2024    Probable interstitial pancreatitis based on imaging studies.  But patient has no abdominal pain or tenderness.  Patient has no history of chronic pancreatitis.  Amylase and lipase are elevated.  GI now following, MRCP continue to show interstitial pancreatitis.  The patient is status post ERCP on 1/23/2024-findings suggestive of chronic pancreatitis with a biopsy of a pancreatic head mass.    Cholelithiasis without findings of acute cholecystitis.  Density in the gallbladder-sludge versus neoplasm.  ERCP showed multiple gallbladder stones and sludge, removal of stones with stricture brushing and PD stent placement.  Multiple duodenal ulcers also seen with stricture and severe gastritis     End-stage renal disease-patient was started on dialysis this admission.  Patient followed by nephrology     History of left AV fistula placement on 9/22/2022 which required a fistulogram and angioplasty on 1/18/2024.  Patient followed by vascular surgery     Acute left leg DVTs.  Hematology following.  Patient has IVC filter placement on 1/19/2024     Type 2 diabetes     History of prostate cancer         Plan     Continue IV ertapenem 500 mg every 24 hours for 14 days-day on 1/31/2024  Patient will need a midline before discharge-please remove when IV antibiotics are completed-will need to get approval from nephrology  Continue supportive care  Mirlande Beavers, APRN  01/24/24  12:01 EST    Note is dictated utilizing voice recognition software/Dragon

## 2024-01-24 NOTE — SIGNIFICANT NOTE
01/24/24 0931   OTHER   Discipline physical therapist   Rehab Time/Intention   Session Not Performed patient unavailable for treatment  (Pt off the unit at dialysis.)   Recommendation   PT - Next Appointment 01/25/24

## 2024-01-24 NOTE — PLAN OF CARE
Goal Outcome Evaluation:  Plan of Care Reviewed With: patient     Problem: Adult Inpatient Plan of Care  Goal: Plan of Care Review  Outcome: Ongoing, Progressing  Flowsheets (Taken 1/24/2024 3038)  Progress: improving  Plan of Care Reviewed With: patient        Progress: improving

## 2024-01-24 NOTE — PROGRESS NOTES
[facility] MEDICINE SERVICE  DAILY PROGRESS NOTE    NAME: Gabriel De Souza  : 1945  MRN: [id number]      LOS: 9 days     PROVIDER OF SERVICE: Mateo Morales MD    Chief Complaint: Hyperkalemia    Subjective:     Interval History:  History taken from: patient  Patient seen and examined, he is doing much better.  Bradycardia and hypotension have resolved.  He did have low blood sugars in the morning with hypoglycemia protocol was activated.  He is currently resting comfortably.  Has  sandy hugger on     patient seen and examined, he is lying in bed comfortably.  Eating drinking okay.  Having bowel movements as well.  Repeat blood cultures were sent by nephrology today     patient seen and examined,resting comfortably , denies any leg pain     patient seen and examined, just came back from IVC filter placement, resting comfortably     patient seen and examined, denies any complaints.  Resting comfortably.     patient seen and examined, no abdominal pain nausea or vomiting     patient seen and examined, denying any abdominal pain nausea vomiting,diet resumed by GI.    Review of Systems:   Review of Systems  10 point ROS is negative other than what is stated positive above  Objective:     Vital Signs  Temp:  [97.1 °F (36.2 °C)-98 °F (36.7 °C)] 97.1 °F (36.2 °C)  Heart Rate:  [66-96] 96  Resp:  [14-20] 20  BP: ()/(51-67) 117/61  Flow (L/min):  [6] 6   Body mass index is 25.97 kg/m².    Physical Exam  Physical Exam  Exam, awake and alert, hard of hearing  HEENT normocephalic atraumatic  Chest clear to auscultation bilaterally  CVs S1-S2 normal, regular rhythm, bradycardia  Abdomen soft nontender nondistended bowel sounds positive  Extremities warm to touch 2+ pulses no edema  Neuro AOx3, grossly normal     Scheduled Meds   [Held by provider] apixaban, 2.5 mg, Oral, Q12H  atorvastatin, 10 mg, Oral, Daily  calcitriol, 0.5 mcg, Oral, Daily  ertapenem, 500 mg, Intravenous,  Q24H  insulin lispro, 2-7 Units, Subcutaneous, 4x Daily AC & at Bedtime  senna-docusate sodium, 2 tablet, Oral, BID  sevelamer, 2,400 mg, Oral, TID With Meals  sodium chloride, 10 mL, Intravenous, Q12H       PRN Meds     acetaminophen    senna-docusate sodium **AND** polyethylene glycol **AND** bisacodyl **AND** bisacodyl    Calcium Replacement - Follow Nurse / BPA Driven Protocol    dextrose    dextrose    glucagon (human recombinant)    Magnesium Standard Dose Replacement - Follow Nurse / BPA Driven Protocol    nitroglycerin    ondansetron    ondansetron ODT    phenol    Phosphorus Replacement - Follow Nurse / BPA Driven Protocol    Potassium Replacement - Follow Nurse / BPA Driven Protocol    [COMPLETED] Insert Peripheral IV **AND** sodium chloride    sodium chloride    sodium chloride   Infusions  sodium chloride, 100 mL/hr, Last Rate: 100 mL/hr (01/24/24 0655)            Diagnostic Data    Results from last 7 days   Lab Units 01/24/24  1209 01/23/24  1704   WBC 10*3/mm3  --  11.40*   HEMOGLOBIN g/dL  --  8.0*   HEMATOCRIT %  --  24.9*   PLATELETS 10*3/mm3  --  233   GLUCOSE mg/dL 102*  --    CREATININE mg/dL 2.69*  --    BUN mg/dL 30*  --    SODIUM mmol/L 137  --    POTASSIUM mmol/L 4.4  --    AST (SGOT) U/L 22  --    ALT (SGPT) U/L 16  --    ALK PHOS U/L 139*  --    BILIRUBIN mg/dL 0.4  --    ANION GAP mmol/L 11.0  --        FL ERCP pancreatic and biliary ducts    Result Date: 1/24/2024  1. ERCP with fluoroscopy. Please refer to the procedure report for findings and recommendations. Electronically Signed: Roseline Bear MD  1/24/2024 8:04 AM EST  Workstation ID: [id number]       I reviewed the patient's new clinical results.    Assessment/Plan:     Active and Resolved Problems  Active Hospital Problems    Diagnosis  POA    **Hyperkalemia [E87.5]  Yes    Mechanical complication of arteriovenous fistula surgically created [T82.590A]  Yes    Choledocholithiasis [K80.50]  Unknown    End stage renal disease on  dialysis [N18.6, Z99.2]  Not Applicable    Sinus bradycardia [R00.1]  Yes      Resolved Hospital Problems   No resolved problems to display.     Hospital summary : patient is a 78-year-old male who presented to the hospital with complaints of hypotension bradycardia hyperkalemia and worsening CKD stage IV.  Hypertension bradycardia were deemed to be secondary to hyperkalemia, he was taken for emergent dialysis.  Hypertension bradycardia resolved after that.  Hyperkalemia resolved.  He was found to have bilateral renal stones, urology was consulted and he has no stents in.  He was also put on Rocephin for UTI.  Urine cultures grew Serratia, blood cultures did not grow anything.  He was doing well however his white count continued to worsen, at that point of time ID was consulted.  They recommended switching antibiotics to ertapenem.  Recommended repeating a CT to check to look for abscess.  CT did not show any abscess but showed pancreatitis.  Now switching his diet to n.p.o., consulted GI and they recommended MRCP  Off note he was also found to have DVT left lower extremity, s/p IVC filter placement and is on Eliquis 2.5 mg p.o. twice daily.  Oncology also following.        CKD stage IV, now end-stage renal disease, hemodialysis has been started.  Patient has a fistula already however it was bleeding status post fistulogram and angioplasty on 1/18/2024.  S/p dialysis third time on 1/19.  Further dialysis per nephrology, appreciate recommendations.  Renvela dose increased  Plan for hemodialysis today  Outpatient dialysis has been arranged for Monday Wednesday Friday.        Hyperkalemia  Resolved        Bilateral renal stones with hydronephrosis likely contributing to deterioration of the CKD  Urology was consulted, patient s/p placement of bilateral ureteral stents.  Was initially on Rocephin however white count worsened so was switched to ertapenem-ID following  Monitor blood and urine cultures.--Blood cultures  negative for 24 hours, urine cultures growing Serratia, sensitive to Rocephin.  Continue      Leukocytosis  Now improving after switching antibiotics to ertapenem from Rocephin.  ID was consulted.    Repeat CT was done since it was concern for abscess, however did not show any abscess but showed acute interstitial pancreatitis.    Acute interstitial pancreatitis per CT scan  - S/p endo  -Follow the liver function test  Continue IV fluid.  Continue antibiotic.  Follow the final biopsy and cytology report.  If the biopsy turned out to be negative we will repeat ERCP with a EUS again in 6 to 8-week after PPI therapy antibiotic for cholangitis.  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  Started Eliquis 2.5 mg p.o. twice daily and now status postplacement of IVC filter       Bradycardia and hypotension  Due to hyperkalemia  Resolved  Vitals now stable    Hypoglycemia  resolved    DVT prophylaxis:  Medical and mechanical DVT prophylaxis orders are present.     Code status is   Code Status and Medical Interventions:   Ordered at: 01/15/24 6180     Level Of Support Discussed With:    Patient    Health Care Surrogate    Next of Kin (If No Surrogate)     Code Status (Patient has no pulse and is not breathing):    CPR (Attempt to Resuscitate)     Medical Interventions (Patient has pulse or is breathing):    Full Support       Plan for disposition: 3 to 4 days    Time: 30 minutes    Signature: Electronically signed by Mateo Morales MD, 01/24/24, 13:10 EST.  [facility]  Hospitalist Team

## 2024-01-24 NOTE — PROGRESS NOTES
[facility] Progress Note      Patient Name: Gabriel De Souza  : 1945  MRN: [id number]  Primary Care Physician:  Waylon Johnson MD  Date of admission: 1/15/2024    Subjective     Interval History:     Patient seen during dialysis, tolerating okay  Denies any abdominal pain, chest pain, shortness of breath, nausea or vomiting    Review of Systems:   As noted above    Objective     Vitals:   Temp:  [97.4 °F (36.3 °C)-98 °F (36.7 °C)] 98 °F (36.7 °C)  Heart Rate:  [66-79] 70  Resp:  [14-18] 16  BP: (107-152)/(46-67) 116/56  Flow (L/min):  [6] 6    Intake/Output Summary (Last 24 hours) at 2024 0917  Last data filed at 2024 2105  Gross per 24 hour   Intake 2480 ml   Output 350 ml   Net 2130 ml       Physical Exam:    General Appearance: Awake, alert, NAD  HEENT: oral mucosa normal, nonicteric sclera  Neck: supple, no JVD  Lungs: CTA  Heart: RRR, normal S1 and S2  Abdomen: soft, nondistended  Extremities: no edema  Neuro: Awake alert and moving all extremities    Scheduled Meds:     [Held by provider] apixaban, 2.5 mg, Oral, Q12H  atorvastatin, 10 mg, Oral, Daily  calcitriol, 0.5 mcg, Oral, Daily  ertapenem, 500 mg, Intravenous, Q24H  insulin lispro, 2-7 Units, Subcutaneous, 4x Daily AC & at Bedtime  senna-docusate sodium, 2 tablet, Oral, BID  sevelamer, 2,400 mg, Oral, TID With Meals  sodium chloride, 10 mL, Intravenous, Q12H      IV Meds:   sodium chloride, 100 mL/hr, Last Rate: 100 mL/hr (24 0655)          Results Reviewed:   I have personally reviewed the results from the time of this admission to 2024 09:17 EST     Results from last 7 days   Lab Units 24  0256 24  0108 24  0003   SODIUM mmol/L 138 137 139   POTASSIUM mmol/L 4.2 4.4 4.1   CHLORIDE mmol/L 104 102 100   CO2 mmol/L 25.0 21.0* 26.0   BUN mg/dL 46* 72* 53*   CREATININE mg/dL 3.93* 5.76* 4.23*   CALCIUM mg/dL 8.5* 8.7 8.8   BILIRUBIN mg/dL 0.3 0.3 0.3   ALK PHOS U/L 136* 119*  118*   ALT (SGPT) U/L 15 16 17   AST (SGOT) U/L 16 17 13   GLUCOSE mg/dL 155* 140* 138*     Estimated Creatinine Clearance: 19.9 mL/min (A) (by C-G formula based on SCr of 3.93 mg/dL (H)).  Results from last 7 days   Lab Units 01/23/24  0256 01/22/24  0108 01/21/24  0003   MAGNESIUM mg/dL 1.8 2.1 1.7   PHOSPHORUS mg/dL 4.4 6.1* 4.8*         Results from last 7 days   Lab Units 01/23/24  1704 01/22/24  0108 01/21/24  0003 01/20/24  0105 01/18/24  2332   WBC 10*3/mm3 11.40* 16.60* 20.10* 18.60* 27.00*   HEMOGLOBIN g/dL 8.0* 9.3* 9.2* 8.8* 9.0*   PLATELETS 10*3/mm3 233 216 180 158 189     Results from last 7 days   Lab Units 01/23/24  0256 01/18/24  1907   INR  1.31* 1.27*       Assessment / Plan     ASSESSMENT:  End-stage renal disease.  Patient receiving dialysis MWF. Electrolytes, volume status okay  Obstructive uropathy.  Status post bilateral ureteral stents placement.  Patient also has complex left renal cyst.  Repeat CT scan showed resolution of hydronephrosis.  Complex right renal cysts can be followed by urology as outpatient.  Hyperkalemia.  Secondary to renal failure.  Improved  Metabolic acidosis.  Improved  Anemia in chronic kidney disease.   hyperphosphatemia.    DVT.  Hematology, vascular following following.  S/p IVC filter placement.    Urinary tract infection.  on IV antibiotics  Pancreatitis.  Gastroenterology following.  S/p ERCP.  Patient was thought to have pancreatic head mass    PLAN:  Hemodialysis Monday Wednesday and Friday  Outpatient dialysis has been arranged      Atul Fowler MD  01/24/24  09:17 [facility]    [facility]  [phone]

## 2024-01-24 NOTE — PLAN OF CARE
Goal Outcome Evaluation:   Pt a/o. Room air. HD today. Tolerated well. Left fistula. Peterson cath to be d/c'd per urology. Nephrology, ID, GI and oncology following. Tolerating GI fat restricted diet. No complaints. Up to c this shift. Encourage repositioning. Call light in reach.

## 2024-01-24 NOTE — NURSING NOTE
Reached out to urology and nephrology regarding godfrey catheter removal. Godfrey catheter d/c'd per urology.

## 2024-01-24 NOTE — CONSULTS
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 MAIN OR;  Service: Vascular;  Laterality: Left;    BACK SURGERY      BASAL CELL CARCINOMA EXCISION  01/2019    CARDIAC CATHETERIZATION      CYSTOSCOPY W/ URETERAL STENT PLACEMENT Bilateral 1/16/2024    Procedure: CYSTOSCOPY, BILATERAL URETERAL STENT INSERTION;  Surgeon: Tyrel Aviita MD;  Location: [facility] MAIN OR;  Service: Urology;  Laterality: Bilateral;    ERCP N/A 1/23/2024    Procedure: ENDOSCOPIC RETROGRADE CHOLANGIOPANCREATOGRAPHY with sphinctorotomy, brushing of bile duct, balloon sweeping of common bile duct up to 12mm, placement of 10 Fr. x 9cm biliary stent, and placement of 5 Fr. x 7cm pancreatic stent;  Surgeon: Ollie Peoples MD;  Location: [facility] ENDOSCOPY;  Service: Gastroenterology;  Laterality: N/A;    TURP / TRANSURETHRAL INCISION / DRAINAGE PROSTATE      UPPER ENDOSCOPIC ULTRASOUND W/ FNA N/A 1/23/2024    Procedure: ESOPHAGOGASTRODUODENOSCOPY WITH ULTRASOUND AND FINE NEEDLE ASPIRATION of pancreatic head lesion;  Surgeon: Ollie Peoples  "MD;  Location: [facility] ENDOSCOPY;  Service: Gastroenterology;  Laterality: N/A;        Current Problems   CKD stage IV  - Nephrology following  - routine HD     Hyperkalemia  - resolved 1/15     Bilateral renal stones with hydronephrosis   - Urology following  - S/P cystoscopy 1/16    Bradycardia and hypotension       Encounter Information        Trending Narrative     1/24: Since last RD review, patient continues to recover and improve PO intakes.  Diet changed per GI to Low Fat, no renal restrictions (Phos, K+ and Na+ WNL).  Per GI note 1/24, ERCP/EUS yesterday highly suggestive of acute on chronic pancreatitis as well as possible pancreatic head mass.  Patient out of room at time of RD visit.      1/17: Admitted for hypotension and hypoglycemia.  RD visited patient at bedside.  Spouse and son at bedside report that patient not eating well recently, N/V this AM, no chewing/swallowing issues, -210# range with some weight loss over the past year.  NFPE completed and not consistent with nutrition diagnosis of malnutrition at this time using AND/ASPEN criteria.  Patient accepting of Boost at time of RD visit.         Anthropometrics        Current Height, Weight Height: 185.4 cm (73\")  Weight: 89.3 kg (196 lb 13.9 oz) (01/22/24 0525)       Usual Body Weight (UBW) 210-212# range        Trending Weight Hx     This admission: 1/24: 7.9% weight loss x 1 week, no significant weight fluctuations per I/Os  1/17: 213#             PTA: Weight gain x 3 months   7.7% weight loss x 1 year     Wt Readings from Last 30 Encounters:   01/22/24 0525 89.3 kg (196 lb 13.9 oz)   01/19/24 0430 90.2 kg (198 lb 13.7 oz)   01/18/24 0512 97.3 kg (214 lb 8.1 oz)   01/15/24 1702 96.7 kg (213 lb 3 oz)   01/15/24 1047 97.5 kg (215 lb)   01/08/24 1001 97.6 kg (215 lb 3.2 oz)   10/10/23 0941 92.3 kg (203 lb 8 oz)   10/04/23 1048 92.1 kg (203 lb)   09/01/23 1306 98.9 kg (218 lb)   04/06/23 0752 99.1 kg (218 lb 6.4 oz)   01/03/23 1240 105 kg " (231 lb)   10/06/22 0909 105 kg (232 lb)   09/22/22 0608 101 kg (222 lb 12.8 oz)   09/13/22 1059 90.7 kg (200 lb)   09/20/22 1126 104 kg (229 lb)   07/06/22 1021 101 kg (223 lb)   12/28/21 0822 99.3 kg (219 lb)   09/17/21 0915 101 kg (222 lb)   06/28/21 0814 102 kg (224 lb)   12/28/20 0844 103 kg (226 lb)   09/14/20 0958 104 kg (229 lb)   06/25/20 1037 104 kg (229 lb)   11/06/19 0803 106 kg (234 lb 9.6 oz)   09/06/19 0958 105 kg (231 lb)   05/09/19 0827 107 kg (235 lb 3.2 oz)   01/17/19 1250 108 kg (238 lb)   11/08/18 0912 108 kg (238 lb)   09/04/18 0931 105 kg (232 lb)   05/10/18 0916 107 kg (236 lb 2 oz)   12/21/17 1051 107 kg (235 lb)   11/09/17 0930 104 kg (229 lb 12.8 oz)   07/07/17 1142 105 kg (231 lb 8 oz)   05/11/17 0920 104 kg (230 lb)   11/07/16 0853 106 kg (233 lb 6 oz)   08/08/16 0854 103 kg (227 lb)      BMI kg/m2 Body mass index is 25.97 kg/m².       Labs        Pertinent Labs    Results from last 7 days   Lab Units 01/24/24  1209 01/23/24  0256 01/22/24  0108   SODIUM mmol/L 137 138 137   POTASSIUM mmol/L 4.4 4.2 4.4   CHLORIDE mmol/L 106 104 102   CO2 mmol/L 20.0* 25.0 21.0*   BUN mg/dL 30* 46* 72*   CREATININE mg/dL 2.69* 3.93* 5.76*   CALCIUM mg/dL 8.2* 8.5* 8.7   BILIRUBIN mg/dL 0.4 0.3 0.3   ALK PHOS U/L 139* 136* 119*   ALT (SGPT) U/L 16 15 16   AST (SGOT) U/L 22 16 17   GLUCOSE mg/dL 102* 155* 140*     Results from last 7 days   Lab Units 01/24/24  1209 01/23/24  1704 01/23/24  0256 01/22/24  0108 01/21/24  0003   MAGNESIUM mg/dL  --   --  1.8 2.1 1.7   PHOSPHORUS mg/dL 3.1  --  4.4 6.1* 4.8*   HEMOGLOBIN g/dL  --  8.0*  --  9.3* 9.2*   HEMATOCRIT %  --  24.9*  --  28.8* 28.7*     Lab Results   Component Value Date    HGBA1C 5.80 (H) 10/10/2023        Medications    Scheduled Medications [Held by provider] apixaban, 2.5 mg, Oral, Q12H  atorvastatin, 10 mg, Oral, Daily  calcitriol, 0.5 mcg, Oral, Daily  ertapenem, 500 mg, Intravenous, Q24H  insulin lispro, 2-7 Units, Subcutaneous, 4x Daily AC &  at Bedtime  senna-docusate sodium, 2 tablet, Oral, BID  sevelamer, 2,400 mg, Oral, TID With Meals  sodium chloride, 10 mL, Intravenous, Q12H        Infusions sodium chloride, 100 mL/hr, Last Rate: 100 mL/hr (01/24/24 0655)        PRN Medications   acetaminophen    senna-docusate sodium **AND** polyethylene glycol **AND** bisacodyl **AND** bisacodyl    Calcium Replacement - Follow Nurse / BPA Driven Protocol    dextrose    dextrose    glucagon (human recombinant)    Magnesium Standard Dose Replacement - Follow Nurse / BPA Driven Protocol    nitroglycerin    ondansetron    ondansetron ODT    phenol    Phosphorus Replacement - Follow Nurse / BPA Driven Protocol    Potassium Replacement - Follow Nurse / BPA Driven Protocol    [COMPLETED] Insert Peripheral IV **AND** sodium chloride    sodium chloride    sodium chloride     Physical Findings        Trending Physical   Appearance, NFPE 1/24: Unable to see patient in person on this date     1/17: NFPE completed and not consistent with nutrition diagnosis of malnutrition at this time using AND/ASPEN criteria      --  Edema  1+ generalized, right arm, legs, ankles, feet     Bowel Function Last documented BM 1/24 (today)     Tubes No feeding tube      Chewing/Swallowing No known issues      Skin Intact      --  Current Nutrition Orders & Evaluation of Intake       Oral Nutrition     Food Allergies NKFA   Current PO Diet Diet: Gastrointestinal Diets; Fat-Restricted; Texture: Regular Texture (IDDSI 7); Fluid Consistency: Thin (IDDSI 0)   Supplement None ordered    PO Evaluation     Trending % PO Intake 1/24: 50-75%  1/17: 0-50%   --  Nutritional Risk Screening        NRS-2002 Score          Nutrition Diagnosis         Nutrition Dx Problem 1 Inadequate oral intake related to intake less than needs as evidenced by PO intakes less than 50% in recent weeks per family.        Nutrition Dx Problem 2        Intervention Goal         Intervention Goal(s) Accept ONS  Stable weight  PO  intake greater than 50%     Nutrition Intervention        RD Action Continue ONS     Nutrition Prescription          Diet Prescription Fat-Restricted    Supplement Prescription Novasource Renal BID   --  Monitor/Evaluation        Monitor Per protocol, I&O, PO intake, Supplement intake, Pertinent labs, Weight, Skin status, GI status, Symptoms, POC/GOC       Electronically signed by:  Corazon Fuentes RD  01/24/24 13:13 EST

## 2024-01-24 NOTE — SIGNIFICANT NOTE
01/24/24 0900   OTHER   Discipline occupational therapist   Rehab Time/Intention   Session Not Performed patient unavailable for treatment  (In dialysis when attempted)   Recommendation   OT - Next Appointment 01/25/24

## 2024-01-24 NOTE — CASE MANAGEMENT/SOCIAL WORK
Continued Stay Note  [facility]     Patient Name: Gabriel De Souza  MRN: [id number]  Today's Date: 1/24/2024    Admit Date: 1/15/2024    Plan: [facility], accepted, pending bed available. No precert needed. PASRR approved. OP HD [facility] (holding a MWF 1255 spot).  Plan IV Invanz thru ML or PICC at SNF.   Discharge Plan       Row Name 01/24/24 1721       Plan    Plan [facility], accepted, pending bed available. No precert needed. PASRR approved. OP HD [facility] (holding a MWF 1255 spot).  Plan IV Invanz thru ML or PICC at SNF.    Plan Comments Discussed with SNF plan for possible Dc thrusday vs friday.   Discussed with SNF plan for IV abx on DC.  BArriers to discharge: ERCP 1/23.  Diet advanced today.  Nephrology ok to remove godfrey cath.  Dialysis today.             Expected Discharge Date and Time       Expected Discharge Date Expected Discharge Time    Jan 25, 2024         Phone communication or documentation only - no physical contact with patient or family.    Niurka Copeland RN     Office Phone [phone]  Office Cell [phone]

## 2024-01-24 NOTE — PROGRESS NOTES
" LOS: 9 days   Patient Care Team:  Waylon Johnson MD as PCP - General  Chemchirian, MD Lali as Consulting Physician (Cardiology)  Atul Fowler MD as Consulting Physician (Nephrology)      Subjective \" I am doing better\"    Interval History:     Subjective: Denies abdominal pain.  No nausea or vomiting.  Currently getting dialysis without GI complaint.    ROS:   No chest pain, shortness of breath, or cough.      Medication Review:     Current Facility-Administered Medications:     acetaminophen (TYLENOL) suppository 650 mg, 650 mg, Rectal, Q6H PRN, Moi Liz MD, 650 mg at 01/16/24 0550    [Held by provider] apixaban (ELIQUIS) tablet 2.5 mg, 2.5 mg, Oral, Q12H, Licha Clinton MD, 2.5 mg at 01/22/24 0835    atorvastatin (LIPITOR) tablet 10 mg, 10 mg, Oral, Daily, Moi Liz MD, 10 mg at 01/23/24 1523    sennosides-docusate (PERICOLACE) 8.6-50 MG per tablet 2 tablet, 2 tablet, Oral, BID, 2 tablet at 01/23/24 1523 **AND** polyethylene glycol (MIRALAX) packet 17 g, 17 g, Oral, Daily PRN **AND** bisacodyl (DULCOLAX) EC tablet 5 mg, 5 mg, Oral, Daily PRN **AND** bisacodyl (DULCOLAX) suppository 10 mg, 10 mg, Rectal, Daily PRN, Moi Liz MD    calcitriol (ROCALTROL) capsule 0.5 mcg, 0.5 mcg, Oral, Daily, Moi Liz MD, 0.5 mcg at 01/23/24 1523    Calcium Replacement - Follow Nurse / BPA Driven Protocol, , Does not apply, PRN, Moi Liz MD    dextrose (D50W) (25 g/50 mL) IV injection 25 g, 25 g, Intravenous, Q15 Min PRN, Moi Liz MD, 25 g at 01/19/24 0039    dextrose (GLUTOSE) oral gel 15 g, 15 g, Oral, Q15 Min PRN, Moi Liz MD    ertapenem (INVanz) 500 mg in sodium chloride 0.9 % 50 mL IVPB, 500 mg, Intravenous, Q24H, Ann Beavers, APRN, Last Rate: 100 mL/hr at 01/23/24 1630, 500 mg at 01/23/24 1630    glucagon (GLUCAGEN) injection 1 mg, 1 mg, Subcutaneous, Q15 Min PRN, Moi Liz MD    insulin lispro " (HUMALOG/ADMELOG) injection 2-7 Units, 2-7 Units, Subcutaneous, 4x Daily AC & at Bedtime, Moi iLz MD, 2 Units at 01/22/24 2128    Magnesium Standard Dose Replacement - Follow Nurse / BPA Driven Protocol, , Does not apply, Shalonda THAYER David Anthony, MD    nitroglycerin (NITROSTAT) SL tablet 0.4 mg, 0.4 mg, Sublingual, Q5 Min PRN, Moi Liz MD    ondansetron (ZOFRAN) injection 4 mg, 4 mg, Intravenous, Q6H PRN, Moi Liz MD, 4 mg at 01/17/24 1456    ondansetron ODT (ZOFRAN-ODT) disintegrating tablet 4 mg, 4 mg, Oral, Q6H PRN, Moi Liz MD    phenol (CHLORASEPTIC) 1.4 % liquid 1 spray, 1 spray, Mouth/Throat, Q2H PRN, Mateo Morales MD, 1 spray at 01/23/24 1630    Phosphorus Replacement - Follow Nurse / BPA Driven Protocol, , Does not apply, Shalonda THAYER David Anthony, MD    Potassium Replacement - Follow Nurse / BPA Driven Protocol, , Does not apply, Shalonda THAYER David Anthony, MD    sevelamer (RENVELA) tablet 2,400 mg, 2,400 mg, Oral, TID With Meals, Atul Fowler MD, 2,400 mg at 01/23/24 1523    [COMPLETED] Insert Peripheral IV, , , Once **AND** sodium chloride 0.9 % flush 10 mL, 10 mL, Intravenous, PRN, Moi Liz MD    sodium chloride 0.9 % flush 10 mL, 10 mL, Intravenous, Q12H, Moi Liz MD, 10 mL at 01/23/24 2106    sodium chloride 0.9 % flush 10 mL, 10 mL, Intravenous, PRN, Moi Liz MD    sodium chloride 0.9 % infusion 40 mL, 40 mL, Intravenous, PRN, Moi Liz MD    sodium chloride 0.9 % infusion, 100 mL/hr, Intravenous, Continuous, Licha Clinton MD, Last Rate: 100 mL/hr at 01/24/24 0655, 100 mL/hr at 01/24/24 0655      Objective resting comfortably in bed, no acute distress, no family present    Vital Signs  Vitals:    01/24/24 1015 01/24/24 1033 01/24/24 1040 01/24/24 1045   BP: 109/56 113/54  106/51   BP Location: Right arm Right arm  Right arm   Patient Position: Lying Lying  Lying   Pulse: 77 82 76 89    Resp: 16 16  15   Temp:   97.7 °F (36.5 °C)    TempSrc:   Oral    SpO2:       Weight:       Height:           Physical Exam:     General Appearance:    Awake and alert, in no acute distress   Head:    Normocephalic, without obvious abnormality   Eyes:          Conjunctivae normal, anicteric sclera   Throat:   No oral lesions, no thrush, oral mucosa moist   Neck:   supple, no JVD   Lungs:     respirations regular, even and unlabored   Abdomen:     Soft, non-tender, nondistended   Rectal:     Deferred   Extremities:   no cyanosis   Skin:   No bruising or rash, no jaundice        Results Review:    CBC    Results from last 7 days   Lab Units 01/23/24  1704 01/22/24  0108 01/21/24  0003 01/20/24  0105 01/18/24  2332 01/18/24  0352   WBC 10*3/mm3 11.40* 16.60* 20.10* 18.60* 27.00* 21.20*   HEMOGLOBIN g/dL 8.0* 9.3* 9.2* 8.8* 9.0* 9.0*   PLATELETS 10*3/mm3 233 216 180 158 189 210     CMP   Results from last 7 days   Lab Units 01/23/24  0256 01/22/24  0108 01/21/24  0003 01/20/24  0105 01/18/24  2332 01/18/24  0352   SODIUM mmol/L 138 137 139 141 139 142   POTASSIUM mmol/L 4.2 4.4 4.1 3.9 4.2 4.9   CHLORIDE mmol/L 104 102 100 101 101 102   CO2 mmol/L 25.0 21.0* 26.0 25.0 25.0 24.0   BUN mg/dL 46* 72* 53* 69* 47* 100*   CREATININE mg/dL 3.93* 5.76* 4.23* 5.50* 4.23* 7.68*   GLUCOSE mg/dL 155* 140* 138* 119* 70 78   ALBUMIN g/dL 2.5* 2.3* 2.9* 2.7* 2.8* 2.8*   BILIRUBIN mg/dL 0.3 0.3 0.3 0.4  --  0.2   ALK PHOS U/L 136* 119* 118* 92  --  93   AST (SGOT) U/L 16 17 13 10  --  21   ALT (SGPT) U/L 15 16 17 17  --  20   MAGNESIUM mg/dL 1.8 2.1 1.7 1.9  --  1.9   PHOSPHORUS mg/dL 4.4 6.1* 4.8* 6.0* 4.4 9.3*   AMYLASE U/L  --   --   --  122*  --   --    LIPASE U/L 106*  --   --  248*  --   --      Cr Clearance Estimated Creatinine Clearance: 19.9 mL/min (A) (by C-G formula based on SCr of 3.93 mg/dL (H)).  Coag   Results from last 7 days   Lab Units 01/23/24  0256 01/18/24  1907   INR  1.31* 1.27*   APTT seconds  --  41.3*      HbA1C   Lab Results   Component Value Date    HGBA1C 5.80 (H) 10/10/2023    HGBA1C 6.30 (H) 04/06/2023    HGBA1C 6.6 (H) 07/06/2022         Infection   Results from last 7 days   Lab Units 01/17/24  1501 01/17/24  1205 01/17/24  1203   BLOODCX   --  No growth at 5 days No growth at 5 days   URINECX  No growth  --   --      UA    Results from last 7 days   Lab Units 01/17/24  1501   NITRITE UA  Negative   WBC UA /HPF Too Numerous to Count*   BACTERIA UA /HPF None Seen   SQUAM EPITHEL UA /HPF 0-2   URINECX  No growth     Microbiology Results (last 10 days)       Procedure Component Value - Date/Time    Urine Culture - Urine, Indwelling Urethral Catheter [[id number]]  (Normal) Collected: 01/17/24 1501    Lab Status: Final result Specimen: Urine from Indwelling Urethral Catheter Updated: 01/18/24 1939     Urine Culture No growth    Blood Culture - Blood, Arm, Left [[id number]]  (Normal) Collected: 01/17/24 1205    Lab Status: Final result Specimen: Blood from Arm, Left Updated: 01/22/24 1231     Blood Culture No growth at 5 days    Blood Culture - Blood, Arm, Left [[id number]]  (Normal) Collected: 01/17/24 1203    Lab Status: Final result Specimen: Blood from Arm, Left Updated: 01/22/24 1231     Blood Culture No growth at 5 days    COVID-19 and FLU A/B PCR, 1 HR TAT - Swab, Nasopharynx [[id number]]  (Normal) Collected: 01/15/24 1602    Lab Status: Final result Specimen: Swab from Nasopharynx Updated: 01/15/24 1624     COVID19 Not Detected     Influenza A PCR Not Detected     Influenza B PCR Not Detected    Narrative:      Fact sheet for providers: https://www.fda.gov/media/837037/download    Fact sheet for patients: https://www.fda.gov/media/716693/download    Test performed by PCR.    Urine Culture - Urine, Urine, Clean Catch [[id number]]  (Abnormal)  (Susceptibility) Collected: 01/15/24 1145    Lab Status: Final result Specimen: Urine, Clean Catch Updated: 01/18/24 0837     Urine Culture 25,000 CFU/mL Serratia  marcescens    Narrative:      Colonization of the urinary tract without infection is common. Treatment is discouraged unless the patient is symptomatic, pregnant, or undergoing an invasive urologic procedure.    Susceptibility        Serratia marcescens      ZOILA      Amoxicillin + Clavulanate Resistant      Cefazolin Resistant      Cefepime Susceptible      Ceftazidime Susceptible      Ceftriaxone Susceptible      Gentamicin Susceptible      Levofloxacin Susceptible      Nitrofurantoin Resistant      Piperacillin + Tazobactam Susceptible  [1]       Trimethoprim + Sulfamethoxazole Susceptible                   [1]  Appended report. These results have been appended to a previously preliminary verified report.                   Blood Culture - Blood, Arm, Right [[id number]]  (Normal) Collected: 01/15/24 1126    Lab Status: Final result Specimen: Blood from Arm, Right Updated: 01/20/24 1130     Blood Culture No growth at 5 days    Narrative:      Less than seven (7) mL's of blood was collected.  Insufficient quantity may yield false negative results.    Blood Culture - Blood, Arm, Left [[id number]]  (Normal) Collected: 01/15/24 1125    Lab Status: Final result Specimen: Blood from Arm, Left Updated: 01/20/24 1130     Blood Culture No growth at 5 days    Narrative:      Less than seven (7) mL's of blood was collected.  Insufficient quantity may yield false negative results.          Imaging Results (Last 72 Hours)       Procedure Component Value Units Date/Time    FL ERCP pancreatic and biliary ducts [[id number]] Collected: 01/24/24 0801     Updated: 01/24/24 0806    Narrative:      FL ERCP PANCREATIC AND BILIARY DUCTS    Date of Exam: 1/23/2024 11:50 AM EST    Indication: with sphinctorotomy, brushing of bile duct, balloon sweeping of common bile duct up to 12mm, placement of 10 Fr. x 9cm biliary stent, and placement of 5 Fr. x 7cm pancreatic stent.    Comparison: MRI abdomen with MRCP 1/22/2024, CT abdomen and pelvis with  contrast 1/20/2024    Technique:  A series of radiographic digital spot films were obtained in conjunction with a endoscopic catheterization of the biliary and pancreatic ductal system, performed by the gastroenterologist.    Fluoroscopic Time: 8.3 minutes    Number of Images: 10 spot fluoroscopic series images    Findings:  Initial image demonstrates percutaneous drainage catheter in the right upper quadrant and IVC filter in place. Contrast was injected retrograde into the common bile duct. There is fusiform common bile duct dilation. Image for suggests filling defects   within the mid to distal CBD. There is opacification of the gallbladder lumen containing multiple round filling defects consistent with gallstones. Final procedure image demonstrates CBD stent placement in satisfactory position.      Impression:      1. ERCP with fluoroscopy. Please refer to the procedure report for findings and recommendations.    Electronically Signed: Roseline Bear MD    1/24/2024 8:04 AM EST    Workstation ID: [id number]    MRI abdomen wo contrast mrcp [[id number]] Collected: 01/22/24 0818     Updated: 01/22/24 0836    Narrative:      MRI ABDOMEN WO CONTRAST MRCP    Date of Exam: 1/22/2024 6:55 AM EST    Indication: abnormal CT, elevated lipase.     Comparison: CT abdomen and pelvis with contrast 1/20/2024. CT abdomen and pelvis without contrast 1/15/2024.     Technique:  Routine multiplanar/multisequence images of the abdomen were obtained with MRCP sequences without contrast administration.      Findings:  Numerous gallstones are present. The gallbladder is mildly distended. 3.6 x 4.7 cm masslike density is seen within the mid gallbladder lumen (series 4 image 18), which could represent tumefactive sludge, although gallbladder malignancy can have a similar   noncontrast imaging appearance.    On series 2 image 15, a 5 mm filling defect is seen within the downstream common bile duct, suggesting choledocholithiasis. Additional  questionable stone in the mid CBD measuring 3 mm on series 12 image 14. Choledocholithiasis is also suggested on thick   slab MRCP imaging series 18 images 41 and 42. There is appearance of small 3 mm stone within the cystic duct on series 18 image 15 and series 5 image 21..    No intrahepatic or extrahepatic biliary dilation is identified. No biliary stricture is evident.    No pancreatic ductal dilation or evidence of pancreatic divisum configuration. Numerous tiny T2 hyperintense cystic foci are scattered throughout the pancreatic parenchyma, measuring 5 mm or less in size, which could represent tiny cysts or multiple   sidebranch duct intraductal papillary mucinous neoplasm (IPMN). No focal well-defined drainable pseudocyst is seen. There is subtle stranding surrounding the pancreatic parenchyma suggesting mild diffuse acute interstitial pancreatitis.    The spleen size is within normal limits. No focal splenic lesion is identified.    The liver size is within normal limits, 17.8 cm craniocaudal. The liver does not appear grossly steatotic or grossly cirrhotic. There are scattered tiny T2 hyperintense foci within the liver parenchyma, favored represent tiny cysts, measuring 5 mm or   less in size.    The adrenal glands are normal.    The kidneys bilaterally are atrophic with multiple T2 hyperintense lesions consistent with the appearance of cysts. Prior CT examination describe the presence of a complex cystic lesion at the right lower renal pole, which is not completely included in   the imaging field-of-view on this.    The stomach, and the large and small bowel, do not appear abnormally thickened, dilated, or inflamed. No adenopathy is identified. Normal caliber of the abdominal aorta. No gross ascites is seen.    Trace right basilar pleural effusion is present. There is mild to moderate right hemidiaphragm elevation with passive right basilar atelectasis.    No suspicious osseous abnormalities are  identified.          Impression:      1. Findings consistent with acute diffuse interstitial pancreatitis.  2. Multiple less than 5 mm cystic foci are scattered throughout the pancreatic parenchyma which could represent sidebranch IPMNs, or pancreatic cysts. No well-defined pseudocyst is seen. Normal caliber of the pancreatic duct. No convincing evidence of   degraded divisum.  3. Choledocholithiasis is suggested in the mid to distal CBD. Suspected small stone within the cystic duct, as well. However, there is no indication of acute cholecystitis.  4. Masslike density is seen within the gallbladder, which could represent tumefactive sludge or gallbladder neoplasm.  5. Multiple gallstones.  6. Small right basilar pleural effusion. Mild right lower lobe atelectasis.  7. Complex cystic lesion described at the right lower renal pole is not included completely in the imaging field-of-view of this examination. The kidneys appear atrophic with multiple cystic changes suggesting chronic medical renal disease.  8. Multiple tiny hepatic cysts.    Electronically Signed: Roseline Bear MD    1/22/2024 8:33 AM EST    Workstation ID: [id number]            Assessment & Plan   Acute pancreatitis  Abnormal MRCP  Elevated alkaline phosphatase  Sinus bradycardia with first-degree AV block  UTI -Serratia marcescens   Chronic kidney disease now on hemodialysis  AV fistula s/p angioplasty 1/18/24  Bilateral obstructing stones status post bilateral ureteral stent placements 1/16/2024  IVC filter placed 1/19/2024/ History of DVT  CAD  Diabetes  Normocytic anemia -likely multifactorial     PLAN:  Patient is a 78-year-old male who presented on 1/15/2024 with worsening renal failure, near syncopal episode and hypotension.  He was found to have acute renal failure and obstructive uropathy with need for urgent bilateral stent placement.    MRCP had changes of acute diffuse pancreatitis with multiple cystic lesions throughout the pancreas,  possible sidebranch IPMN's or pancreatic cysts.  No evidence of pancreatic divisum.  Choledocholithiasis suggestion with possible stone in the cystic duct as well.  There is also concern for masslike density within the gallbladder and multiple gallstones.  ERCP/EUS yesterday highly suggestive of acute on chronic pancreatitis as well as possible pancreatic head mass which is ill-defined leading to stricture of the CBD.  CBD sphincterotomy with clearance of bile duct, brushing of stricture and stent placement.  Numerous gallstones and sludge also noted, less likely gallbladder mass.  Multiple duodenal ulcers with stricture.  PD stent placed prophylactically.    Await pathology from CBD brushings.  CA 19-9 95.3 with AFP less than 2.  May need to consider repeat ERCP with EUS in 6 to 8 weeks with pancreatic biopsy if needed.  Suspect underlying cholangitis. Infectious disease following and currently on ertapenem.  No abdominal pain today after ERCP yesterday.  Awaiting repeat labs.  Currently getting dialysis.    Nephrology following a plan for dialysis Monday, Wednesday and Friday.  Continue supportive care and await lab findings as well as pathology.    Electronically signed by DG Srivastava, 01/24/24, 11:43 AM EST.

## 2024-01-25 ENCOUNTER — INPATIENT HOSPITAL (AMBULATORY)
Dept: URBAN - METROPOLITAN AREA HOSPITAL 84 | Facility: HOSPITAL | Age: 79
End: 2024-01-25

## 2024-01-25 ENCOUNTER — APPOINTMENT (OUTPATIENT)
Dept: INTERVENTIONAL RADIOLOGY/VASCULAR | Facility: HOSPITAL | Age: 79
End: 2024-01-25
Payer: MEDICARE

## 2024-01-25 VITALS
HEIGHT: 73 IN | DIASTOLIC BLOOD PRESSURE: 62 MMHG | OXYGEN SATURATION: 99 % | TEMPERATURE: 97.3 F | SYSTOLIC BLOOD PRESSURE: 120 MMHG | HEART RATE: 83 BPM | BODY MASS INDEX: 26.09 KG/M2 | RESPIRATION RATE: 18 BRPM | WEIGHT: 196.87 LBS

## 2024-01-25 DIAGNOSIS — K86.1 OTHER CHRONIC PANCREATITIS: ICD-10-CM

## 2024-01-25 DIAGNOSIS — R74.8 ABNORMAL LEVELS OF OTHER SERUM ENZYMES: ICD-10-CM

## 2024-01-25 DIAGNOSIS — D64.9 ANEMIA, UNSPECIFIED: ICD-10-CM

## 2024-01-25 DIAGNOSIS — K85.90 ACUTE PANCREATITIS WITHOUT NECROSIS OR INFECTION, UNSPECIFIE: ICD-10-CM

## 2024-01-25 DIAGNOSIS — R93.2 ABNORMAL FINDINGS ON DIAGNOSTIC IMAGING OF LIVER AND BILIARY: ICD-10-CM

## 2024-01-25 PROBLEM — T82.590A MECHANICAL COMPLICATION OF ARTERIOVENOUS FISTULA SURGICALLY CREATED: Status: RESOLVED | Noted: 2024-01-15 | Resolved: 2024-01-25

## 2024-01-25 PROBLEM — E87.5 HYPERKALEMIA: Status: RESOLVED | Noted: 2024-01-15 | Resolved: 2024-01-25

## 2024-01-25 LAB
ALBUMIN SERPL-MCNC: 2.4 G/DL (ref 3.5–5.2)
ALBUMIN/GLOB SERPL: 0.9 G/DL
ALP SERPL-CCNC: 123 U/L (ref 39–117)
ALT SERPL W P-5'-P-CCNC: 14 U/L (ref 1–41)
ANION GAP SERPL CALCULATED.3IONS-SCNC: 10 MMOL/L (ref 5–15)
AST SERPL-CCNC: 13 U/L (ref 1–40)
BASOPHILS # BLD AUTO: 0 10*3/MM3 (ref 0–0.2)
BASOPHILS NFR BLD AUTO: 0.2 % (ref 0–1.5)
BEAKER LAB AP INTRAOPERATIVE CONSULTATION: NORMAL
BILIRUB SERPL-MCNC: 0.3 MG/DL (ref 0–1.2)
BUN SERPL-MCNC: 45 MG/DL (ref 8–23)
BUN/CREAT SERPL: 11.4 (ref 7–25)
CALCIUM SPEC-SCNC: 7.8 MG/DL (ref 8.6–10.5)
CHLORIDE SERPL-SCNC: 106 MMOL/L (ref 98–107)
CO2 SERPL-SCNC: 23 MMOL/L (ref 22–29)
CREAT SERPL-MCNC: 3.95 MG/DL (ref 0.76–1.27)
DEPRECATED RDW RBC AUTO: 46.8 FL (ref 37–54)
EGFRCR SERPLBLD CKD-EPI 2021: 14.9 ML/MIN/1.73
EOSINOPHIL # BLD AUTO: 0.2 10*3/MM3 (ref 0–0.4)
EOSINOPHIL NFR BLD AUTO: 1.2 % (ref 0.3–6.2)
ERYTHROCYTE [DISTWIDTH] IN BLOOD BY AUTOMATED COUNT: 14.9 % (ref 12.3–15.4)
GLOBULIN UR ELPH-MCNC: 2.6 GM/DL
GLUCOSE BLDC GLUCOMTR-MCNC: 101 MG/DL (ref 70–105)
GLUCOSE BLDC GLUCOMTR-MCNC: 115 MG/DL (ref 70–105)
GLUCOSE SERPL-MCNC: 121 MG/DL (ref 65–99)
HCT VFR BLD AUTO: 23.6 % (ref 37.5–51)
HGB BLD-MCNC: 7.6 G/DL (ref 13–17.7)
LAB AP CASE REPORT: NORMAL
LAB AP SPECIAL STAINS: NORMAL
LYMPHOCYTES # BLD AUTO: 1.9 10*3/MM3 (ref 0.7–3.1)
LYMPHOCYTES NFR BLD AUTO: 13.9 % (ref 19.6–45.3)
MCH RBC QN AUTO: 28.8 PG (ref 26.6–33)
MCHC RBC AUTO-ENTMCNC: 32.1 G/DL (ref 31.5–35.7)
MCV RBC AUTO: 89.7 FL (ref 79–97)
MONOCYTES # BLD AUTO: 2.2 10*3/MM3 (ref 0.1–0.9)
MONOCYTES NFR BLD AUTO: 16.2 % (ref 5–12)
NEUTROPHILS NFR BLD AUTO: 68.5 % (ref 42.7–76)
NEUTROPHILS NFR BLD AUTO: 9.2 10*3/MM3 (ref 1.7–7)
NRBC BLD AUTO-RTO: 0 /100 WBC (ref 0–0.2)
PATH REPORT.FINAL DX SPEC: NORMAL
PATH REPORT.GROSS SPEC: NORMAL
PLATELET # BLD AUTO: 319 10*3/MM3 (ref 140–450)
PMV BLD AUTO: 8.8 FL (ref 6–12)
POTASSIUM SERPL-SCNC: 4.4 MMOL/L (ref 3.5–5.2)
PROT SERPL-MCNC: 5 G/DL (ref 6–8.5)
RBC # BLD AUTO: 2.63 10*6/MM3 (ref 4.14–5.8)
SODIUM SERPL-SCNC: 139 MMOL/L (ref 136–145)
WBC NRBC COR # BLD AUTO: 13.4 10*3/MM3 (ref 3.4–10.8)

## 2024-01-25 PROCEDURE — 77001 FLUOROGUIDE FOR VEIN DEVICE: CPT

## 2024-01-25 PROCEDURE — 25010000002 CEFAZOLIN PER 500 MG

## 2024-01-25 PROCEDURE — 97110 THERAPEUTIC EXERCISES: CPT

## 2024-01-25 PROCEDURE — 80053 COMPREHEN METABOLIC PANEL: CPT | Performed by: NURSE PRACTITIONER

## 2024-01-25 PROCEDURE — 82948 REAGENT STRIP/BLOOD GLUCOSE: CPT

## 2024-01-25 PROCEDURE — 25010000002 ERTAPENEM PER 500 MG: Performed by: NURSE PRACTITIONER

## 2024-01-25 PROCEDURE — C1894 INTRO/SHEATH, NON-LASER: HCPCS

## 2024-01-25 PROCEDURE — 99232 SBSQ HOSP IP/OBS MODERATE 35: CPT | Mod: FS | Performed by: NURSE PRACTITIONER

## 2024-01-25 PROCEDURE — 85025 COMPLETE CBC W/AUTO DIFF WBC: CPT | Performed by: NURSE PRACTITIONER

## 2024-01-25 PROCEDURE — 25810000003 SODIUM CHLORIDE 0.9 % SOLUTION: Performed by: HOSPITALIST

## 2024-01-25 PROCEDURE — 0JH63XZ INSERTION OF TUNNELED VASCULAR ACCESS DEVICE INTO CHEST SUBCUTANEOUS TISSUE AND FASCIA, PERCUTANEOUS APPROACH: ICD-10-PCS | Performed by: RADIOLOGY

## 2024-01-25 PROCEDURE — 25010000002 ONDANSETRON PER 1 MG

## 2024-01-25 PROCEDURE — 76937 US GUIDE VASCULAR ACCESS: CPT

## 2024-01-25 PROCEDURE — 36558 INSERT TUNNELED CV CATH: CPT

## 2024-01-25 PROCEDURE — 25010000002 LIDOCAINE 1 % SOLUTION

## 2024-01-25 PROCEDURE — C1751 CATH, INF, PER/CENT/MIDLINE: HCPCS

## 2024-01-25 PROCEDURE — 02HV33Z INSERTION OF INFUSION DEVICE INTO SUPERIOR VENA CAVA, PERCUTANEOUS APPROACH: ICD-10-PCS | Performed by: RADIOLOGY

## 2024-01-25 PROCEDURE — 97116 GAIT TRAINING THERAPY: CPT

## 2024-01-25 PROCEDURE — 25010000002 FENTANYL CITRATE (PF) 50 MCG/ML SOLUTION

## 2024-01-25 PROCEDURE — B518YZA FLUOROSCOPY OF SUPERIOR VENA CAVA USING OTHER CONTRAST, GUIDANCE: ICD-10-PCS | Performed by: RADIOLOGY

## 2024-01-25 RX ORDER — ERTAPENEM 1 G/1
0.5 INJECTION, POWDER, LYOPHILIZED, FOR SOLUTION INTRAMUSCULAR; INTRAVENOUS EVERY 24 HOURS
Status: DISCONTINUED | OUTPATIENT
Start: 2024-01-25 | End: 2024-01-25

## 2024-01-25 RX ORDER — ERTAPENEM 1 G/1
0.5 INJECTION, POWDER, LYOPHILIZED, FOR SOLUTION INTRAMUSCULAR; INTRAVENOUS EVERY 24 HOURS
Status: DISCONTINUED | OUTPATIENT
Start: 2024-01-25 | End: 2024-01-25 | Stop reason: HOSPADM

## 2024-01-25 RX ORDER — IBUPROFEN 600 MG/1
1 TABLET ORAL
Start: 2024-01-25

## 2024-01-25 RX ORDER — LIDOCAINE HYDROCHLORIDE 10 MG/ML
INJECTION, SOLUTION INFILTRATION; PERINEURAL AS NEEDED
Status: DISCONTINUED | OUTPATIENT
Start: 2024-01-25 | End: 2024-01-25 | Stop reason: HOSPADM

## 2024-01-25 RX ORDER — ONDANSETRON 4 MG/1
4 TABLET, ORALLY DISINTEGRATING ORAL EVERY 8 HOURS PRN
Start: 2024-01-25

## 2024-01-25 RX ORDER — FENTANYL CITRATE 50 UG/ML
INJECTION, SOLUTION INTRAMUSCULAR; INTRAVENOUS AS NEEDED
Status: DISCONTINUED | OUTPATIENT
Start: 2024-01-25 | End: 2024-01-25 | Stop reason: HOSPADM

## 2024-01-25 RX ORDER — ONDANSETRON 2 MG/ML
INJECTION INTRAMUSCULAR; INTRAVENOUS AS NEEDED
Status: DISCONTINUED | OUTPATIENT
Start: 2024-01-25 | End: 2024-01-25 | Stop reason: HOSPADM

## 2024-01-25 RX ADMIN — Medication 10 ML: at 08:21

## 2024-01-25 RX ADMIN — ONDANSETRON 4 MG: 2 INJECTION INTRAMUSCULAR; INTRAVENOUS at 15:43

## 2024-01-25 RX ADMIN — FENTANYL CITRATE 50 MCG: 50 INJECTION, SOLUTION INTRAMUSCULAR; INTRAVENOUS at 15:56

## 2024-01-25 RX ADMIN — ERTAPENEM SODIUM 500 MG: 1 INJECTION, POWDER, LYOPHILIZED, FOR SOLUTION INTRAMUSCULAR; INTRAVENOUS at 16:49

## 2024-01-25 RX ADMIN — SEVELAMER CARBONATE 2400 MG: 800 TABLET, FILM COATED ORAL at 08:21

## 2024-01-25 RX ADMIN — SODIUM CHLORIDE 100 ML/HR: 9 INJECTION, SOLUTION INTRAVENOUS at 14:23

## 2024-01-25 RX ADMIN — CEFAZOLIN 1000 MG: 1 INJECTION, POWDER, FOR SOLUTION INTRAMUSCULAR; INTRAVENOUS at 15:43

## 2024-01-25 RX ADMIN — SEVELAMER CARBONATE 2400 MG: 800 TABLET, FILM COATED ORAL at 11:37

## 2024-01-25 RX ADMIN — LIDOCAINE HYDROCHLORIDE 10 ML: 10 INJECTION, SOLUTION INFILTRATION; PERINEURAL at 15:59

## 2024-01-25 RX ADMIN — ATORVASTATIN CALCIUM 10 MG: 10 TABLET, FILM COATED ORAL at 08:21

## 2024-01-25 RX ADMIN — FENTANYL CITRATE 100 MCG: 50 INJECTION, SOLUTION INTRAMUSCULAR; INTRAVENOUS at 15:47

## 2024-01-25 RX ADMIN — SENNOSIDES AND DOCUSATE SODIUM 2 TABLET: 50; 8.6 TABLET ORAL at 08:21

## 2024-01-25 RX ADMIN — CALCITRIOL CAPSULES 0.25 MCG 0.5 MCG: 0.25 CAPSULE ORAL at 08:21

## 2024-01-25 NOTE — PROGRESS NOTES
[facility] Progress Note      Patient Name: Gabriel De Souza  : 1945  MRN: [id number]  Primary Care Physician:  Waylon Johnson MD  Date of admission: 1/15/2024    Subjective     Interval History:     Patient feeling fine. No new complaint.    Review of Systems:   As noted above    Objective     Vitals:   Temp:  [97.2 °F (36.2 °C)-98.5 °F (36.9 °C)] 97.3 °F (36.3 °C)  Heart Rate:  [81-96] 96  Resp:  [13-20] 20  BP: (120-144)/(58-70) 120/62    Intake/Output Summary (Last 24 hours) at 2024 1544  Last data filed at 2024 0900  Gross per 24 hour   Intake 720 ml   Output 1175 ml   Net -455 ml       Physical Exam:    General Appearance: Awake, alert, NAD  HEENT: oral mucosa normal, nonicteric sclera  Neck: supple, no JVD  Lungs: CTA  Heart: RRR, normal S1 and S2  Abdomen: soft, nondistended  Extremities: no edema  Neuro: Awake alert and moving all extremities    Scheduled Meds:     [Held by provider] apixaban, 2.5 mg, Oral, Q12H  atorvastatin, 10 mg, Oral, Daily  calcitriol, 0.5 mcg, Oral, Daily  ertapenem, 500 mg, Intravenous, Q24H  insulin lispro, 2-7 Units, Subcutaneous, 4x Daily AC & at Bedtime  senna-docusate sodium, 2 tablet, Oral, BID  sevelamer, 2,400 mg, Oral, TID With Meals  sodium chloride, 10 mL, Intravenous, Q12H      IV Meds:   ceFAZolin,   sodium chloride, 100 mL/hr, Last Rate: 100 mL/hr (24 1423)          Results Reviewed:   I have personally reviewed the results from the time of this admission to 2024 15:44 EST     Results from last 7 days   Lab Units 24  0152 24  1209 24  0256   SODIUM mmol/L 139 137 138   POTASSIUM mmol/L 4.4 4.4 4.2   CHLORIDE mmol/L 106 106 104   CO2 mmol/L 23.0 20.0* 25.0   BUN mg/dL 45* 30* 46*   CREATININE mg/dL 3.95* 2.69* 3.93*   CALCIUM mg/dL 7.8* 8.2* 8.5*   BILIRUBIN mg/dL 0.3 0.4 0.3   ALK PHOS U/L 123* 139* 136*   ALT (SGPT) U/L 14 16 15   AST (SGOT) U/L 13 22 16   GLUCOSE mg/dL 121* 102* 155*      Estimated Creatinine Clearance: 19.8 mL/min (A) (by C-G formula based on SCr of 3.95 mg/dL (H)).  Results from last 7 days   Lab Units 01/24/24  1209 01/23/24  0256 01/22/24  0108 01/21/24  0003   MAGNESIUM mg/dL  --  1.8 2.1 1.7   PHOSPHORUS mg/dL 3.1 4.4 6.1* 4.8*         Results from last 7 days   Lab Units 01/25/24  0152 01/23/24  1704 01/22/24  0108 01/21/24  0003 01/20/24  0105   WBC 10*3/mm3 13.40* 11.40* 16.60* 20.10* 18.60*   HEMOGLOBIN g/dL 7.6* 8.0* 9.3* 9.2* 8.8*   PLATELETS 10*3/mm3 319 233 216 180 158     Results from last 7 days   Lab Units 01/24/24  1209 01/23/24  0256 01/18/24  1907   INR  1.32* 1.31* 1.27*       Assessment / Plan     ASSESSMENT:  End-stage renal disease.  Patient receiving dialysis MWF. Electrolytes, volume status okay  Obstructive uropathy.  Status post bilateral ureteral stents placement.    Hyperkalemia.  Secondary to renal failure.  Improved  Metabolic acidosis.  Improved  Anemia in chronic kidney disease.   hyperphosphatemia.  improved  DVT.  Hematology, vascular following following.  S/p IVC filter placement.    Urinary tract infection.  on IV antibiotics  Pancreatitis.  Gastroenterology following.  S/p ERCP.  Patient was thought to have pancreatic head mass    PLAN:  Hemodialysis Monday Wednesday and Friday  Outpatient dialysis has been arranged      Atul Fowler MD  01/25/24  15:44 [facility]    [facility]  [phone]

## 2024-01-25 NOTE — THERAPY TREATMENT NOTE
"Subjective: Pt agreeable to therapeutic plan of care.    Objective:     Bed mobility - CGA  Transfers - Min-A; with one LOB upon standing  Ambulation - 25 feet Min-A with IV pole    Therapeutic Exercise - 10 Reps B LE AROM supported sitting / chair    Vitals: WNL    Pain: 0 VAS   Location: N/a  Intervention for pain: N/A    Education: Provided education on the importance of mobility in the acute care setting, Verbal/Tactile Cues, Transfer Training, Gait Training, and Energy conservation strategies    Assessment: Gabriel De Souza presents with functional mobility impairments which indicate the need for skilled intervention. Pt required min A to perform STS with one brief LOB upon standing. Pt required min A/ HHA via IV pole to maintain dynamic standing balance. Pt improved gait distance with ability to ambulate ~25 feet. Pt continues to be limited due to decreased activity tolerance and functional strength. Tolerating session today without incident. Will continue to follow and progress as tolerated.     Plan/Recommendations:   If medically appropriate, Moderate Intensity Therapy recommended post-acute care. This is recommended as therapy feels the patient would require 3-4 days per week and wouldn't tolerate \"3 hour daily\" rehab intensity. SNF would be the preferred choice. If the patient does not agree to SNF, arrange HH or OP depending on home bound status. If patient is medically complex, consider LTACH. Pt requires no DME at discharge.     Pt desires Skilled Rehab placement at discharge. Pt cooperative; agreeable to therapeutic recommendations and plan of care.         Basic Mobility 6-click:  Rollin = Total, A lot = 2, A little = 3; 4 = None  Supine>Sit:   1 = Total, A lot = 2, A little = 3; 4 = None   Sit>Stand with arms:  1 = Total, A lot = 2, A little = 3; 4 = None  Bed>Chair:   1 = Total, A lot = 2, A little = 3; 4 = None  Ambulate in room:  1 = Total, A lot = 2, A little = 3; 4 = None  3-5 Steps " with railin = Total, A lot = 2, A little = 3; 4 = None  Score: 14    Modified [place]: N/A = No pre-op stroke/TIA    Post-Tx Position: Up in Chair, Alarms activated, and Call light and personal items within reach  PPE: gloves and surgical mask

## 2024-01-25 NOTE — CASE MANAGEMENT/SOCIAL WORK
Case Management Discharge Note      Final Note: [facility]    Provided Post Acute Provider List?: N/A  Provided Post Acute Provider Quality & Resource List?: N/A    Selected Continued Care - Admitted Since 1/15/2024       Destination Coordination complete.      Service Provider Selected Services Address Phone Fax Patient Preferred    [facility] [address] [place] [address] [phone] --           Transportation Services  W/C Van: Yimi Haskins    Final Discharge Disposition Code: 03 - skilled nursing facility (SNF)      Continued Stay Note  [facility]     Patient Name: Gabriel De Souza  MRN: [id number]  Today's Date: 1/25/2024    Admit Date: 1/15/2024    Plan: [facility], accepted, pending bed available. No precert needed. PASRR approved. OP [facility] (holding a MWF 1255 spot).  Plan IV Invanz thru ML or PICC at SNF.   Discharge Plan       Row Name 01/25/24 0086       Plan    Plan Comments DC to [facility] today.  Called Tyrel with [facility] to update on DC plan.   Tunneled PICC placed for IV abx.  Yimi haskins set up will call, RN aware.                       Expected Discharge Date and Time       Expected Discharge Date Expected Discharge Time    Jan 25, 2024               Niurka Copeland, SELMA

## 2024-01-25 NOTE — PLAN OF CARE
Goal Outcome Evaluation:      Gabriel De Souza presents with functional mobility impairments which indicate the need for skilled intervention. Pt required min A to perform STS with one brief LOB upon standing. Pt required min A/ HHA via IV pole to maintain dynamic standing balance. Pt improved gait distance with ability to ambulate ~25 feet. Pt continues to be limited due to decreased activity tolerance and functional strength. Tolerating session today without incident. Will continue to follow and progress as tolerated.

## 2024-01-25 NOTE — PROGRESS NOTES
" LOS: 10 days   Patient Care Team:  Waylon Johnson MD as PCP - General  Chemchirian, MD Lali as Consulting Physician (Cardiology)  Atul Fowler MD as Consulting Physician (Nephrology)      Subjective \" I am doing well\"    Interval History:     Subjective: Only mild abdominal discomfort.  Eating some and denies nausea or vomiting.  No fevers.  Bowel movement yesterday.    ROS:   No chest pain, shortness of breath, or cough.        Medication Review:     Current Facility-Administered Medications:     acetaminophen (TYLENOL) suppository 650 mg, 650 mg, Rectal, Q6H PRN, Moi Liz MD, 650 mg at 01/16/24 0550    [Held by provider] apixaban (ELIQUIS) tablet 2.5 mg, 2.5 mg, Oral, Q12H, Licha Clinton MD, 2.5 mg at 01/22/24 0835    atorvastatin (LIPITOR) tablet 10 mg, 10 mg, Oral, Daily, Moi Liz MD, 10 mg at 01/25/24 0821    sennosides-docusate (PERICOLACE) 8.6-50 MG per tablet 2 tablet, 2 tablet, Oral, BID, 2 tablet at 01/25/24 0821 **AND** polyethylene glycol (MIRALAX) packet 17 g, 17 g, Oral, Daily PRN **AND** bisacodyl (DULCOLAX) EC tablet 5 mg, 5 mg, Oral, Daily PRN **AND** bisacodyl (DULCOLAX) suppository 10 mg, 10 mg, Rectal, Daily PRN, Moi Liz MD    calcitriol (ROCALTROL) capsule 0.5 mcg, 0.5 mcg, Oral, Daily, Moi Liz MD, 0.5 mcg at 01/25/24 0821    Calcium Replacement - Follow Nurse / BPA Driven Protocol, , Does not apply, PRN, Moi Liz MD    dextrose (D50W) (25 g/50 mL) IV injection 25 g, 25 g, Intravenous, Q15 Min PRN, Moi Liz MD, 25 g at 01/19/24 0039    dextrose (GLUTOSE) oral gel 15 g, 15 g, Oral, Q15 Min PRN, Moi Liz MD    ertapenem (INVanz) 500 mg in sodium chloride 0.9 % 50 mL IVPB, 500 mg, Intravenous, Q24H, Ann Beavers, APRN, Last Rate: 100 mL/hr at 01/24/24 1652, 500 mg at 01/24/24 1652    glucagon (GLUCAGEN) injection 1 mg, 1 mg, Subcutaneous, Q15 Min PRN, Moi Liz MD    " insulin lispro (HUMALOG/ADMELOG) injection 2-7 Units, 2-7 Units, Subcutaneous, 4x Daily AC & at Bedtime, Moi Liz MD, 2 Units at 01/24/24 2120    Magnesium Standard Dose Replacement - Follow Nurse / BPA Driven Protocol, , Does not apply, Shalonda THAYER David Anthony, MD    nitroglycerin (NITROSTAT) SL tablet 0.4 mg, 0.4 mg, Sublingual, Q5 Min PRN, Moi Liz MD    ondansetron (ZOFRAN) injection 4 mg, 4 mg, Intravenous, Q6H PRN, Moi Liz MD, 4 mg at 01/17/24 1456    ondansetron ODT (ZOFRAN-ODT) disintegrating tablet 4 mg, 4 mg, Oral, Q6H PRN, Moi Liz MD    phenol (CHLORASEPTIC) 1.4 % liquid 1 spray, 1 spray, Mouth/Throat, Q2H PRN, Mateo Morales MD, 1 spray at 01/23/24 1630    Phosphorus Replacement - Follow Nurse / BPA Driven Protocol, , Does not apply, Shalonda THAYER David Anthony, MD    Potassium Replacement - Follow Nurse / BPA Driven Protocol, , Does not apply, Shalonda THAYER David Anthony, MD    sevelamer (RENVELA) tablet 2,400 mg, 2,400 mg, Oral, TID With Meals, Atul Fowler MD, 2,400 mg at 01/25/24 1137    [COMPLETED] Insert Peripheral IV, , , Once **AND** sodium chloride 0.9 % flush 10 mL, 10 mL, Intravenous, PRN, Moi Liz MD    sodium chloride 0.9 % flush 10 mL, 10 mL, Intravenous, Q12H, Moi Liz MD, 10 mL at 01/25/24 0821    sodium chloride 0.9 % flush 10 mL, 10 mL, Intravenous, PRN, Moi Liz MD    sodium chloride 0.9 % infusion 40 mL, 40 mL, Intravenous, PRN, Moi Liz MD    sodium chloride 0.9 % infusion, 100 mL/hr, Intravenous, Continuous, Licha Clinton MD, Last Rate: 100 mL/hr at 01/24/24 1807, 100 mL/hr at 01/24/24 1807      Objective resting in bed, no acute distress, no family present    Vital Signs  Vitals:    01/24/24 2358 01/25/24 0429 01/25/24 0816 01/25/24 1154   BP: 125/64 144/59 138/70 120/62   BP Location: Right arm Right arm Right arm    Patient Position: Lying Lying Lying    Pulse:  81 82  96   Resp: 18 20 17 20   Temp: 98.4 °F (36.9 °C) 98.5 °F (36.9 °C) 97.2 °F (36.2 °C) 97.3 °F (36.3 °C)   TempSrc: Oral Oral Oral    SpO2:  98% 98% 98%   Weight:       Height:           Physical Exam:     General Appearance:    Awake and alert, in no acute distress   Head:    Normocephalic, without obvious abnormality   Eyes:          Conjunctivae normal, anicteric sclera   Throat:   No oral lesions, no thrush, oral mucosa moist   Neck:   supple, no JVD   Lungs:     respirations regular, even and unlabored   Abdomen:     Soft, nondistended, mild upper abdominal tenderness without rebound or guarding   Rectal:     Deferred   Extremities:   no cyanosis   Skin:   No bruising or rash, no jaundice        Results Review:    CBC    Results from last 7 days   Lab Units 01/25/24  0152 01/23/24  1704 01/22/24  0108 01/21/24  0003 01/20/24  0105 01/18/24  2332   WBC 10*3/mm3 13.40* 11.40* 16.60* 20.10* 18.60* 27.00*   HEMOGLOBIN g/dL 7.6* 8.0* 9.3* 9.2* 8.8* 9.0*   PLATELETS 10*3/mm3 319 233 216 180 158 189     CMP   Results from last 7 days   Lab Units 01/25/24  0152 01/24/24  1209 01/23/24  0256 01/22/24  0108 01/21/24  0003 01/20/24  0105 01/18/24  2332   SODIUM mmol/L 139 137 138 137 139 141 139   POTASSIUM mmol/L 4.4 4.4 4.2 4.4 4.1 3.9 4.2   CHLORIDE mmol/L 106 106 104 102 100 101 101   CO2 mmol/L 23.0 20.0* 25.0 21.0* 26.0 25.0 25.0   BUN mg/dL 45* 30* 46* 72* 53* 69* 47*   CREATININE mg/dL 3.95* 2.69* 3.93* 5.76* 4.23* 5.50* 4.23*   GLUCOSE mg/dL 121* 102* 155* 140* 138* 119* 70   ALBUMIN g/dL 2.4* 2.4* 2.5* 2.3* 2.9* 2.7* 2.8*   BILIRUBIN mg/dL 0.3 0.4 0.3 0.3 0.3 0.4  --    ALK PHOS U/L 123* 139* 136* 119* 118* 92  --    AST (SGOT) U/L 13 22 16 17 13 10  --    ALT (SGPT) U/L 14 16 15 16 17 17  --    MAGNESIUM mg/dL  --   --  1.8 2.1 1.7 1.9  --    PHOSPHORUS mg/dL  --  3.1 4.4 6.1* 4.8* 6.0* 4.4   AMYLASE U/L  --   --   --   --   --  122*  --    LIPASE U/L  --  69* 106*  --   --  248*  --      Cr Clearance Estimated  Creatinine Clearance: 19.8 mL/min (A) (by C-G formula based on SCr of 3.95 mg/dL (H)).  Coag   Results from last 7 days   Lab Units 01/24/24  1209 01/23/24  0256 01/18/24  1907   INR  1.32* 1.31* 1.27*   APTT seconds  --   --  41.3*     HbA1C   Lab Results   Component Value Date    HGBA1C 5.80 (H) 10/10/2023    HGBA1C 6.30 (H) 04/06/2023    HGBA1C 6.6 (H) 07/06/2022         Infection     UA      Microbiology Results (last 10 days)       Procedure Component Value - Date/Time    Urine Culture - Urine, Indwelling Urethral Catheter [[id number]]  (Normal) Collected: 01/17/24 1501    Lab Status: Final result Specimen: Urine from Indwelling Urethral Catheter Updated: 01/18/24 1939     Urine Culture No growth    Blood Culture - Blood, Arm, Left [[id number]]  (Normal) Collected: 01/17/24 1205    Lab Status: Final result Specimen: Blood from Arm, Left Updated: 01/22/24 1231     Blood Culture No growth at 5 days    Blood Culture - Blood, Arm, Left [[id number]]  (Normal) Collected: 01/17/24 1203    Lab Status: Final result Specimen: Blood from Arm, Left Updated: 01/22/24 1231     Blood Culture No growth at 5 days    COVID-19 and FLU A/B PCR, 1 HR TAT - Swab, Nasopharynx [[id number]]  (Normal) Collected: 01/15/24 1602    Lab Status: Final result Specimen: Swab from Nasopharynx Updated: 01/15/24 1624     COVID19 Not Detected     Influenza A PCR Not Detected     Influenza B PCR Not Detected    Narrative:      Fact sheet for providers: https://www.fda.gov/media/585665/download    Fact sheet for patients: https://www.fda.gov/media/557498/download    Test performed by PCR.          Imaging Results (Last 72 Hours)       Procedure Component Value Units Date/Time    FL ERCP pancreatic and biliary ducts [[id number]] Collected: 01/24/24 0801     Updated: 01/24/24 0806    Narrative:      FL ERCP PANCREATIC AND BILIARY DUCTS    Date of Exam: 1/23/2024 11:50 AM EST    Indication: with sphinctorotomy, brushing of bile duct, balloon sweeping of  common bile duct up to 12mm, placement of 10 Fr. x 9cm biliary stent, and placement of 5 Fr. x 7cm pancreatic stent.    Comparison: MRI abdomen with MRCP 1/22/2024, CT abdomen and pelvis with contrast 1/20/2024    Technique:  A series of radiographic digital spot films were obtained in conjunction with a endoscopic catheterization of the biliary and pancreatic ductal system, performed by the gastroenterologist.    Fluoroscopic Time: 8.3 minutes    Number of Images: 10 spot fluoroscopic series images    Findings:  Initial image demonstrates percutaneous drainage catheter in the right upper quadrant and IVC filter in place. Contrast was injected retrograde into the common bile duct. There is fusiform common bile duct dilation. Image for suggests filling defects   within the mid to distal CBD. There is opacification of the gallbladder lumen containing multiple round filling defects consistent with gallstones. Final procedure image demonstrates CBD stent placement in satisfactory position.      Impression:      1. ERCP with fluoroscopy. Please refer to the procedure report for findings and recommendations.    Electronically Signed: Roseline Bear MD    1/24/2024 8:04 AM EST    Workstation ID: [id number]            Assessment & Plan   Acute pancreatitis  Abnormal MRCP  Elevated alkaline phosphatase  Sinus bradycardia with first-degree AV block  UTI -Serratia marcescens   Chronic kidney disease now on hemodialysis  AV fistula s/p angioplasty 1/18/24  Bilateral obstructing stones status post bilateral ureteral stent placements 1/16/2024  IVC filter placed 1/19/2024/ History of DVT  CAD  Diabetes  Normocytic anemia -likely multifactorial     PLAN:  Patient is a 78-year-old male who presented on 1/15/2024 with worsening renal failure, near syncopal episode and hypotension.  He was found to have acute renal failure and obstructive uropathy with need for urgent bilateral stent placement.     MRCP had changes of acute diffuse  pancreatitis with multiple cystic lesions throughout the pancreas, possible sidebranch IPMN's or pancreatic cysts.  No evidence of pancreatic divisum.  Choledocholithiasis suggestion with possible stone in the cystic duct as well.  There is also concern for masslike density within the gallbladder and multiple gallstones.  ERCP/EUS highly suggestive of acute on chronic pancreatitis as well as possible pancreatic head mass which is ill-defined leading to stricture of the CBD.  CBD sphincterotomy with clearance of bile duct, brushing of stricture and stent placement.  Numerous gallstones and sludge also noted, less likely gallbladder mass.  Multiple duodenal ulcers with stricture.  PD stent placed prophylactically.    We are still awaiting pathology from CBD brushings.  CA 19-9 95.3.  We will plan ERCP in 6 to 8 weeks +/- EUS depending on pathology.  Low-fat diet recommended.  Alk phos 123 otherwise normal LFTs.  Infectious disease following with plan for IV or ertapenem for 14 days.  WBC 13.4 without bandemia.  Hemoglobin 7.6 without overt GI bleeding.     Nephrology following a plan for dialysis Monday, Wednesday and Friday.  Okay to discharge home from GI standpoint if otherwise medically stable with outpatient GI follow-up.  We will see inpatient as needed, call questions or concerns.    Electronically signed by DG Srivastava, 01/25/24, 12:02 PM IVETTE.

## 2024-01-25 NOTE — PLAN OF CARE
Goal Outcome Evaluation:   Pt a/o. Room air. Nephrology following. HD MWF. Vitals stable. UP in chair participated with therapy today. Spouse at bedside earlier. No other complaints. Tunneled picc to be placed by IR this afternoon. Call light in reach.

## 2024-01-25 NOTE — PROGRESS NOTES
Infectious Diseases Progress Note      LOS: 10 days   Patient Care Team:  Waylon Johnson MD as PCP - General  Chemchirian, MD Lali as Consulting Physician (Cardiology)  Atul Fowler MD as Consulting Physician (Nephrology)    Chief Complaint: Fatigue    Subjective       The patient has been afebrile for the last 24 hours.  The patient is on room air, hemodynamically stable, and is tolerating antimicrobial therapy.  Denies abdominal pain.  Patient is currently in the chair and has no new complaints      Review of Systems:   Review of Systems   Constitutional:  Positive for fatigue.   HENT: Negative.     Eyes: Negative.    Respiratory: Negative.     Cardiovascular: Negative.    Gastrointestinal: Negative.    Endocrine: Negative.    Genitourinary: Negative.    Musculoskeletal: Negative.    Skin: Negative.    Neurological: Negative.    Psychiatric/Behavioral: Negative.     All other systems reviewed and are negative.       Objective     Vital Signs  Temp:  [97.2 °F (36.2 °C)-98.5 °F (36.9 °C)] 97.3 °F (36.3 °C)  Heart Rate:  [81-96] 96  Resp:  [13-20] 20  BP: (120-144)/(58-70) 120/62    Physical Exam:  Physical Exam  Vitals and nursing note reviewed.   Constitutional:       General: He is not in acute distress.     Appearance: He is well-developed and normal weight. He is ill-appearing. He is not diaphoretic.   HENT:      Head: Normocephalic and atraumatic.   Eyes:      Conjunctiva/sclera: Conjunctivae normal.      Pupils: Pupils are equal, round, and reactive to light.   Cardiovascular:      Rate and Rhythm: Normal rate and regular rhythm.      Heart sounds: Normal heart sounds, S1 normal and S2 normal.   Pulmonary:      Effort: Pulmonary effort is normal. No respiratory distress.      Breath sounds: Normal breath sounds. No stridor. No wheezing or rales.   Abdominal:      General: Bowel sounds are normal. There is no distension.      Palpations: Abdomen is soft. There is no mass.      Tenderness: There is no  abdominal tenderness. There is no guarding.      Comments: Abdomen is nontender   Genitourinary:     Comments: Peterson catheter  Musculoskeletal:         General: No deformity. Normal range of motion.      Cervical back: Neck supple.   Skin:     General: Skin is warm and dry.      Coloration: Skin is not pale.      Findings: No erythema or rash.      Comments:   Left arm fistula   Neurological:      Mental Status: He is alert and oriented to person, place, and time.      Cranial Nerves: No cranial nerve deficit.   Psychiatric:         Mood and Affect: Mood normal.          Results Review:    I have reviewed all clinical data, test, lab, and imaging results.     Radiology  No Radiology Exams Resulted Within Past 24 Hours    Cardiology    Laboratory    Results from last 7 days   Lab Units 01/25/24  0152 01/23/24  1704 01/22/24  0108 01/21/24  0003 01/20/24  0105 01/18/24  2332   WBC 10*3/mm3 13.40* 11.40* 16.60* 20.10* 18.60* 27.00*   HEMOGLOBIN g/dL 7.6* 8.0* 9.3* 9.2* 8.8* 9.0*   HEMATOCRIT % 23.6* 24.9* 28.8* 28.7* 27.6* 27.7*   PLATELETS 10*3/mm3 319 233 216 180 158 189     Results from last 7 days   Lab Units 01/25/24  0152 01/24/24  1209 01/23/24  0256 01/22/24  0108 01/21/24  0003 01/20/24  0105 01/18/24  2332   SODIUM mmol/L 139 137 138 137 139 141 139   POTASSIUM mmol/L 4.4 4.4 4.2 4.4 4.1 3.9 4.2   CHLORIDE mmol/L 106 106 104 102 100 101 101   CO2 mmol/L 23.0 20.0* 25.0 21.0* 26.0 25.0 25.0   BUN mg/dL 45* 30* 46* 72* 53* 69* 47*   CREATININE mg/dL 3.95* 2.69* 3.93* 5.76* 4.23* 5.50* 4.23*   GLUCOSE mg/dL 121* 102* 155* 140* 138* 119* 70   ALBUMIN g/dL 2.4* 2.4* 2.5* 2.3* 2.9* 2.7* 2.8*   BILIRUBIN mg/dL 0.3 0.4 0.3 0.3 0.3 0.4  --    ALK PHOS U/L 123* 139* 136* 119* 118* 92  --    AST (SGOT) U/L 13 22 16 17 13 10  --    ALT (SGPT) U/L 14 16 15 16 17 17  --    AMYLASE U/L  --   --   --   --   --  122*  --    LIPASE U/L  --  69* 106*  --   --  248*  --    CALCIUM mg/dL 7.8* 8.2* 8.5* 8.7 8.8 8.6 8.4*                    Microbiology   Microbiology Results (last 10 days)       Procedure Component Value - Date/Time    Urine Culture - Urine, Indwelling Urethral Catheter [[id number]]  (Normal) Collected: 01/17/24 1501    Lab Status: Final result Specimen: Urine from Indwelling Urethral Catheter Updated: 01/18/24 1939     Urine Culture No growth    Blood Culture - Blood, Arm, Left [[id number]]  (Normal) Collected: 01/17/24 1205    Lab Status: Final result Specimen: Blood from Arm, Left Updated: 01/22/24 1231     Blood Culture No growth at 5 days    Blood Culture - Blood, Arm, Left [[id number]]  (Normal) Collected: 01/17/24 1203    Lab Status: Final result Specimen: Blood from Arm, Left Updated: 01/22/24 1231     Blood Culture No growth at 5 days    COVID-19 and FLU A/B PCR, 1 HR TAT - Swab, Nasopharynx [[id number]]  (Normal) Collected: 01/15/24 1602    Lab Status: Final result Specimen: Swab from Nasopharynx Updated: 01/15/24 1624     COVID19 Not Detected     Influenza A PCR Not Detected     Influenza B PCR Not Detected    Narrative:      Fact sheet for providers: https://www.fda.gov/media/457835/download    Fact sheet for patients: https://www.fda.gov/media/339351/download    Test performed by PCR.            Medication Review:       Schedule Meds  [Held by provider] apixaban, 2.5 mg, Oral, Q12H  atorvastatin, 10 mg, Oral, Daily  calcitriol, 0.5 mcg, Oral, Daily  ertapenem, 500 mg, Intravenous, Q24H  insulin lispro, 2-7 Units, Subcutaneous, 4x Daily AC & at Bedtime  senna-docusate sodium, 2 tablet, Oral, BID  sevelamer, 2,400 mg, Oral, TID With Meals  sodium chloride, 10 mL, Intravenous, Q12H        Infusion Meds  sodium chloride, 100 mL/hr, Last Rate: 100 mL/hr (01/25/24 1423)        PRN Meds    acetaminophen    senna-docusate sodium **AND** polyethylene glycol **AND** bisacodyl **AND** bisacodyl    Calcium Replacement - Follow Nurse / BPA Driven Protocol    dextrose    dextrose    glucagon (human recombinant)    Magnesium Standard  Dose Replacement - Follow Nurse / BPA Driven Protocol    nitroglycerin    ondansetron    ondansetron ODT    phenol    Phosphorus Replacement - Follow Nurse / BPA Driven Protocol    Potassium Replacement - Follow Nurse / BPA Driven Protocol    [COMPLETED] Insert Peripheral IV **AND** sodium chloride    sodium chloride    sodium chloride        Assessment & Plan       Antimicrobial Therapy   1.  IV ertapenem        2.        3.        4.        5.          Assessment     Severe reactive leukocytosis.  Started to improve     Probable complicated UTI.  Urine culture grew Serratia marcescens from January 15 2024.  Patient had prior urine culture before admission on January 8, 2024 and grew the same organism.  CT scan of abdomen pelvis done without contrast and showed bilateral hydronephrosis with possible complex cyst on the right kidney.  CT scan of abdomen pelvis with IV contrast showed complex right renal cysts versus nodules  -The patient is s/p bilateral ureteral stent placement on January 16, 2024    Probable interstitial pancreatitis based on imaging studies.  But patient has no abdominal pain or tenderness.  Patient has no history of chronic pancreatitis.  Amylase and lipase are elevated.  GI now following, MRCP continue to show interstitial pancreatitis.  The patient is status post ERCP on 1/23/2024-findings suggestive of chronic pancreatitis with a biopsy of a pancreatic head mass.    Cholelithiasis without findings of acute cholecystitis.  Density in the gallbladder-sludge versus neoplasm.  ERCP showed multiple gallbladder stones and sludge, removal of stones with stricture brushing and PD stent placement.  Multiple duodenal ulcers also seen with stricture and severe gastritis     End-stage renal disease-patient was started on dialysis this admission.  Patient followed by nephrology     History of left AV fistula placement on 9/22/2022 which required a fistulogram and angioplasty on 1/18/2024.  Patient  followed by vascular surgery     Acute left leg DVTs.  Hematology following.  Patient has IVC filter placement on 1/19/2024     Type 2 diabetes     History of prostate cancer         Plan     Continue IV ertapenem 500 mg every 24 hours for 14 days-day on 1/31/2024  Patient will need a midline before discharge-please remove when IV antibiotics are completed-will need to get approval from nephrology  Continue supportive care  Case discussed with patient and family member at bedside  Not much more to add from infectious disease standpoint-we will sign off at this time-please call with any questions.        Ann Beavers, APRN  01/25/24  15:24 EST    Note is dictated utilizing voice recognition software/Dragon

## 2024-01-25 NOTE — PROGRESS NOTES
[facility] MEDICINE SERVICE  DAILY PROGRESS NOTE    NAME: Gabriel De Souza  : 1945  MRN: [id number]      LOS: 10 days     PROVIDER OF SERVICE: Partha Palacios MD    Chief Complaint: Hyperkalemia  Gabriel De Souza is a 78 patient who presented to the hospital with complaints of hypotension bradycardia hyperkalemia and worsening CKD stage IV.  Hypertension bradycardia were deemed to be secondary to hyperkalemia, he was taken for emergent dialysis.  Hypertension bradycardia resolved after that.  Hyperkalemia resolved.  He was found to have bilateral renal stones, urology was consulted and he has no stents in.  He was also put on Rocephin for UTI.  Urine cultures grew Serratia, blood cultures did not grow anything.  He was doing well however his white count continued to worsen, at that point of time ID was consulted.  They recommended switching antibiotics to ertapenem.  Recommended repeating a CT to check to look for abscess.  CT did not show any abscess but showed pancreatitis.  Now switching his diet to n.p.o., consulted GI and they recommended MRCP  Off note he was also found to have DVT left lower extremity, s/p IVC filter placement and is on Eliquis 2.5 mg p.o. twice daily.  Oncology also following.  Subjective:     Interval History:  History taken from: patient  Patient Complaints: has no new complaints, feeling better   Patient Denies:  fever or chills     Review of Systems:   Review of Systems  14 pt ROS unremarkable except mentioned above   Objective:     Vital Signs  Temp:  [97.2 °F (36.2 °C)-98.5 °F (36.9 °C)] 97.3 °F (36.3 °C)  Heart Rate:  [81-96] 96  Resp:  [13-20] 20  BP: (120-144)/(58-70) 120/62   Body mass index is 25.97 kg/m².    Physical Exam  Physical Exam  HENT:      Head: Atraumatic.      Mouth/Throat:      Mouth: Mucous membranes are moist.   Eyes:      Pupils: Pupils are equal, round, and reactive to light.   Cardiovascular:      Rate and Rhythm: Normal rate and regular rhythm.    Pulmonary:      Effort: Pulmonary effort is normal.      Breath sounds: Normal breath sounds.   Abdominal:      General: Bowel sounds are normal.      Palpations: Abdomen is soft.   Musculoskeletal:         General: Normal range of motion.      Cervical back: Neck supple.   Skin:     General: Skin is warm.      Comments: Left arm fistula    Neurological:      General: No focal deficit present.      Mental Status: He is alert and oriented to person, place, and time.   Psychiatric:         Mood and Affect: Mood normal.         Behavior: Behavior normal.         Scheduled Meds   [Held by provider] apixaban, 2.5 mg, Oral, Q12H  atorvastatin, 10 mg, Oral, Daily  calcitriol, 0.5 mcg, Oral, Daily  ertapenem, 500 mg, Intravenous, Q24H  insulin lispro, 2-7 Units, Subcutaneous, 4x Daily AC & at Bedtime  senna-docusate sodium, 2 tablet, Oral, BID  sevelamer, 2,400 mg, Oral, TID With Meals  sodium chloride, 10 mL, Intravenous, Q12H       PRN Meds     acetaminophen    senna-docusate sodium **AND** polyethylene glycol **AND** bisacodyl **AND** bisacodyl    Calcium Replacement - Follow Nurse / BPA Driven Protocol    dextrose    dextrose    glucagon (human recombinant)    Magnesium Standard Dose Replacement - Follow Nurse / BPA Driven Protocol    nitroglycerin    ondansetron    ondansetron ODT    phenol    Phosphorus Replacement - Follow Nurse / BPA Driven Protocol    Potassium Replacement - Follow Nurse / BPA Driven Protocol    [COMPLETED] Insert Peripheral IV **AND** sodium chloride    sodium chloride    sodium chloride   Infusions  sodium chloride, 100 mL/hr, Last Rate: 100 mL/hr (01/25/24 1423)          Diagnostic Data    Results from last 7 days   Lab Units 01/25/24  0152   WBC 10*3/mm3 13.40*   HEMOGLOBIN g/dL 7.6*   HEMATOCRIT % 23.6*   PLATELETS 10*3/mm3 319   GLUCOSE mg/dL 121*   CREATININE mg/dL 3.95*   BUN mg/dL 45*   SODIUM mmol/L 139   POTASSIUM mmol/L 4.4   AST (SGOT) U/L 13   ALT (SGPT) U/L 14   ALK PHOS U/L 123*    BILIRUBIN mg/dL 0.3   ANION GAP mmol/L 10.0       No radiology results for the last day      I reviewed the patient's new clinical results.    Assessment/Plan:     Active and Resolved Problems  #complicated UTI from  Serratia marcescens   #Leukocytosis   #Sepsis present on admission  -Id consult appreciated   -Continue IV ertapenem 500 mg every 24 hours for 14 days-day on 1/31/2024   -leukocytosis improving     Repeat CT was done since it was concern for abscess, however did not show any abscess but showed acute interstitial pancreatitis.      #ESRD on HD  hemodialysis has been started.  Patient has a fistula already however it was bleeding status post fistulogram and angioplasty on 1/18/2024.  Further dialysis per nephrology, appreciate recommendations.  Renvela dose increased  Plan for hemodialysis today  Outpatient dialysis has been arranged for Monday Wednesday Friday.     #Hyperkalemia from ESRD -resolved   Resolved      #Bilateral renal stones with hydronephrosis likely contributing to deterioration of the CKD  Urology was consulted, patient s/p placement of bilateral ureteral stents.  Was initially on Rocephin however white count worsened so was switched to ertapenem-ID following and plan to give antibiotics with last date of 1/31/24   Monitor blood and urine cultures.--Blood cultures negative for 24 hours, urine cultures growing Serratia, sensitive to Rocephin.  Continue        #Leukocytosis from   Now improving after switching antibiotics to ertapenem from Rocephin.  ID was consulted.    Repeat CT was done since it was concern for abscess, however did not show any abscess but showed acute interstitial pancreatitis.     #Acute interstitial pancreatitis per CT scan  - S/p endo  -Follow the liver function test  Continue IV fluid.  Continue antibiotic.  Follow the final biopsy and cytology report.  If the biopsy turned out to be negative we will repeat ERCP with a EUS again in 6 to 8-week after PPI therapy  antibiotic for cholangitis.  Okay to start clear liquids  Follow CA 19-9          #Chronic DVT noted in left common femoral, proximal femoral, popliteal and gastrocnemius  #Chronic LLE superficial thrombophlebitis in  small saphenous  #Sub-acute DVT noted in left external iliac, deep femoral  Oncology following.  Started Eliquis 2.5 mg p.o. twice daily and now status postplacement of IVC filter        #Bradycardia and hypotension- resolved   Due to hyperkalemia  Resolved  Vitals now stable     Hypoglycemia  resolved      DVT prophylaxis:  Medical and mechanical DVT prophylaxis orders are present.         Code status is   Code Status and Medical Interventions:   Ordered at: 01/15/24 3089     Level Of Support Discussed With:    Patient    Health Care Surrogate    Next of Kin (If No Surrogate)     Code Status (Patient has no pulse and is not breathing):    CPR (Attempt to Resuscitate)     Medical Interventions (Patient has pulse or is breathing):    Full Support       Plan for disposition:SNF  in 1-2 days    Time: 32 minutes    Signature: Electronically signed by aPrtha Palacios MD, 01/25/24, 14:27 EST.  [facility] Hospitalist Team

## 2024-01-25 NOTE — PLAN OF CARE
Goal Outcome Evaluation:  Plan of Care Reviewed With: patient           Outcome Evaluation: Pt slept throughout the night, currently getting IV abx until 1/31 will need ML or PICC @ discharge. Will continue to monitor.

## 2024-01-25 NOTE — SIGNIFICANT NOTE
01/25/24 1536   OTHER   Discipline occupational therapist   Rehab Time/Intention   Session Not Performed patient unavailable for treatment;other (see comments)  (Off the floor when attempted)   Recommendation   OT - Next Appointment 01/26/24

## 2024-01-26 LAB
LAB AP CASE REPORT: NORMAL
LAB AP DIAGNOSIS COMMENT: NORMAL
PATH REPORT.FINAL DX SPEC: NORMAL
PATH REPORT.GROSS SPEC: NORMAL

## 2024-01-26 NOTE — DISCHARGE SUMMARY
[facility] Medicine Services  Discharge Summary    Date of Service: 2024  Patient Name: Gabriel De Souza  : 1945  MRN: [id number]    Date of Admission: 1/15/2024  Discharge Diagnosis: complicated UTI and sepsis   Date of Discharge:  23  Primary Care Physician: Waylon Johnson MD      Presenting Problem:   Hyperkalemia [E87.5]  Ureterolithiasis [N20.1]  Weakness [R53.1]  Acute renal failure, unspecified acute renal failure type [N17.9]  Acute pancreatitis, unspecified complication status, unspecified pancreatitis type [K85.90]    Active and Resolved Hospital Problems:  #complicated UTI from  Serratia marcescens   #Leukocytosis   #Sepsis present on admission  #ESRD on HD  #Hyperkalemia from ESRD -resolved   #Bilateral renal stones with hydronephrosis likely contributing to deterioration of the CKD  #Acute interstitial pancreatitis per CT scan  #Chronic DVT noted in left common femoral, proximal femoral, popliteal and gastrocnemius  #Chronic LLE superficial thrombophlebitis in  small saphenous  #Sub-acute DVT noted in left external iliac, deep femoral  #Bradycardia and hypotension- resolved   #Hypoglycemia    Hospital Course     HPI:  Per the H&P   Gabriel De Souza is a 78 y.o. male with past medical history of diabetes mellitus dyslipidemia hypertension presented to the hospital with complaints of low blood pressure and low blood sugar.  Patient has history of CKD and was progressively worsening to the point that he had a fistula placed for possible dialysis.  He has been feeling bad since Chari and went to see his PCP a week back when he was given antibiotics for UTI however did not really help much.  He has been nauseated been vomiting now and then, has been having some abdominal discomfort, which progressively became worse.  Today the wife took his blood pressure which was running low also blood sugar was low so they went to nephrology office and his heart rate was also  found to be low in the office so he was referred to ER for further evaluation.  Patient denies any new symptoms, no chest pain lightheadedness or dizziness but does have mild abdominal pain and nausea as mentioned above.  Upon presentation to the ER, patient was found to have a potassium of 7.3, systolic blood pressure of 80, heart rate around 44.  EKG showed sinus bradycardia.  BUN up to 56, creatinine of 16.11. .  Patient is currently a full code.  CT scan of the abdomen pelvis also came back later that showed moderate bilateral hydronephrosis secondary to obstructing right medical calculus measuring 9 mm left mid ureteral calculus measuring 8 mm.  3.1 cm complex axis the lower lobe of the right kidney containing either mural nodularity or small volume hemorrhage.  Patient also does have an elevated white count of 23.10.  Blood cultures are in process.  UA showed small leukocytes, also moderate amount of blood    Hospital Course:  Patient was started IV antibiotics ID team was consulted found to have UTI from Serratia marcescens and discharged with IVF therapy and to complete total of 14 days.  Patient also underwent hemodialysis and nephrology team was consulted.  For bilateral renal stones urology team was consulted and placed bilateral ureteral stents and needs to follow-up with urology as outpatient for subsequent removal and care.  For acute interstitial pancreatitis GI team was consulted recommended to follow-up as outpatient stop patient was started on Eliquis and underwent IVC filter placement        DISCHARGE Follow Up Recommendations for labs and diagnostics:   Infectious disease, GI and urology as outpatient      Reasons For Change In Medications and Indications for New Medications:      Day of Discharge     Vital Signs:       Physical Exam:  Physical Exam   HENT:      Head: Atraumatic.      Mouth/Throat:      Mouth: Mucous membranes are moist.   Eyes:      Pupils: Pupils are equal, round, and reactive  to light.   Cardiovascular:      Rate and Rhythm: Normal rate and regular rhythm.   Pulmonary:      Effort: Pulmonary effort is normal.      Breath sounds: Normal breath sounds.   Abdominal:      General: Bowel sounds are normal.      Palpations: Abdomen is soft.   Musculoskeletal:         General: Normal range of motion.      Cervical back: Neck supple.   Skin:     General: Skin is warm.      Comments: Left arm fistula    Neurological:      General: No focal deficit present.      Mental Status: He is alert and oriented to person, place, and time.   Psychiatric:         Mood and Affect: Mood normal.         Behavior: Behavior normal.       Pertinent  and/or Most Recent Results     LAB RESULTS:      Lab 01/25/24  0152 01/24/24  1209 01/23/24  1704 01/23/24  0256 01/22/24  0108 01/21/24  0003 01/20/24  0105   WBC 13.40*  --  11.40*  --  16.60* 20.10* 18.60*   HEMOGLOBIN 7.6*  --  8.0*  --  9.3* 9.2* 8.8*   HEMATOCRIT 23.6*  --  24.9*  --  28.8* 28.7* 27.6*   PLATELETS 319  --  233  --  216 180 158   NEUTROS ABS 9.20*  --  10.00*  --  11.60* 14.90* 16.00*   LYMPHS ABS 1.90  --  0.80  --  2.10 2.00  --    MONOS ABS 2.20*  --  0.50  --  2.40* 2.90*  --    EOS ABS 0.20  --  0.00  --  0.50* 0.20 0.56*   MCV 89.7  --  89.8  --  89.7 87.3 87.3   CRP  --  4.97*  --  7.61*  --   --   --    PROTIME  --  14.1*  --  14.0*  --   --   --          Lab 01/25/24  0152 01/24/24  1209 01/23/24  0256 01/22/24  0108 01/21/24  0003 01/20/24  0105   SODIUM 139 137 138 137 139 141   POTASSIUM 4.4 4.4 4.2 4.4 4.1 3.9   CHLORIDE 106 106 104 102 100 101   CO2 23.0 20.0* 25.0 21.0* 26.0 25.0   ANION GAP 10.0 11.0 9.0 14.0 13.0 15.0   BUN 45* 30* 46* 72* 53* 69*   CREATININE 3.95* 2.69* 3.93* 5.76* 4.23* 5.50*   EGFR 14.9* 23.6* 15.0* 9.5* 13.7* 10.0*   GLUCOSE 121* 102* 155* 140* 138* 119*   CALCIUM 7.8* 8.2* 8.5* 8.7 8.8 8.6   MAGNESIUM  --   --  1.8 2.1 1.7 1.9   PHOSPHORUS  --  3.1 4.4 6.1* 4.8* 6.0*         Lab 01/25/24  0152 01/24/24  1207  01/23/24  0256 01/22/24  0108 01/21/24  0003 01/20/24  0105   TOTAL PROTEIN 5.0* 5.9* 5.7* 5.6* 6.0 5.8*   ALBUMIN 2.4* 2.4* 2.5* 2.3* 2.9* 2.7*   GLOBULIN 2.6 3.5 3.2 3.3 3.1 3.1   ALT (SGPT) 14 16 15 16 17 17   AST (SGOT) 13 22 16 17 13 10   BILIRUBIN 0.3 0.4 0.3 0.3 0.3 0.4   ALK PHOS 123* 139* 136* 119* 118* 92   AMYLASE  --   --   --   --   --  122*   LIPASE  --  69* 106*  --   --  248*         Lab 01/24/24  1209 01/23/24  0256   PROTIME 14.1* 14.0*   INR 1.32* 1.31*                 Brief Urine Lab Results  (Last result in the past 365 days)        Color   Clarity   Blood   Leuk Est   Nitrite   Protein   CREAT   Urine HCG        01/17/24 1501 Red  Comment: Any Substance that causes an abnormal urine color can alter the accuracy of the chemical reactions.   Cloudy   Large (3+)   Large (3+)   Negative   100 mg/dL (2+)                 Microbiology Results (last 10 days)       Procedure Component Value - Date/Time    Urine Culture - Urine, Indwelling Urethral Catheter [[id number]]  (Normal) Collected: 01/17/24 1501    Lab Status: Final result Specimen: Urine from Indwelling Urethral Catheter Updated: 01/18/24 1939     Urine Culture No growth    Blood Culture - Blood, Arm, Left [[id number]]  (Normal) Collected: 01/17/24 1205    Lab Status: Final result Specimen: Blood from Arm, Left Updated: 01/22/24 1231     Blood Culture No growth at 5 days    Blood Culture - Blood, Arm, Left [[id number]]  (Normal) Collected: 01/17/24 1203    Lab Status: Final result Specimen: Blood from Arm, Left Updated: 01/22/24 1231     Blood Culture No growth at 5 days            IR Insert Tunneled CV Catheter Without Port 5 Plus    Result Date: 1/25/2024  Impression: 1. Successful placement of tunneled central venous catheter. Electronically Signed: Blair Khan MD  1/25/2024 5:05 PM EST  Workstation ID: [id number]    FL ERCP pancreatic and biliary ducts    Result Date: 1/24/2024  Impression: 1. ERCP with fluoroscopy. Please refer to the  procedure report for findings and recommendations. Electronically Signed: Roseline Bear MD  1/24/2024 8:04 AM EST  Workstation ID: [id number]    MRI abdomen wo contrast mrcp    Result Date: 1/22/2024  Impression: 1. Findings consistent with acute diffuse interstitial pancreatitis. 2. Multiple less than 5 mm cystic foci are scattered throughout the pancreatic parenchyma which could represent sidebranch IPMNs, or pancreatic cysts. No well-defined pseudocyst is seen. Normal caliber of the pancreatic duct. No convincing evidence of degraded divisum. 3. Choledocholithiasis is suggested in the mid to distal CBD. Suspected small stone within the cystic duct, as well. However, there is no indication of acute cholecystitis. 4. Masslike density is seen within the gallbladder, which could represent tumefactive sludge or gallbladder neoplasm. 5. Multiple gallstones. 6. Small right basilar pleural effusion. Mild right lower lobe atelectasis. 7. Complex cystic lesion described at the right lower renal pole is not included completely in the imaging field-of-view of this examination. The kidneys appear atrophic with multiple cystic changes suggesting chronic medical renal disease. 8. Multiple tiny hepatic cysts. Electronically Signed: Roseline Bear MD  1/22/2024 8:33 AM EST  Workstation ID: [id number]    CT Abdomen Pelvis With Contrast    Result Date: 1/20/2024  Impression: Impression: 1.Findings are consistent with acute interstitial pancreatitis. Correlate with history and symptoms. 2.Complex right renal cysts versus nodules. Dedicated nonemergent renal mass protocol MRI or recommended. 3.Cholelithiasis with generalized gallbladder distention, similar prior examination. There is either tumefactive sludge or potential gallbladder neoplasm. This can be further assessed with ultrasound or at time of renal MRI/CT. 4.Interval placement of bilateral ureteral stents with resolution of hydronephrosis. Relatively stable position of  bilateral proximal ureteral calculi. 5.New IVC filter. 6.New trace right pleural effusion with minimal right basal atelectasis. 7.No evidence of renal abscess. 8.Other stable findings. Electronically Signed: Sudhir Topete MD  1/20/2024 12:32 PM CST  Workstation ID: [id number]    IR IVC Filter Placement    Result Date: 1/19/2024  Impression: Impression: Technically successful placement of a Option Elite inferior vena cava filter within the infrarenal IVC as discussed above. Electronically Signed: Miguelito Bruno MD  1/19/2024 12:38 PM EST  Workstation ID: [id number]    CT Abdomen Pelvis Without Contrast    Result Date: 1/15/2024  Impression: Impression: Moderate bilateral hydronephrosis secondary to obstructing right mid ureteral calculus measuring 9 mm and left mid ureteral calculus measuring 8 mm. 3.1 cm complex cyst in the lower pole of the right kidney containing either mural nodularity or small volume hemorrhage. If possible, correlation with contrast-enhanced renal protocol CT or MRI is suggested. If there is not possible due to renal function, close follow-up findings of noncontrast CT or renal ultrasound is suggested. Consider urologic evaluation. Cholelithiasis. Additional findings per body of the report. Electronically Signed: James Monsalve MD  1/15/2024 3:12 PM EST  Workstation ID: [id number]    XR Chest 1 View    Result Date: 1/15/2024  Impression: Impression: Low lung volumes without definite acute cardiopulmonary abnormality. Electronically Signed: Madelyn Vaughn  1/15/2024 12:13 PM EST  Workstation ID: [id number]     Results for orders placed during the hospital encounter of 01/15/24    Duplex Venous Lower Extremity - Left CAR    Interpretation Summary    Chronic left lower extremity deep vein thrombosis noted in the common femoral, proximal femoral, popliteal and gastrocnemius.    Sub-acute left lower extremity deep vein thrombosis noted in the external iliac and deep femoral.    Chronic left lower extremity  superficial thrombophlebitis noted in the small saphenous.    All other left sided veins appeared normal.      Results for orders placed during the hospital encounter of 01/15/24    Duplex Venous Lower Extremity - Left CAR    Interpretation Summary    Chronic left lower extremity deep vein thrombosis noted in the common femoral, proximal femoral, popliteal and gastrocnemius.    Sub-acute left lower extremity deep vein thrombosis noted in the external iliac and deep femoral.    Chronic left lower extremity superficial thrombophlebitis noted in the small saphenous.    All other left sided veins appeared normal.          Labs Pending at Discharge:      Procedures Performed  Procedure(s):  ENDOSCOPIC RETROGRADE CHOLANGIOPANCREATOGRAPHY with sphinctorotomy, brushing of bile duct, balloon sweeping of common bile duct up to 12mm, placement of 10 Fr. x 9cm biliary stent, and placement of 5 Fr. x 7cm pancreatic stent  ESOPHAGOGASTRODUODENOSCOPY WITH ULTRASOUND AND FINE NEEDLE ASPIRATION of pancreatic head lesion         Consults:   Consults       Date and Time Order Name Status Description    1/18/2024  9:59 AM Inpatient Infectious Diseases Consult Completed     1/17/2024  1:59 PM Hematology & Oncology Inpatient Consult Completed     1/17/2024 10:07 AM Inpatient Vascular Surgery Consult Completed     1/15/2024  4:10 PM Inpatient Urology Consult Completed     1/15/2024  3:22 PM Urology (on-call MD unless specified) Completed     1/15/2024  1:41 PM Nephrology (on -call MD unless specified) Completed               Discharge Details        Discharge Medications        New Medications        Instructions Start Date   apixaban 2.5 MG tablet tablet  Commonly known as: ELIQUIS   2.5 mg, Oral, Every 12 Hours Scheduled      ertapenem 500mg/100mL solution IV  Commonly known as: INVanz   500 mg, Intravenous, Every 24 Hours      glucagon 1 MG injection  Commonly known as: GLUCAGEN   1 mg, Subcutaneous, Every 15 Minutes PRN       ondansetron ODT 4 MG disintegrating tablet  Commonly known as: ZOFRAN-ODT   4 mg, Translingual, Every 8 Hours PRN             Changes to Medications        Instructions Start Date   Insulin Glargine-Lixisenatide 100-33 UNT-MCG/ML solution pen-injector injection  Commonly known as: SOLIQUA  What changed: how much to take   3 Units, Subcutaneous, Daily             Continue These Medications        Instructions Start Date   aspirin 81 MG tablet   81 mg, Oral, Daily      calcitriol 0.25 MCG capsule  Commonly known as: ROCALTROL   0.5 mcg, Oral, Daily      sevelamer 800 MG tablet  Commonly known as: RENVELA   800 mg, Oral, 3 Times Daily PRN      simvastatin 20 MG tablet  Commonly known as: ZOCOR   20 mg, Oral, Nightly             Stop These Medications      amLODIPine 5 MG tablet  Commonly known as: NORVASC     atenolol 50 MG tablet  Commonly known as: TENORMIN     ciprofloxacin 250 MG tablet  Commonly known as: CIPRO     torsemide 20 MG tablet  Commonly known as: DEMADEX              Allergies   Allergen Reactions    Tylenol With Codeine #3 [Acetaminophen-Codeine] Nausea Only and GI Intolerance         Discharge Disposition:   Skilled Nursing Facility (DC - External)    Diet:  Hospital:No active diet order        Discharge Activity:   as tolerated       CODE STATUS:  Code Status and Medical Interventions:   Ordered at: 01/15/24 2009     Level Of Support Discussed With:    Patient    Health Care Surrogate    Next of Kin (If No Surrogate)     Code Status (Patient has no pulse and is not breathing):    CPR (Attempt to Resuscitate)     Medical Interventions (Patient has pulse or is breathing):    Full Support         Future Appointments   Date Time Provider Department Center   4/9/2024  9:30 AM [facility] NA   4/9/2024 11:30 AM Lali Borja MD [facility] NA   4/16/2024  9:15 AM Waylon Johnson MD [facility]           Time spent on Discharge including face to face  service:  >30 minutes    Signature: Electronically signed by Partha Palacios MD, 01/26/24, 18:40 EST.  [facility] Hospitalist Team

## 2024-02-02 ENCOUNTER — HOSPITAL ENCOUNTER (OUTPATIENT)
Dept: INFUSION THERAPY | Facility: HOSPITAL | Age: 79
Discharge: HOME | End: 2024-02-02
Payer: MEDICARE

## 2024-02-02 DIAGNOSIS — Z99.2 ANEMIA IN CHRONIC KIDNEY DISEASE, ON CHRONIC DIALYSIS: Primary | ICD-10-CM

## 2024-02-02 DIAGNOSIS — D63.1 ANEMIA IN CHRONIC KIDNEY DISEASE, ON CHRONIC DIALYSIS: ICD-10-CM

## 2024-02-02 DIAGNOSIS — D63.1 ANEMIA IN CHRONIC KIDNEY DISEASE, ON CHRONIC DIALYSIS: Primary | ICD-10-CM

## 2024-02-02 DIAGNOSIS — N18.6 ANEMIA IN CHRONIC KIDNEY DISEASE, ON CHRONIC DIALYSIS: Primary | ICD-10-CM

## 2024-02-02 DIAGNOSIS — Z99.2 ANEMIA IN CHRONIC KIDNEY DISEASE, ON CHRONIC DIALYSIS: ICD-10-CM

## 2024-02-02 DIAGNOSIS — N18.6 ANEMIA IN CHRONIC KIDNEY DISEASE, ON CHRONIC DIALYSIS: ICD-10-CM

## 2024-02-02 LAB
HCT VFR BLD AUTO: 24.7 % (ref 37.5–51)
HGB BLD-MCNC: 8 G/DL (ref 13–17.7)

## 2024-02-02 PROCEDURE — 85018 HEMOGLOBIN: CPT | Performed by: NURSE PRACTITIONER

## 2024-02-02 PROCEDURE — 85014 HEMATOCRIT: CPT | Performed by: NURSE PRACTITIONER

## 2024-02-02 PROCEDURE — 36592 COLLECT BLOOD FROM PICC: CPT

## 2024-02-02 NOTE — PROGRESS NOTES
Patient came in for Blood transfusion, Hgb came back at 8.0, per Qing @ [facility] order parameters are to transfuse for Hgb less than 7. Patient was educated and sent home with blood band. Labs drawn from chest line.

## 2024-02-12 ENCOUNTER — TELEPHONE (OUTPATIENT)
Dept: ONCOLOGY | Facility: CLINIC | Age: 79
End: 2024-02-12
Payer: MEDICARE

## 2024-02-18 RX ORDER — AMLODIPINE BESYLATE 5 MG/1
5 TABLET ORAL DAILY
Qty: 90 TABLET | Refills: 3 | Status: SHIPPED | OUTPATIENT
Start: 2024-02-18

## 2024-02-20 ENCOUNTER — TELEPHONE (OUTPATIENT)
Dept: FAMILY MEDICINE CLINIC | Facility: CLINIC | Age: 79
End: 2024-02-20
Payer: MEDICARE

## 2024-02-20 NOTE — TELEPHONE ENCOUNTER
Caller: MARCELLA    Relationship: [facility]    Best call back number:     What orders are you requesting (i.e. lab or imaging): NURSING, PHYSICAL THERAPY, OCCUPATIONAL THERAPY    In what timeframe would the patient need to come in:     Where will you receive your lab/imaging services:     Additional notes: MARCELLA IS CALLING IN TO REQUEST THE ORDERS LISTED ABOVE FOR THE PATIENT.  SHE CAN BE CALLED BACK IF NECESSARY.

## 2024-02-21 ENCOUNTER — TELEPHONE (OUTPATIENT)
Dept: FAMILY MEDICINE CLINIC | Facility: CLINIC | Age: 79
End: 2024-02-21
Payer: MEDICARE

## 2024-02-21 NOTE — TELEPHONE ENCOUNTER
Caller: Pat De Souza    Relationship: Emergency Contact    Best call back number: [phone]     Which medication are you concerned about: Insulin Glargine-Lixisenatide (SOLIQUA) 100-33 UNT-MCG/ML solution pen-injector injection     What are your concerns:   PROBLEM INJECTING Sig - Route: Inject 3 Units under the skin into the appropriate area as directed Daily.  THE BOX READ 15-60.  PLEASE CLARIFY    WHEN PATIENT WENT TO THE HOSPITAL THEY GAVE HIM 20 UNITS.      3 UNITS IS VERY LITTLE INSULIN    HE WAS IN REHAB AND RELEASED TODAY

## 2024-02-23 ENCOUNTER — TELEPHONE (OUTPATIENT)
Dept: FAMILY MEDICINE CLINIC | Facility: CLINIC | Age: 79
End: 2024-02-23
Payer: MEDICARE

## 2024-02-23 NOTE — TELEPHONE ENCOUNTER
Opal called [facility] they saw him today for the start of care and needed verbal orders for nursing to see him once a week for 9 weeks.   He was discharged from [facility] with a central line and he is not receiving any antibiotics or any flushes. Do you want to see it and then pull it or what are the orders?

## 2024-02-28 PROCEDURE — 93005 ELECTROCARDIOGRAM TRACING: CPT | Performed by: INTERNAL MEDICINE

## 2024-03-02 RX ORDER — ATENOLOL 50 MG/1
TABLET ORAL
Qty: 180 TABLET | Refills: 3 | Status: SHIPPED | OUTPATIENT
Start: 2024-03-02

## 2024-03-02 RX ORDER — SIMVASTATIN 20 MG
20 TABLET ORAL DAILY
Qty: 90 TABLET | Refills: 3 | Status: SHIPPED | OUTPATIENT
Start: 2024-03-02

## 2024-03-05 ENCOUNTER — TRANSCRIBE ORDERS (OUTPATIENT)
Dept: ADMINISTRATIVE | Facility: HOSPITAL | Age: 79
End: 2024-03-05
Payer: MEDICARE

## 2024-03-05 ENCOUNTER — LAB (OUTPATIENT)
Dept: LAB | Facility: HOSPITAL | Age: 79
End: 2024-03-05
Payer: MEDICARE

## 2024-03-05 ENCOUNTER — HOSPITAL ENCOUNTER (OUTPATIENT)
Dept: INTERVENTIONAL RADIOLOGY/VASCULAR | Facility: HOSPITAL | Age: 79
Discharge: HOME | End: 2024-03-05
Payer: MEDICARE

## 2024-03-05 DIAGNOSIS — N40.0 ENLARGED PROSTATE: Primary | ICD-10-CM

## 2024-03-05 DIAGNOSIS — N40.0 ENLARGED PROSTATE: ICD-10-CM

## 2024-03-05 DIAGNOSIS — N18.6 ESRD (END STAGE RENAL DISEASE) ON DIALYSIS: ICD-10-CM

## 2024-03-05 DIAGNOSIS — Z99.2 ESRD (END STAGE RENAL DISEASE) ON DIALYSIS: ICD-10-CM

## 2024-03-05 LAB
ANION GAP SERPL CALCULATED.3IONS-SCNC: 11 MMOL/L (ref 5–15)
BUN SERPL-MCNC: 36 MG/DL (ref 8–23)
BUN/CREAT SERPL: 7.7 (ref 7–25)
CALCIUM SPEC-SCNC: 10.8 MG/DL (ref 8.6–10.5)
CHLORIDE SERPL-SCNC: 102 MMOL/L (ref 98–107)
CO2 SERPL-SCNC: 27 MMOL/L (ref 22–29)
CREAT SERPL-MCNC: 4.69 MG/DL (ref 0.76–1.27)
DEPRECATED RDW RBC AUTO: 57.8 FL (ref 37–54)
EGFRCR SERPLBLD CKD-EPI 2021: 12.1 ML/MIN/1.73
ERYTHROCYTE [DISTWIDTH] IN BLOOD BY AUTOMATED COUNT: 16.7 % (ref 12.3–15.4)
GLUCOSE SERPL-MCNC: 93 MG/DL (ref 65–99)
HCT VFR BLD AUTO: 33.5 % (ref 37.5–51)
HGB BLD-MCNC: 10.5 G/DL (ref 13–17.7)
MCH RBC QN AUTO: 29.2 PG (ref 26.6–33)
MCHC RBC AUTO-ENTMCNC: 31.4 G/DL (ref 31.5–35.7)
MCV RBC AUTO: 93.2 FL (ref 79–97)
PLATELET # BLD AUTO: 276 10*3/MM3 (ref 140–450)
PMV BLD AUTO: 8.6 FL (ref 6–12)
POTASSIUM SERPL-SCNC: 5.3 MMOL/L (ref 3.5–5.2)
RBC # BLD AUTO: 3.59 10*6/MM3 (ref 4.14–5.8)
SODIUM SERPL-SCNC: 140 MMOL/L (ref 136–145)
WBC NRBC COR # BLD AUTO: 10.7 10*3/MM3 (ref 3.4–10.8)

## 2024-03-05 PROCEDURE — 36415 COLL VENOUS BLD VENIPUNCTURE: CPT

## 2024-03-05 PROCEDURE — 84154 ASSAY OF PSA FREE: CPT

## 2024-03-05 PROCEDURE — 85027 COMPLETE CBC AUTOMATED: CPT | Performed by: INTERNAL MEDICINE

## 2024-03-05 PROCEDURE — 84153 ASSAY OF PSA TOTAL: CPT

## 2024-03-05 PROCEDURE — 80048 BASIC METABOLIC PNL TOTAL CA: CPT | Performed by: INTERNAL MEDICINE

## 2024-03-05 PROCEDURE — 25010000002 LIDOCAINE 1 % SOLUTION

## 2024-03-05 RX ORDER — LIDOCAINE HYDROCHLORIDE 10 MG/ML
INJECTION, SOLUTION INFILTRATION; PERINEURAL AS NEEDED
Status: COMPLETED | OUTPATIENT
Start: 2024-03-05 | End: 2024-03-05

## 2024-03-05 RX ORDER — LIDOCAINE HYDROCHLORIDE 10 MG/ML
INJECTION, SOLUTION EPIDURAL; INFILTRATION; INTRACAUDAL; PERINEURAL
Status: DISCONTINUED
Start: 2024-03-05 | End: 2024-03-06 | Stop reason: HOSPADM

## 2024-03-05 RX ADMIN — LIDOCAINE HYDROCHLORIDE 5 ML: 10 INJECTION, SOLUTION INFILTRATION; PERINEURAL at 14:02

## 2024-03-06 LAB
PSA FREE MFR SERPL: 27.5 %
PSA FREE SERPL-MCNC: 0.44 NG/ML
PSA SERPL-MCNC: 1.6 NG/ML (ref 0–4)

## 2024-03-11 ENCOUNTER — ANESTHESIA EVENT (OUTPATIENT)
Dept: GASTROENTEROLOGY | Facility: HOSPITAL | Age: 79
End: 2024-03-11
Payer: MEDICARE

## 2024-03-12 ENCOUNTER — HOSPITAL ENCOUNTER (OUTPATIENT)
Facility: HOSPITAL | Age: 79
Setting detail: HOSPITAL OUTPATIENT SURGERY
Discharge: HOME | End: 2024-03-12
Attending: INTERNAL MEDICINE | Admitting: INTERNAL MEDICINE
Payer: MEDICARE

## 2024-03-12 ENCOUNTER — ANESTHESIA (OUTPATIENT)
Dept: GASTROENTEROLOGY | Facility: HOSPITAL | Age: 79
End: 2024-03-12
Payer: MEDICARE

## 2024-03-12 ENCOUNTER — APPOINTMENT (OUTPATIENT)
Dept: GENERAL RADIOLOGY | Facility: HOSPITAL | Age: 79
End: 2024-03-12
Payer: MEDICARE

## 2024-03-12 ENCOUNTER — ON CAMPUS - OUTPATIENT (AMBULATORY)
Dept: URBAN - METROPOLITAN AREA HOSPITAL 85 | Facility: HOSPITAL | Age: 79
End: 2024-03-12

## 2024-03-12 ENCOUNTER — APPOINTMENT (OUTPATIENT)
Dept: CARDIOLOGY | Facility: HOSPITAL | Age: 79
End: 2024-03-12
Payer: MEDICARE

## 2024-03-12 VITALS
OXYGEN SATURATION: 100 % | WEIGHT: 188 LBS | HEART RATE: 62 BPM | HEIGHT: 73 IN | BODY MASS INDEX: 24.92 KG/M2 | SYSTOLIC BLOOD PRESSURE: 145 MMHG | DIASTOLIC BLOOD PRESSURE: 48 MMHG | RESPIRATION RATE: 13 BRPM

## 2024-03-12 DIAGNOSIS — R93.3 ABNORMAL FINDINGS ON DIAGNOSTIC IMAGING OF OTHER PARTS OF DI: ICD-10-CM

## 2024-03-12 DIAGNOSIS — K44.9 DIAPHRAGMATIC HERNIA WITHOUT OBSTRUCTION OR GANGRENE: ICD-10-CM

## 2024-03-12 DIAGNOSIS — K85.90 ACUTE PANCREATITIS WITHOUT NECROSIS OR INFECTION, UNSPECIFIE: ICD-10-CM

## 2024-03-12 DIAGNOSIS — R93.3 ABNORMAL MAGNETIC RESONANCE CHOLANGIOPANCREATOGRAPHY (MRCP): ICD-10-CM

## 2024-03-12 DIAGNOSIS — K85.90 ACUTE PANCREATITIS: ICD-10-CM

## 2024-03-12 LAB
CANCER AG19-9 SERPL-ACNC: 47.7 U/ML
GLUCOSE BLDC GLUCOMTR-MCNC: 75 MG/DL (ref 70–105)
QT INTERVAL: 412 MS
QTC INTERVAL: 490 MS

## 2024-03-12 PROCEDURE — 25510000001 IOPAMIDOL 61 % SOLUTION 30 ML VIAL: Performed by: INTERNAL MEDICINE

## 2024-03-12 PROCEDURE — 25010000002 PHENYLEPHRINE 10 MG/ML SOLUTION: Performed by: NURSE ANESTHETIST, CERTIFIED REGISTERED

## 2024-03-12 PROCEDURE — 88305 TISSUE EXAM BY PATHOLOGIST: CPT | Performed by: INTERNAL MEDICINE

## 2024-03-12 PROCEDURE — 88172 CYTP DX EVAL FNA 1ST EA SITE: CPT | Performed by: INTERNAL MEDICINE

## 2024-03-12 PROCEDURE — 25010000002 SUGAMMADEX 200 MG/2ML SOLUTION: Performed by: NURSE ANESTHETIST, CERTIFIED REGISTERED

## 2024-03-12 PROCEDURE — 25010000002 DEXAMETHASONE PER 1 MG: Performed by: NURSE ANESTHETIST, CERTIFIED REGISTERED

## 2024-03-12 PROCEDURE — 25010000002 LEVOFLOXACIN PER 250 MG: Performed by: NURSE ANESTHETIST, CERTIFIED REGISTERED

## 2024-03-12 PROCEDURE — 82948 REAGENT STRIP/BLOOD GLUCOSE: CPT

## 2024-03-12 PROCEDURE — C1769 GUIDE WIRE: HCPCS | Performed by: INTERNAL MEDICINE

## 2024-03-12 PROCEDURE — 25010000002 METRONIDAZOLE 500 MG/100ML SOLUTION: Performed by: NURSE ANESTHETIST, CERTIFIED REGISTERED

## 2024-03-12 PROCEDURE — 93005 ELECTROCARDIOGRAM TRACING: CPT

## 2024-03-12 PROCEDURE — 25010000002 PHENYLEPHRINE 10 MG/ML SOLUTION 5 ML VIAL: Performed by: NURSE ANESTHETIST, CERTIFIED REGISTERED

## 2024-03-12 PROCEDURE — 25810000003 SODIUM CHLORIDE 0.9 % SOLUTION 250 ML FLEX CONT: Performed by: NURSE ANESTHETIST, CERTIFIED REGISTERED

## 2024-03-12 PROCEDURE — 93010 ELECTROCARDIOGRAM REPORT: CPT | Performed by: INTERNAL MEDICINE

## 2024-03-12 PROCEDURE — 25010000002 ONDANSETRON PER 1 MG: Performed by: NURSE ANESTHETIST, CERTIFIED REGISTERED

## 2024-03-12 PROCEDURE — 25810000003 SODIUM CHLORIDE 0.9 % SOLUTION: Performed by: NURSE ANESTHETIST, CERTIFIED REGISTERED

## 2024-03-12 PROCEDURE — 43276 ERCP STENT EXCHANGE W/DILATE: CPT | Performed by: INTERNAL MEDICINE

## 2024-03-12 PROCEDURE — 43242 EGD US FINE NEEDLE BX/ASPIR: CPT | Performed by: INTERNAL MEDICINE

## 2024-03-12 PROCEDURE — 25010000002 FENTANYL CITRATE (PF) 100 MCG/2ML SOLUTION: Performed by: NURSE ANESTHETIST, CERTIFIED REGISTERED

## 2024-03-12 PROCEDURE — 25010000002 PROPOFOL 200 MG/20ML EMULSION: Performed by: NURSE ANESTHETIST, CERTIFIED REGISTERED

## 2024-03-12 PROCEDURE — C2625 STENT, NON-COR, TEM W/DEL SY: HCPCS | Performed by: INTERNAL MEDICINE

## 2024-03-12 PROCEDURE — 74328 X-RAY BILE DUCT ENDOSCOPY: CPT

## 2024-03-12 PROCEDURE — 86301 IMMUNOASSAY TUMOR CA 19-9: CPT | Performed by: INTERNAL MEDICINE

## 2024-03-12 DEVICE — BILIARY STENT WITH NAVIFLEXTM RX DELIVERY SYSTEM
Type: IMPLANTABLE DEVICE | Site: BILE DUCT | Status: FUNCTIONAL
Brand: ADVANIX™ BILIARY

## 2024-03-12 RX ORDER — DEXAMETHASONE SODIUM PHOSPHATE 4 MG/ML
INJECTION, SOLUTION INTRA-ARTICULAR; INTRALESIONAL; INTRAMUSCULAR; INTRAVENOUS; SOFT TISSUE AS NEEDED
Status: DISCONTINUED | OUTPATIENT
Start: 2024-03-12 | End: 2024-03-12 | Stop reason: SURG

## 2024-03-12 RX ORDER — PHENYLEPHRINE HYDROCHLORIDE 10 MG/ML
INJECTION INTRAVENOUS AS NEEDED
Status: DISCONTINUED | OUTPATIENT
Start: 2024-03-12 | End: 2024-03-12 | Stop reason: SURG

## 2024-03-12 RX ORDER — DIPHENHYDRAMINE HYDROCHLORIDE 50 MG/ML
12.5 INJECTION INTRAMUSCULAR; INTRAVENOUS
Status: DISCONTINUED | OUTPATIENT
Start: 2024-03-12 | End: 2024-03-12 | Stop reason: HOSPADM

## 2024-03-12 RX ORDER — ONDANSETRON 2 MG/ML
4 INJECTION INTRAMUSCULAR; INTRAVENOUS ONCE AS NEEDED
Status: DISCONTINUED | OUTPATIENT
Start: 2024-03-12 | End: 2024-03-12 | Stop reason: HOSPADM

## 2024-03-12 RX ORDER — LIDOCAINE HYDROCHLORIDE 20 MG/ML
INJECTION, SOLUTION EPIDURAL; INFILTRATION; INTRACAUDAL; PERINEURAL AS NEEDED
Status: DISCONTINUED | OUTPATIENT
Start: 2024-03-12 | End: 2024-03-12 | Stop reason: SURG

## 2024-03-12 RX ORDER — HYDRALAZINE HYDROCHLORIDE 20 MG/ML
5 INJECTION INTRAMUSCULAR; INTRAVENOUS
Status: DISCONTINUED | OUTPATIENT
Start: 2024-03-12 | End: 2024-03-12 | Stop reason: HOSPADM

## 2024-03-12 RX ORDER — LABETALOL HYDROCHLORIDE 5 MG/ML
5 INJECTION, SOLUTION INTRAVENOUS
Status: DISCONTINUED | OUTPATIENT
Start: 2024-03-12 | End: 2024-03-12 | Stop reason: HOSPADM

## 2024-03-12 RX ORDER — PROPOFOL 10 MG/ML
INJECTION, EMULSION INTRAVENOUS AS NEEDED
Status: DISCONTINUED | OUTPATIENT
Start: 2024-03-12 | End: 2024-03-12 | Stop reason: SURG

## 2024-03-12 RX ORDER — DIPHENHYDRAMINE HYDROCHLORIDE 50 MG/ML
12.5 INJECTION INTRAMUSCULAR; INTRAVENOUS ONCE AS NEEDED
Status: DISCONTINUED | OUTPATIENT
Start: 2024-03-12 | End: 2024-03-12 | Stop reason: HOSPADM

## 2024-03-12 RX ORDER — EPHEDRINE SULFATE 5 MG/ML
5 INJECTION INTRAVENOUS ONCE AS NEEDED
Status: DISCONTINUED | OUTPATIENT
Start: 2024-03-12 | End: 2024-03-12 | Stop reason: HOSPADM

## 2024-03-12 RX ORDER — METRONIDAZOLE 500 MG/100ML
INJECTION, SOLUTION INTRAVENOUS AS NEEDED
Status: DISCONTINUED | OUTPATIENT
Start: 2024-03-12 | End: 2024-03-12 | Stop reason: SURG

## 2024-03-12 RX ORDER — INDOMETHACIN 100 MG
SUPPOSITORY, RECTAL RECTAL
Status: DISCONTINUED
Start: 2024-03-12 | End: 2024-03-12 | Stop reason: HOSPADM

## 2024-03-12 RX ORDER — SODIUM CHLORIDE 9 MG/ML
INJECTION, SOLUTION INTRAVENOUS CONTINUOUS PRN
Status: DISCONTINUED | OUTPATIENT
Start: 2024-03-12 | End: 2024-03-12 | Stop reason: SURG

## 2024-03-12 RX ORDER — OXYCODONE HYDROCHLORIDE 5 MG/1
5 TABLET ORAL ONCE AS NEEDED
Status: DISCONTINUED | OUTPATIENT
Start: 2024-03-12 | End: 2024-03-12 | Stop reason: HOSPADM

## 2024-03-12 RX ORDER — OXYCODONE HYDROCHLORIDE 5 MG/1
10 TABLET ORAL EVERY 4 HOURS PRN
Status: DISCONTINUED | OUTPATIENT
Start: 2024-03-12 | End: 2024-03-12 | Stop reason: HOSPADM

## 2024-03-12 RX ORDER — FLUMAZENIL 0.1 MG/ML
0.2 INJECTION INTRAVENOUS AS NEEDED
Status: DISCONTINUED | OUTPATIENT
Start: 2024-03-12 | End: 2024-03-12 | Stop reason: HOSPADM

## 2024-03-12 RX ORDER — SODIUM CHLORIDE 9 MG/ML
INJECTION, SOLUTION INTRAVENOUS
Status: COMPLETED
Start: 2024-03-12 | End: 2024-03-12

## 2024-03-12 RX ORDER — FENTANYL CITRATE 50 UG/ML
INJECTION, SOLUTION INTRAMUSCULAR; INTRAVENOUS AS NEEDED
Status: DISCONTINUED | OUTPATIENT
Start: 2024-03-12 | End: 2024-03-12 | Stop reason: SURG

## 2024-03-12 RX ORDER — ONDANSETRON 2 MG/ML
INJECTION INTRAMUSCULAR; INTRAVENOUS AS NEEDED
Status: DISCONTINUED | OUTPATIENT
Start: 2024-03-12 | End: 2024-03-12 | Stop reason: SURG

## 2024-03-12 RX ORDER — ROCURONIUM BROMIDE 10 MG/ML
INJECTION, SOLUTION INTRAVENOUS AS NEEDED
Status: DISCONTINUED | OUTPATIENT
Start: 2024-03-12 | End: 2024-03-12 | Stop reason: SURG

## 2024-03-12 RX ORDER — NALOXONE HCL 0.4 MG/ML
0.4 VIAL (ML) INJECTION AS NEEDED
Status: DISCONTINUED | OUTPATIENT
Start: 2024-03-12 | End: 2024-03-12 | Stop reason: HOSPADM

## 2024-03-12 RX ORDER — IPRATROPIUM BROMIDE AND ALBUTEROL SULFATE 2.5; .5 MG/3ML; MG/3ML
3 SOLUTION RESPIRATORY (INHALATION) ONCE AS NEEDED
Status: DISCONTINUED | OUTPATIENT
Start: 2024-03-12 | End: 2024-03-12 | Stop reason: HOSPADM

## 2024-03-12 RX ORDER — LEVOFLOXACIN 5 MG/ML
INJECTION, SOLUTION INTRAVENOUS AS NEEDED
Status: DISCONTINUED | OUTPATIENT
Start: 2024-03-12 | End: 2024-03-12 | Stop reason: SURG

## 2024-03-12 RX ADMIN — PROPOFOL 30 MG: 10 INJECTION, EMULSION INTRAVENOUS at 12:41

## 2024-03-12 RX ADMIN — ONDANSETRON 4 MG: 2 INJECTION INTRAMUSCULAR; INTRAVENOUS at 13:04

## 2024-03-12 RX ADMIN — PHENYLEPHRINE HYDROCHLORIDE 0.5 MCG/KG/MIN: 10 INJECTION INTRAVENOUS at 12:40

## 2024-03-12 RX ADMIN — PHENYLEPHRINE HYDROCHLORIDE 100 MCG: 10 INJECTION INTRAVENOUS at 13:07

## 2024-03-12 RX ADMIN — METRONIDAZOLE 500 MG: 500 INJECTION, SOLUTION INTRAVENOUS at 13:04

## 2024-03-12 RX ADMIN — SODIUM CHLORIDE: 9 INJECTION, SOLUTION INTRAVENOUS at 12:34

## 2024-03-12 RX ADMIN — ROCURONIUM BROMIDE 40 MG: 10 INJECTION, SOLUTION INTRAVENOUS at 12:40

## 2024-03-12 RX ADMIN — LIDOCAINE HYDROCHLORIDE 40 MG: 20 INJECTION, SOLUTION EPIDURAL; INFILTRATION; INTRACAUDAL; PERINEURAL at 12:40

## 2024-03-12 RX ADMIN — DEXAMETHASONE SODIUM PHOSPHATE 4 MG: 4 INJECTION, SOLUTION INTRAMUSCULAR; INTRAVENOUS at 13:04

## 2024-03-12 RX ADMIN — SUGAMMADEX 200 MG: 100 INJECTION, SOLUTION INTRAVENOUS at 13:46

## 2024-03-12 RX ADMIN — LEVOFLOXACIN 500 MG: 500 INJECTION, SOLUTION INTRAVENOUS at 13:16

## 2024-03-12 RX ADMIN — PROPOFOL 120 MG: 10 INJECTION, EMULSION INTRAVENOUS at 12:40

## 2024-03-12 RX ADMIN — FENTANYL CITRATE 25 MCG: 50 INJECTION, SOLUTION INTRAMUSCULAR; INTRAVENOUS at 12:40

## 2024-03-12 NOTE — ANESTHESIA PREPROCEDURE EVALUATION
Anesthesia Evaluation     Patient summary reviewed and Nursing notes reviewed   NPO Solid Status: > 8 hours  NPO Liquid Status: > 8 hours           Airway   Mallampati: II  TM distance: >3 FB  Neck ROM: full  No difficulty expected  Dental - normal exam     Pulmonary - negative pulmonary ROS and normal exam    breath sounds clear to auscultation  Cardiovascular - negative cardio ROS and normal exam  Exercise tolerance: unable to assess    ECG reviewed  Rhythm: regular  Rate: normal    (+) hypertension, valvular problems/murmurs, CAD, DVT, hyperlipidemia    ROS comment: Sinus Lee    No Cardiac Studies avail    Neuro/Psych- negative ROS  (+) numbness  GI/Hepatic/Renal/Endo - negative ROS   (+) renal disease- ESRD and dialysis, diabetes mellitus, thyroid problem hypothyroidism    Musculoskeletal (-) negative ROS    Abdominal  - normal exam   Substance History - negative use     OB/GYN negative ob/gyn ROS         Other - negative ROS     history of cancer remission                  Anesthesia Plan    ASA 3     general     (No Cardiac Studies Available  NOTE: Pt required a large amount of CAPRICE last ERCP. Plan CAPRICE Drip  GETA)  intravenous induction     Anesthetic plan, risks, benefits, and alternatives have been provided, discussed and informed consent has been obtained with: patient.    CODE STATUS:

## 2024-03-12 NOTE — ANESTHESIA POSTPROCEDURE EVALUATION
Patient: Gabriel De Souza    Procedure Summary       Date: 03/12/24 Room / Location: [facility] ENDOSCOPY 2 / [facility] ENDOSCOPY    Anesthesia Start: 1234 Anesthesia Stop: 1358    Procedures:       ENDOSCOPIC RETROGRADE CHOLANGIOPANCREATOGRAPHY WITH REMOVAL OF PANCREATIC AND BILIARY STENTS,EXTENSION OF SPHINCTEROTOMY, CLEARANCE OF BILE DUCT WITH BALLOON (9MM-12MM, UP TO 12MM), PLACEMENT OF BILIARY STENT      ENDOSCOPIC ULTRASOUND WITH FINE NEEDLE ASPIRATION Diagnosis:       Abnormal magnetic resonance cholangiopancreatography (MRCP)      Acute pancreatitis      (Abnormal magnetic resonance cholangiopancreatography (MRCP) [R93.3])      (Acute pancreatitis [K85.90])    Surgeons: Ollie Peoples MD Provider: Shawn Khan MD    Anesthesia Type: general ASA Status: 3            Anesthesia Type: general    Vitals  Vitals Value Taken Time   /48 03/12/24 1436   Temp     Pulse 62 03/12/24 1455   Resp     SpO2 100 % 03/12/24 1455   Vitals shown include unfiled device data.        Post Anesthesia Care and Evaluation    Patient location during evaluation: PACU  Patient participation: complete - patient participated  Level of consciousness: awake  Pain scale: See nurse's notes for pain score.  Pain management: adequate    Airway patency: patent  Anesthetic complications: No anesthetic complications  PONV Status: none  Cardiovascular status: acceptable  Respiratory status: acceptable and spontaneous ventilation  Hydration status: acceptable    Comments: Patient seen and examined postoperatively; vital signs stable; SpO2 greater than or equal to 90%; cardiopulmonary status stable; nausea/vomiting adequately controlled; pain adequately controlled; no apparent anesthesia complications; patient discharged from anesthesia care when discharge criteria were met

## 2024-03-12 NOTE — OP NOTE
ENDOSCOPIC RETROGRADE CHOLANGIOPANCREATOGRAPHY, ESOPHAGOGASTRODUODENOSCOPY WITH ULTRASOUND AND FINE NEEDLE ASPIRATION Procedure Report    Patient Name:  Gabriel De Souza  YOB: 1945    Date of Surgery:  3/12/2024     Pre-Op Diagnosis:  Abnormal magnetic resonance cholangiopancreatography (MRCP) [R93.3]  Acute pancreatitis [K85.90]        Procedure/CPT® Codes:      Procedure(s):  ENDOSCOPIC RETROGRADE CHOLANGIOPANCREATOGRAPHY WITH REMOVAL OF PANCREATIC AND BILIARY STENTS,EXTENSION OF SPHINCTEROTOMY, CLEARANCE OF BILE DUCT WITH BALLOON (9MM-12MM, UP TO 12MM), PLACEMENT OF BILIARY STENT  ENDOSCOPIC ULTRASOUND WITH FINE NEEDLE ASPIRATION    Staff:  Surgeon(s):  Ollie Peoples MD      Anesthesia: General    Description of Procedure:  A description of the procedure as well as risks, benefits and alternative methods were explained to the patient who voiced understanding and signed the corresponding consent form.Specifically risks of post-ERCP pancreatitis, bleeding, perforation, failure to canulate and adverse reaction to sedation were discussed. A physical exam was performed and vital signs were monitored throughout the procedure.    Initially Pentax radial and later linear scope is advanced under direct rotation from oropharynx, esophagus stomach and second part of the duodenum.  Subsequently ERCP was performed as detailed below.  A  film was performed which was normal. With the patient in the semi-prone position, an Olympus side viewing endoscope was placed into the mouth and proceeded through the esophagus, stomach and second portion of the duodenum without difficulty. Limited views of the esophagus and stomach were normal. The ampulla was visualized and appeared normal. A [employer] hydrotome was used to cannulate the ampulla using wire guided technique. Bile was aspirated to ensure this was the duct of interest. Contrast was injected into the bile duct.      The scope was then retroflexed and  the fundus was visualized. The procedure was not difficult and there were no immediate complications.    Findings:   EUS finding  Limited evaluation of the esophageal gastric and dual mucosa grossly looks normal except slightly J-shaped stomach.  Scanning of the body and the tail of the pancreas did show significant hyperechoic bright foci and marked lobulation of the pancreatic parenchyma.  PD was not significantly dilated, PD was 2.2 mm at the most.  No other obvious mass or lesion was noticed in the body of the tail of the pancreas.  Scope was subsequently advanced into the duodenal bulb and then at D2 level.  Scanning at the head of the pancreas D2 level again showed changes consistent with chronic pancreatitis as well as slightly more hypodense area in the head of the pancreas.  The PD and CBD stent was also traversing leading to a stricture of both CBD and CBD.  That area.  This hypodense area of mass measures close to 2.5 to 3 cm at the most.  At this stage scope was switched to linear and at least 4 passes were performed using 25-gauge needle.  Initial path did show few atypical cell as per pathologist, further subsequent passes were saved for final stains and immunochemistry to rule out definite malignancy.  No other obvious surrounding lymph node was noticed.    ERCP findings  Duodenoscope was advanced under direct realization from orifice, esophagus stomach into the second part of duodenum.  Major ampulla was aligned with the scope, PD and subsequently CBD stent was removed using the snare.  After removal of those 2 stent, cannulation of the common bile duct was obtained selectively using a dream tome preloaded with a wire.  Wire was advanced intrahepatically cholangiogram was performed.  Angiogram did show common bile duct is dilated close to 9 mm to 1 cm the very small stricture at the distal end, previous brushing cytology from this have shown minimal atypia, brushing cytology was not performed this  time.  Due to finding of hypodense area in the head of the pancreas as well as stricture at the common bile duct area we opted to place 10 Swazi 9 cm stent with excellent drainage of common bile duct.  Patient tolerated procedure very well no major complication was noticed.    Impression:  At least close to 2.8 cm hypodense lesion/mass was again noticed in the head of the pancreas initial cytology again showed a few atypical cells.  further passes (3) were saved for stains and final diagnosis.  Previous cytology have also shown atypia.  Suspect this may be related to underlying well-differentiated adenocarcinoma versus related to chronic pancreatitis versus others.  Moderately severe chronic pancreatitis.  Stricture at the distal common bile duct status post of CBD stent removal and replacement with a 10 Swazi 9 cm.  J-shaped stomach, duodenoscope was advanced into the D2 in left lateral position.  Hiatal hernia    Recommendations:  Follow the final cytology and biopsy report.  Repeat CA 19-9.  Patient to follow the GI clinic in 6 to 8 weeks.  We will ERCP with a stent removal in 10 to 12 weeks depending on her final cytology and biopsy report.  Resume other preop medication.      Ollie Peoples MD     Date: 3/12/2024    Time: 17:36 EDT

## 2024-03-12 NOTE — H&P
Patient Care Team:  Waylon Johnson MD as PCP - General  Chemchiriiona, MD Lali as Consulting Physician (Cardiology)  Atul Fowler MD as Consulting Physician (Nephrology)      Subjective .     History of present illness:    Gabriel De Souza is a 78 y.o. male who presents today for Procedure(s):  ENDOSCOPIC RETROGRADE CHOLANGIOPANCREATOGRAPHY  ENDOSCOPIC ULTRASOUND for the indications listed below.     The updated Patient Profile was reviewed prior to the procedure, in conjunction with the Physical Exam, including medical conditions, surgical procedures, medications, allergies, family history and social history.     Pre-operatively, I reviewed the indication(s) for the procedure, the risks of the procedure [including but not limited to: unexpected bleeding possibly requiring hospitalization and/or unplanned repeat procedures, perforation possibly requiring surgical treatment, missed lesions and complications of sedation/MAC (also explained by anesthesia staff)].     I have evaluated the patient for risks associated with the planned anesthesia and the procedure to be performed and find the patient an acceptable candidate for IV sedation.    Multiple opportunities were provided for any questions or concerns, and all questions were answered satisfactorily before any anesthesia was administered. We will proceed with the planned procedure.      ASSESSMENT/PLAN:  78 years old male with a possible abnormal pancreatic head lesion with few atypical cell elevated CA 19-9 here for clearance of common bile duct reevaluation of the pancreatic head mass  ERCP  EUS      Past Medical History:  Past Medical History:   Diagnosis Date    Cancer 01/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    Coronary artery disease     Deep vein thrombosis 04/1990    Diabetes mellitus     Dyslipidemia     Erectile dysfunction 51 years old    Heart murmur 10/04/2023    Hemodialysis patient     HL (hearing loss) 7 year old    right    Hyperlipidemia 1970    Hypertension      Neuropathy     Renal insufficiency     Stage 4 chronic kidney disease     3-4       Past Surgical History:  Past Surgical History:   Procedure Laterality Date    ARTERIOVENOUS FISTULA/SHUNT SURGERY Left 2022    Procedure: BRACHIAL CEPHALIC FISTULA FORMATION;  Surgeon: Edy Vega MD;  Location: [facility] MAIN OR;  Service: Vascular;  Laterality: Left;    BACK SURGERY      BASAL CELL CARCINOMA EXCISION  2019    CARDIAC CATHETERIZATION      CYSTOSCOPY W/ URETERAL STENT PLACEMENT Bilateral 2024    Procedure: CYSTOSCOPY, BILATERAL URETERAL STENT INSERTION;  Surgeon: Tyrel Avitia MD;  Location: [facility] MAIN OR;  Service: Urology;  Laterality: Bilateral;    ERCP N/A 2024    Procedure: ENDOSCOPIC RETROGRADE CHOLANGIOPANCREATOGRAPHY with sphinctorotomy, brushing of bile duct, balloon sweeping of common bile duct up to 12mm, placement of 10 Fr. x 9cm biliary stent, and placement of 5 Fr. x 7cm pancreatic stent;  Surgeon: Ollie Peoples MD;  Location: [facility] ENDOSCOPY;  Service: Gastroenterology;  Laterality: N/A;    TURP / TRANSURETHRAL INCISION / DRAINAGE PROSTATE      UPPER ENDOSCOPIC ULTRASOUND W/ FNA N/A 2024    Procedure: ESOPHAGOGASTRODUODENOSCOPY WITH ULTRASOUND AND FINE NEEDLE ASPIRATION of pancreatic head lesion;  Surgeon: Ollie Peoples MD;  Location: [facility] ENDOSCOPY;  Service: Gastroenterology;  Laterality: N/A;       Social History:  Social History     Tobacco Use    Smoking status: Former     Current packs/day: 0.00     Types: Cigarettes     Quit date:      Years since quittin.2     Passive exposure: Past    Smokeless tobacco: Never   Vaping Use    Vaping status: Never Used   Substance Use Topics    Alcohol use: No    Drug use: No       Family History:  Family History   Problem Relation Age of Onset    Heart disease Mother     Heart disease Brother     Heart attack Maternal Grandfather     Hypertension Father     Hearing loss Father        Medications:  Medications  Prior to Admission   Medication Sig Dispense Refill Last Dose    amLODIPine (NORVASC) 5 MG tablet TAKE 1 TABLET BY MOUTH DAILY 90 tablet 3     aspirin 81 MG tablet Take 1 tablet by mouth Daily.       calcitriol (ROCALTROL) 0.25 MCG capsule Take 2 capsules by mouth Daily.       glucagon (GLUCAGEN) 1 MG injection Inject 1 mg under the skin into the appropriate area as directed Every 15 (Fifteen) Minutes As Needed (Blood Glucose Less Than 70 - Patient Without IV Access - Unresponsive, NPO or Unable To Safely Swallow).       Insulin Glargine-Lixisenatide (SOLIQUA) 100-33 UNT-MCG/ML solution pen-injector injection Inject 30 Units under the skin into the appropriate area as directed Daily. 9 mL 3     ondansetron ODT (ZOFRAN-ODT) 4 MG disintegrating tablet Place 1 tablet on the tongue Every 8 (Eight) Hours As Needed for Nausea or Vomiting.       sevelamer (RENVELA) 800 MG tablet Take 1 tablet by mouth 3 (Three) Times a Day As Needed.       atenolol (TENORMIN) 50 MG tablet TAKE 1 TABLET BY MOUTH TWICE  DAILY 180 tablet 3     simvastatin (ZOCOR) 20 MG tablet TAKE 1 TABLET BY MOUTH DAILY 90 tablet 3        Scheduled Meds:  Continuous Infusions:No current facility-administered medications for this encounter.    PRN Meds:.    ALLERGIES:  Tylenol with codeine #3 [acetaminophen-codeine]        Objective     Vital Signs:        Physical Exam:      General Appearance:    Awake and alert, in no acute distress   Lungs:     Clear to auscultation bilaterally, respirations regular, even and unlabored    Heart:    Regular rhythm and normal rate, normal S1 and S2, no            murmur, no gallop, no rub   Abdomen:     Normal bowel sounds, soft, non-tender, no rebound or guarding, non-distended, no hepatosplenomegaly        Results Review:   I reviewed the patient's labs and imaging.  Lab Results (last 24 hours)       Procedure Component Value Units Date/Time    POC Glucose Once [[id number]]  (Normal) Collected: 03/12/24 0928    Specimen:  Blood Updated: 03/12/24 0930     Glucose 75 mg/dL      Comment: Serial Number: [id number]:  [id number]               Imaging Results (Last 24 Hours)       ** No results found for the last 24 hours. **               I discussed the patients findings and my recommendations with the patient.  Ollie Peoples MD  03/12/24  09:50 EDT

## 2024-03-12 NOTE — DISCHARGE INSTRUCTIONS
A responsible adult should stay with you and you should rest quietly for the rest of the day.    Do not drink alcohol, drive, operate any heavy machinery or power tools or make any legal/important decisions for the next 24 hours.     Progress your diet as tolerated.  If you begin to experience severe pain, increased shortness of breath, racing heartbeat or a fever above 101 F, seek immediate medical attention.     Follow up with MD as instructed. Call office for results in 3 to 5 days if needed.     Dr Peoples @ [phone]

## 2024-03-12 NOTE — ANESTHESIA PROCEDURE NOTES
Airway  Urgency: elective    Date/Time: 3/12/2024 12:42 PM  Airway not difficult    General Information and Staff    Patient location during procedure: OR  CRNA/CAA: Ivett Prince, CRNA    Indications and Patient Condition  Indications for airway management: airway protection    Preoxygenated: yes  Mask difficulty assessment: 2 - vent by mask + OA or adjuvant +/- NMBA    Final Airway Details  Final airway type: endotracheal airway      Successful airway: ETT  Cuffed: yes   Successful intubation technique: video laryngoscopy  Facilitating devices/methods: intubating stylet  Endotracheal tube insertion site: oral  Blade: Grey  Blade size: 3  ETT size (mm): 7.5  Cormack-Lehane Classification: grade I - full view of glottis  Placement verified by: capnometry   Measured from: lips  ETT/EBT  to lips (cm): 22  Number of attempts at approach: 1  Assessment: lips, teeth, and gum same as pre-op and atraumatic intubation

## 2024-03-14 LAB
BEAKER LAB AP INTRAOPERATIVE CONSULTATION: NORMAL
LAB AP CASE REPORT: NORMAL
LAB AP DIAGNOSIS COMMENT: NORMAL
PATH REPORT.FINAL DX SPEC: NORMAL
PATH REPORT.GROSS SPEC: NORMAL

## 2024-03-28 ENCOUNTER — LAB REQUISITION (OUTPATIENT)
Dept: LAB | Facility: HOSPITAL | Age: 79
End: 2024-03-28
Payer: MEDICARE

## 2024-03-28 DIAGNOSIS — N20.1 CALCULUS OF URETER: ICD-10-CM

## 2024-03-28 PROCEDURE — 82365 CALCULUS SPECTROSCOPY: CPT | Performed by: UROLOGY

## 2024-04-03 LAB
COLOR STONE: NORMAL
COM MFR STONE: 100 %
COMPN STONE: NORMAL
LABORATORY COMMENT REPORT: NORMAL
Lab: NORMAL
Lab: NORMAL
PHOTO: NORMAL
SIZE STONE: NORMAL MM
SPEC SOURCE SUBJ: NORMAL
WT STONE: 28 MG

## 2024-04-09 ENCOUNTER — HOSPITAL ENCOUNTER (OUTPATIENT)
Dept: CARDIOLOGY | Facility: HOSPITAL | Age: 79
Discharge: HOME | End: 2024-04-09
Admitting: INTERNAL MEDICINE
Payer: MEDICARE

## 2024-04-09 ENCOUNTER — OFFICE VISIT (OUTPATIENT)
Dept: CARDIOLOGY | Facility: CLINIC | Age: 79
End: 2024-04-09
Payer: MEDICARE

## 2024-04-09 VITALS
HEIGHT: 73 IN | SYSTOLIC BLOOD PRESSURE: 136 MMHG | BODY MASS INDEX: 25.18 KG/M2 | WEIGHT: 190 LBS | DIASTOLIC BLOOD PRESSURE: 79 MMHG

## 2024-04-09 VITALS
SYSTOLIC BLOOD PRESSURE: 136 MMHG | HEIGHT: 73 IN | WEIGHT: 188 LBS | BODY MASS INDEX: 24.92 KG/M2 | DIASTOLIC BLOOD PRESSURE: 79 MMHG

## 2024-04-09 DIAGNOSIS — N18.6 END STAGE RENAL DISEASE ON DIALYSIS: ICD-10-CM

## 2024-04-09 DIAGNOSIS — I10 PRIMARY HYPERTENSION: Primary | ICD-10-CM

## 2024-04-09 DIAGNOSIS — R01.1 HEART MURMUR: ICD-10-CM

## 2024-04-09 DIAGNOSIS — Z99.2 END STAGE RENAL DISEASE ON DIALYSIS: ICD-10-CM

## 2024-04-09 LAB
BH CV ECHO MEAS - ACS: 2.05 CM
BH CV ECHO MEAS - AO MAX PG: 7.8 MMHG
BH CV ECHO MEAS - AO MEAN PG: 4.4 MMHG
BH CV ECHO MEAS - AO ROOT DIAM: 4.2 CM
BH CV ECHO MEAS - AO V2 MAX: 139.9 CM/SEC
BH CV ECHO MEAS - AO V2 VTI: 26.4 CM
BH CV ECHO MEAS - AVA(I,D): 3.7 CM2
BH CV ECHO MEAS - EDV(CUBED): 49.3 ML
BH CV ECHO MEAS - EDV(MOD-SP2): 118.4 ML
BH CV ECHO MEAS - EDV(MOD-SP4): 111.1 ML
BH CV ECHO MEAS - EF(MOD-BP): 57 %
BH CV ECHO MEAS - EF(MOD-SP2): 59.7 %
BH CV ECHO MEAS - EF(MOD-SP4): 55.4 %
BH CV ECHO MEAS - ESV(CUBED): 20.2 ML
BH CV ECHO MEAS - ESV(MOD-SP2): 47.7 ML
BH CV ECHO MEAS - ESV(MOD-SP4): 49.5 ML
BH CV ECHO MEAS - FS: 25.7 %
BH CV ECHO MEAS - IVS/LVPW: 1.04 CM
BH CV ECHO MEAS - IVSD: 1.35 CM
BH CV ECHO MEAS - LA DIMENSION: 4.3 CM
BH CV ECHO MEAS - LV MASS(C)D: 168.2 GRAMS
BH CV ECHO MEAS - LV MAX PG: 7.8 MMHG
BH CV ECHO MEAS - LV MEAN PG: 4.4 MMHG
BH CV ECHO MEAS - LV V1 MAX: 139.7 CM/SEC
BH CV ECHO MEAS - LV V1 VTI: 23 CM
BH CV ECHO MEAS - LVIDD: 3.7 CM
BH CV ECHO MEAS - LVIDS: 2.7 CM
BH CV ECHO MEAS - LVOT AREA: 4.2 CM2
BH CV ECHO MEAS - LVOT DIAM: 2.32 CM
BH CV ECHO MEAS - LVPWD: 1.29 CM
BH CV ECHO MEAS - MR MAX PG: 10 MMHG
BH CV ECHO MEAS - MR MAX VEL: 157.8 CM/SEC
BH CV ECHO MEAS - MV A MAX VEL: 101.2 CM/SEC
BH CV ECHO MEAS - MV DEC SLOPE: 244.1 CM/SEC2
BH CV ECHO MEAS - MV DEC TIME: 0.24 SEC
BH CV ECHO MEAS - MV E MAX VEL: 59.7 CM/SEC
BH CV ECHO MEAS - MV E/A: 0.59
BH CV ECHO MEAS - MV MAX PG: 4.2 MMHG
BH CV ECHO MEAS - MV MEAN PG: 1.78 MMHG
BH CV ECHO MEAS - MV V2 VTI: 17 CM
BH CV ECHO MEAS - MVA(VTI): 5.7 CM2
BH CV ECHO MEAS - PA ACC TIME: 0.1 SEC
BH CV ECHO MEAS - PA V2 MAX: 94.9 CM/SEC
BH CV ECHO MEAS - PI END-D VEL: 91.8 CM/SEC
BH CV ECHO MEAS - RV MAX PG: 2.23 MMHG
BH CV ECHO MEAS - RV V1 MAX: 74.6 CM/SEC
BH CV ECHO MEAS - RV V1 VTI: 14.4 CM
BH CV ECHO MEAS - RVDD: 3.7 CM
BH CV ECHO MEAS - SV(LVOT): 97.5 ML
BH CV ECHO MEAS - SV(MOD-SP2): 70.7 ML
BH CV ECHO MEAS - SV(MOD-SP4): 61.6 ML
BH CV ECHO MEAS - TR MAX PG: 27.9 MMHG
BH CV ECHO MEAS - TR MAX VEL: 263.9 CM/SEC

## 2024-04-09 PROCEDURE — 93306 TTE W/DOPPLER COMPLETE: CPT

## 2024-04-09 PROCEDURE — 93306 TTE W/DOPPLER COMPLETE: CPT | Performed by: INTERNAL MEDICINE

## 2024-04-09 NOTE — PROGRESS NOTES
Progress note      Name: Gabriel De Souza ADMIT: (Not on file)   : 1945  PCP: Waylon Johnson MD    MRN: [id number] LOS: 0 days   AGE/SEX: 78 y.o. male  ROOM: Room/bed info not found     Chief Complaint   Patient presents with    Follow-up     6 month F/u with Echo       Subjective       History of present illness  Gabriel De Souza is a 78-year-old male patient who has nonobstructive CAD, hypertension, diabetes, chronic renal insufficiency on hemodialysis since 2024, beta-blocker induced bradycardia, here today for follow-up.  He is doing fairly well, still adjusting to dialysis, denies any significant shortness of breath, no lower extremity edema no syncopal episodes.      Past Medical History:   Diagnosis Date    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    Coronary artery disease     Deep vein thrombosis 1990    Diabetes mellitus     Dyslipidemia     Erectile dysfunction 51 years old    Heart murmur 10/04/2023    Hemodialysis patient     HL (hearing loss) 7 year old    right    Hyperlipidemia     Hypertension     Neuropathy     Renal insufficiency     Stage 4 chronic kidney disease     3-4     Past Surgical History:   Procedure Laterality Date    ARTERIOVENOUS FISTULA/SHUNT SURGERY Left 2022    Procedure: BRACHIAL CEPHALIC FISTULA FORMATION;  Surgeon: Edy Vega MD;  Location: [facility] OR;  Service: Vascular;  Laterality: Left;    BACK SURGERY      BASAL CELL CARCINOMA EXCISION  2019    CARDIAC CATHETERIZATION      CYSTOSCOPY W/ URETERAL STENT PLACEMENT Bilateral 2024    Procedure: CYSTOSCOPY, BILATERAL URETERAL STENT INSERTION;  Surgeon: Tyrel Avitia MD;  Location: [facility] OR;  Service: Urology;  Laterality: Bilateral;    ERCP N/A 2024    Procedure: ENDOSCOPIC RETROGRADE CHOLANGIOPANCREATOGRAPHY with sphinctorotomy, brushing of bile duct, balloon sweeping of common bile duct up to 12mm, placement of 10 Fr. x 9cm biliary stent, and placement of 5 Fr. x 7cm  pancreatic stent;  Surgeon: Ollie Peoples MD;  Location: [facility] ENDOSCOPY;  Service: Gastroenterology;  Laterality: N/A;    ERCP N/A 3/12/2024    Procedure: ENDOSCOPIC RETROGRADE CHOLANGIOPANCREATOGRAPHY WITH REMOVAL OF PANCREATIC AND BILIARY STENTS,EXTENSION OF SPHINCTEROTOMY, CLEARANCE OF BILE DUCT WITH BALLOON (9MM-12MM, UP TO 12MM), PLACEMENT OF BILIARY STENT;  Surgeon: Ollie Peoples MD;  Location: [facility] ENDOSCOPY;  Service: Gastroenterology;  Laterality: N/A;  Post-    TURP / TRANSURETHRAL INCISION / DRAINAGE PROSTATE      UPPER ENDOSCOPIC ULTRASOUND W/ FNA N/A 2024    Procedure: ESOPHAGOGASTRODUODENOSCOPY WITH ULTRASOUND AND FINE NEEDLE ASPIRATION of pancreatic head lesion;  Surgeon: Ollie Peoples MD;  Location: [facility] ENDOSCOPY;  Service: Gastroenterology;  Laterality: N/A;    UPPER ENDOSCOPIC ULTRASOUND W/ FNA N/A 3/12/2024    Procedure: ENDOSCOPIC ULTRASOUND WITH FINE NEEDLE ASPIRATION;  Surgeon: Ollie Peoples MD;  Location: [facility] ENDOSCOPY;  Service: Gastroenterology;  Laterality: N/A;  Post-     Family History   Problem Relation Age of Onset    Heart disease Mother     Heart disease Brother     Heart attack Maternal Grandfather     Hypertension Father     Hearing loss Father      Social History     Tobacco Use    Smoking status: Former     Current packs/day: 0.00     Types: Cigarettes     Quit date:      Years since quittin.3     Passive exposure: Past    Smokeless tobacco: Never   Vaping Use    Vaping status: Never Used   Substance Use Topics    Alcohol use: No    Drug use: No       Current Outpatient Medications:     amLODIPine (NORVASC) 5 MG tablet, TAKE 1 TABLET BY MOUTH DAILY, Disp: 90 tablet, Rfl: 3    aspirin 81 MG tablet, Take 1 tablet by mouth Daily., Disp: , Rfl:     calcitriol (ROCALTROL) 0.25 MCG capsule, Take 2 capsules by mouth Daily., Disp: , Rfl:     glucagon (GLUCAGEN) 1 MG injection, Inject 1 mg under the skin into the appropriate area as directed Every 15 (Fifteen)  Minutes As Needed (Blood Glucose Less Than 70 - Patient Without IV Access - Unresponsive, NPO or Unable To Safely Swallow)., Disp: , Rfl:     Insulin Glargine-Lixisenatide (SOLIQUA) 100-33 UNT-MCG/ML solution pen-injector injection, Inject 30 Units under the skin into the appropriate area as directed Daily., Disp: 9 mL, Rfl: 3    ondansetron ODT (ZOFRAN-ODT) 4 MG disintegrating tablet, Place 1 tablet on the tongue Every 8 (Eight) Hours As Needed for Nausea or Vomiting., Disp: , Rfl:     sevelamer (RENVELA) 800 MG tablet, Take 1 tablet by mouth 3 (Three) Times a Day As Needed., Disp: , Rfl:     simvastatin (ZOCOR) 20 MG tablet, TAKE 1 TABLET BY MOUTH DAILY, Disp: 90 tablet, Rfl: 3  Allergies:  Codeine and Tylenol with codeine #3 [acetaminophen-codeine]      Physical Exam  VITALS REVIEWED    General:      well developed, in no acute distress.    Head:      normocephalic and atraumatic.    Eyes:      PERRL/EOM intact, conjunctiva and sclera clear with out nystagmus.    Neck:      no masses, thyromegaly,  trachea central with normal respiratory effort and PMI displaced laterally  Lungs:      Clear to auscultation bilaterally  Heart:       Regular rate and rhythm, systolic murmur  Msk:      no deformity or scoliosis noted of thoracic or lumbar spine.    Pulses:      pulses normal in all 4 extremities.    Extremities:       No lower extremity edema  Neurologic:      no focal deficits.   alert oriented x3  Skin:      intact without lesions or rashes.    Psych:      alert and cooperative; normal mood and affect; normal attention span and concentration.      Result Review :               Pertinent cardiac workup    Echocardiogram 4/9/2024 ejection fraction 60 to 65%  EKG 3/12/2024 sinus rhythm      Procedures        Assessment and Plan      Gabriel De Souza is a 78-year-old male patient who has no significant CAD, has diabetes, renal insufficiency, on hemodialysis, beta-blocker induced bradycardia, here today for follow-up.   He is doing well, denies any anginal symptoms or CHF symptoms, his heart rate and blood pressure are stable.  Cardiac wise he seems to be doing okay, continue same therapy, will see him in follow-up in 6 months.    Diagnoses and all orders for this visit:    1. Primary hypertension (Primary)    2. End stage renal disease on dialysis           Return in about 6 months (around 10/9/2024) for with EKG.  Patient was given instructions and counseling regarding his condition or for health maintenance advice. Please see specific information pulled into the AVS if appropriate.

## 2024-04-16 ENCOUNTER — OFFICE VISIT (OUTPATIENT)
Dept: FAMILY MEDICINE CLINIC | Facility: CLINIC | Age: 79
End: 2024-04-16
Payer: MEDICARE

## 2024-04-16 VITALS
HEART RATE: 84 BPM | OXYGEN SATURATION: 100 % | TEMPERATURE: 98.4 F | BODY MASS INDEX: 25.31 KG/M2 | DIASTOLIC BLOOD PRESSURE: 84 MMHG | WEIGHT: 191 LBS | HEIGHT: 73 IN | SYSTOLIC BLOOD PRESSURE: 120 MMHG

## 2024-04-16 DIAGNOSIS — E11.22 TYPE 2 DIABETES MELLITUS WITH CHRONIC KIDNEY DISEASE ON CHRONIC DIALYSIS, WITHOUT LONG-TERM CURRENT USE OF INSULIN: Primary | ICD-10-CM

## 2024-04-16 DIAGNOSIS — Z99.2 TYPE 2 DIABETES MELLITUS WITH CHRONIC KIDNEY DISEASE ON CHRONIC DIALYSIS, WITHOUT LONG-TERM CURRENT USE OF INSULIN: Primary | ICD-10-CM

## 2024-04-16 DIAGNOSIS — N18.6 TYPE 2 DIABETES MELLITUS WITH CHRONIC KIDNEY DISEASE ON CHRONIC DIALYSIS, WITHOUT LONG-TERM CURRENT USE OF INSULIN: Primary | ICD-10-CM

## 2024-04-16 PROCEDURE — 3079F DIAST BP 80-89 MM HG: CPT | Performed by: FAMILY MEDICINE

## 2024-04-16 PROCEDURE — 99213 OFFICE O/P EST LOW 20 MIN: CPT | Performed by: FAMILY MEDICINE

## 2024-04-16 PROCEDURE — 3074F SYST BP LT 130 MM HG: CPT | Performed by: FAMILY MEDICINE

## 2024-04-16 PROCEDURE — 1159F MED LIST DOCD IN RCRD: CPT | Performed by: FAMILY MEDICINE

## 2024-04-16 PROCEDURE — 1160F RVW MEDS BY RX/DR IN RCRD: CPT | Performed by: FAMILY MEDICINE

## 2024-04-16 NOTE — PROGRESS NOTES
"Subjective   Gabriel De Souza is a 78 y.o. male.     History of Present Illness  Gabriel De Souza is in for follow up on his diabetes. He has ESRD and is now on dialysis on M,W and F. There is no history of chest pain or dyspnea of late. There is no history of issue with bowel or bladder function. There is no history of vision or hearing problems. There is no history of dizziness or lightheadedness. There is no history of issue with sleep or mood. As for checking blood sugar readings, things have improved with insulin and this morning was only 78. There is no issue with medication compliance. Diet and exercise compliance has been good of late.    Hypertension  This is a chronic problem. The current episode started more than 1 year ago. The problem has been improved since onset. Pertinent negatives include no anxiety, blurred vision, chest pain, headaches, malaise/fatigue, neck pain, orthopnea, palpitations, peripheral edema or shortness of breath. There are no associated agents to hypertension. There are no compliance problems.           /84 (BP Location: Left arm, Patient Position: Sitting, Cuff Size: Adult)   Pulse 84   Temp 98.4 °F (36.9 °C) (Temporal)   Ht 185.4 cm (73\")   Wt 86.6 kg (191 lb)   SpO2 100%   BMI 25.20 kg/m²       Chief Complaint   Patient presents with    Diabetes    Hospital Follow Up Visit     Pt is on dialysis 3 times a week. Went yesterday. Says that he got 3 hep b shots and 1st shingrix.            Current Outpatient Medications:     aspirin 81 MG tablet, Take 1 tablet by mouth Daily., Disp: , Rfl:     calcitriol (ROCALTROL) 0.25 MCG capsule, Take 2 capsules by mouth Daily., Disp: , Rfl:     Insulin Glargine-Lixisenatide (SOLIQUA) 100-33 UNT-MCG/ML solution pen-injector injection, Inject 30 Units under the skin into the appropriate area as directed Daily., Disp: 9 mL, Rfl: 3    sevelamer (RENVELA) 800 MG tablet, Take 1 tablet by mouth 3 (Three) Times a Day As Needed., Disp: , Rfl:     " simvastatin (ZOCOR) 20 MG tablet, TAKE 1 TABLET BY MOUTH DAILY, Disp: 90 tablet, Rfl: 3    Lab Results   Component Value Date    HGBA1C 5.80 (H) 10/10/2023    HGBA1C 6.30 (H) 04/06/2023    HGBA1C 6.6 (H) 07/06/2022     Lab Results   Component Value Date    MICROALBUR 3.3 10/10/2023    CREATININE 4.69 (H) 03/05/2024         Wt Readings from Last 3 Encounters:   04/16/24 86.6 kg (191 lb)   04/09/24 85.3 kg (188 lb)   04/09/24 86.2 kg (190 lb)       The following portions of the patient's history were reviewed and updated as appropriate: allergies, current medications, past family history, past medical history, past social history, past surgical history, and problem list.    Review of Systems   Constitutional:  Positive for fatigue. Negative for activity change, fever and malaise/fatigue.   HENT:  Negative for congestion, sinus pressure, sinus pain, sore throat and trouble swallowing.    Eyes:  Negative for blurred vision and visual disturbance.   Respiratory:  Negative for chest tightness, shortness of breath and wheezing.    Cardiovascular:  Negative for chest pain, palpitations and orthopnea.   Gastrointestinal:  Negative for abdominal distention, abdominal pain, constipation, diarrhea, nausea and vomiting.   Genitourinary:  Negative for difficulty urinating and dysuria.   Musculoskeletal:  Negative for back pain and neck pain.   Neurological:  Positive for weakness. Negative for headaches.   Psychiatric/Behavioral:  Negative for agitation, hallucinations and suicidal ideas.        Objective   Physical Exam  Vitals and nursing note reviewed.   Constitutional:       Appearance: Normal appearance.   HENT:      Right Ear: Tympanic membrane and ear canal normal.      Left Ear: Tympanic membrane and ear canal normal.   Cardiovascular:      Rate and Rhythm: Normal rate and regular rhythm.      Heart sounds: Normal heart sounds. No murmur heard.  Pulmonary:      Effort: Pulmonary effort is normal.      Breath sounds: No  wheezing or rales.   Abdominal:      General: Bowel sounds are normal.      Palpations: Abdomen is soft.      Tenderness: There is no abdominal tenderness. There is no guarding.   Musculoskeletal:      Cervical back: Neck supple.      Right lower leg: No edema.      Left lower leg: No edema.   Lymphadenopathy:      Cervical: No cervical adenopathy.   Neurological:      General: No focal deficit present.      Mental Status: He is alert and oriented to person, place, and time.   Psychiatric:         Mood and Affect: Mood normal.           Assessment & Plan   Problems Addressed this Visit          Endocrine and Metabolic    Type 2 diabetes mellitus - Primary     Diagnoses         Codes Comments    Type 2 diabetes mellitus with chronic kidney disease on chronic dialysis, without long-term current use of insulin    -  Primary ICD-10-CM: E11.22, N18.6, Z99.2  ICD-9-CM: 250.40, 585.9, V45.11             I noted his most recent labs done at dialysis and I will leave his medication plan intact for now  I did encourage him to get his second Shingrix  I plan to see him again in 6 months  I advised him to update his eye exam  He has a foot exam coming up with podiatry  I will do his wellness visit next time he is in         Answers submitted by the patient for this visit:  Primary Reason for Visit (Submitted on 4/9/2024)  What is the primary reason for your visit?: High Blood Pressure

## 2024-04-19 ENCOUNTER — TELEPHONE (OUTPATIENT)
Dept: FAMILY MEDICINE CLINIC | Facility: CLINIC | Age: 79
End: 2024-04-19
Payer: MEDICARE

## 2024-06-07 RX ORDER — INSULIN GLARGINE AND LIXISENATIDE 100; 33 U/ML; UG/ML
INJECTION, SOLUTION SUBCUTANEOUS
Qty: 30 ML | Refills: 3 | Status: SHIPPED | OUTPATIENT
Start: 2024-06-07

## 2024-06-20 ENCOUNTER — TELEPHONE (OUTPATIENT)
Dept: FAMILY MEDICINE CLINIC | Facility: CLINIC | Age: 79
End: 2024-06-20
Payer: MEDICARE

## 2024-06-20 NOTE — TELEPHONE ENCOUNTER
They are going to discharge him from home health due to all goals met. He is doing rather well. They need a verbal okay.

## 2024-07-05 PROCEDURE — 93005 ELECTROCARDIOGRAM TRACING: CPT | Performed by: INTERNAL MEDICINE

## 2024-07-09 ENCOUNTER — HOSPITAL ENCOUNTER (OUTPATIENT)
Dept: CARDIOLOGY | Facility: HOSPITAL | Age: 79
Discharge: HOME | End: 2024-07-09
Admitting: INTERNAL MEDICINE
Payer: MEDICARE

## 2024-07-09 PROCEDURE — 93010 ELECTROCARDIOGRAM REPORT: CPT | Performed by: INTERNAL MEDICINE

## 2024-07-09 PROCEDURE — 93005 ELECTROCARDIOGRAM TRACING: CPT

## 2024-07-14 LAB
QT INTERVAL: 414 MS
QTC INTERVAL: 466 MS

## 2024-07-17 ENCOUNTER — ANESTHESIA EVENT (OUTPATIENT)
Dept: GASTROENTEROLOGY | Facility: HOSPITAL | Age: 79
End: 2024-07-17
Payer: MEDICARE

## 2024-07-17 RX ORDER — SODIUM CHLORIDE 9 MG/ML
9 INJECTION, SOLUTION INTRAVENOUS CONTINUOUS PRN
Status: CANCELLED | OUTPATIENT
Start: 2024-07-17

## 2024-07-17 RX ORDER — SODIUM CHLORIDE 0.9 % (FLUSH) 0.9 %
10 SYRINGE (ML) INJECTION EVERY 12 HOURS SCHEDULED
Status: CANCELLED | OUTPATIENT
Start: 2024-07-17

## 2024-07-17 RX ORDER — SODIUM CHLORIDE 0.9 % (FLUSH) 0.9 %
10 SYRINGE (ML) INJECTION AS NEEDED
Status: CANCELLED | OUTPATIENT
Start: 2024-07-17

## 2024-07-18 ENCOUNTER — HOSPITAL ENCOUNTER (OUTPATIENT)
Facility: HOSPITAL | Age: 79
Setting detail: HOSPITAL OUTPATIENT SURGERY
Discharge: HOME | End: 2024-07-18
Attending: INTERNAL MEDICINE | Admitting: INTERNAL MEDICINE
Payer: MEDICARE

## 2024-07-18 ENCOUNTER — APPOINTMENT (OUTPATIENT)
Dept: GENERAL RADIOLOGY | Facility: HOSPITAL | Age: 79
End: 2024-07-18
Payer: MEDICARE

## 2024-07-18 ENCOUNTER — ANESTHESIA (OUTPATIENT)
Dept: GASTROENTEROLOGY | Facility: HOSPITAL | Age: 79
End: 2024-07-18
Payer: MEDICARE

## 2024-07-18 ENCOUNTER — ON CAMPUS - OUTPATIENT (AMBULATORY)
Dept: URBAN - METROPOLITAN AREA HOSPITAL 85 | Facility: HOSPITAL | Age: 79
End: 2024-07-18
Payer: COMMERCIAL

## 2024-07-18 VITALS
HEIGHT: 73 IN | SYSTOLIC BLOOD PRESSURE: 126 MMHG | TEMPERATURE: 98 F | HEART RATE: 64 BPM | BODY MASS INDEX: 24.92 KG/M2 | WEIGHT: 188 LBS | OXYGEN SATURATION: 100 % | DIASTOLIC BLOOD PRESSURE: 57 MMHG | RESPIRATION RATE: 14 BRPM

## 2024-07-18 DIAGNOSIS — K85.90 PANCREATITIS: ICD-10-CM

## 2024-07-18 DIAGNOSIS — K85.90 ACUTE PANCREATITIS WITHOUT NECROSIS OR INFECTION, UNSPECIFIE: ICD-10-CM

## 2024-07-18 DIAGNOSIS — Z96.89 PRESENCE OF OTHER SPECIFIED FUNCTIONAL IMPLANTS: ICD-10-CM

## 2024-07-18 DIAGNOSIS — K31.89 OTHER DISEASES OF STOMACH AND DUODENUM: ICD-10-CM

## 2024-07-18 LAB
ANION GAP SERPL CALCULATED.3IONS-SCNC: 14.2 MMOL/L (ref 5–15)
BASOPHILS # BLD AUTO: 0.02 10*3/MM3 (ref 0–0.2)
BASOPHILS NFR BLD AUTO: 0.3 % (ref 0–1.5)
BUN SERPL-MCNC: 40 MG/DL (ref 8–23)
BUN/CREAT SERPL: 8.5 (ref 7–25)
CALCIUM SPEC-SCNC: 10.2 MG/DL (ref 8.6–10.5)
CHLORIDE SERPL-SCNC: 97 MMOL/L (ref 98–107)
CO2 SERPL-SCNC: 25.8 MMOL/L (ref 22–29)
CREAT SERPL-MCNC: 4.72 MG/DL (ref 0.76–1.27)
DEPRECATED RDW RBC AUTO: 53.3 FL (ref 37–54)
EGFRCR SERPLBLD CKD-EPI 2021: 12 ML/MIN/1.73
EOSINOPHIL # BLD AUTO: 0.12 10*3/MM3 (ref 0–0.4)
EOSINOPHIL NFR BLD AUTO: 1.7 % (ref 0.3–6.2)
ERYTHROCYTE [DISTWIDTH] IN BLOOD BY AUTOMATED COUNT: 15.4 % (ref 12.3–15.4)
GLUCOSE BLDC GLUCOMTR-MCNC: 74 MG/DL (ref 70–105)
GLUCOSE SERPL-MCNC: 88 MG/DL (ref 65–99)
HCT VFR BLD AUTO: 34.6 % (ref 37.5–51)
HGB BLD-MCNC: 11 G/DL (ref 13–17.7)
IMM GRANULOCYTES # BLD AUTO: 0.02 10*3/MM3 (ref 0–0.05)
IMM GRANULOCYTES NFR BLD AUTO: 0.3 % (ref 0–0.5)
LYMPHOCYTES # BLD AUTO: 2.51 10*3/MM3 (ref 0.7–3.1)
LYMPHOCYTES NFR BLD AUTO: 35.4 % (ref 19.6–45.3)
MCH RBC QN AUTO: 30.1 PG (ref 26.6–33)
MCHC RBC AUTO-ENTMCNC: 31.8 G/DL (ref 31.5–35.7)
MCV RBC AUTO: 94.5 FL (ref 79–97)
MONOCYTES # BLD AUTO: 1.04 10*3/MM3 (ref 0.1–0.9)
MONOCYTES NFR BLD AUTO: 14.7 % (ref 5–12)
NEUTROPHILS NFR BLD AUTO: 3.38 10*3/MM3 (ref 1.7–7)
NEUTROPHILS NFR BLD AUTO: 47.6 % (ref 42.7–76)
NRBC BLD AUTO-RTO: 0 /100 WBC (ref 0–0.2)
PLATELET # BLD AUTO: 289 10*3/MM3 (ref 140–450)
PMV BLD AUTO: 9.5 FL (ref 6–12)
POTASSIUM SERPL-SCNC: 4 MMOL/L (ref 3.5–5.2)
RBC # BLD AUTO: 3.66 10*6/MM3 (ref 4.14–5.8)
SODIUM SERPL-SCNC: 137 MMOL/L (ref 136–145)
WBC NRBC COR # BLD AUTO: 7.09 10*3/MM3 (ref 3.4–10.8)

## 2024-07-18 PROCEDURE — 25010000002 PROPOFOL 200 MG/20ML EMULSION: Performed by: NURSE ANESTHETIST, CERTIFIED REGISTERED

## 2024-07-18 PROCEDURE — 25010000002 FENTANYL CITRATE (PF) 100 MCG/2ML SOLUTION: Performed by: NURSE ANESTHETIST, CERTIFIED REGISTERED

## 2024-07-18 PROCEDURE — 43275 ERCP REMOVE FORGN BODY DUCT: CPT | Performed by: INTERNAL MEDICINE

## 2024-07-18 PROCEDURE — C1769 GUIDE WIRE: HCPCS | Performed by: INTERNAL MEDICINE

## 2024-07-18 PROCEDURE — 25510000001 IOPAMIDOL 61 % SOLUTION: Performed by: INTERNAL MEDICINE

## 2024-07-18 PROCEDURE — 82948 REAGENT STRIP/BLOOD GLUCOSE: CPT

## 2024-07-18 PROCEDURE — 85025 COMPLETE CBC W/AUTO DIFF WBC: CPT | Performed by: ANESTHESIOLOGY

## 2024-07-18 PROCEDURE — 74330 X-RAY BILE/PANC ENDOSCOPY: CPT

## 2024-07-18 PROCEDURE — 25010000002 SUGAMMADEX 200 MG/2ML SOLUTION: Performed by: NURSE ANESTHETIST, CERTIFIED REGISTERED

## 2024-07-18 PROCEDURE — 25010000002 DEXAMETHASONE PER 1 MG: Performed by: NURSE ANESTHETIST, CERTIFIED REGISTERED

## 2024-07-18 PROCEDURE — 25010000002 ONDANSETRON PER 1 MG: Performed by: NURSE ANESTHETIST, CERTIFIED REGISTERED

## 2024-07-18 PROCEDURE — 80048 BASIC METABOLIC PNL TOTAL CA: CPT | Performed by: ANESTHESIOLOGY

## 2024-07-18 PROCEDURE — 25810000003 SODIUM CHLORIDE 0.9 % SOLUTION: Performed by: NURSE ANESTHETIST, CERTIFIED REGISTERED

## 2024-07-18 RX ORDER — ACETAMINOPHEN 325 MG/1
650 TABLET ORAL ONCE AS NEEDED
Status: DISCONTINUED | OUTPATIENT
Start: 2024-07-18 | End: 2024-07-18 | Stop reason: HOSPADM

## 2024-07-18 RX ORDER — ONDANSETRON 4 MG/1
4 TABLET, ORALLY DISINTEGRATING ORAL EVERY 6 HOURS PRN
Status: CANCELLED | OUTPATIENT
Start: 2024-07-18

## 2024-07-18 RX ORDER — DROPERIDOL 2.5 MG/ML
0.62 INJECTION, SOLUTION INTRAMUSCULAR; INTRAVENOUS ONCE AS NEEDED
Status: DISCONTINUED | OUTPATIENT
Start: 2024-07-18 | End: 2024-07-18 | Stop reason: HOSPADM

## 2024-07-18 RX ORDER — OXYCODONE HYDROCHLORIDE 5 MG/1
10 TABLET ORAL ONCE AS NEEDED
Status: DISCONTINUED | OUTPATIENT
Start: 2024-07-18 | End: 2024-07-18 | Stop reason: HOSPADM

## 2024-07-18 RX ORDER — METOCLOPRAMIDE HYDROCHLORIDE 5 MG/ML
10 INJECTION INTRAMUSCULAR; INTRAVENOUS 3 TIMES DAILY
Status: CANCELLED | OUTPATIENT
Start: 2024-07-18

## 2024-07-18 RX ORDER — SODIUM CHLORIDE 9 MG/ML
INJECTION, SOLUTION INTRAVENOUS CONTINUOUS PRN
Status: DISCONTINUED | OUTPATIENT
Start: 2024-07-18 | End: 2024-07-18 | Stop reason: SURG

## 2024-07-18 RX ORDER — ONDANSETRON 2 MG/ML
INJECTION INTRAMUSCULAR; INTRAVENOUS AS NEEDED
Status: DISCONTINUED | OUTPATIENT
Start: 2024-07-18 | End: 2024-07-18 | Stop reason: SURG

## 2024-07-18 RX ORDER — LIDOCAINE HYDROCHLORIDE 20 MG/ML
INJECTION, SOLUTION EPIDURAL; INFILTRATION; INTRACAUDAL; PERINEURAL AS NEEDED
Status: DISCONTINUED | OUTPATIENT
Start: 2024-07-18 | End: 2024-07-18 | Stop reason: SURG

## 2024-07-18 RX ORDER — TORSEMIDE 20 MG/1
20 TABLET ORAL DAILY
COMMUNITY

## 2024-07-18 RX ORDER — DEXAMETHASONE SODIUM PHOSPHATE 4 MG/ML
INJECTION, SOLUTION INTRA-ARTICULAR; INTRALESIONAL; INTRAMUSCULAR; INTRAVENOUS; SOFT TISSUE AS NEEDED
Status: DISCONTINUED | OUTPATIENT
Start: 2024-07-18 | End: 2024-07-18 | Stop reason: SURG

## 2024-07-18 RX ORDER — ROCURONIUM BROMIDE 10 MG/ML
INJECTION, SOLUTION INTRAVENOUS AS NEEDED
Status: DISCONTINUED | OUTPATIENT
Start: 2024-07-18 | End: 2024-07-18 | Stop reason: SURG

## 2024-07-18 RX ORDER — FENTANYL CITRATE 50 UG/ML
INJECTION, SOLUTION INTRAMUSCULAR; INTRAVENOUS AS NEEDED
Status: DISCONTINUED | OUTPATIENT
Start: 2024-07-18 | End: 2024-07-18 | Stop reason: SURG

## 2024-07-18 RX ORDER — ONDANSETRON 2 MG/ML
4 INJECTION INTRAMUSCULAR; INTRAVENOUS EVERY 6 HOURS PRN
Status: CANCELLED | OUTPATIENT
Start: 2024-07-18

## 2024-07-18 RX ORDER — SODIUM CHLORIDE, SODIUM LACTATE, POTASSIUM CHLORIDE, CALCIUM CHLORIDE 600; 310; 30; 20 MG/100ML; MG/100ML; MG/100ML; MG/100ML
125 INJECTION, SOLUTION INTRAVENOUS CONTINUOUS
Status: CANCELLED | OUTPATIENT
Start: 2024-07-18

## 2024-07-18 RX ORDER — PROMETHAZINE HYDROCHLORIDE 25 MG/1
25 TABLET ORAL ONCE AS NEEDED
Status: DISCONTINUED | OUTPATIENT
Start: 2024-07-18 | End: 2024-07-18 | Stop reason: HOSPADM

## 2024-07-18 RX ORDER — FENTANYL CITRATE 50 UG/ML
25 INJECTION, SOLUTION INTRAMUSCULAR; INTRAVENOUS
Status: DISCONTINUED | OUTPATIENT
Start: 2024-07-18 | End: 2024-07-18 | Stop reason: HOSPADM

## 2024-07-18 RX ORDER — PROPOFOL 10 MG/ML
INJECTION, EMULSION INTRAVENOUS AS NEEDED
Status: DISCONTINUED | OUTPATIENT
Start: 2024-07-18 | End: 2024-07-18 | Stop reason: SURG

## 2024-07-18 RX ORDER — NALOXONE HCL 0.4 MG/ML
0.1 VIAL (ML) INJECTION
Status: CANCELLED | OUTPATIENT
Start: 2024-07-18

## 2024-07-18 RX ORDER — ACETAMINOPHEN 650 MG/1
325 SUPPOSITORY RECTAL EVERY 4 HOURS PRN
Status: DISCONTINUED | OUTPATIENT
Start: 2024-07-18 | End: 2024-07-18 | Stop reason: HOSPADM

## 2024-07-18 RX ORDER — PROMETHAZINE HYDROCHLORIDE 25 MG/1
25 SUPPOSITORY RECTAL ONCE AS NEEDED
Status: DISCONTINUED | OUTPATIENT
Start: 2024-07-18 | End: 2024-07-18 | Stop reason: HOSPADM

## 2024-07-18 RX ORDER — PHENYLEPHRINE HCL IN 0.9% NACL 1 MG/10 ML
SYRINGE (ML) INTRAVENOUS AS NEEDED
Status: DISCONTINUED | OUTPATIENT
Start: 2024-07-18 | End: 2024-07-18 | Stop reason: SURG

## 2024-07-18 RX ADMIN — Medication 100 MCG: at 10:25

## 2024-07-18 RX ADMIN — Medication 100 MCG: at 10:43

## 2024-07-18 RX ADMIN — FENTANYL CITRATE 100 MCG: 50 INJECTION, SOLUTION INTRAMUSCULAR; INTRAVENOUS at 10:13

## 2024-07-18 RX ADMIN — ONDANSETRON 4 MG: 2 INJECTION INTRAMUSCULAR; INTRAVENOUS at 10:44

## 2024-07-18 RX ADMIN — DEXAMETHASONE SODIUM PHOSPHATE 4 MG: 4 INJECTION, SOLUTION INTRAMUSCULAR; INTRAVENOUS at 10:36

## 2024-07-18 RX ADMIN — PROPOFOL 150 MG: 10 INJECTION, EMULSION INTRAVENOUS at 10:13

## 2024-07-18 RX ADMIN — ROCURONIUM BROMIDE 50 MG: 10 INJECTION, SOLUTION INTRAVENOUS at 10:13

## 2024-07-18 RX ADMIN — Medication 200 MCG: at 10:34

## 2024-07-18 RX ADMIN — SODIUM CHLORIDE: 9 INJECTION, SOLUTION INTRAVENOUS at 10:08

## 2024-07-18 RX ADMIN — PROPOFOL 50 MG: 10 INJECTION, EMULSION INTRAVENOUS at 10:17

## 2024-07-18 RX ADMIN — SUGAMMADEX 200 MG: 100 INJECTION, SOLUTION INTRAVENOUS at 10:48

## 2024-07-18 RX ADMIN — LIDOCAINE HYDROCHLORIDE 100 MG: 20 INJECTION, SOLUTION EPIDURAL; INFILTRATION; INTRACAUDAL; PERINEURAL at 10:13

## 2024-07-18 NOTE — DISCHARGE INSTRUCTIONS
A responsible adult should stay with you and you should rest quietly for the rest of the day.    Do not drink alcohol, drive, operate any heavy machinery or power tools or make any legal/important decisions for the next 24 hours.     Progress your diet as tolerated.  If you begin to experience severe pain, increased shortness of breath, racing heartbeat or a fever above 101 F, seek immediate medical attention.     Follow up with MD as instructed. Call office for results in 3 to 5 days if needed.  [phone]      Recommendations:  Follow the brushing cytology.  Follow the liver function test.  Follow the GI clinic in 3 months.  Patient and family was advised to call for any postprocedure abdominal pain nausea vomiting.

## 2024-07-18 NOTE — H&P
Patient Care Team:  Waylon Johnson MD as PCP - General  Chemchirian, MD Lali as Consulting Physician (Cardiology)  Atul Fowler MD as Consulting Physician (Nephrology)      Subjective .     History of present illness:    Gabriel De Souza is a 79 y.o. male who presents today for Procedure(s):  ENDOSCOPIC RETROGRADE CHOLANGIOPANCREATOGRAPHY for the indications listed below.     The updated Patient Profile was reviewed prior to the procedure, in conjunction with the Physical Exam, including medical conditions, surgical procedures, medications, allergies, family history and social history.     Pre-operatively, I reviewed the indication(s) for the procedure, the risks of the procedure [including but not limited to: unexpected bleeding possibly requiring hospitalization and/or unplanned repeat procedures, perforation possibly requiring surgical treatment, missed lesions and complications of sedation/MAC (also explained by anesthesia staff)].     I have evaluated the patient for risks associated with the planned anesthesia and the procedure to be performed and find the patient an acceptable candidate for IV sedation.    Multiple opportunities were provided for any questions or concerns, and all questions were answered satisfactorily before any anesthesia was administered. We will proceed with the planned procedure.      ASSESSMENT/PLAN:  79 years old male with a history of pancreatitis as well as stricture of the CBD and possible pancreatic mass likely chronic pancreatitis related  ERCP  EUS      Past Medical History:  Past Medical History:   Diagnosis Date    Cancer 01/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    Coronary artery disease     Deep vein thrombosis 04/1990    Diabetes mellitus     Dyslipidemia     Erectile dysfunction 51 years old    Heart murmur 10/04/2023    Hemodialysis patient     HL (hearing loss) 7 year old    right    Hyperlipidemia 1970    Hypertension     Neuropathy     Renal insufficiency 2020    Stage 4 chronic  kidney disease     3-4       Past Surgical History:  Past Surgical History:   Procedure Laterality Date    ARTERIOVENOUS FISTULA/SHUNT SURGERY Left 9/22/2022    Procedure: BRACHIAL CEPHALIC FISTULA FORMATION;  Surgeon: Edy Vega MD;  Location: [facility] MAIN OR;  Service: Vascular;  Laterality: Left;    BACK SURGERY      BASAL CELL CARCINOMA EXCISION  01/2019    CARDIAC CATHETERIZATION      CYSTOSCOPY W/ URETERAL STENT PLACEMENT Bilateral 1/16/2024    Procedure: CYSTOSCOPY, BILATERAL URETERAL STENT INSERTION;  Surgeon: Tyrel Avitia MD;  Location: [facility] MAIN OR;  Service: Urology;  Laterality: Bilateral;    ERCP N/A 1/23/2024    Procedure: ENDOSCOPIC RETROGRADE CHOLANGIOPANCREATOGRAPHY with sphinctorotomy, brushing of bile duct, balloon sweeping of common bile duct up to 12mm, placement of 10 Fr. x 9cm biliary stent, and placement of 5 Fr. x 7cm pancreatic stent;  Surgeon: Ollie Peoples MD;  Location: [facility] ENDOSCOPY;  Service: Gastroenterology;  Laterality: N/A;    ERCP N/A 3/12/2024    Procedure: ENDOSCOPIC RETROGRADE CHOLANGIOPANCREATOGRAPHY WITH REMOVAL OF PANCREATIC AND BILIARY STENTS,EXTENSION OF SPHINCTEROTOMY, CLEARANCE OF BILE DUCT WITH BALLOON (9MM-12MM, UP TO 12MM), PLACEMENT OF BILIARY STENT;  Surgeon: Ollie Peoples MD;  Location: [facility] ENDOSCOPY;  Service: Gastroenterology;  Laterality: N/A;  Post-    TURP / TRANSURETHRAL INCISION / DRAINAGE PROSTATE      UPPER ENDOSCOPIC ULTRASOUND W/ FNA N/A 1/23/2024    Procedure: ESOPHAGOGASTRODUODENOSCOPY WITH ULTRASOUND AND FINE NEEDLE ASPIRATION of pancreatic head lesion;  Surgeon: Ollie Peoples MD;  Location: [facility] ENDOSCOPY;  Service: Gastroenterology;  Laterality: N/A;    UPPER ENDOSCOPIC ULTRASOUND W/ FNA N/A 3/12/2024    Procedure: ENDOSCOPIC ULTRASOUND WITH FINE NEEDLE ASPIRATION;  Surgeon: Ollie Peoples MD;  Location: [facility] ENDOSCOPY;  Service: Gastroenterology;  Laterality: N/A;  Post-       Social History:  Social History     Tobacco Use     Smoking status: Never     Passive exposure: Never    Smokeless tobacco: Never   Vaping Use    Vaping status: Never Used   Substance Use Topics    Alcohol use: No    Drug use: No       Family History:  Family History   Problem Relation Age of Onset    Heart disease Mother     Heart disease Brother     Heart attack Maternal Grandfather     Hypertension Father     Hearing loss Father        Medications:  Medications Prior to Admission   Medication Sig Dispense Refill Last Dose    aspirin 81 MG tablet Take 1 tablet by mouth Daily.       calcitriol (ROCALTROL) 0.25 MCG capsule Take 2 capsules by mouth Daily.       sevelamer (RENVELA) 800 MG tablet Take 1 tablet by mouth 3 (Three) Times a Day As Needed.       simvastatin (ZOCOR) 20 MG tablet TAKE 1 TABLET BY MOUTH DAILY 90 tablet 3     Soliqua 100-33 UNT-MCG/ML solution pen-injector injection INJECT 20 UNITS SUBCUTANEOUSLY  INTO THE APPROPRIATE AREA AS  DIRECTED DAILY (Patient taking differently: Inject 18 Units under the skin into the appropriate area as directed Daily.) 30 mL 3        Scheduled Meds:  Continuous Infusions:No current facility-administered medications for this encounter.    PRN Meds:.    ALLERGIES:  Codeine and Tylenol with codeine #3 [acetaminophen-codeine]        Objective     Vital Signs:        Physical Exam:      General Appearance:    Awake and alert, in no acute distress   Lungs:     Clear to auscultation bilaterally, respirations regular, even and unlabored    Heart:    Regular rhythm and normal rate, normal S1 and S2, no            murmur, no gallop, no rub   Abdomen:     Normal bowel sounds, soft, non-tender, no rebound or guarding, non-distended, no hepatosplenomegaly        Results Review:   I reviewed the patient's labs and imaging.  Lab Results (last 24 hours)       ** No results found for the last 24 hours. **            Imaging Results (Last 24 Hours)       ** No results found for the last 24 hours. **               I discussed the  patients findings and my recommendations with the patient.  Ollie Peoples MD  07/18/24  08:18 EDT

## 2024-07-18 NOTE — ANESTHESIA POSTPROCEDURE EVALUATION
Patient: Gabriel De Souza    Procedure Summary       Date: 07/18/24 Room / Location: [facility] ENDOSCOPY 2 / [facility] ENDOSCOPY    Anesthesia Start: 1008 Anesthesia Stop: 1103    Procedure: ENDOSCOPIC RETROGRADE CHOLANGIOPANCREATOGRAPHY with SPHINCTEROTOMY, BALLOON SWEEP, BILE DUCT BRUSHINGS Diagnosis:       Pancreatitis      (Pancreatitis [K85.90])    Surgeons: Ollie Peoples MD Provider: Peng Hyatt MD    Anesthesia Type: general ASA Status: 4            Anesthesia Type: general    Vitals  Vitals Value Taken Time   /63 07/18/24 1103   Temp 98 °F (36.7 °C) 07/18/24 1103   Pulse 74 07/18/24 1106   Resp 14 07/18/24 1103   SpO2 99 % 07/18/24 1106   Vitals shown include unfiled device data.        Post Anesthesia Care and Evaluation    Patient location during evaluation: PACU  Patient participation: complete - patient participated  Level of consciousness: awake  Pain scale: See nurse's notes for pain score.  Pain management: adequate    Airway patency: patent  Anesthetic complications: No anesthetic complications  PONV Status: none  Cardiovascular status: acceptable  Respiratory status: acceptable and spontaneous ventilation  Hydration status: acceptable    Comments: Patient seen and examined postoperatively; vital signs stable; SpO2 greater than or equal to 90%; cardiopulmonary status stable; nausea/vomiting adequately controlled; pain adequately controlled; no apparent anesthesia complications; patient discharged from anesthesia care when discharge criteria were met

## 2024-07-18 NOTE — ANESTHESIA PROCEDURE NOTES
Airway  Urgency: elective    Date/Time: 7/18/2024 10:15 AM  Airway not difficult    General Information and Staff    Patient location during procedure: OR  CRNA/CAA: Samantha Smith CRNA    Indications and Patient Condition  Indications for airway management: airway protection    Preoxygenated: yes  MILS maintained throughout  Mask difficulty assessment: 1 - vent by mask    Final Airway Details  Final airway type: endotracheal airway      Successful airway: ETT  Cuffed: yes   Successful intubation technique: video laryngoscopy  Facilitating devices/methods: intubating stylet  Endotracheal tube insertion site: oral  Blade: Grey  Blade size: 3  ETT size (mm): 8.0  Cormack-Lehane Classification: grade I - full view of glottis  Placement verified by: chest auscultation and capnometry   Measured from: lips  ETT/EBT  to lips (cm): 24  Number of attempts at approach: 1  Assessment: lips, teeth, and gum same as pre-op and atraumatic intubation

## 2024-07-18 NOTE — ANESTHESIA PREPROCEDURE EVALUATION
Anesthesia Evaluation     Patient summary reviewed and Nursing notes reviewed   no history of anesthetic complications:   NPO Solid Status: > 8 hours  NPO Liquid Status: > 2 hours           Airway   Dental      Pulmonary    Cardiovascular     ECG reviewed  PT is on anticoagulation therapy    (+) hypertension, valvular problems/murmurs murmur, CAD, angina, DVT, hyperlipidemia      Neuro/Psych  (+) numbness  GI/Hepatic/Renal/Endo    (+) renal disease- dialysis, ESRD and CRI, diabetes mellitus, thyroid problem     Musculoskeletal     (+) back pain  Abdominal    Substance History      OB/GYN          Other   blood dyscrasia anemia,   history of cancer    ROS/Med Hx Other: Additional History:  Neuropathy, goiter, pancreatitis, renal cyst, hypogonadism, hyperparathyroidism    Echo:    Echocardiogram Findings    Left Ventricle Left ventricular ejection fraction appears to be 56 - 60%.   Normal global . Normal left ventricular cavity size and wall thickness noted. All left ventricular wall segments contract normally. Left ventricular diastolic function is consistent with (grade Ia w/high LAP) impaired relaxation and age.  Right Ventricle Normal right ventricular cavity size, wall thickness, systolic function and septal motion noted.  Left Atrium The left atrial cavity is mildly dilated.  Right Atrium Normal right atrial cavity size noted. The inferior vena cava is normally sized. Normal IVC inspiratory collapse of greater than 50% noted.  Aortic Valve The aortic valve is abnormal in structure. The aortic valve exhibits sclerosis. The aortic valve appears trileaflet. No aortic valve regurgitation is present. No hemodynamically significant aortic valve stenosis is present.  Mitral Valve The mitral valve is structurally normal with no significant stenosis present. Trace mitral valve regurgitation is present.  Tricuspid Valve The tricuspid valve is structurally normal with no stenosis present. Trace tricuspid valve  regurgitation is present. Insufficient TR velocity profile to estimate the right ventricular systolic pressure.  Pulmonic Valve The pulmonic valve is structurally normal with no significant stenosis present. There is mild to moderate pulmonic valve regurgitation present.  Greater Vessels No dilation of the aortic root is present. No dilation of the sinuses of Valsalva is present.  Pericardium The pericardium is normal. There is no evidence of pericardial effusion. .        PSH:  TURP / TRANSURETHRAL INCISION / DRAINAGE PROSTATE BACK SURGERY  CARDIAC CATHETERIZATION BASAL CELL CARCINOMA EXCISION  ARTERIOVENOUS FISTULA/SHUNT SURGERY CYSTOSCOPY W/ URETERAL STENT PLACEMENT  ERCP UPPER ENDOSCOPIC ULTRASOUND W/ FNA  ERCP UPPER ENDOSCOPIC ULTRASOUND W/ FNA                Anesthesia Plan    ASA 4     general     (Patient identified; pre-operative vital signs, all relevant labs/studies, complete medical/surgical/anesthetic history, full medication list, full allergy list, and NPO status obtained/reviewed; physical assessment performed; anesthetic options, side effects, potential complications, risks, and benefits discussed; questions answered; written anesthesia consent obtained; patient cleared for procedure; anesthesia machine and equipment checked and functioning)  intravenous induction     Anesthetic plan, risks, benefits, and alternatives have been provided, discussed and informed consent has been obtained with: patient.    Plan discussed with CRNA and CAA.    CODE STATUS:

## 2024-07-18 NOTE — OP NOTE
ENDOSCOPIC RETROGRADE CHOLANGIOPANCREATOGRAPHY Procedure Report    Patient Name:  Gabriel De Souza  YOB: 1945    Date of Surgery:  7/18/2024     Pre-Op Diagnosis:  Pancreatitis [K85.90]        Procedure/CPT® Codes:      Procedure(s):  ENDOSCOPIC RETROGRADE CHOLANGIOPANCREATOGRAPHY with SPHINCTEROTOMY, BALLOON SWEEP, BILE DUCT BRUSHINGS    Staff:  Surgeon(s):  Ollie Peoples MD      Anesthesia: General    Description of Procedure:  A description of the procedure as well as risks, benefits and alternative methods were explained to the patient who voiced understanding and signed the corresponding consent form.Specifically risks of post-ERCP pancreatitis, bleeding, perforation, failure to canulate and adverse reaction to sedation were discussed. A physical exam was performed and vital signs were monitored throughout the procedure.    A  film was performed which was normal. With the patient in the semi-prone position, an Olympus side viewing endoscope was placed into the mouth and proceeded through the esophagus, stomach and second portion of the duodenum without difficulty. Limited views of the esophagus and stomach were normal. The ampulla was visualized and appeared normal. A [employer] hydrotome was used to cannulate the ampulla using wire guided technique. Bile was aspirated to ensure this was the duct of interest. Contrast was injected into the bile duct.      The scope was then retroflexed and the fundus was visualized. The procedure was not difficult and there were no immediate complications.    Findings:   Limited evaluation of esophagogastric dual mucosa grossly looks normal except patient has a J-shaped stomach requiring left lateral position to go to the D2 level.  Major ampulla was aligned with the scope, stent was seen coming out of the major ampulla.  Stent was removed with a snare subsequently cannulation of the common bile duct was obtained with the dream tome preloaded with a  wire.,  Common bile duct was prominent close to 1 to 1.2 cm, some residual sphincter was noticed, sphincterotomy was extended.  Subsequently multiple sweeps did not retrieve any sludge or stone.  There was a small residual sphincter versus a stricture noticed brushing cytology was performed.  Bile and contrast was flowing freely.  Patient tolerated procedure very well no immediate complication was noticed.    Impression:  1.  Successful removal of CBD stent, small residual stricture versus related to residual sphincter was noticed brushing cytology was performed.  Doubt malignant stricture.  Previous 2 fine-needle biopsy from the pancreas has been negative.  CA 19-9 is also improving.  We removed common bile duct stent and multiple sweeps of common bile duct with balloon extraction did not retrieve any sludge or stone.  Patient Toller procedure very well.  2.  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    Recommendations:  Follow the brushing cytology.  Follow the liver function test.  Follow the GI clinic in 3 months.  Patient and family was advised to call for any postprocedure abdominal pain nausea vomiting.      Ollie Peoples MD     Date: 7/18/2024    Time: 10:47 EDT

## 2024-10-15 ENCOUNTER — OFFICE VISIT (OUTPATIENT)
Dept: CARDIOLOGY | Facility: CLINIC | Age: 79
End: 2024-10-15
Payer: MEDICARE

## 2024-10-15 VITALS
WEIGHT: 194.4 LBS | HEART RATE: 78 BPM | BODY MASS INDEX: 25.76 KG/M2 | SYSTOLIC BLOOD PRESSURE: 137 MMHG | DIASTOLIC BLOOD PRESSURE: 77 MMHG | HEIGHT: 73 IN

## 2024-10-15 DIAGNOSIS — I25.10 MILD CAD: Primary | ICD-10-CM

## 2024-10-15 PROCEDURE — 1159F MED LIST DOCD IN RCRD: CPT | Performed by: INTERNAL MEDICINE

## 2024-10-15 PROCEDURE — 3078F DIAST BP <80 MM HG: CPT | Performed by: INTERNAL MEDICINE

## 2024-10-15 PROCEDURE — 1160F RVW MEDS BY RX/DR IN RCRD: CPT | Performed by: INTERNAL MEDICINE

## 2024-10-15 PROCEDURE — 3075F SYST BP GE 130 - 139MM HG: CPT | Performed by: INTERNAL MEDICINE

## 2024-10-15 PROCEDURE — 99212 OFFICE O/P EST SF 10 MIN: CPT | Performed by: INTERNAL MEDICINE

## 2024-10-15 RX ORDER — SEVELAMER CARBONATE 800 MG/1
3 TABLET, FILM COATED ORAL 3 TIMES DAILY
COMMUNITY
Start: 2024-06-21

## 2024-10-15 NOTE — PROGRESS NOTES
Progress note      Name: Gabriel De Souza ADMIT: (Not on file)   : 1945  PCP: Waylon Johnson MD    MRN: [id number] LOS: 0 days   AGE/SEX: 79 y.o. male  ROOM: Room/bed info not found     Chief Complaint   Patient presents with    Follow-up     6mth       Subjective       History of present illness  Gabriel De Souza is a 79-year-old male patient who has nonobstructive CAD, hypertension, diabetes, chronic renal insufficiency on hemodialysis since 2024, beta-blocker induced bradycardia, here today for follow-up.  Doing well, denies any chest pain or shortness of breath no palpitations no lower extremity edema no syncopal episodes.    Past Medical History:   Diagnosis Date    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    Coronary artery disease     Deep vein thrombosis 1990    Diabetes mellitus     Dyslipidemia     Erectile dysfunction 51 years old    Heart murmur 10/04/2023    Hemodialysis patient     HL (hearing loss) 7 year old    right    Hyperlipidemia 1970    Hypertension     Neuropathy     Renal insufficiency     Stage 4 chronic kidney disease     3-4     Past Surgical History:   Procedure Laterality Date    ARTERIOVENOUS FISTULA/SHUNT SURGERY Left 2022    Procedure: BRACHIAL CEPHALIC FISTULA FORMATION;  Surgeon: Edy Vega MD;  Location: [facility] OR;  Service: Vascular;  Laterality: Left;    BACK SURGERY      BASAL CELL CARCINOMA EXCISION  2019    CARDIAC CATHETERIZATION      CYSTOSCOPY W/ URETERAL STENT PLACEMENT Bilateral 2024    Procedure: CYSTOSCOPY, BILATERAL URETERAL STENT INSERTION;  Surgeon: Tyrel Avitia MD;  Location: [facility] OR;  Service: Urology;  Laterality: Bilateral;    ERCP N/A 2024    Procedure: ENDOSCOPIC RETROGRADE CHOLANGIOPANCREATOGRAPHY with sphinctorotomy, brushing of bile duct, balloon sweeping of common bile duct up to 12mm, placement of 10 Fr. x 9cm biliary stent, and placement of 5 Fr. x 7cm pancreatic stent;  Surgeon: Ollie Peoples MD;   Location: [facility] ENDOSCOPY;  Service: Gastroenterology;  Laterality: N/A;    ERCP N/A 7/18/2024    Procedure: ENDOSCOPIC RETROGRADE CHOLANGIOPANCREATOGRAPHY with SPHINCTEROTOMY, BALLOON SWEEP, BILE DUCT BRUSHINGS;  Surgeon: Ollie Peoples MD;  Location: [facility] ENDOSCOPY;  Service: Gastroenterology;  Laterality: N/A;  POST: PANCREATITIS    ERCP N/A 3/12/2024    Procedure: ENDOSCOPIC RETROGRADE CHOLANGIOPANCREATOGRAPHY WITH REMOVAL OF PANCREATIC AND BILIARY STENTS,EXTENSION OF SPHINCTEROTOMY, CLEARANCE OF BILE DUCT WITH BALLOON (9MM-12MM, UP TO 12MM), PLACEMENT OF BILIARY STENT;  Surgeon: Ollie Peoples MD;  Location: [facility] ENDOSCOPY;  Service: Gastroenterology;  Laterality: N/A;  Post-    TURP / TRANSURETHRAL INCISION / DRAINAGE PROSTATE      UPPER ENDOSCOPIC ULTRASOUND W/ FNA N/A 1/23/2024    Procedure: ESOPHAGOGASTRODUODENOSCOPY WITH ULTRASOUND AND FINE NEEDLE ASPIRATION of pancreatic head lesion;  Surgeon: Ollie Peoples MD;  Location: [facility] ENDOSCOPY;  Service: Gastroenterology;  Laterality: N/A;    UPPER ENDOSCOPIC ULTRASOUND W/ FNA N/A 3/12/2024    Procedure: ENDOSCOPIC ULTRASOUND WITH FINE NEEDLE ASPIRATION;  Surgeon: Ollie Peoples MD;  Location: [facility] ENDOSCOPY;  Service: Gastroenterology;  Laterality: N/A;  Post-     Family History   Problem Relation Age of Onset    Heart disease Mother     Heart disease Brother     Heart attack Maternal Grandfather     Hypertension Father     Hearing loss Father      Social History     Tobacco Use    Smoking status: Never     Passive exposure: Never    Smokeless tobacco: Never    Tobacco comments:     never used   Vaping Use    Vaping status: Never Used   Substance Use Topics    Alcohol use: No    Drug use: No       Current Outpatient Medications:     aspirin 81 MG tablet, Take 1 tablet by mouth Daily., Disp: , Rfl:     sevelamer (RENVELA) 800 MG tablet, Take 3 tablets by mouth 3 (Three) Times a Day., Disp: , Rfl:     simvastatin (ZOCOR) 20 MG tablet, TAKE 1 TABLET BY  MOUTH DAILY, Disp: 90 tablet, Rfl: 3    Soliqua 100-33 UNT-MCG/ML solution pen-injector injection, INJECT 20 UNITS SUBCUTANEOUSLY  INTO THE APPROPRIATE AREA AS  DIRECTED DAILY (Patient taking differently: Inject 18 Units under the skin into the appropriate area as directed Daily.), Disp: 30 mL, Rfl: 3    torsemide (DEMADEX) 20 MG tablet, Take 1 tablet by mouth Daily., Disp: , Rfl:   Allergies:  Codeine and Tylenol with codeine #3 [acetaminophen-codeine]      Physical Exam  VITALS REVIEWED    General:      well developed, in no acute distress.    Head:      normocephalic and atraumatic.    Eyes:      PERRL/EOM intact, conjunctiva and sclera clear with out nystagmus.    Neck:      no masses, thyromegaly,  trachea central with normal respiratory effort and PMI displaced laterally  Lungs:      Clear to auscultation bilaterally  Heart:       Regular rate and rhythm, systolic murmur  Msk:      no deformity or scoliosis noted of thoracic or lumbar spine.    Pulses:      pulses normal in all 4 extremities.    Extremities:       No lower extremity edema  Neurologic:      no focal deficits.   alert oriented x3  Skin:      intact without lesions or rashes.    Psych:      alert and cooperative; normal mood and affect; normal attention span and concentration.      Result Review :               Pertinent cardiac workup    Echocardiogram 4/9/2024 ejection fraction 60 to 65%  EKG 3/12/2024 sinus rhythm         Procedures        Assessment and Plan      Gabriel De Souza is a 79-year-old male patient who has no significant CAD, has diabetes, renal insufficiency, on hemodialysis, beta-blocker induced bradycardia, here today for follow-up. He is doing well, denies any anginal symptoms or CHF symptoms, his heart rate and blood pressure are stable. Cardiac wise he seems to be doing okay, continue same therapy, will see him in follow-up in 1 year.     Diagnoses and all orders for this visit:    1. Mild CAD (Primary)           Return in  about 1 year (around 10/15/2025).  Patient was given instructions and counseling regarding his condition or for health maintenance advice. Please see specific information pulled into the AVS if appropriate.       Electronically signed by Lali Borja MD, 10/15/24, 9:14 AM EDT.

## 2024-10-22 ENCOUNTER — OFFICE VISIT (OUTPATIENT)
Dept: FAMILY MEDICINE CLINIC | Facility: CLINIC | Age: 79
End: 2024-10-22
Payer: MEDICARE

## 2024-10-22 VITALS
TEMPERATURE: 96.9 F | HEART RATE: 72 BPM | SYSTOLIC BLOOD PRESSURE: 150 MMHG | BODY MASS INDEX: 25.98 KG/M2 | DIASTOLIC BLOOD PRESSURE: 72 MMHG | WEIGHT: 196 LBS | OXYGEN SATURATION: 99 % | HEIGHT: 73 IN

## 2024-10-22 DIAGNOSIS — E11.42 DIABETIC PERIPHERAL NEUROPATHY ASSOCIATED WITH TYPE 2 DIABETES MELLITUS: ICD-10-CM

## 2024-10-22 DIAGNOSIS — N18.6 TYPE 2 DIABETES MELLITUS WITH CHRONIC KIDNEY DISEASE ON CHRONIC DIALYSIS, WITHOUT LONG-TERM CURRENT USE OF INSULIN: ICD-10-CM

## 2024-10-22 DIAGNOSIS — Z00.00 MEDICARE ANNUAL WELLNESS VISIT, SUBSEQUENT: Primary | ICD-10-CM

## 2024-10-22 DIAGNOSIS — I10 ESSENTIAL HYPERTENSION: ICD-10-CM

## 2024-10-22 DIAGNOSIS — Z99.2 TYPE 2 DIABETES MELLITUS WITH CHRONIC KIDNEY DISEASE ON CHRONIC DIALYSIS, WITHOUT LONG-TERM CURRENT USE OF INSULIN: ICD-10-CM

## 2024-10-22 DIAGNOSIS — E11.22 TYPE 2 DIABETES MELLITUS WITH CHRONIC KIDNEY DISEASE ON CHRONIC DIALYSIS, WITHOUT LONG-TERM CURRENT USE OF INSULIN: ICD-10-CM

## 2024-10-22 DIAGNOSIS — E78.5 DYSLIPIDEMIA: ICD-10-CM

## 2024-10-22 PROCEDURE — 99214 OFFICE O/P EST MOD 30 MIN: CPT | Performed by: FAMILY MEDICINE

## 2024-10-22 PROCEDURE — 1125F AMNT PAIN NOTED PAIN PRSNT: CPT | Performed by: FAMILY MEDICINE

## 2024-10-22 PROCEDURE — 3078F DIAST BP <80 MM HG: CPT | Performed by: FAMILY MEDICINE

## 2024-10-22 PROCEDURE — G0439 PPPS, SUBSEQ VISIT: HCPCS | Performed by: FAMILY MEDICINE

## 2024-10-22 PROCEDURE — 1159F MED LIST DOCD IN RCRD: CPT | Performed by: FAMILY MEDICINE

## 2024-10-22 PROCEDURE — 3077F SYST BP >= 140 MM HG: CPT | Performed by: FAMILY MEDICINE

## 2024-10-22 PROCEDURE — 1160F RVW MEDS BY RX/DR IN RCRD: CPT | Performed by: FAMILY MEDICINE

## 2024-10-22 NOTE — PROGRESS NOTES
Subjective   The ABCs of the Annual Wellness Visit  Medicare Wellness Visit      Gabriel De Souza is a 79 y.o. patient who presents for a Medicare Wellness Visit.    The following portions of the patient's history were reviewed and   updated as appropriate: allergies, current medications, past family history, past medical history, past social history, past surgical history, and problem list.    Compared to one year ago, the patient's physical   health is the same.  Compared to one year ago, the patient's mental   health is the same.    Recent Hospitalizations:  This patient has had a [facility] admission record on file within the last 365 days.  Current Medical Providers:  Patient Care Team:  Waylon Johnson MD as PCP - General  ChemchiriLali monson MD as Consulting Physician (Cardiology)  Atul Fowler MD as Consulting Physician (Nephrology)    Outpatient Medications Prior to Visit   Medication Sig Dispense Refill    aspirin 81 MG tablet Take 1 tablet by mouth Daily.      sevelamer (RENVELA) 800 MG tablet Take 3 tablets by mouth 3 (Three) Times a Day.      simvastatin (ZOCOR) 20 MG tablet TAKE 1 TABLET BY MOUTH DAILY 90 tablet 3    Soliqua 100-33 UNT-MCG/ML solution pen-injector injection INJECT 20 UNITS SUBCUTANEOUSLY  INTO THE APPROPRIATE AREA AS  DIRECTED DAILY (Patient taking differently: Inject 18 Units under the skin into the appropriate area as directed Daily.) 30 mL 3    torsemide (DEMADEX) 20 MG tablet Take 1 tablet by mouth Daily.       No facility-administered medications prior to visit.     No opioid medication identified on active medication list. I have reviewed chart for other potential  high risk medication/s and harmful drug interactions in the elderly.      Aspirin is on active medication list. Aspirin use is indicated based on review of current medical condition/s. Pros and cons of this therapy have been discussed today. Benefits of this medication outweigh potential harm.  Patient has  "been encouraged to continue taking this medication.  .      Patient Active Problem List   Diagnosis    Angina pectoris    Mild CAD    Dyslipidemia    Hypertension    Type 2 diabetes mellitus    Encounter for screening for malignant neoplasm of prostate    Hip pain, right    Anemia in chronic kidney disease    Benign prostatic hyperplasia    Bronchitis    Deafness in right ear    Diabetic peripheral neuropathy associated with type 2 diabetes mellitus    Goiter    History of thrombosis    Hypercholesterolemia    Hypogonadism    Low back pain    Peripheral neuropathy    Proteinuria    Secondary hyperparathyroidism of renal origin    Renal cyst    Vitamin D deficiency    Hyperphosphatemia due to chronic kidney disease    Heart murmur    End stage renal disease on dialysis    Choledocholithiasis     Advance Care Planning Advance Directive is on file.  ACP discussion was held with the patient during this visit. Patient has an advance directive in EMR which is still valid.             Objective   Vitals:    10/22/24 0756   BP: 150/72   BP Location: Right arm   Patient Position: Sitting   Cuff Size: Large Adult   Pulse: 72   Temp: 96.9 °F (36.1 °C)   TempSrc: Temporal   SpO2: 99%   Weight: 88.9 kg (196 lb)   Height: 185.4 cm (72.99\")   PainSc:   4   PainLoc: Leg  Comment: bakc of legs and feet       Estimated body mass index is 25.86 kg/m² as calculated from the following:    Height as of this encounter: 185.4 cm (72.99\").    Weight as of this encounter: 88.9 kg (196 lb).    BMI is >= 25 and <30. (Overweight) The following options were offered after discussion;: exercise counseling/recommendations and nutrition counseling/recommendations       Does the patient have evidence of cognitive impairment? No  Lab Results   Component Value Date    HGBA1C 5.3 08/14/2024                                                                                                Health  Risk Assessment    Smoking Status:  Social History     Tobacco " Use   Smoking Status Never    Passive exposure: Never   Smokeless Tobacco Never   Tobacco Comments    never used     Alcohol Consumption:  Social History     Substance and Sexual Activity   Alcohol Use No       Fall Risk Screen  STEADI Fall Risk Assessment was completed, and patient is at LOW risk for falls.Assessment completed on:10/22/2024    Depression Screening:      10/22/2024     7:58 AM   PHQ-2/PHQ-9 Depression Screening   Little interest or pleasure in doing things Not at all   Feeling down, depressed, or hopeless Not at all     Health Habits and Functional and Cognitive Screening:      10/15/2024    12:11 PM   Functional & Cognitive Status   Do you have difficulty preparing food and eating? No    Do you have difficulty bathing yourself, getting dressed or grooming yourself? No    Do you have difficulty using the toilet? No    Do you have difficulty moving around from place to place? No    Do you have trouble with steps or getting out of a bed or a chair? No    Current Diet Well Balanced Diet    Dental Exam Up to date    Eye Exam Up to date    Exercise (times per week) 6 times per week    Current Exercises Include Light Weights;Stationary Bicycling/Spin Class;Weightlifting    Do you need help using the phone?  No    Are you deaf or do you have serious difficulty hearing?  Yes    Do you need help to go to places out of walking distance? No    Do you need help shopping? No    Do you need help preparing meals?  No    Do you need help with housework?  No    Do you need help with laundry? No    Do you need help taking your medications? No    Do you need help managing money? No    Do you ever drive or ride in a car without wearing a seat belt? No    Have you felt unusual stress, anger or loneliness in the last month? No    Who do you live with? Spouse    If you need help, do you have trouble finding someone available to you? No    Have you been bothered in the last four weeks by sexual problems? No    Do you have  difficulty concentrating, remembering or making decisions? No        Patient-reported           Age-appropriate Screening Schedule:  Refer to the list below for future screening recommendations based on patient's age, sex and/or medical conditions. Orders for these recommended tests are listed in the plan section. The patient has been provided with a written plan.    Health Maintenance List  Health Maintenance   Topic Date Due    TDAP/TD VACCINES (1 - Tdap) Never done    DIABETIC EYE EXAM  04/04/2024    ANNUAL WELLNESS VISIT  10/10/2024    LIPID PANEL  10/10/2024    URINE MICROALBUMIN  10/10/2024    BMI FOLLOWUP  10/10/2024    HEMOGLOBIN A1C  02/14/2025    COVID-19 Vaccine (6 - 2023-24 season) 02/15/2025    HEPATITIS C SCREENING  Completed    Hepatitis B  Completed    RSV Vaccine - Adults  Completed    INFLUENZA VACCINE  Completed    Pneumococcal Vaccine 65+  Completed    ZOSTER VACCINE  Completed    COLORECTAL CANCER SCREENING  Discontinued                                                                                                                                                CMS Preventative Services Quick Reference  Risk Factors Identified During Encounter  Immunizations Discussed/Encouraged: Influenza and COVID19  Dental Screening Recommended  Vision Screening Recommended    The above risks/problems have been discussed with the patient.  Pertinent information has been shared with the patient in the After Visit Summary.  An After Visit Summary and PPPS were made available to the patient.    Follow Up:   Next Medicare Wellness visit to be scheduled in 1 year.         Additional E&M Note during same encounter follows:  Patient has additional, significant, and separately identifiable condition(s)/problem(s) that require work above and beyond the Medicare Wellness Visit     Chief Complaint  Medicare Wellness-subsequent    Subjective   HPI  JEET is also being seen today for additional medical problem/s.  He has  "type 2 diabetes and high blood pressure that we manage here.  He has end-stage kidney disease and is on dialysis 3 times a week now.  Labs done through nephrology showed his most recent A1c good at 5.3%.  He has diabetic neuropathy which is a little worse in his opinion despite treatment.  He is still capable of doing activities around the house and remains active, more than most getting dialysis honestly.  Other than feeling extremely tired today after dialysis yesterday, he has no immediate complaints.  He has had his flu and COVID vaccinations.    Review of Systems   Constitutional:  Positive for fatigue. Negative for activity change.   HENT:  Negative for congestion, postnasal drip, rhinorrhea, trouble swallowing and voice change.    Eyes:  Negative for photophobia and visual disturbance.   Respiratory:  Negative for cough, shortness of breath and wheezing.    Cardiovascular:  Negative for chest pain and palpitations.   Gastrointestinal:  Negative for abdominal pain, constipation, diarrhea, nausea and vomiting.   Genitourinary:  Negative for difficulty urinating, frequency and urgency.   Musculoskeletal:  Positive for arthralgias. Negative for back pain, gait problem and myalgias.   Neurological:  Positive for weakness and numbness. Negative for dizziness and light-headedness.   Hematological:  Does not bruise/bleed easily.   Psychiatric/Behavioral:  Negative for dysphoric mood. The patient is not nervous/anxious.               Objective   Vital Signs:  /72 (BP Location: Right arm, Patient Position: Sitting, Cuff Size: Large Adult)   Pulse 72   Temp 96.9 °F (36.1 °C) (Temporal)   Ht 185.4 cm (72.99\")   Wt 88.9 kg (196 lb)   SpO2 99%   BMI 25.86 kg/m²   Physical Exam  Vitals and nursing note reviewed.   HENT:      Right Ear: Tympanic membrane and ear canal normal.      Left Ear: Tympanic membrane and ear canal normal.   Cardiovascular:      Rate and Rhythm: Normal rate.      Heart sounds: Normal heart " sounds.   Pulmonary:      Effort: Pulmonary effort is normal.      Breath sounds: No wheezing or rales.   Abdominal:      General: Bowel sounds are normal.      Palpations: Abdomen is soft.      Tenderness: There is no abdominal tenderness. There is no guarding or rebound.      Hernia: No hernia is present.   Musculoskeletal:      Cervical back: Neck supple.      Right lower leg: No edema.      Left lower leg: No edema.   Lymphadenopathy:      Cervical: No cervical adenopathy.   Neurological:      Mental Status: He is alert and oriented to person, place, and time. Mental status is at baseline.   Psychiatric:         Mood and Affect: Mood normal.         The following data was reviewed by: Waylon Johnson MD on 10/22/2024:        Assessment and Plan Additional age appropriate preventative wellness advice topics were discussed during today's preventative wellness exam(some topics already addressed during AWV portion of the note above):   Nutrition: Discussed nutrition plan with patient. Information shared in after visit summary. Goal is for a well balanced diet to enhance overall health.     Healthy Weight: Discussed current and goal BMI with patient. Steps to attain this goal discussed. Information shared in after visit summary.              Type 2 diabetes mellitus with chronic kidney disease on chronic dialysis, without long-term current use of insulin  Diabetes is stable.   Continue current treatment regimen.  Diabetes will be reassessed in 6 months  Medicare annual wellness visit, subsequent    Essential hypertension  Hypertension is stable and controlled  Continue current treatment regimen.  Blood pressure will be reassessed in 6 months.  Dyslipidemia    Diabetic peripheral neuropathy associated with type 2 diabetes mellitus  Diabetes is stable.   Continue current treatment regimen.  Diabetes will be reassessed in 6 months  Orders Placed This Encounter   Procedures    Lipid Panel     Order Specific  Question:   Release to patient     Answer:   Routine Release [1400000002]    MicroAlbumin, Urine, Random - Urine, Clean Catch     Order Specific Question:   Release to patient     Answer:   Routine Release [1400000002]    Comprehensive Metabolic Panel     Order Specific Question:   Release to patient     Answer:   Routine Release [1400000002]    Hemoglobin A1c     Order Specific Question:   Release to patient     Answer:   Routine Release [1400000002]    CBC & Differential     Order Specific Question:   Manual Differential     Answer:   No     Order Specific Question:   Release to patient     Answer:   Routine Release [1400000002]           I spent 30 minutes caring for Gabriel on this date of service. This time includes time spent by me in the following activities:preparing for the visit, obtaining and/or reviewing a separately obtained history, performing a medically appropriate examination and/or evaluation , counseling and educating the patient/family/caregiver, ordering medications, tests, or procedures, and documenting information in the medical record  Follow Up   No follow-ups on file.  Patient was given instructions and counseling regarding his condition or for health maintenance advice. Please see specific information pulled into the AVS if appropriate.    I will continue to follow along with nephrology and they will get most of his lab work  I do not need to get any labs today  He does not need any immunizations today  I did encourage him to remain active  I did ask him to let me know if he develops new concerns  He also sees cardiology once a year and will let me know if they make any changes  Vision exam is currently up-to-date  Fall risk is minimal

## 2024-10-24 ENCOUNTER — OFFICE (AMBULATORY)
Age: 79
End: 2024-10-24
Payer: MEDICARE

## 2024-10-24 ENCOUNTER — OFFICE (AMBULATORY)
Dept: URBAN - METROPOLITAN AREA CLINIC 64 | Facility: CLINIC | Age: 79
End: 2024-10-24
Payer: MEDICARE

## 2024-10-24 VITALS
SYSTOLIC BLOOD PRESSURE: 124 MMHG | HEIGHT: 74 IN | WEIGHT: 192.2 LBS | HEIGHT: 74 IN | SYSTOLIC BLOOD PRESSURE: 124 MMHG | SYSTOLIC BLOOD PRESSURE: 124 MMHG | SYSTOLIC BLOOD PRESSURE: 124 MMHG | HEIGHT: 74 IN | DIASTOLIC BLOOD PRESSURE: 70 MMHG | HEART RATE: 68 BPM | HEART RATE: 68 BPM | HEART RATE: 68 BPM | DIASTOLIC BLOOD PRESSURE: 70 MMHG | DIASTOLIC BLOOD PRESSURE: 70 MMHG | HEART RATE: 68 BPM | DIASTOLIC BLOOD PRESSURE: 70 MMHG | DIASTOLIC BLOOD PRESSURE: 70 MMHG | WEIGHT: 192.2 LBS | HEIGHT: 74 IN | SYSTOLIC BLOOD PRESSURE: 124 MMHG | DIASTOLIC BLOOD PRESSURE: 70 MMHG | SYSTOLIC BLOOD PRESSURE: 124 MMHG | WEIGHT: 192.2 LBS | HEIGHT: 74 IN | WEIGHT: 192.2 LBS | HEIGHT: 74 IN | DIASTOLIC BLOOD PRESSURE: 70 MMHG | HEART RATE: 68 BPM | WEIGHT: 192.2 LBS | WEIGHT: 192.2 LBS | HEIGHT: 74 IN | WEIGHT: 192.2 LBS | HEART RATE: 68 BPM | HEART RATE: 68 BPM | SYSTOLIC BLOOD PRESSURE: 124 MMHG

## 2024-10-24 DIAGNOSIS — K86.1 OTHER CHRONIC PANCREATITIS: ICD-10-CM

## 2024-10-24 DIAGNOSIS — R97.8 OTHER ABNORMAL TUMOR MARKERS: ICD-10-CM

## 2024-10-24 DIAGNOSIS — K86.9 DISEASE OF PANCREAS, UNSPECIFIED: ICD-10-CM

## 2024-10-24 PROCEDURE — 99214 OFFICE O/P EST MOD 30 MIN: CPT

## 2024-12-31 RX ORDER — SIMVASTATIN 20 MG
20 TABLET ORAL DAILY
Qty: 90 TABLET | Refills: 3 | Status: SHIPPED | OUTPATIENT
Start: 2024-12-31

## 2025-01-01 ENCOUNTER — TELEPHONE (OUTPATIENT)
Dept: FAMILY MEDICINE CLINIC | Facility: CLINIC | Age: 80
End: 2025-01-01

## 2025-01-01 ENCOUNTER — CLINICAL SUPPORT (OUTPATIENT)
Dept: ONCOLOGY | Facility: CLINIC | Age: 80
End: 2025-01-01
Payer: MEDICARE

## 2025-01-01 ENCOUNTER — OFFICE VISIT (OUTPATIENT)
Dept: ONCOLOGY | Facility: CLINIC | Age: 80
End: 2025-01-01
Payer: MEDICARE

## 2025-01-01 ENCOUNTER — DOCUMENTATION (OUTPATIENT)
Dept: ONCOLOGY | Facility: CLINIC | Age: 80
End: 2025-01-01
Payer: MEDICARE

## 2025-01-01 VITALS
OXYGEN SATURATION: 95 % | HEART RATE: 97 BPM | HEIGHT: 73 IN | TEMPERATURE: 97.7 F | DIASTOLIC BLOOD PRESSURE: 55 MMHG | WEIGHT: 192 LBS | SYSTOLIC BLOOD PRESSURE: 106 MMHG | BODY MASS INDEX: 25.45 KG/M2

## 2025-01-01 DIAGNOSIS — C24.9: Primary | ICD-10-CM

## 2025-01-01 RX ORDER — ONDANSETRON 8 MG/1
8 TABLET, FILM COATED ORAL 3 TIMES DAILY PRN
Qty: 30 TABLET | Refills: 5 | Status: SHIPPED | OUTPATIENT
Start: 2025-01-01

## 2025-01-01 RX ORDER — OLANZAPINE 5 MG/1
5 TABLET, FILM COATED ORAL NIGHTLY
Qty: 8 TABLET | Refills: 5 | Status: SHIPPED | OUTPATIENT
Start: 2025-01-01

## 2025-01-01 RX ORDER — LIDOCAINE AND PRILOCAINE 25; 25 MG/G; MG/G
1 CREAM TOPICAL AS NEEDED
Qty: 30 G | Refills: 2 | Status: SHIPPED | OUTPATIENT
Start: 2025-01-01

## 2025-04-22 ENCOUNTER — OFFICE VISIT (OUTPATIENT)
Dept: FAMILY MEDICINE CLINIC | Facility: CLINIC | Age: 80
End: 2025-04-22
Payer: MEDICARE

## 2025-04-22 ENCOUNTER — LAB (OUTPATIENT)
Dept: FAMILY MEDICINE CLINIC | Facility: CLINIC | Age: 80
End: 2025-04-22
Payer: MEDICARE

## 2025-04-22 VITALS
OXYGEN SATURATION: 100 % | HEART RATE: 70 BPM | SYSTOLIC BLOOD PRESSURE: 145 MMHG | DIASTOLIC BLOOD PRESSURE: 68 MMHG | BODY MASS INDEX: 25.6 KG/M2 | WEIGHT: 193.2 LBS | TEMPERATURE: 98.4 F | HEIGHT: 73 IN

## 2025-04-22 DIAGNOSIS — Z99.2 TYPE 2 DIABETES MELLITUS WITH CHRONIC KIDNEY DISEASE ON CHRONIC DIALYSIS, WITHOUT LONG-TERM CURRENT USE OF INSULIN: Primary | ICD-10-CM

## 2025-04-22 DIAGNOSIS — N18.6 TYPE 2 DIABETES MELLITUS WITH CHRONIC KIDNEY DISEASE ON CHRONIC DIALYSIS, WITHOUT LONG-TERM CURRENT USE OF INSULIN: Primary | ICD-10-CM

## 2025-04-22 DIAGNOSIS — E11.22 TYPE 2 DIABETES MELLITUS WITH CHRONIC KIDNEY DISEASE ON CHRONIC DIALYSIS, WITHOUT LONG-TERM CURRENT USE OF INSULIN: Primary | ICD-10-CM

## 2025-04-22 DIAGNOSIS — I10 ESSENTIAL HYPERTENSION: ICD-10-CM

## 2025-04-22 DIAGNOSIS — E78.5 DYSLIPIDEMIA: ICD-10-CM

## 2025-04-22 LAB
CHOLEST SERPL-MCNC: 120 MG/DL (ref 0–200)
HBA1C MFR BLD: 5.5 % (ref 4.8–5.6)
HDLC SERPL-MCNC: 32 MG/DL (ref 40–60)
LDLC SERPL CALC-MCNC: 68 MG/DL (ref 0–100)
LDLC/HDLC SERPL: 2.09 {RATIO}
TRIGL SERPL-MCNC: 106 MG/DL (ref 0–150)
VLDLC SERPL-MCNC: 20 MG/DL (ref 5–40)

## 2025-04-22 PROCEDURE — 83036 HEMOGLOBIN GLYCOSYLATED A1C: CPT | Performed by: FAMILY MEDICINE

## 2025-04-22 PROCEDURE — 36415 COLL VENOUS BLD VENIPUNCTURE: CPT | Performed by: FAMILY MEDICINE

## 2025-04-22 PROCEDURE — 80061 LIPID PANEL: CPT | Performed by: FAMILY MEDICINE

## 2025-04-22 NOTE — PROGRESS NOTES
"Subjective   Gabriel De Souza is a 79 y.o. male.     History of Present Illness  Gabriel De Souza is in for follow up on his issues with diabetes, high blood pressure and high cholesterol.  He sees nephrology and gets dialyzed 3 days a week.  He has not had any difficulties with that.  There is no history of chest pain or dyspnea. There is no history of issue with bowel  dysfunction. There is no history of dizziness or lightheadedness. There is no history of issue with sleep or mood. There is no history of issue with present medication.       Hypertension  This is a chronic problem. The current episode started more than 1 year ago. The problem has been stable since onset. Pertinent negatives include no anxiety, blurred vision, chest pain, headaches, malaise/fatigue, neck pain, orthopnea, palpitations, peripheral edema or shortness of breath. Agents associated with hypertension include no associated agents and NSAIDs. There are no associated agents and NSAIDs to hypertension. There are no compliance problems.           /68 (BP Location: Right arm, Patient Position: Sitting, Cuff Size: Large Adult)   Pulse 70   Temp 98.4 °F (36.9 °C) (Temporal)   Ht 185.4 cm (72.99\")   Wt 87.6 kg (193 lb 3.2 oz)   SpO2 100%   BMI 25.50 kg/m²       Chief Complaint   Patient presents with    Diabetes     6 month f/u - insulin been running 70-90 does he still need the shot?     surgery - remove gallbladder      Dr. Raleigh Bender     Results     He said they told him in 2-2025 they put a filter in to catch his blood but he can't find anything about it            Current Outpatient Medications:     aspirin 81 MG tablet, Take 1 tablet by mouth Daily., Disp: , Rfl:     sevelamer (RENVELA) 800 MG tablet, Take 3 tablets by mouth 3 (Three) Times a Day., Disp: , Rfl:     simvastatin (ZOCOR) 20 MG tablet, TAKE 1 TABLET BY MOUTH DAILY, Disp: 90 tablet, Rfl: 3    Soliqua 100-33 UNT-MCG/ML solution pen-injector injection, INJECT 20 UNITS " SUBCUTANEOUSLY  INTO THE APPROPRIATE AREA AS  DIRECTED DAILY (Patient taking differently: Inject 18 Units under the skin into the appropriate area as directed Daily.), Disp: 30 mL, Rfl: 3    torsemide (DEMADEX) 20 MG tablet, Take 1 tablet by mouth Daily., Disp: , Rfl:             The following portions of the patient's history were reviewed and updated as appropriate: allergies, current medications, past family history, past medical history, past social history, past surgical history, and problem list.    Review of Systems   Constitutional:  Positive for fatigue. Negative for activity change, fever and malaise/fatigue.   HENT:  Negative for congestion, sinus pressure, sinus pain, sore throat and trouble swallowing.    Eyes:  Negative for blurred vision and visual disturbance.   Respiratory:  Negative for chest tightness, shortness of breath and wheezing.    Cardiovascular:  Negative for chest pain, palpitations and orthopnea.   Gastrointestinal:  Negative for abdominal distention, abdominal pain, constipation, diarrhea, nausea and vomiting.   Genitourinary:  Negative for difficulty urinating and dysuria.   Musculoskeletal:  Negative for back pain and neck pain.   Neurological:  Positive for weakness. Negative for headaches.   Psychiatric/Behavioral:  Negative for agitation, hallucinations and suicidal ideas.        Objective   Physical Exam  Vitals and nursing note reviewed.   Constitutional:       Appearance: Normal appearance.   HENT:      Right Ear: Tympanic membrane and ear canal normal.      Left Ear: Tympanic membrane and ear canal normal.   Cardiovascular:      Rate and Rhythm: Normal rate and regular rhythm.      Heart sounds: Normal heart sounds. No murmur heard.  Pulmonary:      Effort: Pulmonary effort is normal.      Breath sounds: No wheezing or rales.   Abdominal:      General: Bowel sounds are normal.      Palpations: Abdomen is soft.      Tenderness: There is no abdominal tenderness. There is no  guarding or rebound.      Hernia: No hernia is present.   Musculoskeletal:      Cervical back: Neck supple. No rigidity.      Right lower leg: No edema.      Left lower leg: No edema.   Lymphadenopathy:      Cervical: No cervical adenopathy.   Neurological:      Mental Status: He is alert and oriented to person, place, and time. Mental status is at baseline.   Psychiatric:         Mood and Affect: Mood normal.           Assessment & Plan   Problems Addressed this Visit          Cardiac and Vasculature    Dyslipidemia       Endocrine and Metabolic    Type 2 diabetes mellitus - Primary    Relevant Orders    Hemoglobin A1c    Lipid panel     Other Visit Diagnoses         Essential hypertension              Diagnoses         Codes Comments      Type 2 diabetes mellitus with chronic kidney disease on chronic dialysis, without long-term current use of insulin    -  Primary ICD-10-CM: E11.22, N18.6, Z99.2  ICD-9-CM: 250.40, 585.9, V45.11       Essential hypertension     ICD-10-CM: I10  ICD-9-CM: 401.9       Dyslipidemia     ICD-10-CM: E78.5  ICD-9-CM: 272.4           I will follow along with his nephrologist  I will get an A1c and lipid panel today but the labs he had done at dialysis looked good otherwise  I will follow-up on potential gallbladder removal surgery that is being orchestrated by his gastroenterologist with a local surgeon  I asked him to see me again in 6 months for his Medicare wellness

## 2025-05-06 ENCOUNTER — TELEPHONE (OUTPATIENT)
Dept: FAMILY MEDICINE CLINIC | Facility: CLINIC | Age: 80
End: 2025-05-06
Payer: MEDICARE

## 2025-05-06 ENCOUNTER — OFFICE (AMBULATORY)
Dept: URBAN - METROPOLITAN AREA CLINIC 64 | Facility: CLINIC | Age: 80
End: 2025-05-06
Payer: MEDICARE

## 2025-05-06 VITALS
HEART RATE: 72 BPM | DIASTOLIC BLOOD PRESSURE: 84 MMHG | SYSTOLIC BLOOD PRESSURE: 141 MMHG | WEIGHT: 191 LBS | SYSTOLIC BLOOD PRESSURE: 144 MMHG | HEIGHT: 74 IN

## 2025-05-06 DIAGNOSIS — R93.3 ABNORMAL FINDINGS ON DIAGNOSTIC IMAGING OF OTHER PARTS OF DI: ICD-10-CM

## 2025-05-06 DIAGNOSIS — K86.1 OTHER CHRONIC PANCREATITIS: ICD-10-CM

## 2025-05-06 DIAGNOSIS — K80.20 CALCULUS OF GALLBLADDER WITHOUT CHOLECYSTITIS WITHOUT OBSTRU: ICD-10-CM

## 2025-05-06 PROCEDURE — 99214 OFFICE O/P EST MOD 30 MIN: CPT | Performed by: INTERNAL MEDICINE

## 2025-05-07 ENCOUNTER — TRANSCRIBE ORDERS (OUTPATIENT)
Dept: ADMINISTRATIVE | Facility: HOSPITAL | Age: 80
End: 2025-05-07
Payer: MEDICARE

## 2025-05-07 DIAGNOSIS — K86.1 OTHER CHRONIC PANCREATITIS: Primary | ICD-10-CM

## 2025-05-07 DIAGNOSIS — R93.3 ABNORMAL MAGNETIC RESONANCE CHOLANGIOPANCREATOGRAPHY (MRCP): ICD-10-CM

## 2025-05-20 ENCOUNTER — HOSPITAL ENCOUNTER (OUTPATIENT)
Dept: CT IMAGING | Facility: HOSPITAL | Age: 80
Discharge: HOME | End: 2025-05-20
Admitting: INTERNAL MEDICINE
Payer: MEDICARE

## 2025-05-20 DIAGNOSIS — K86.1 OTHER CHRONIC PANCREATITIS: ICD-10-CM

## 2025-05-20 DIAGNOSIS — R93.3 ABNORMAL MAGNETIC RESONANCE CHOLANGIOPANCREATOGRAPHY (MRCP): ICD-10-CM

## 2025-05-20 PROCEDURE — 74177 CT ABD & PELVIS W/CONTRAST: CPT

## 2025-05-20 PROCEDURE — 25510000001 IOPAMIDOL PER 1 ML: Performed by: INTERNAL MEDICINE

## 2025-05-20 RX ORDER — IOPAMIDOL 755 MG/ML
100 INJECTION, SOLUTION INTRAVASCULAR
Status: COMPLETED | OUTPATIENT
Start: 2025-05-20 | End: 2025-05-20

## 2025-05-20 RX ADMIN — IOPAMIDOL 100 ML: 755 INJECTION, SOLUTION INTRAVENOUS at 13:55

## 2025-06-03 NOTE — PROGRESS NOTES
HEMATOLOGY ONCOLOGY OUTPATIENT CONSULTATION       Patient name: Gabriel De Souza  : 1945  MRN: [id number]  Primary Care Physician: Waylon Johnson MD  Referring Physician: Ollie Peoples MD  Reason For Consult: Apparent adenocarcinoma of gallbladder/bile duct.    History of Present Illness:    2025: In the office for the first time to stage and treat an adenocarcinoma that appears to have a reason either in the extrahepatic bile ducts or the gallbladder.  Sometime early in  Mr. De Souza was brought to the hospital with hypotension.  He was found to have evidence of bilateral nephrolithiasis that had resulted in hydronephrosis.  In addition there was evidence of interstitial pancreatitis.  For some time his renal function had been declining and at that time he had already received an arteriovenous fistula.  He started hemodialysis.  Stenting of both ureters was achieved without any complications.  He received an inferior vena cava filter, as he had been taking anticoagulation for some time.  He was discharged to continue investigations as outpatient.  Currently after that, in March, he underwent an endoscopic retrograde cholangiopancreatography.  Fine-needle aspiration of the ampulla and the pancreas confirmed chronic pancreatitis.  Later that same year he was taken again to the endoscopy suite without and underwent cholangiopancreatography with sphincterotomy, a balloon sweep and bile duct brushings.  Pathology again was not consistent with malignancy.  Throughout this time, in addition, he had developed more radiographic abnormalities.  In segments 8 and 4A of the liver enlarging tumors were identified.  One of them was in close proximity to the gallbladder foci it was associated to nodular changes that were felt to be potentially the result of gallbladder malignancy.  In addition there was enlarging figueroa hepatic adenopathy that was consistent with malignancy.  For this  reason in May 2025 he was taken again to the endoscopy suite and underwent FNA from the figueroa hepatis nodules. This time the final report of pathology was positive for adenocarcinoma.  He tells me today he feels reasonably well.  He has not experienced any abdominal pain.  He has been having dialysis without difficulties and he will probably need continued dialysis for life.  He has been eating reasonably well and his weight has been stable.  He has had no respiratory symptoms and he denies chest pains.  He has been defecating regularly without melena or hematochezia and he denies dysuria.  Family history and social history were reviewed.  On exam he appears ill but in no distress.  There is no jaundice and there is no pallor.  There are no oral lesions but his dentition is poor.  The lungs are clear bilaterally and the heart regular.  The abdomen is soft.  There is fullness in the epigastrium to palpation but there is no tenderness.  There is no edema.  Laboratory exams reviewed.  I reviewed independently the images of all the recent studies.  There is clearly a malignant process in the figueroa hepatis and I suspect, indeed, that this corresponds to a gallbladder malignancy.  Cholangiocarcinoma is possible to but it would correspond to an extrahepatic cholangiocarcinoma.  I would like to be able to perform next-generation sequencing and a tissue available at this point is scant.  For this reason a CT-guided core biopsy of one of the liver lesions not only will confirm the diagnosis of metastatic disease but also will provide enough tissue for adequate testing.  He is also to have a CT of the chest.  He will see me with results.    Past Medical History:   Diagnosis Date    Cancer 01/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    Coronary artery disease     Deep vein thrombosis 04/1990    Diabetes mellitus     Dyslipidemia     Erectile dysfunction 51 years old    Heart murmur 10/04/2023    Hemodialysis patient     HL (hearing loss) 7 year  old    right    Hyperlipidemia 1970    Hypertension     Neuropathy     Renal insufficiency 2020    Stage 4 chronic kidney disease     3-4     Past Surgical History:   Procedure Laterality Date    ARTERIOVENOUS FISTULA/SHUNT SURGERY Left 09/22/2022    Procedure: BRACHIAL CEPHALIC FISTULA FORMATION;  Surgeon: Edy Vega MD;  Location: [facility] MAIN OR;  Service: Vascular;  Laterality: Left;    BACK SURGERY      BASAL CELL CARCINOMA EXCISION  01/2019    CARDIAC CATHETERIZATION      CYSTOSCOPY W/ URETERAL STENT PLACEMENT Bilateral 01/16/2024    Procedure: CYSTOSCOPY, BILATERAL URETERAL STENT INSERTION;  Surgeon: Tyrel Avitia MD;  Location: [facility] MAIN OR;  Service: Urology;  Laterality: Bilateral;    ERCP N/A 01/23/2024    Procedure: ENDOSCOPIC RETROGRADE CHOLANGIOPANCREATOGRAPHY with sphinctorotomy, brushing of bile duct, balloon sweeping of common bile duct up to 12mm, placement of 10 Fr. x 9cm biliary stent, and placement of 5 Fr. x 7cm pancreatic stent;  Surgeon: Ollie Peoples MD;  Location: [facility] ENDOSCOPY;  Service: Gastroenterology;  Laterality: N/A;    ERCP N/A 07/18/2024    Procedure: ENDOSCOPIC RETROGRADE CHOLANGIOPANCREATOGRAPHY with SPHINCTEROTOMY, BALLOON SWEEP, BILE DUCT BRUSHINGS;  Surgeon: Ollie Peoples MD;  Location: [facility] ENDOSCOPY;  Service: Gastroenterology;  Laterality: N/A;  POST: PANCREATITIS    ERCP N/A 03/12/2024    Procedure: ENDOSCOPIC RETROGRADE CHOLANGIOPANCREATOGRAPHY WITH REMOVAL OF PANCREATIC AND BILIARY STENTS,EXTENSION OF SPHINCTEROTOMY, CLEARANCE OF BILE DUCT WITH BALLOON (9MM-12MM, UP TO 12MM), PLACEMENT OF BILIARY STENT;  Surgeon: Ollie Peoples MD;  Location: [facility] ENDOSCOPY;  Service: Gastroenterology;  Laterality: N/A;  Post-    EYE SURGERY  2-6 & 2-    left & right eye    TURP / TRANSURETHRAL INCISION / DRAINAGE PROSTATE      UPPER ENDOSCOPIC ULTRASOUND W/ FNA N/A 01/23/2024    Procedure: ESOPHAGOGASTRODUODENOSCOPY WITH ULTRASOUND AND FINE NEEDLE ASPIRATION of  pancreatic head lesion;  Surgeon: Ollie Peoples MD;  Location: [facility] ENDOSCOPY;  Service: Gastroenterology;  Laterality: N/A;    UPPER ENDOSCOPIC ULTRASOUND W/ FNA N/A 03/12/2024    Procedure: ENDOSCOPIC ULTRASOUND WITH FINE NEEDLE ASPIRATION;  Surgeon: Ollie Peoples MD;  Location: [facility] ENDOSCOPY;  Service: Gastroenterology;  Laterality: N/A;  Post-       Current Outpatient Medications:     aspirin 81 MG tablet, Take 1 tablet by mouth Daily., Disp: , Rfl:     D 1000 25 MCG (1000 UT) capsule, Take 1 capsule by mouth Daily., Disp: , Rfl:     sevelamer (RENVELA) 800 MG tablet, Take 3 tablets by mouth 3 (Three) Times a Day., Disp: , Rfl:     simvastatin (ZOCOR) 20 MG tablet, TAKE 1 TABLET BY MOUTH DAILY, Disp: 90 tablet, Rfl: 3    Soliqua 100-33 UNT-MCG/ML solution pen-injector injection, INJECT 20 UNITS SUBCUTANEOUSLY  INTO THE APPROPRIATE AREA AS  DIRECTED DAILY (Patient taking differently: Inject 18 Units under the skin into the appropriate area as directed Daily.), Disp: 30 mL, Rfl: 3    torsemide (DEMADEX) 20 MG tablet, Take 1 tablet by mouth Daily., Disp: , Rfl:     Allergies   Allergen Reactions    Codeine Nausea And Vomiting    Tylenol With Codeine #3 [Acetaminophen-Codeine] Nausea Only and GI Intolerance     Family History   Problem Relation Age of Onset    Heart disease Mother     Hypertension Father     Hearing loss Father     Brain cancer Father     Heart disease Brother     Diabetes Brother     Heart attack Maternal Grandfather      Cancer-related family history includes Brain cancer in his father.    Social History     Tobacco Use    Smoking status: Never     Passive exposure: Never    Smokeless tobacco: Never    Tobacco comments:     never used   Vaping Use    Vaping status: Never Used   Substance Use Topics    Alcohol use: No    Drug use: No     Social History     Social History Narrative    Not on file     ROS:   Review of Systems   Constitutional:  Positive for fatigue. Negative for activity  "change, appetite change, chills, diaphoresis, fever and unexpected weight change.   HENT:  Negative for congestion, dental problem, drooling, ear discharge, ear pain, facial swelling, hearing loss, mouth sores, nosebleeds, postnasal drip, rhinorrhea, sinus pressure, sinus pain, sneezing, sore throat, tinnitus, trouble swallowing and voice change.    Eyes:  Negative for photophobia, pain, discharge, redness, itching and visual disturbance.   Respiratory:  Negative for apnea, cough, choking, chest tightness, shortness of breath, wheezing and stridor.    Cardiovascular:  Negative for chest pain, palpitations and leg swelling.   Gastrointestinal:  Negative for abdominal distention, abdominal pain, anal bleeding, blood in stool, constipation, diarrhea, nausea, rectal pain and vomiting.   Endocrine: Negative for cold intolerance, heat intolerance, polydipsia and polyuria.   Genitourinary:  Negative for decreased urine volume, difficulty urinating, dysuria, flank pain, frequency, genital sores, hematuria and urgency.   Musculoskeletal:  Negative for arthralgias, back pain, gait problem, joint swelling, myalgias, neck pain and neck stiffness.   Skin:  Negative for color change, pallor and rash.   Neurological:  Negative for dizziness, tremors, seizures, syncope, facial asymmetry, speech difficulty, weakness, light-headedness, numbness and headaches.   Hematological:  Negative for adenopathy. Does not bruise/bleed easily.   Psychiatric/Behavioral:  Negative for agitation, behavioral problems, confusion, decreased concentration, hallucinations, self-injury, sleep disturbance and suicidal ideas. The patient is not nervous/anxious.      Objective:    Vital Signs:  Vitals:    06/05/25 1157   BP: 132/69   Pulse: 74   Temp: 98.2 °F (36.8 °C)   TempSrc: Temporal   SpO2: 98%   Weight: 85.5 kg (188 lb 6.4 oz)   Height: 185.4 cm (73\")   PainSc: 0-No pain     Body mass index is 24.86 kg/m².    ECOG Status  (1) Restricted in physically " strenuous activity, ambulatory and able to do work of light nature    Physical Exam:   Physical Exam  Constitutional:       General: He is not in acute distress.     Appearance: He is ill-appearing. He is not toxic-appearing or diaphoretic.   HENT:      Head: Normocephalic and atraumatic.      Right Ear: External ear normal.      Left Ear: External ear normal.      Nose: Nose normal.      Mouth/Throat:      Mouth: Mucous membranes are moist.      Pharynx: Oropharynx is clear.   Eyes:      General: No scleral icterus.        Right eye: No discharge.         Left eye: No discharge.      Conjunctiva/sclera: Conjunctivae normal.      Pupils: Pupils are equal, round, and reactive to light.   Cardiovascular:      Rate and Rhythm: Normal rate and regular rhythm.      Pulses: Normal pulses.      Heart sounds: Normal heart sounds. No murmur heard.     No friction rub. No gallop.   Pulmonary:      Effort: No respiratory distress.      Breath sounds: No stridor. No wheezing, rhonchi or rales.      Comments: Breath sounds are diminished bilaterally.  Chest:      Chest wall: No tenderness.   Abdominal:      General: Bowel sounds are normal. There is no distension.      Palpations: Abdomen is soft. There is no mass.      Tenderness: There is no abdominal tenderness. There is no right CVA tenderness, left CVA tenderness, guarding or rebound.      Comments: The abdomen is soft.  On palpation there is no tenderness.  However there is fullness in the epigastrium.   Musculoskeletal:         General: No swelling, tenderness, deformity or signs of injury.      Cervical back: No rigidity.      Right lower leg: No edema.      Left lower leg: No edema.   Lymphadenopathy:      Cervical: No cervical adenopathy.   Skin:     General: Skin is warm and dry.      Coloration: Skin is not jaundiced.      Findings: No bruising or rash.   Neurological:      General: No focal deficit present.      Mental Status: He is alert and oriented to person,  place, and time.      Gait: Gait normal.   Psychiatric:         Mood and Affect: Mood normal.         Behavior: Behavior normal.         Thought Content: Thought content normal.         Judgment: Judgment normal.     PILLO Calixto MD performed a physical exam on 6/5/2025 as documented above.    Lab Results - Last 18 Months   Lab Units 06/05/25  1158 05/28/25  0000 07/24/24  0000 07/18/24  0830 04/03/24  0000 03/05/24  1225   WBC 10*3/mm3 8.77  --   --  7.09  --  10.70   HEMOGLOBIN g/dL 12.0* 11.4*  34.2*   < > 11.0*   < > 10.5*   HEMATOCRIT % 37.5  --   --  34.6*  --  33.5*   PLATELETS 10*3/mm3 236  --   --  289  --  276   MCV fL 99.7*  --   --  94.5  --  93.2    < > = values in this interval not displayed.     Lab Results - Last 18 Months   Lab Units 06/05/25  1306 07/18/24  0830 07/12/24  0000 03/05/24  1225 01/25/24  0152 01/24/24  1209   SODIUM mmol/L 138 137  --  140 139 137   POTASSIUM mmol/L 4.8 4.0  --  5.3* 4.4 4.4   CHLORIDE mmol/L 98 97*  --  102 106 106   CO2 mmol/L 27.5 25.8  --  27.0 23.0 20.0*   BUN mg/dL 26.9* 40* 78* 36* 45* 30*   CREATININE mg/dL 4.55* 4.72*  --  4.69* 3.95* 2.69*   CALCIUM mg/dL 9.9 10.2  --  10.8* 7.8* 8.2*   BILIRUBIN mg/dL 0.4  --   --   --  0.3 0.4   ALK PHOS U/L 155*  --   --   --  123* 139*   ALT (SGPT) U/L 6  --   --   --  14 16   AST (SGOT) U/L 17  --   --   --  13 22   GLUCOSE mg/dL 96 88  --  93 121* 102*     Lab Results   Component Value Date    GLUCOSE 96 06/05/2025    BUN 26.9 (H) 06/05/2025    CREATININE 4.55 (H) 06/05/2025    EGFRIFNONA 19 (L) 02/18/2022    BCR 5.9 (L) 06/05/2025    K 4.8 06/05/2025    CO2 27.5 06/05/2025    CALCIUM 9.9 06/05/2025    ALBUMIN 4.1 06/05/2025    AST 17 06/05/2025    ALT 6 06/05/2025     Lab Results - Last 18 Months   Lab Units 01/24/24  1209 01/23/24  0256 01/18/24  1907   INR  1.32* 1.31* 1.27*   APTT seconds  --   --  41.3*     Lab Results   Component Value Date    FOLATE 4.3 05/14/2025     Lab Results   Component Value Date     PTT 41.3 (H) 01/18/2024    INR 1.32 (H) 01/24/2024     Lab Results   Component Value Date    PSA 1.6 03/05/2024     Assessment & Plan     1.  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  Discussed at length with him and with his family.  Additional tissue for [employer] ration sequencing and PD-L1 testing is appropriate.  A CT scan of the chest will allow adequate staging.  He will see me with results.  2.  End-stage renal disease: This obviously makes treatment more complicated and likely less fruitful.  On this basis I discussed with him alternatives to active antineoplastic treatment, basically supportive and symptomatic treatment.  3.  Reviewed all records including notes from [facility], [place] and [facility].  Reviewed notes from gastroenterology and all reports of pathology and laboratory.  Discussed with him.  4.  See me in approximately 2 weeks with results.    Hammad Calixto MD on 6/5/2025 at 5:06 PM.

## 2025-06-05 ENCOUNTER — LAB (OUTPATIENT)
Dept: LAB | Facility: HOSPITAL | Age: 80
End: 2025-06-05
Payer: MEDICARE

## 2025-06-05 ENCOUNTER — CONSULT (OUTPATIENT)
Dept: ONCOLOGY | Facility: CLINIC | Age: 80
End: 2025-06-05
Payer: MEDICARE

## 2025-06-05 VITALS
DIASTOLIC BLOOD PRESSURE: 69 MMHG | OXYGEN SATURATION: 98 % | HEIGHT: 73 IN | WEIGHT: 188.4 LBS | SYSTOLIC BLOOD PRESSURE: 132 MMHG | HEART RATE: 74 BPM | BODY MASS INDEX: 24.97 KG/M2 | TEMPERATURE: 98.2 F

## 2025-06-05 DIAGNOSIS — K80.50 CHOLEDOCHOLITHIASIS: ICD-10-CM

## 2025-06-05 DIAGNOSIS — D63.1 ANEMIA IN CHRONIC KIDNEY DISEASE, UNSPECIFIED CKD STAGE: ICD-10-CM

## 2025-06-05 DIAGNOSIS — R97.8 OTHER ABNORMAL TUMOR MARKERS: ICD-10-CM

## 2025-06-05 DIAGNOSIS — C22.0 HEPATOCELLULAR CARCINOMA: Primary | ICD-10-CM

## 2025-06-05 DIAGNOSIS — C22.0 HEPATOCELLULAR CARCINOMA: ICD-10-CM

## 2025-06-05 DIAGNOSIS — N18.9 ANEMIA IN CHRONIC KIDNEY DISEASE, UNSPECIFIED CKD STAGE: ICD-10-CM

## 2025-06-05 LAB
ALBUMIN SERPL-MCNC: 4.1 G/DL (ref 3.5–5.2)
ALBUMIN/GLOB SERPL: 1.3 G/DL
ALP SERPL-CCNC: 155 U/L (ref 39–117)
ALT SERPL W P-5'-P-CCNC: 6 U/L (ref 1–41)
ANION GAP SERPL CALCULATED.3IONS-SCNC: 12.5 MMOL/L (ref 5–15)
AST SERPL-CCNC: 17 U/L (ref 1–40)
BASOPHILS # BLD AUTO: 0.02 10*3/MM3 (ref 0–0.2)
BASOPHILS NFR BLD AUTO: 0.2 % (ref 0–1.5)
BILIRUB SERPL-MCNC: 0.4 MG/DL (ref 0–1.2)
BUN SERPL-MCNC: 26.9 MG/DL (ref 8–23)
BUN/CREAT SERPL: 5.9 (ref 7–25)
CALCIUM SPEC-SCNC: 9.9 MG/DL (ref 8.6–10.5)
CANCER AG19-9 SERPL-ACNC: 4282 U/ML
CHLORIDE SERPL-SCNC: 98 MMOL/L (ref 98–107)
CO2 SERPL-SCNC: 27.5 MMOL/L (ref 22–29)
CREAT SERPL-MCNC: 4.55 MG/DL (ref 0.76–1.27)
DEPRECATED RDW RBC AUTO: 49 FL (ref 37–54)
EGFRCR SERPLBLD CKD-EPI 2021: 12.5 ML/MIN/1.73
EOSINOPHIL # BLD AUTO: 0.17 10*3/MM3 (ref 0–0.4)
EOSINOPHIL NFR BLD AUTO: 1.9 % (ref 0.3–6.2)
ERYTHROCYTE [DISTWIDTH] IN BLOOD BY AUTOMATED COUNT: 13.9 % (ref 12.3–15.4)
GLOBULIN UR ELPH-MCNC: 3.1 GM/DL
GLUCOSE SERPL-MCNC: 96 MG/DL (ref 65–99)
HCT VFR BLD AUTO: 37.5 % (ref 37.5–51)
HGB BLD-MCNC: 12 G/DL (ref 13–17.7)
LYMPHOCYTES # BLD AUTO: 3.26 10*3/MM3 (ref 0.7–3.1)
LYMPHOCYTES NFR BLD AUTO: 37.2 % (ref 19.6–45.3)
MCH RBC QN AUTO: 31.9 PG (ref 26.6–33)
MCHC RBC AUTO-ENTMCNC: 32 G/DL (ref 31.5–35.7)
MCV RBC AUTO: 99.7 FL (ref 79–97)
MONOCYTES # BLD AUTO: 1.39 10*3/MM3 (ref 0.1–0.9)
MONOCYTES NFR BLD AUTO: 15.8 % (ref 5–12)
NEUTROPHILS NFR BLD AUTO: 3.93 10*3/MM3 (ref 1.7–7)
NEUTROPHILS NFR BLD AUTO: 44.9 % (ref 42.7–76)
PLATELET # BLD AUTO: 236 10*3/MM3 (ref 140–450)
PMV BLD AUTO: 10.6 FL (ref 6–12)
POTASSIUM SERPL-SCNC: 4.8 MMOL/L (ref 3.5–5.2)
PROT SERPL-MCNC: 7.2 G/DL (ref 6–8.5)
RBC # BLD AUTO: 3.76 10*6/MM3 (ref 4.14–5.8)
SODIUM SERPL-SCNC: 138 MMOL/L (ref 136–145)
WBC NRBC COR # BLD AUTO: 8.77 10*3/MM3 (ref 3.4–10.8)

## 2025-06-05 PROCEDURE — 80053 COMPREHEN METABOLIC PANEL: CPT | Performed by: INTERNAL MEDICINE

## 2025-06-05 PROCEDURE — 36415 COLL VENOUS BLD VENIPUNCTURE: CPT | Performed by: INTERNAL MEDICINE

## 2025-06-05 PROCEDURE — 85025 COMPLETE CBC W/AUTO DIFF WBC: CPT | Performed by: INTERNAL MEDICINE

## 2025-06-05 PROCEDURE — 86301 IMMUNOASSAY TUMOR CA 19-9: CPT | Performed by: INTERNAL MEDICINE

## 2025-06-05 RX ORDER — AMPICILLIN TRIHYDRATE 500 MG
1 CAPSULE ORAL DAILY
COMMUNITY
Start: 2025-05-16

## 2025-06-06 ENCOUNTER — PATIENT ROUNDING (BHMG ONLY) (OUTPATIENT)
Dept: ONCOLOGY | Facility: CLINIC | Age: 80
End: 2025-06-06
Payer: MEDICARE

## 2025-06-06 NOTE — PROGRESS NOTES
June 6, 2025    Hello, may I speak with Gabriel De Souza?    My name is Gladys De Souza      I am  with [facility]  [address] [facility] [place] [address] [phone].    Before we get started may I verify your date of birth? 1945    I am calling to officially welcome you to our practice and ask about your recent visit. Is this a good time to talk? no    Tell me about your visit with us. What things went well?  A My Chart message was sent to the patient.         We're always looking for ways to make our patients' experiences even better. Do you have recommendations on ways we may improve?  no    Overall were you satisfied with your first visit to our practice? yes       I appreciate you taking the time to speak with me today. Is there anything else I can do for you? no      Thank you, and have a great day.

## 2025-06-10 ENCOUNTER — HOSPITAL ENCOUNTER (OUTPATIENT)
Dept: CT IMAGING | Facility: HOSPITAL | Age: 80
Discharge: HOME | End: 2025-06-10
Admitting: INTERNAL MEDICINE
Payer: MEDICARE

## 2025-06-10 DIAGNOSIS — C22.0 HEPATOCELLULAR CARCINOMA: ICD-10-CM

## 2025-06-10 PROCEDURE — 71250 CT THORAX DX C-: CPT

## 2025-06-17 ENCOUNTER — DOCUMENTATION (OUTPATIENT)
Dept: ONCOLOGY | Facility: CLINIC | Age: 80
End: 2025-06-17
Payer: MEDICARE

## 2025-06-17 NOTE — PROGRESS NOTES
Distress Screening Follow-up    Diagnosis:     Location of Distress Screening: Medical Oncology    Distress Level: 5 (6/5/2025  3:00 PM)    Physical Concerns:       Practical Problems:       Emotional Concerns:       Family Concerns:       Spiritual Concerns:        Interventions:        Comments:     Called pt to f/up on DST.  Pt unavailable.  Spoke with pt wife.  She states that pt is to have liver biopsy on 6/19.  They are doing ok at this time.  She does not identify any areas of need at this time.  Encouraged wife to reach out if any supports or resources are needed.  Wife v/u.

## 2025-06-19 ENCOUNTER — HOSPITAL ENCOUNTER (OUTPATIENT)
Dept: CT IMAGING | Facility: HOSPITAL | Age: 80
Discharge: HOME | End: 2025-06-19
Payer: MEDICARE

## 2025-06-19 VITALS
BODY MASS INDEX: 24.92 KG/M2 | HEIGHT: 73 IN | DIASTOLIC BLOOD PRESSURE: 69 MMHG | WEIGHT: 188 LBS | RESPIRATION RATE: 15 BRPM | TEMPERATURE: 97.9 F | OXYGEN SATURATION: 96 % | SYSTOLIC BLOOD PRESSURE: 132 MMHG | HEART RATE: 61 BPM

## 2025-06-19 DIAGNOSIS — C22.0 HEPATOCELLULAR CARCINOMA: ICD-10-CM

## 2025-06-19 LAB
APTT PPP: 32.5 SECONDS (ref 22.7–35.4)
BASOPHILS # BLD AUTO: 0.03 10*3/MM3 (ref 0–0.2)
BASOPHILS NFR BLD AUTO: 0.4 % (ref 0–1.5)
DEPRECATED RDW RBC AUTO: 48.5 FL (ref 37–54)
EOSINOPHIL # BLD AUTO: 0.21 10*3/MM3 (ref 0–0.4)
EOSINOPHIL NFR BLD AUTO: 2.7 % (ref 0.3–6.2)
ERYTHROCYTE [DISTWIDTH] IN BLOOD BY AUTOMATED COUNT: 13.3 % (ref 12.3–15.4)
HCT VFR BLD AUTO: 35.5 % (ref 37.5–51)
HGB BLD-MCNC: 11.2 G/DL (ref 13–17.7)
IMM GRANULOCYTES # BLD AUTO: 0.02 10*3/MM3 (ref 0–0.05)
IMM GRANULOCYTES NFR BLD AUTO: 0.3 % (ref 0–0.5)
INR PPP: 1.04 (ref 0.9–1.1)
LYMPHOCYTES # BLD AUTO: 2.71 10*3/MM3 (ref 0.7–3.1)
LYMPHOCYTES NFR BLD AUTO: 35.4 % (ref 19.6–45.3)
MCH RBC QN AUTO: 31 PG (ref 26.6–33)
MCHC RBC AUTO-ENTMCNC: 31.5 G/DL (ref 31.5–35.7)
MCV RBC AUTO: 98.3 FL (ref 79–97)
MONOCYTES # BLD AUTO: 1.12 10*3/MM3 (ref 0.1–0.9)
MONOCYTES NFR BLD AUTO: 14.6 % (ref 5–12)
NEUTROPHILS NFR BLD AUTO: 3.56 10*3/MM3 (ref 1.7–7)
NEUTROPHILS NFR BLD AUTO: 46.6 % (ref 42.7–76)
NRBC BLD AUTO-RTO: 0 /100 WBC (ref 0–0.2)
PLATELET # BLD AUTO: 257 10*3/MM3 (ref 140–450)
PMV BLD AUTO: 9.4 FL (ref 6–12)
PROTHROMBIN TIME: 13.5 SECONDS (ref 11.7–14.2)
RBC # BLD AUTO: 3.61 10*6/MM3 (ref 4.14–5.8)
WBC NRBC COR # BLD AUTO: 7.65 10*3/MM3 (ref 3.4–10.8)

## 2025-06-19 PROCEDURE — 88333 PATH CONSLTJ SURG CYTO XM 1: CPT | Performed by: INTERNAL MEDICINE

## 2025-06-19 PROCEDURE — 88342 IMHCHEM/IMCYTCHM 1ST ANTB: CPT | Performed by: INTERNAL MEDICINE

## 2025-06-19 PROCEDURE — 25010000002 ONDANSETRON PER 1 MG: Performed by: RADIOLOGY

## 2025-06-19 PROCEDURE — 85610 PROTHROMBIN TIME: CPT | Performed by: RADIOLOGY

## 2025-06-19 PROCEDURE — 25010000002 FENTANYL CITRATE (PF) 50 MCG/ML SOLUTION: Performed by: RADIOLOGY

## 2025-06-19 PROCEDURE — 85025 COMPLETE CBC W/AUTO DIFF WBC: CPT | Performed by: RADIOLOGY

## 2025-06-19 PROCEDURE — 77012 CT SCAN FOR NEEDLE BIOPSY: CPT

## 2025-06-19 PROCEDURE — 88341 IMHCHEM/IMCYTCHM EA ADD ANTB: CPT | Performed by: INTERNAL MEDICINE

## 2025-06-19 PROCEDURE — 85730 THROMBOPLASTIN TIME PARTIAL: CPT | Performed by: RADIOLOGY

## 2025-06-19 PROCEDURE — 25010000002 LIDOCAINE 1 % SOLUTION: Performed by: RADIOLOGY

## 2025-06-19 PROCEDURE — 25010000002 MIDAZOLAM PER 1 MG: Performed by: RADIOLOGY

## 2025-06-19 PROCEDURE — 99152 MOD SED SAME PHYS/QHP 5/>YRS: CPT

## 2025-06-19 PROCEDURE — 99153 MOD SED SAME PHYS/QHP EA: CPT

## 2025-06-19 PROCEDURE — 88307 TISSUE EXAM BY PATHOLOGIST: CPT | Performed by: INTERNAL MEDICINE

## 2025-06-19 RX ORDER — ONDANSETRON 2 MG/ML
INJECTION INTRAMUSCULAR; INTRAVENOUS AS NEEDED
Status: COMPLETED | OUTPATIENT
Start: 2025-06-19 | End: 2025-06-19

## 2025-06-19 RX ORDER — SODIUM CHLORIDE 0.9 % (FLUSH) 0.9 %
10 SYRINGE (ML) INJECTION EVERY 12 HOURS SCHEDULED
Status: DISCONTINUED | OUTPATIENT
Start: 2025-06-19 | End: 2025-06-20 | Stop reason: HOSPADM

## 2025-06-19 RX ORDER — FENTANYL CITRATE 50 UG/ML
INJECTION, SOLUTION INTRAMUSCULAR; INTRAVENOUS AS NEEDED
Status: COMPLETED | OUTPATIENT
Start: 2025-06-19 | End: 2025-06-19

## 2025-06-19 RX ORDER — SODIUM CHLORIDE 0.9 % (FLUSH) 0.9 %
10 SYRINGE (ML) INJECTION AS NEEDED
Status: DISCONTINUED | OUTPATIENT
Start: 2025-06-19 | End: 2025-06-20 | Stop reason: HOSPADM

## 2025-06-19 RX ORDER — MIDAZOLAM HYDROCHLORIDE 1 MG/ML
INJECTION, SOLUTION INTRAMUSCULAR; INTRAVENOUS AS NEEDED
Status: COMPLETED | OUTPATIENT
Start: 2025-06-19 | End: 2025-06-19

## 2025-06-19 RX ORDER — LIDOCAINE HYDROCHLORIDE 10 MG/ML
INJECTION, SOLUTION INFILTRATION; PERINEURAL AS NEEDED
Status: COMPLETED | OUTPATIENT
Start: 2025-06-19 | End: 2025-06-19

## 2025-06-19 RX ORDER — SODIUM CHLORIDE 9 MG/ML
75 INJECTION, SOLUTION INTRAVENOUS CONTINUOUS
Status: DISPENSED | OUTPATIENT
Start: 2025-06-19 | End: 2025-06-19

## 2025-06-19 RX ADMIN — ONDANSETRON 4 MG: 2 INJECTION INTRAMUSCULAR; INTRAVENOUS at 08:34

## 2025-06-19 RX ADMIN — FENTANYL CITRATE 50 MCG: 50 INJECTION, SOLUTION INTRAMUSCULAR; INTRAVENOUS at 08:51

## 2025-06-19 RX ADMIN — FENTANYL CITRATE 50 MCG: 50 INJECTION, SOLUTION INTRAMUSCULAR; INTRAVENOUS at 08:58

## 2025-06-19 RX ADMIN — LIDOCAINE HYDROCHLORIDE 4 ML: 10 INJECTION, SOLUTION INFILTRATION; PERINEURAL at 08:55

## 2025-06-19 RX ADMIN — GELATIN ABSORBABLE SPONGE 12-7 MM 1 EACH: 12-7 MISC at 09:03

## 2025-06-19 RX ADMIN — MIDAZOLAM 1 MG: 1 INJECTION INTRAMUSCULAR; INTRAVENOUS at 08:50

## 2025-06-19 NOTE — H&P
[facility]   Interventional Radiology H&P    Patient Name: Gabriel De Souza  : 1945  MRN: [id number]  Primary Care Physician:  Waylon Johnson MD  Referring Physician: Hammad Calixto MD  Date of admission: 2025    Subjective   Subjective     HPI:  Gabriel De Souza is a 80 y.o. 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    Review of Systems:   Constitutional no fever,  no weight loss       Otolaryngeal no difficulty swallowing   Cardiovascular no chest pain   Pulmonary no cough, no sputum production   Gastrointestinal no constipation, no diarrhea                         Personal History       Past Medical/Surgical History:   Past Medical History:   Diagnosis Date    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    Coronary artery disease     Deep vein thrombosis 1990    Diabetes mellitus     Dyslipidemia     Erectile dysfunction 51 years old    Heart murmur 10/04/2023    Hemodialysis patient     HL (hearing loss) 7 year old    right    Hyperlipidemia 1970    Hypertension     Neuropathy     Renal insufficiency     Stage 4 chronic kidney disease     3-4     Past Surgical History:   Procedure Laterality Date    ARTERIOVENOUS FISTULA/SHUNT SURGERY Left 2022    Procedure: BRACHIAL CEPHALIC FISTULA FORMATION;  Surgeon: Edy Vega MD;  Location: [facility];  Service: Vascular;  Laterality: Left;    BACK SURGERY      BASAL CELL CARCINOMA EXCISION  2019    CARDIAC CATHETERIZATION      CYSTOSCOPY W/ URETERAL STENT PLACEMENT Bilateral 2024    Procedure: CYSTOSCOPY, BILATERAL URETERAL STENT INSERTION;  Surgeon: Tyrel Avitia MD;  Location: [facility] OR;  Service: Urology;  Laterality: Bilateral;    ERCP N/A 2024    Procedure: ENDOSCOPIC RETROGRADE CHOLANGIOPANCREATOGRAPHY with sphinctorotomy, brushing of bile duct, balloon sweeping of common bile duct up to 12mm, placement of 10 Fr. x 9cm biliary stent, and placement of 5  Fr. x 7cm pancreatic stent;  Surgeon: Ollie Peoples MD;  Location: [facility] ENDOSCOPY;  Service: Gastroenterology;  Laterality: N/A;    ERCP N/A 07/18/2024    Procedure: ENDOSCOPIC RETROGRADE CHOLANGIOPANCREATOGRAPHY with SPHINCTEROTOMY, BALLOON SWEEP, BILE DUCT BRUSHINGS;  Surgeon: Ollie Peoples MD;  Location: [facility] ENDOSCOPY;  Service: Gastroenterology;  Laterality: N/A;  POST: PANCREATITIS    ERCP N/A 03/12/2024    Procedure: ENDOSCOPIC RETROGRADE CHOLANGIOPANCREATOGRAPHY WITH REMOVAL OF PANCREATIC AND BILIARY STENTS,EXTENSION OF SPHINCTEROTOMY, CLEARANCE OF BILE DUCT WITH BALLOON (9MM-12MM, UP TO 12MM), PLACEMENT OF BILIARY STENT;  Surgeon: Ollie Peoples MD;  Location: [facility] ENDOSCOPY;  Service: Gastroenterology;  Laterality: N/A;  Post-    EYE SURGERY  2-6 & 2-    left & right eye    TURP / TRANSURETHRAL INCISION / DRAINAGE PROSTATE      UPPER ENDOSCOPIC ULTRASOUND W/ FNA N/A 01/23/2024    Procedure: ESOPHAGOGASTRODUODENOSCOPY WITH ULTRASOUND AND FINE NEEDLE ASPIRATION of pancreatic head lesion;  Surgeon: Ollie Peoples MD;  Location: [facility] ENDOSCOPY;  Service: Gastroenterology;  Laterality: N/A;    UPPER ENDOSCOPIC ULTRASOUND W/ FNA N/A 03/12/2024    Procedure: ENDOSCOPIC ULTRASOUND WITH FINE NEEDLE ASPIRATION;  Surgeon: Ollie Peoples MD;  Location: [facility] ENDOSCOPY;  Service: Gastroenterology;  Laterality: N/A;  Post-       Social History:  reports that he has never smoked. He has never been exposed to tobacco smoke. He has never used smokeless tobacco. He reports that he does not drink alcohol and does not use drugs.    Medications:  (Not in a hospital admission)    Current medications:  sodium chloride, 10 mL, Intravenous, Q12H      Current IV drips:  sodium chloride, 75 mL/hr        Allergies:  Allergies   Allergen Reactions    Codeine Nausea And Vomiting    Tylenol With Codeine #3 [Acetaminophen-Codeine] Nausea Only and GI Intolerance       Objective    Objective     Vitals:   Temp:  [97.9 °F  "(36.6 °C)] 97.9 °F (36.6 °C)  Heart Rate:  [67-68] 67  Resp:  [8-12] 8  BP: (139-146)/(71-76) 139/76      Physical Exam:   Constitutional: Awake, alert, No acute distress    Respiratory: Clear to auscultation bilaterally, nonlabored respirations    Cardiovascular: RRR, no murmurs, rubs, or gallops, palpable pedal pulses bilaterally   Gastrointestinal: Positive bowel sounds, soft, nontender, nondistended        ASA SCALE ASSESSMENT:  2-Mild to moderate systemic disease, medically well controlled, with no functional limitation    MALLAMPATI CLASSIFICATION:  2-Able to visualize the soft palate, fauces, uvula. The anterior & posterior tonsilar pillars are hidden by the tongue.       Result Review        Result Review:     No results found for: \"NA\"    No results found for: \"K\"    No results found for: \"CL\"    No results found for: \"PLASMABICARB\"    No results found for: \"BUN\"    No results found for: \"CREATININE\"    No results found for: \"CALCIUM\"        No components found for: \"GLUCOSE.*\"  Results from last 7 days   Lab Units 06/19/25  0743   WBC 10*3/mm3 7.65   HEMOGLOBIN g/dL 11.2*   HEMATOCRIT % 35.5*   PLATELETS 10*3/mm3 257      Results from last 7 days   Lab Units 06/19/25  0743   INR  1.04           Assessment / Plan     Assesment:   Apparent malignant cholangiocarcinoma or gallbladder adenocarcinoma with liver lesion.      Plan:   CT guided liver lesion core biopsy.     The risks and benefits of the procedure were discussed with the patient.    Electronically signed by DG Lucero, 06/19/25, 8:36 AM EDT.  "

## 2025-06-19 NOTE — DISCHARGE INSTRUCTIONS
A responsible adult should stay with you and you should rest quietly for the rest of the day. Do not drink alcohol, drive or cook for 24 hours following your procedure.  Progress your diet as tolerated.  Resume your usual medications including aspirin.  When you remove your dressing in 48 hours, a small amount of blood is to be expected. Do not be alarmed.  If you feel it is bleeding excessively apply pressure and proceed to the Emergency room.  Do not shower, bathe, or get your dressing wet at all for 48 hours.  You may shower after the dressing is removed. No lifting more that 10 pounds for 48 hours.  If severe pain, increased shortness of air or racing heartbeat occur, seek immediate medical attention.  Follow up with Dr. Calixto for results.

## 2025-06-20 LAB
LAB AP CASE REPORT: NORMAL
LAB AP DIAGNOSIS COMMENT: NORMAL
Lab: NORMAL
PATH REPORT.FINAL DX SPEC: NORMAL
PATH REPORT.GROSS SPEC: NORMAL

## 2025-07-02 ENCOUNTER — TELEPHONE (OUTPATIENT)
Dept: ONCOLOGY | Facility: CLINIC | Age: 80
End: 2025-07-02
Payer: MEDICARE

## 2025-07-09 RX ORDER — INSULIN GLARGINE AND LIXISENATIDE 100; 33 U/ML; UG/ML
INJECTION, SOLUTION SUBCUTANEOUS
Qty: 30 ML | Refills: 3 | Status: SHIPPED | OUTPATIENT
Start: 2025-07-09

## 2025-07-22 NOTE — PROGRESS NOTES
HEMATOLOGY ONCOLOGY OUTPATIENT FOLLOW UP       Patient name: Gabriel De Souza  : 1945  MRN: [id number]  Primary Care Physician: Waylon Johnson MD  Referring Physician: Waylon Johnson*  Reason For Consult: Apparent adenocarcinoma of gallbladder/bile duct.    History of Present Illness:    2025: In the office for the first time to stage and treat an adenocarcinoma that appears to have arisen either in the extrahepatic bile ducts or the gallbladder.  Sometime early in  Mr. De Souza was brought to the hospital with hypotension.  He was found to have evidence of bilateral nephrolithiasis that had resulted in hydronephrosis.  In addition there was evidence of interstitial pancreatitis.  For some time his renal function had been declining and at that time he had already received an arteriovenous fistula.  He started hemodialysis.  Stenting of both ureters was achieved without any complications.  He received an inferior vena cava filter, as he had been taking anticoagulation for some time.  He was discharged to continue investigations as outpatient.  Currently after that, in March, he underwent an endoscopic retrograde cholangiopancreatography.  Fine-needle aspiration of the ampulla and the pancreas confirmed chronic pancreatitis.  Later that same year he was taken again to the endoscopy suite without and underwent cholangiopancreatography with sphincterotomy, a balloon sweep and bile duct brushings.  Pathology again was not consistent with malignancy.  Throughout this time, in addition, he had developed more radiographic abnormalities.  In segments 8 and 4A of the liver enlarging tumors were identified.  One of them was in close proximity to the gallbladder foci it was associated to nodular changes that were felt to be potentially the result of gallbladder malignancy.  In addition there was enlarging figueroa hepatic adenopathy that was consistent with malignancy.  For  this reason in May 2025 he was taken again to the endoscopy suite and underwent FNA from the figueroa hepatis nodules. This time the final report of pathology was positive for adenocarcinoma.  He tells me today he feels reasonably well.  He has not experienced any abdominal pain.  He has been having dialysis without difficulties and he will probably need continued dialysis for life.  He has been eating reasonably well and his weight has been stable.  He has had no respiratory symptoms and he denies chest pains.  He has been defecating regularly without melena or hematochezia and he denies dysuria.  Family history and social history were reviewed.  On exam he appears ill but in no distress.  There is no jaundice and there is no pallor.  There are no oral lesions but his dentition is poor.  The lungs are clear bilaterally and the heart regular.  The abdomen is soft.  There is fullness in the epigastrium to palpation but there is no tenderness.  There is no edema.  Laboratory exams reviewed.  I reviewed independently the images of all the recent studies.  There is clearly a malignant process in the figueroa hepatis and I suspect, indeed, that this corresponds to a gallbladder malignancy.  Cholangiocarcinoma is possible to but it would correspond to an extrahepatic cholangiocarcinoma.  I would like to be able to perform next-generation sequencing and a tissue available at this point is scant.  For this reason a CT-guided core biopsy of one of the liver lesions not only will confirm the diagnosis of metastatic disease but also will provide enough tissue for adequate testing.  He is also to have a CT of the chest.  He will see me with results.    7/24/2025: Back in the office to review the results of the biopsy.  The procedure took place without complications.  He still has some soreness at the site but no pain.  No bleeding.  He continues to eat well.  Continues to receive dialysis regularly.  Has been exercising some.  No  fevers.  Denies night sweats.  No chest pains or cough and no abdominal pain or diarrhea.  On exam alert, conversant and oriented.  No distress no jaundice.  Lungs clear bilaterally.  Heart regular.  Abdomen soft.  No edema.  Laboratory exams reviewed.  Briefly reviewed the report of the biopsy that confirms metastatic invasive moderately differentiated adenocarcinoma.  The malignant cells are positive for cytokeratin 7 and CDX2.  The cells are negative for cytokeratin 20.  Discussed at great length the findings of the imaging studies.  Discussed at great length the findings of the biopsy and the significance.  Offered treatment with gemcitabine.  Requested next-generation sequencing and PD-L1 expression.  Explained the plans.  To see Dr. NOE Aguirre for insertion of port.  See me back in 2 weeks.    Past Medical History:   Diagnosis Date    Cancer 01/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    Coronary artery disease     Deep vein thrombosis 04/1990    Diabetes mellitus     Dyslipidemia     Erectile dysfunction 51 years old    Heart murmur 10/04/2023    Hemodialysis patient     HL (hearing loss) 7 year old    right    Hyperlipidemia 1970    Hypertension     Neuropathy     Renal insufficiency 2020    Stage 4 chronic kidney disease     3-4     Past Surgical History:   Procedure Laterality Date    ARTERIOVENOUS FISTULA/SHUNT SURGERY Left 09/22/2022    Procedure: BRACHIAL CEPHALIC FISTULA FORMATION;  Surgeon: Edy Vega MD;  Location: [facility];  Service: Vascular;  Laterality: Left;    BACK SURGERY      BASAL CELL CARCINOMA EXCISION  01/2019    CARDIAC CATHETERIZATION      CYSTOSCOPY W/ URETERAL STENT PLACEMENT Bilateral 01/16/2024    Procedure: CYSTOSCOPY, BILATERAL URETERAL STENT INSERTION;  Surgeon: Tyrel Avitia MD;  Location: [facility] OR;  Service: Urology;  Laterality: Bilateral;    ERCP N/A 01/23/2024    Procedure: ENDOSCOPIC RETROGRADE CHOLANGIOPANCREATOGRAPHY with sphinctorotomy, brushing of bile duct, balloon  sweeping of common bile duct up to 12mm, placement of 10 Fr. x 9cm biliary stent, and placement of 5 Fr. x 7cm pancreatic stent;  Surgeon: Ollie Peoples MD;  Location: [facility] ENDOSCOPY;  Service: Gastroenterology;  Laterality: N/A;    ERCP N/A 07/18/2024    Procedure: ENDOSCOPIC RETROGRADE CHOLANGIOPANCREATOGRAPHY with SPHINCTEROTOMY, BALLOON SWEEP, BILE DUCT BRUSHINGS;  Surgeon: Ollie Peopels MD;  Location: [facility] ENDOSCOPY;  Service: Gastroenterology;  Laterality: N/A;  POST: PANCREATITIS    ERCP N/A 03/12/2024    Procedure: ENDOSCOPIC RETROGRADE CHOLANGIOPANCREATOGRAPHY WITH REMOVAL OF PANCREATIC AND BILIARY STENTS,EXTENSION OF SPHINCTEROTOMY, CLEARANCE OF BILE DUCT WITH BALLOON (9MM-12MM, UP TO 12MM), PLACEMENT OF BILIARY STENT;  Surgeon: Ollie Peoples MD;  Location: [facility] ENDOSCOPY;  Service: Gastroenterology;  Laterality: N/A;  Post-    EYE SURGERY  2-6 & 2-    left & right eye    TURP / TRANSURETHRAL INCISION / DRAINAGE PROSTATE      UPPER ENDOSCOPIC ULTRASOUND W/ FNA N/A 01/23/2024    Procedure: ESOPHAGOGASTRODUODENOSCOPY WITH ULTRASOUND AND FINE NEEDLE ASPIRATION of pancreatic head lesion;  Surgeon: Ollie Peoples MD;  Location: [facility] ENDOSCOPY;  Service: Gastroenterology;  Laterality: N/A;    UPPER ENDOSCOPIC ULTRASOUND W/ FNA N/A 03/12/2024    Procedure: ENDOSCOPIC ULTRASOUND WITH FINE NEEDLE ASPIRATION;  Surgeon: Ollie Peoples MD;  Location: [facility] ENDOSCOPY;  Service: Gastroenterology;  Laterality: N/A;  Post-       Current Outpatient Medications:     aspirin 81 MG tablet, Take 1 tablet by mouth Daily., Disp: , Rfl:     D 1000 25 MCG (1000 UT) capsule, Take 1 capsule by mouth Daily., Disp: , Rfl:     sevelamer (RENVELA) 800 MG tablet, Take 3 tablets by mouth 3 (Three) Times a Day., Disp: , Rfl:     simvastatin (ZOCOR) 20 MG tablet, TAKE 1 TABLET BY MOUTH DAILY, Disp: 90 tablet, Rfl: 3    Soliqua 100-33 UNT-MCG/ML solution pen-injector injection, INJECT 20 UNITS SUBCUTANEOUSLY  INTO THE  APPROPRIATE AREA AS  DIRECTED DAILY, Disp: 30 mL, Rfl: 3    torsemide (DEMADEX) 20 MG tablet, Take 1 tablet by mouth Daily., Disp: , Rfl:     Allergies   Allergen Reactions    Codeine Nausea And Vomiting    Tylenol With Codeine #3 [Acetaminophen-Codeine] Nausea Only and GI Intolerance     Family History   Problem Relation Age of Onset    Heart disease Mother     Hypertension Father     Hearing loss Father     Brain cancer Father     Heart disease Brother     Diabetes Brother     Heart attack Maternal Grandfather      Cancer-related family history includes Brain cancer in his father.    Social History     Tobacco Use    Smoking status: Never     Passive exposure: Never    Smokeless tobacco: Never    Tobacco comments:     never used   Vaping Use    Vaping status: Never Used   Substance Use Topics    Alcohol use: No    Drug use: No     Social History     Social History Narrative    Not on file     ROS:   Review of Systems   Constitutional:  Positive for fatigue. Negative for activity change, appetite change, chills, diaphoresis, fever and unexpected weight change.   HENT:  Negative for congestion, dental problem, drooling, ear discharge, ear pain, facial swelling, hearing loss, mouth sores, nosebleeds, postnasal drip, rhinorrhea, sinus pressure, sinus pain, sneezing, sore throat, tinnitus, trouble swallowing and voice change.    Eyes:  Negative for photophobia, pain, discharge, redness, itching and visual disturbance.   Respiratory:  Negative for apnea, cough, choking, chest tightness, shortness of breath, wheezing and stridor.    Cardiovascular:  Negative for chest pain, palpitations and leg swelling.   Gastrointestinal:  Negative for abdominal distention, abdominal pain, anal bleeding, blood in stool, constipation, diarrhea, nausea, rectal pain and vomiting.   Endocrine: Negative for cold intolerance, heat intolerance, polydipsia and polyuria.   Genitourinary:  Negative for decreased urine volume, difficulty  "urinating, dysuria, flank pain, frequency, genital sores, hematuria and urgency.   Musculoskeletal:  Negative for arthralgias, back pain, gait problem, joint swelling, myalgias, neck pain and neck stiffness.   Skin:  Negative for color change, pallor and rash.   Neurological:  Negative for dizziness, tremors, seizures, syncope, facial asymmetry, speech difficulty, weakness, light-headedness, numbness and headaches.   Hematological:  Negative for adenopathy. Does not bruise/bleed easily.   Psychiatric/Behavioral:  Negative for agitation, behavioral problems, confusion, decreased concentration, hallucinations, self-injury, sleep disturbance and suicidal ideas. The patient is not nervous/anxious.      Objective:    Vital Signs:  Vitals:    07/24/25 1125   BP: 146/71   Pulse: 85   Resp: 18   Temp: 98.5 °F (36.9 °C)   SpO2: 94%   Weight: 86.1 kg (189 lb 12.8 oz)   Height: 185.4 cm (73\")   PainSc: 5    PainLoc: Abdomen  Comment: Recent biopsy     Body mass index is 25.04 kg/m².    ECOG Status  (1) Restricted in physically strenuous activity, ambulatory and able to do work of light nature    Physical Exam:   Physical Exam  Constitutional:       General: He is not in acute distress.     Appearance: He is ill-appearing. He is not toxic-appearing or diaphoretic.   HENT:      Head: Normocephalic and atraumatic.      Right Ear: External ear normal.      Left Ear: External ear normal.      Nose: Nose normal.      Mouth/Throat:      Mouth: Mucous membranes are moist.      Pharynx: Oropharynx is clear.   Eyes:      General: No scleral icterus.        Right eye: No discharge.         Left eye: No discharge.      Conjunctiva/sclera: Conjunctivae normal.      Pupils: Pupils are equal, round, and reactive to light.   Cardiovascular:      Rate and Rhythm: Normal rate and regular rhythm.      Pulses: Normal pulses.      Heart sounds: Normal heart sounds. No murmur heard.     No friction rub. No gallop.   Pulmonary:      Effort: No " respiratory distress.      Breath sounds: No stridor. No wheezing, rhonchi or rales.      Comments: Breath sounds are diminished bilaterally.  Chest:      Chest wall: No tenderness.   Abdominal:      General: Bowel sounds are normal. There is no distension.      Palpations: Abdomen is soft. There is no mass.      Tenderness: There is no abdominal tenderness. There is no right CVA tenderness, left CVA tenderness, guarding or rebound.      Comments: The abdomen is soft.  On palpation there is no tenderness.  However there is fullness in the epigastrium.   Musculoskeletal:         General: No swelling, tenderness, deformity or signs of injury.      Cervical back: No rigidity.      Right lower leg: No edema.      Left lower leg: No edema.   Lymphadenopathy:      Cervical: No cervical adenopathy.   Skin:     General: Skin is warm and dry.      Coloration: Skin is not jaundiced.      Findings: No bruising or rash.   Neurological:      General: No focal deficit present.      Mental Status: He is alert and oriented to person, place, and time.      Gait: Gait normal.   Psychiatric:         Mood and Affect: Mood normal.         Behavior: Behavior normal.         Thought Content: Thought content normal.         Judgment: Judgment normal.     PILLO Calixto MD performed a physical exam on 7/24/2025 as documented above.    Lab Results - Last 18 Months   Lab Units 07/24/25  1122 07/16/25  0000 06/25/25  0000 06/19/25  0743 06/18/25  0000 06/05/25  1158   WBC 10*3/mm3 7.77  --   --  7.65  --  8.77   HEMOGLOBIN g/dL 10.5* 11.2*  33.6*   < > 11.2*   < > 12.0*   HEMATOCRIT % 32.4*  --   --  35.5*  --  37.5   PLATELETS 10*3/mm3 341  --   --  257  --  236   MCV fL 97.6*  --   --  98.3*  --  99.7*    < > = values in this interval not displayed.     Lab Results - Last 18 Months   Lab Units 06/05/25  1306 07/18/24  0830 07/12/24  0000 03/05/24  1225 01/25/24  0152 01/24/24  1209   SODIUM mmol/L 138 137  --  140 139 137   POTASSIUM  mmol/L 4.8 4.0  --  5.3* 4.4 4.4   CHLORIDE mmol/L 98 97*  --  102 106 106   CO2 mmol/L 27.5 25.8  --  27.0 23.0 20.0*   BUN mg/dL 26.9* 40* 78* 36* 45* 30*   CREATININE mg/dL 4.55* 4.72*  --  4.69* 3.95* 2.69*   CALCIUM mg/dL 9.9 10.2  --  10.8* 7.8* 8.2*   BILIRUBIN mg/dL 0.4  --   --   --  0.3 0.4   ALK PHOS U/L 155*  --   --   --  123* 139*   ALT (SGPT) U/L 6  --   --   --  14 16   AST (SGOT) U/L 17  --   --   --  13 22   GLUCOSE mg/dL 96 88  --  93 121* 102*     Lab Results   Component Value Date    GLUCOSE 96 06/05/2025    BUN 26.9 (H) 06/05/2025    CREATININE 4.55 (H) 06/05/2025    EGFRIFNONA 19 (L) 02/18/2022    BCR 5.9 (L) 06/05/2025    K 4.8 06/05/2025    CO2 27.5 06/05/2025    CALCIUM 9.9 06/05/2025    ALBUMIN 4.1 06/05/2025    AST 17 06/05/2025    ALT 6 06/05/2025     Lab Results - Last 18 Months   Lab Units 06/19/25  0743 01/24/24  1209   INR  1.04 1.32*   APTT seconds 32.5  --      Lab Results   Component Value Date    FOLATE 4.3 05/14/2025     Lab Results   Component Value Date    PTT 32.5 06/19/2025    INR 1.04 06/19/2025     Lab Results   Component Value Date    PSA 1.6 03/05/2024     Assessment & Plan     1.  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  Discussed at length with him and with his family.  Additional tissue for [employer] ration sequencing and PD-L1 testing is appropriate.  The CT scan of the chest does not show any evidence of metastatic disease.  The biopsy of the liver DOS confirmed cholangiocarcinoma versus metastatic gallbladder carcinoma.  2.  End-stage renal disease: This obviously makes treatment more complicated and likely less fruitful.  On this basis I discussed with him alternatives to active antineoplastic treatment, basically supportive and symptomatic treatment.  3.  Reviewed the reports of the imaging studies and discussed with him.  Reviewed the report of pathology.  Discussed at great length the findings, the implications and treatment.  The  disease was described as incurable.  Treatment is aimed at prolonging life span.  4.  Requested next-generation sequencing and PD-L1 expression.  Consider treatment with gemcitabine and single agent.  See me in 2 weeks.  He was referred for insertion of port.    Hammad Calixto MD on 7/24/2025 at 12:39 PM.

## 2025-07-24 ENCOUNTER — OFFICE VISIT (OUTPATIENT)
Dept: ONCOLOGY | Facility: CLINIC | Age: 80
End: 2025-07-24
Payer: MEDICARE

## 2025-07-24 ENCOUNTER — LAB (OUTPATIENT)
Dept: LAB | Facility: HOSPITAL | Age: 80
End: 2025-07-24
Payer: MEDICARE

## 2025-07-24 VITALS
DIASTOLIC BLOOD PRESSURE: 71 MMHG | OXYGEN SATURATION: 94 % | BODY MASS INDEX: 25.15 KG/M2 | TEMPERATURE: 98.5 F | HEIGHT: 73 IN | WEIGHT: 189.8 LBS | SYSTOLIC BLOOD PRESSURE: 146 MMHG | RESPIRATION RATE: 18 BRPM | HEART RATE: 85 BPM

## 2025-07-24 DIAGNOSIS — Z45.2 ENCOUNTER FOR ADJUSTMENT OR MANAGEMENT OF VASCULAR ACCESS DEVICE: ICD-10-CM

## 2025-07-24 DIAGNOSIS — D63.1 ANEMIA IN CHRONIC KIDNEY DISEASE, UNSPECIFIED CKD STAGE: ICD-10-CM

## 2025-07-24 DIAGNOSIS — N18.9 ANEMIA IN CHRONIC KIDNEY DISEASE, UNSPECIFIED CKD STAGE: ICD-10-CM

## 2025-07-24 DIAGNOSIS — C22.0 HEPATOCELLULAR CARCINOMA: Primary | ICD-10-CM

## 2025-07-24 PROBLEM — C24.9: Status: ACTIVE | Noted: 2025-07-24

## 2025-07-24 LAB
BASOPHILS # BLD AUTO: 0.03 10*3/MM3 (ref 0–0.2)
BASOPHILS NFR BLD AUTO: 0.4 % (ref 0–1.5)
DEPRECATED RDW RBC AUTO: 48.8 FL (ref 37–54)
EOSINOPHIL # BLD AUTO: 0.1 10*3/MM3 (ref 0–0.4)
EOSINOPHIL NFR BLD AUTO: 1.3 % (ref 0.3–6.2)
ERYTHROCYTE [DISTWIDTH] IN BLOOD BY AUTOMATED COUNT: 13.4 % (ref 12.3–15.4)
HCT VFR BLD AUTO: 32.4 % (ref 37.5–51)
HGB BLD-MCNC: 10.5 G/DL (ref 13–17.7)
HOLD SPECIMEN: NORMAL
HOLD SPECIMEN: NORMAL
IMM GRANULOCYTES # BLD AUTO: 0.04 10*3/MM3 (ref 0–0.05)
IMM GRANULOCYTES NFR BLD AUTO: 0.5 % (ref 0–0.5)
LYMPHOCYTES # BLD AUTO: 2.23 10*3/MM3 (ref 0.7–3.1)
LYMPHOCYTES NFR BLD AUTO: 28.7 % (ref 19.6–45.3)
MCH RBC QN AUTO: 31.6 PG (ref 26.6–33)
MCHC RBC AUTO-ENTMCNC: 32.4 G/DL (ref 31.5–35.7)
MCV RBC AUTO: 97.6 FL (ref 79–97)
MONOCYTES # BLD AUTO: 1.55 10*3/MM3 (ref 0.1–0.9)
MONOCYTES NFR BLD AUTO: 19.9 % (ref 5–12)
NEUTROPHILS NFR BLD AUTO: 3.82 10*3/MM3 (ref 1.7–7)
NEUTROPHILS NFR BLD AUTO: 49.2 % (ref 42.7–76)
PLATELET # BLD AUTO: 341 10*3/MM3 (ref 140–450)
PMV BLD AUTO: 8.9 FL (ref 6–12)
RBC # BLD AUTO: 3.32 10*6/MM3 (ref 4.14–5.8)
WBC NRBC COR # BLD AUTO: 7.77 10*3/MM3 (ref 3.4–10.8)

## 2025-07-24 PROCEDURE — 36415 COLL VENOUS BLD VENIPUNCTURE: CPT

## 2025-07-24 PROCEDURE — 85025 COMPLETE CBC W/AUTO DIFF WBC: CPT | Performed by: INTERNAL MEDICINE

## 2025-07-28 LAB
LAB AP CASE REPORT: NORMAL
LAB AP DIAGNOSIS COMMENT: NORMAL
Lab: NORMAL
PATH REPORT.ADDENDUM SPEC: NORMAL
PATH REPORT.FINAL DX SPEC: NORMAL
PATH REPORT.GROSS SPEC: NORMAL

## 2025-07-29 ENCOUNTER — OFFICE VISIT (OUTPATIENT)
Dept: SURGERY | Facility: CLINIC | Age: 80
End: 2025-07-29
Payer: MEDICARE

## 2025-07-29 VITALS
HEIGHT: 73 IN | TEMPERATURE: 97.3 F | SYSTOLIC BLOOD PRESSURE: 96 MMHG | OXYGEN SATURATION: 95 % | BODY MASS INDEX: 25.42 KG/M2 | WEIGHT: 191.8 LBS | DIASTOLIC BLOOD PRESSURE: 55 MMHG | HEART RATE: 82 BPM

## 2025-07-29 DIAGNOSIS — C24.9: Primary | ICD-10-CM

## 2025-07-29 RX ORDER — SODIUM CHLORIDE 0.9 % (FLUSH) 0.9 %
3-10 SYRINGE (ML) INJECTION AS NEEDED
Status: CANCELLED | OUTPATIENT
Start: 2025-07-31

## 2025-07-29 RX ORDER — SUCROFERRIC OXYHYDROXIDE 500 MG/1
TABLET, CHEWABLE ORAL
COMMUNITY
Start: 2025-07-23

## 2025-07-29 RX ORDER — SODIUM CHLORIDE 9 MG/ML
40 INJECTION, SOLUTION INTRAVENOUS AS NEEDED
Status: CANCELLED | OUTPATIENT
Start: 2025-07-31

## 2025-07-29 RX ORDER — SODIUM CHLORIDE 0.9 % (FLUSH) 0.9 %
3 SYRINGE (ML) INJECTION EVERY 12 HOURS SCHEDULED
Status: CANCELLED | OUTPATIENT
Start: 2025-07-31

## 2025-07-29 RX ORDER — SODIUM CHLORIDE 9 MG/ML
100 INJECTION, SOLUTION INTRAVENOUS CONTINUOUS
Status: CANCELLED | OUTPATIENT
Start: 2025-07-31 | End: 2025-08-01

## 2025-07-30 ENCOUNTER — LAB (OUTPATIENT)
Dept: LAB | Facility: HOSPITAL | Age: 80
End: 2025-07-30
Payer: MEDICARE

## 2025-07-30 ENCOUNTER — HOSPITAL ENCOUNTER (OUTPATIENT)
Dept: CARDIOLOGY | Facility: HOSPITAL | Age: 80
Discharge: HOME | End: 2025-07-30
Payer: MEDICARE

## 2025-07-30 LAB
ANION GAP SERPL CALCULATED.3IONS-SCNC: 12 MMOL/L (ref 5–15)
BUN SERPL-MCNC: 22 MG/DL (ref 8–23)
BUN/CREAT SERPL: 9.3 (ref 7–25)
CALCIUM SPEC-SCNC: 8.7 MG/DL (ref 8.6–10.5)
CHLORIDE SERPL-SCNC: 97 MMOL/L (ref 98–107)
CO2 SERPL-SCNC: 28 MMOL/L (ref 22–29)
CREAT SERPL-MCNC: 2.36 MG/DL (ref 0.76–1.27)
EGFRCR SERPLBLD CKD-EPI 2021: 27.3 ML/MIN/1.73
GLUCOSE SERPL-MCNC: 134 MG/DL (ref 65–99)
MRSA DNA SPEC QL NAA+PROBE: NORMAL
POTASSIUM SERPL-SCNC: 3.4 MMOL/L (ref 3.5–5.2)
QT INTERVAL: 431 MS
QTC INTERVAL: 514 MS
SODIUM SERPL-SCNC: 137 MMOL/L (ref 136–145)

## 2025-07-30 PROCEDURE — 87641 MR-STAPH DNA AMP PROBE: CPT

## 2025-07-30 PROCEDURE — 93005 ELECTROCARDIOGRAM TRACING: CPT | Performed by: SURGERY

## 2025-07-30 PROCEDURE — 80048 BASIC METABOLIC PNL TOTAL CA: CPT

## 2025-07-31 ENCOUNTER — ANESTHESIA EVENT (OUTPATIENT)
Dept: PERIOP | Facility: HOSPITAL | Age: 80
End: 2025-07-31
Payer: MEDICARE

## 2025-07-31 ENCOUNTER — HOSPITAL ENCOUNTER (OUTPATIENT)
Dept: GENERAL RADIOLOGY | Facility: HOSPITAL | Age: 80
Setting detail: HOSPITAL OUTPATIENT SURGERY
Discharge: HOME | End: 2025-07-31
Payer: MEDICARE

## 2025-07-31 ENCOUNTER — ANESTHESIA (OUTPATIENT)
Dept: PERIOP | Facility: HOSPITAL | Age: 80
End: 2025-07-31
Payer: MEDICARE

## 2025-07-31 ENCOUNTER — HOSPITAL ENCOUNTER (OUTPATIENT)
Facility: HOSPITAL | Age: 80
Setting detail: HOSPITAL OUTPATIENT SURGERY
Discharge: HOME | End: 2025-07-31
Attending: SURGERY | Admitting: SURGERY
Payer: MEDICARE

## 2025-07-31 ENCOUNTER — APPOINTMENT (OUTPATIENT)
Dept: GENERAL RADIOLOGY | Facility: HOSPITAL | Age: 80
End: 2025-07-31
Payer: MEDICARE

## 2025-07-31 VITALS
SYSTOLIC BLOOD PRESSURE: 101 MMHG | OXYGEN SATURATION: 94 % | HEIGHT: 73 IN | BODY MASS INDEX: 25.26 KG/M2 | HEART RATE: 78 BPM | DIASTOLIC BLOOD PRESSURE: 53 MMHG | WEIGHT: 190.6 LBS | TEMPERATURE: 98.3 F | RESPIRATION RATE: 20 BRPM

## 2025-07-31 DIAGNOSIS — C24.9: ICD-10-CM

## 2025-07-31 LAB
GLUCOSE BLDC GLUCOMTR-MCNC: 121 MG/DL (ref 70–105)
GLUCOSE BLDC GLUCOMTR-MCNC: 123 MG/DL (ref 70–105)
POTASSIUM SERPL-SCNC: 3.2 MMOL/L (ref 3.5–5.2)

## 2025-07-31 PROCEDURE — 76000 FLUOROSCOPY <1 HR PHYS/QHP: CPT

## 2025-07-31 PROCEDURE — 36561 INSERT TUNNELED CV CATH: CPT | Performed by: SURGERY

## 2025-07-31 PROCEDURE — 71045 X-RAY EXAM CHEST 1 VIEW: CPT

## 2025-07-31 PROCEDURE — 25810000003 SODIUM CHLORIDE 0.9 % SOLUTION: Performed by: SURGERY

## 2025-07-31 PROCEDURE — C1788 PORT, INDWELLING, IMP: HCPCS | Performed by: SURGERY

## 2025-07-31 PROCEDURE — 25010000002 LIDOCAINE PF 2% 2 % SOLUTION: Performed by: NURSE ANESTHETIST, CERTIFIED REGISTERED

## 2025-07-31 PROCEDURE — 77001 FLUOROGUIDE FOR VEIN DEVICE: CPT | Performed by: SURGERY

## 2025-07-31 PROCEDURE — 25010000002 BUPIVACAINE (PF) 0.25 % SOLUTION: Performed by: SURGERY

## 2025-07-31 PROCEDURE — 82948 REAGENT STRIP/BLOOD GLUCOSE: CPT

## 2025-07-31 PROCEDURE — 25010000002 ONDANSETRON PER 1 MG: Performed by: NURSE ANESTHETIST, CERTIFIED REGISTERED

## 2025-07-31 PROCEDURE — 84132 ASSAY OF SERUM POTASSIUM: CPT | Performed by: ANESTHESIOLOGY

## 2025-07-31 PROCEDURE — 25010000002 PROPOFOL 10 MG/ML EMULSION: Performed by: NURSE ANESTHETIST, CERTIFIED REGISTERED

## 2025-07-31 PROCEDURE — 25010000002 FENTANYL CITRATE (PF) 100 MCG/2ML SOLUTION: Performed by: NURSE ANESTHETIST, CERTIFIED REGISTERED

## 2025-07-31 PROCEDURE — 25010000002 HEPARIN (PORCINE) PER 1000 UNITS: Performed by: SURGERY

## 2025-07-31 PROCEDURE — 25010000002 CEFAZOLIN PER 500 MG: Performed by: SURGERY

## 2025-07-31 PROCEDURE — 82948 REAGENT STRIP/BLOOD GLUCOSE: CPT | Performed by: NURSE ANESTHETIST, CERTIFIED REGISTERED

## 2025-07-31 DEVICE — POWERPORT CLEARVUE ISP IMPLANTABLE PORT WITH ATTACHABLE 8F POLYURETHANE OPEN-ENDED SINGLE-LUMEN VENOUS CATHETER PROCEDURAL KIT
Type: IMPLANTABLE DEVICE | Site: CHEST | Status: FUNCTIONAL
Brand: POWERPORT CLEARVUE

## 2025-07-31 RX ORDER — LABETALOL HYDROCHLORIDE 5 MG/ML
5 INJECTION, SOLUTION INTRAVENOUS
Status: DISCONTINUED | OUTPATIENT
Start: 2025-07-31 | End: 2025-07-31 | Stop reason: HOSPADM

## 2025-07-31 RX ORDER — SODIUM CHLORIDE 9 MG/ML
100 INJECTION, SOLUTION INTRAVENOUS CONTINUOUS
Status: DISCONTINUED | OUTPATIENT
Start: 2025-07-31 | End: 2025-07-31 | Stop reason: HOSPADM

## 2025-07-31 RX ORDER — PHENYLEPHRINE HCL IN 0.9% NACL 1 MG/10 ML
SYRINGE (ML) INTRAVENOUS AS NEEDED
Status: DISCONTINUED | OUTPATIENT
Start: 2025-07-31 | End: 2025-07-31 | Stop reason: SURG

## 2025-07-31 RX ORDER — SODIUM CHLORIDE 9 MG/ML
40 INJECTION, SOLUTION INTRAVENOUS AS NEEDED
Status: DISCONTINUED | OUTPATIENT
Start: 2025-07-31 | End: 2025-07-31 | Stop reason: HOSPADM

## 2025-07-31 RX ORDER — FENTANYL CITRATE 50 UG/ML
INJECTION, SOLUTION INTRAMUSCULAR; INTRAVENOUS AS NEEDED
Status: DISCONTINUED | OUTPATIENT
Start: 2025-07-31 | End: 2025-07-31 | Stop reason: SURG

## 2025-07-31 RX ORDER — IPRATROPIUM BROMIDE AND ALBUTEROL SULFATE 2.5; .5 MG/3ML; MG/3ML
3 SOLUTION RESPIRATORY (INHALATION) ONCE AS NEEDED
Status: DISCONTINUED | OUTPATIENT
Start: 2025-07-31 | End: 2025-07-31 | Stop reason: HOSPADM

## 2025-07-31 RX ORDER — FENTANYL CITRATE 50 UG/ML
50 INJECTION, SOLUTION INTRAMUSCULAR; INTRAVENOUS
Status: DISCONTINUED | OUTPATIENT
Start: 2025-07-31 | End: 2025-07-31 | Stop reason: HOSPADM

## 2025-07-31 RX ORDER — LIDOCAINE HYDROCHLORIDE 20 MG/ML
INJECTION, SOLUTION EPIDURAL; INFILTRATION; INTRACAUDAL; PERINEURAL AS NEEDED
Status: DISCONTINUED | OUTPATIENT
Start: 2025-07-31 | End: 2025-07-31 | Stop reason: SURG

## 2025-07-31 RX ORDER — NALOXONE HCL 0.4 MG/ML
0.2 VIAL (ML) INJECTION AS NEEDED
Status: DISCONTINUED | OUTPATIENT
Start: 2025-07-31 | End: 2025-07-31 | Stop reason: HOSPADM

## 2025-07-31 RX ORDER — SODIUM CHLORIDE 0.9 % (FLUSH) 0.9 %
3 SYRINGE (ML) INJECTION EVERY 12 HOURS SCHEDULED
Status: DISCONTINUED | OUTPATIENT
Start: 2025-07-31 | End: 2025-07-31 | Stop reason: HOSPADM

## 2025-07-31 RX ORDER — BUPIVACAINE HYDROCHLORIDE 2.5 MG/ML
INJECTION, SOLUTION EPIDURAL; INFILTRATION; INTRACAUDAL; PERINEURAL AS NEEDED
Status: DISCONTINUED | OUTPATIENT
Start: 2025-07-31 | End: 2025-07-31 | Stop reason: HOSPADM

## 2025-07-31 RX ORDER — DROPERIDOL 2.5 MG/ML
0.62 INJECTION, SOLUTION INTRAMUSCULAR; INTRAVENOUS
Status: DISCONTINUED | OUTPATIENT
Start: 2025-07-31 | End: 2025-07-31 | Stop reason: HOSPADM

## 2025-07-31 RX ORDER — HYDRALAZINE HYDROCHLORIDE 20 MG/ML
5 INJECTION INTRAMUSCULAR; INTRAVENOUS
Status: DISCONTINUED | OUTPATIENT
Start: 2025-07-31 | End: 2025-07-31 | Stop reason: HOSPADM

## 2025-07-31 RX ORDER — PROMETHAZINE HYDROCHLORIDE 25 MG/1
25 SUPPOSITORY RECTAL ONCE AS NEEDED
Status: DISCONTINUED | OUTPATIENT
Start: 2025-07-31 | End: 2025-07-31 | Stop reason: HOSPADM

## 2025-07-31 RX ORDER — EPHEDRINE SULFATE 5 MG/ML
INJECTION INTRAVENOUS AS NEEDED
Status: DISCONTINUED | OUTPATIENT
Start: 2025-07-31 | End: 2025-07-31 | Stop reason: SURG

## 2025-07-31 RX ORDER — PROMETHAZINE HYDROCHLORIDE 25 MG/1
25 TABLET ORAL ONCE AS NEEDED
Status: DISCONTINUED | OUTPATIENT
Start: 2025-07-31 | End: 2025-07-31 | Stop reason: HOSPADM

## 2025-07-31 RX ORDER — DIPHENHYDRAMINE HYDROCHLORIDE 50 MG/ML
12.5 INJECTION, SOLUTION INTRAMUSCULAR; INTRAVENOUS
Status: DISCONTINUED | OUTPATIENT
Start: 2025-07-31 | End: 2025-07-31 | Stop reason: HOSPADM

## 2025-07-31 RX ORDER — HYDROCODONE BITARTRATE AND ACETAMINOPHEN 5; 325 MG/1; MG/1
1 TABLET ORAL ONCE AS NEEDED
Status: COMPLETED | OUTPATIENT
Start: 2025-07-31 | End: 2025-07-31

## 2025-07-31 RX ORDER — HYDROMORPHONE HYDROCHLORIDE 1 MG/ML
0.5 INJECTION, SOLUTION INTRAMUSCULAR; INTRAVENOUS; SUBCUTANEOUS
Status: DISCONTINUED | OUTPATIENT
Start: 2025-07-31 | End: 2025-07-31 | Stop reason: HOSPADM

## 2025-07-31 RX ORDER — SODIUM CHLORIDE 0.9 % (FLUSH) 0.9 %
3-10 SYRINGE (ML) INJECTION AS NEEDED
Status: DISCONTINUED | OUTPATIENT
Start: 2025-07-31 | End: 2025-07-31 | Stop reason: HOSPADM

## 2025-07-31 RX ORDER — FLUMAZENIL 0.1 MG/ML
0.2 INJECTION INTRAVENOUS AS NEEDED
Status: DISCONTINUED | OUTPATIENT
Start: 2025-07-31 | End: 2025-07-31 | Stop reason: HOSPADM

## 2025-07-31 RX ORDER — ONDANSETRON 2 MG/ML
4 INJECTION INTRAMUSCULAR; INTRAVENOUS ONCE AS NEEDED
Status: COMPLETED | OUTPATIENT
Start: 2025-07-31 | End: 2025-07-31

## 2025-07-31 RX ADMIN — FENTANYL CITRATE 25 MCG: 50 INJECTION, SOLUTION INTRAMUSCULAR; INTRAVENOUS at 10:05

## 2025-07-31 RX ADMIN — PROPOFOL 150 MCG/KG/MIN: 10 INJECTION, EMULSION INTRAVENOUS at 10:02

## 2025-07-31 RX ADMIN — Medication 100 MCG: at 10:10

## 2025-07-31 RX ADMIN — EPHEDRINE SULFATE 10 MG: 5 INJECTION INTRAVENOUS at 10:58

## 2025-07-31 RX ADMIN — Medication 100 MCG: at 10:30

## 2025-07-31 RX ADMIN — FENTANYL CITRATE 25 MCG: 50 INJECTION, SOLUTION INTRAMUSCULAR; INTRAVENOUS at 10:12

## 2025-07-31 RX ADMIN — SODIUM CHLORIDE 100 ML/HR: 9 INJECTION, SOLUTION INTRAVENOUS at 09:53

## 2025-07-31 RX ADMIN — LIDOCAINE HYDROCHLORIDE 60 MG: 20 INJECTION, SOLUTION EPIDURAL; INFILTRATION; INTRACAUDAL; PERINEURAL at 10:02

## 2025-07-31 RX ADMIN — FENTANYL CITRATE 50 MCG: 50 INJECTION, SOLUTION INTRAMUSCULAR; INTRAVENOUS at 10:20

## 2025-07-31 RX ADMIN — ONDANSETRON 4 MG: 2 INJECTION, SOLUTION INTRAMUSCULAR; INTRAVENOUS at 11:53

## 2025-07-31 RX ADMIN — EPHEDRINE SULFATE 10 MG: 5 INJECTION INTRAVENOUS at 10:53

## 2025-07-31 RX ADMIN — Medication 200 MCG: at 10:33

## 2025-07-31 RX ADMIN — CEFAZOLIN 2000 MG: 2 INJECTION, POWDER, FOR SOLUTION INTRAMUSCULAR; INTRAVENOUS at 09:54

## 2025-07-31 RX ADMIN — Medication 300 MCG: at 10:40

## 2025-07-31 RX ADMIN — HYDROCODONE BITARTRATE AND ACETAMINOPHEN 1 TABLET: 5; 325 TABLET ORAL at 11:51

## 2025-07-31 RX ADMIN — Medication 100 MCG: at 10:47

## 2025-07-31 NOTE — PROGRESS NOTES
General Surgery History and Physical      Referring Provider: Hammad Calixto MD    Chief Complaint:    Adenocarcinoma of biliary origin    History of Present Illness:    Gabriel De Souza is a 80 y.o. recently diagnosed with adenocarcinoma biliary origin.  He follows with Dr. Calixto.  Plans to start chemotherapy.  He does report he has a fistula in the left upper extremity and that he has a prior history of a central line in the right neck.    Past Medical History:   Past Medical History:   Diagnosis Date    Cancer 01/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    Coronary artery disease     Deep vein thrombosis 04/1990    Diabetes mellitus     Dyslipidemia     Erectile dysfunction 51 years old    Heart murmur 10/04/2023    Hemodialysis patient     HL (hearing loss) 7 year old    right    Hyperlipidemia 1970    Hypertension     Liver cancer 07/24/2005    Neuropathy     Renal insufficiency 2020    Stage 4 chronic kidney disease     3-4        Past Surgical History:    Past Surgical History:   Procedure Laterality Date    ARTERIOVENOUS FISTULA/SHUNT SURGERY Left 09/22/2022    Procedure: BRACHIAL CEPHALIC FISTULA FORMATION;  Surgeon: Edy Vega MD;  Location: [facility] MAIN OR;  Service: Vascular;  Laterality: Left;    BACK SURGERY      BASAL CELL CARCINOMA EXCISION  01/2019    CARDIAC CATHETERIZATION      CYSTOSCOPY W/ URETERAL STENT PLACEMENT Bilateral 01/16/2024    Procedure: CYSTOSCOPY, BILATERAL URETERAL STENT INSERTION;  Surgeon: Tyrel Avitia MD;  Location: [facility] MAIN OR;  Service: Urology;  Laterality: Bilateral;    ERCP N/A 01/23/2024    Procedure: ENDOSCOPIC RETROGRADE CHOLANGIOPANCREATOGRAPHY with sphinctorotomy, brushing of bile duct, balloon sweeping of common bile duct up to 12mm, placement of 10 Fr. x 9cm biliary stent, and placement of 5 Fr. x 7cm pancreatic stent;  Surgeon: Ollie Peoples MD;  Location: [facility] ENDOSCOPY;  Service: Gastroenterology;  Laterality: N/A;    ERCP N/A 07/18/2024    Procedure: ENDOSCOPIC  RETROGRADE CHOLANGIOPANCREATOGRAPHY with SPHINCTEROTOMY, BALLOON SWEEP, BILE DUCT BRUSHINGS;  Surgeon: Ollie Peoples MD;  Location: [facility] ENDOSCOPY;  Service: Gastroenterology;  Laterality: N/A;  POST: PANCREATITIS    ERCP N/A 03/12/2024    Procedure: ENDOSCOPIC RETROGRADE CHOLANGIOPANCREATOGRAPHY WITH REMOVAL OF PANCREATIC AND BILIARY STENTS,EXTENSION OF SPHINCTEROTOMY, CLEARANCE OF BILE DUCT WITH BALLOON (9MM-12MM, UP TO 12MM), PLACEMENT OF BILIARY STENT;  Surgeon: Ollie Peoples MD;  Location: [facility] ENDOSCOPY;  Service: Gastroenterology;  Laterality: N/A;  Post-    EYE SURGERY  2-6 & 2-    left & right eye    TURP / TRANSURETHRAL INCISION / DRAINAGE PROSTATE      UPPER ENDOSCOPIC ULTRASOUND W/ FNA N/A 01/23/2024    Procedure: ESOPHAGOGASTRODUODENOSCOPY WITH ULTRASOUND AND FINE NEEDLE ASPIRATION of pancreatic head lesion;  Surgeon: Ollie Peoples MD;  Location: [facility] ENDOSCOPY;  Service: Gastroenterology;  Laterality: N/A;    UPPER ENDOSCOPIC ULTRASOUND W/ FNA N/A 03/12/2024    Procedure: ENDOSCOPIC ULTRASOUND WITH FINE NEEDLE ASPIRATION;  Surgeon: Ollie Peoples MD;  Location: [facility] ENDOSCOPY;  Service: Gastroenterology;  Laterality: N/A;  Post-       Family History:    Family History   Problem Relation Age of Onset    Heart disease Mother     Hypertension Father     Hearing loss Father     Brain cancer Father     Heart disease Brother     Diabetes Brother     Heart attack Maternal Grandfather        Social History:    Social History     Socioeconomic History    Marital status:    Tobacco Use    Smoking status: Never     Passive exposure: Never    Smokeless tobacco: Never    Tobacco comments:     never used   Vaping Use    Vaping status: Never Used   Substance and Sexual Activity    Alcohol use: No    Drug use: No    Sexual activity: Not Currently     Partners: Female     Birth control/protection: None       Allergies:   Allergies   Allergen Reactions    Codeine Nausea And Vomiting    Tylenol  With Codeine #3 [Acetaminophen-Codeine] Nausea Only and GI Intolerance       Medications:   No current facility-administered medications for this visit.  No current outpatient medications on file.    Facility-Administered Medications Ordered in Other Visits:     ceFAZolin 2000 mg IVPB in 100 mL NS (MBP), 2,000 mg, Intravenous, Once, Laurel Aguirre MD    sodium chloride 0.9 % flush 3 mL, 3 mL, Intravenous, Q12H, Laurel Aguirre MD    sodium chloride 0.9 % flush 3-10 mL, 3-10 mL, Intravenous, PRN, Laurel Aguirre MD    sodium chloride 0.9 % infusion 40 mL, 40 mL, Intravenous, PRN, Laurel Aguirre MD    sodium chloride 0.9 % infusion, 100 mL/hr, Intravenous, Continuous, Laurel Aguirre MD    Radiology/Endoscopy:    CT abdomen/pelvis 5/20/2025  Impression:  1. Increasing hepatic masses in hepatic segment 8 and 4A consistent with malignancy. The inferior most right hepatic lobe lesion is contiguous with the cephalad margin of the gallbladder which demonstrates abnormal masslike nodular thickening. Findings   could reflect sequelae of primary gallbladder malignancy with hepatic metastases versus primary peripheral mass forming cholangiocarcinoma given history of elevated tumor markers. Metastatic disease from other extrahepatic primary is possible.     2. Abnormal and enlarging figueroa hepatic adenopathy consistent with shan metastases. Peritoneal nodularity in the pelvis is highly suspicious for early carcinomatosis.     3. Partially calcified soft tissue nodule in the right lower quadrant suspicious for neuroendocrine tumor, either arising from a loop of ileum or possibly the tip of the appendix. Smaller calcified soft tissue nodules in the adjacent central mesentery   suggest metastatic disease.     4. Heterogeneous possibly enhancing left upper pole renal mass without significant change from 2024. Differential includes primary renal neoplasm versus complex cyst. Numerous additional renal lesions favor simple cysts and  hemorrhagic/proteinaceous   cysts. Nonemergent multiphase abdominal MRI could be considered for more accurate characterization.     5. Cholelithiasis without findings of acute cholecystitis.     6. Pancreatic calcifications consistent with chronic pancreatitis. No active inflammation or obvious primary pancreatic mass.     7. Numerous additional chronic/ancillary findings as above.    CT chest 6/10/2025  Impression:  1. No findings of intrathoracic metastatic disease.  2. Hepatic metastatic lesions at segment 8 and segment 4A as seen on the recent abdominal CT with findings again concerning for mass involving the gallbladder.  3. Enlarged portacaval lymph nodes consistent with known metastatic adenopathy.  4. Multiple renal cysts with suspected enhancing 2.1 cm left upper pole renal lesion measuring 2.1 cm which again may relate to renal cell carcinoma, unchanged.  5. Coronary artery disease and additional chronic findings above.    Labs:    Recent labs reviewed    Review of Systems:   As noted above in HPI    Physical Exam:   No acute distress, alert  Nonlabored respirations  Right neck region with prior scars from central line  Left upper extremity with AV fistula/graft    Assessment and Plan:  Gabriel De Souza is a 80 y.o. 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  Plan to start chemotherapy.    - We discussed port placement along with associate risk, benefits, alternatives  - Risk discussed include but are not limited to bleeding, infection, vascular injury requiring further surgical intervention, pneumothorax  - Patient expressed understanding of everything discussed and is agreeable to proceed with port placement; will schedule     Laurel Aguirre MD  General Surgery

## 2025-07-31 NOTE — OP NOTE
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 Item Serial No.  Lot No. LRB No. Used Action   KT PRT POWERPORT CLEARVUE MOD/BUMP INTERMD 8F 45CM - [id number] Implant KT PRT POWERPORT CLEARVUE MOD/BUMP INTERMD 8F 45CM  [employer] [id number] 1 Implanted     Specimen:          None    Findings: Wire and catheter in good position on intraoperative imaging    Complications: None immediate    CLINICAL INDICATIONS:  The patient is 80 year old male with adenocarcinoma of hepatobiliary origin cancer requiring chemotherapy.  The patient presents today for port placement.  The surgery along with the associated risks (including bleeding, infection, pneumothorax), benefits, and alternatives.  The patient expressed understanding and wished to proceed.  Informed consent was obtained.    DESCRIPTION OF PROCEDURE:  The patient was brought to the operating room and placed in the supine position with both arms tucked and shoulder roll placed.  Monitored anesthesia care was provided by the Anesthesia service.  Preoperative antibiotics were administered.  The neck and chest regions were prepped and draped in the usual sterile fashion.  A time out was performed.    The skin and subcutaneous tissues were anesthetized with local anesthetic on the patient's right side.  The patient was placed in slight trendelenburg position.  Under ultrasound guidance I then  carefully access the right internal jugular vein with the large bore needle.  A single attempt was made and the vein was successfully accessed.  The guide wire was then inserted and went in easily without resistance.  At this point an xray was obtained showing the guide wire in good position coursing into the superior vena cava.  The needle was withdrawn and the wire was left in place.  An incision was made for creation of the port pocket.  The incision was carried down to the pectoralis fascia with electrocautery and a pocket was created large enough to house the port.  A small incision was made over the wire and the introducer sheath was then fed over the wire to dilate the catheter tract.  The wire was removed from the introducer sheath and the catheter was fed into the sheath to 25cm.  The sheath was then torn away from the catheter holding the catheter in place.  Using the tunneler the catheter was tunneled from the neck incision into the port pocket.  Fluoroscopic images were then obtained showing the catheter going down into the superior vena cava.  Under fluoroscopic guidance the catheter was pulled back to the junction of the superior vena cava and the right atrium.  At this point we aspirated and flushed the catheter freely.  We then cut the catheter to the proper length and secured it to the port.  The port was then placed into the pocket that was created and secured to the pectoralis fascia in 2 locations using 3-0 prolene suture to prevent the port from flipping.  The port was accessed with the farnsworth needle and again it aspirated and flushed freely.  It was flushed with heparinized saline.  Hemostasis was achieved with electrocautery.  The subcutaneous tissue closed with 3-0 vicryl suture in an interrupted fashion.  The skin incision was closed with 4-0 vicryl in a subcuticular fashion.  The incision was cleaned and sterile dressings were applied.    The patient tolerated the procedure well.  He was  awakened from anesthesia and transferred to the recovery room in stable condition.  At the completion of the case, all instrument, needle, and sponge counts were correct.  Postoperative chest xray was obtained showing the port in good position and no pneumothorax.     Laurel Aguirre MD     Date: 7/31/2025  Time: 14:58 EDT    This note was created using Dragon Voice Recognition software.

## 2025-07-31 NOTE — H&P (VIEW-ONLY)
General Surgery History and Physical      Referring Provider: Hammad Calixto MD    Chief Complaint:    Adenocarcinoma of biliary origin    History of Present Illness:    Gabriel De Souza is a 80 y.o. recently diagnosed with adenocarcinoma biliary origin.  He follows with Dr. Calixto.  Plans to start chemotherapy.  He does report he has a fistula in the left upper extremity and that he has a prior history of a central line in the right neck.    Past Medical History:   Past Medical History:   Diagnosis Date    Cancer 01/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    Coronary artery disease     Deep vein thrombosis 04/1990    Diabetes mellitus     Dyslipidemia     Erectile dysfunction 51 years old    Heart murmur 10/04/2023    Hemodialysis patient     HL (hearing loss) 7 year old    right    Hyperlipidemia 1970    Hypertension     Liver cancer 07/24/2005    Neuropathy     Renal insufficiency 2020    Stage 4 chronic kidney disease     3-4        Past Surgical History:    Past Surgical History:   Procedure Laterality Date    ARTERIOVENOUS FISTULA/SHUNT SURGERY Left 09/22/2022    Procedure: BRACHIAL CEPHALIC FISTULA FORMATION;  Surgeon: Edy Vega MD;  Location: [facility] MAIN OR;  Service: Vascular;  Laterality: Left;    BACK SURGERY      BASAL CELL CARCINOMA EXCISION  01/2019    CARDIAC CATHETERIZATION      CYSTOSCOPY W/ URETERAL STENT PLACEMENT Bilateral 01/16/2024    Procedure: CYSTOSCOPY, BILATERAL URETERAL STENT INSERTION;  Surgeon: Tyrel Avitia MD;  Location: [facility] MAIN OR;  Service: Urology;  Laterality: Bilateral;    ERCP N/A 01/23/2024    Procedure: ENDOSCOPIC RETROGRADE CHOLANGIOPANCREATOGRAPHY with sphinctorotomy, brushing of bile duct, balloon sweeping of common bile duct up to 12mm, placement of 10 Fr. x 9cm biliary stent, and placement of 5 Fr. x 7cm pancreatic stent;  Surgeon: Ollie Peoples MD;  Location: [facility] ENDOSCOPY;  Service: Gastroenterology;  Laterality: N/A;    ERCP N/A 07/18/2024    Procedure: ENDOSCOPIC  RETROGRADE CHOLANGIOPANCREATOGRAPHY with SPHINCTEROTOMY, BALLOON SWEEP, BILE DUCT BRUSHINGS;  Surgeon: Ollie Peoples MD;  Location: [facility] ENDOSCOPY;  Service: Gastroenterology;  Laterality: N/A;  POST: PANCREATITIS    ERCP N/A 03/12/2024    Procedure: ENDOSCOPIC RETROGRADE CHOLANGIOPANCREATOGRAPHY WITH REMOVAL OF PANCREATIC AND BILIARY STENTS,EXTENSION OF SPHINCTEROTOMY, CLEARANCE OF BILE DUCT WITH BALLOON (9MM-12MM, UP TO 12MM), PLACEMENT OF BILIARY STENT;  Surgeon: Ollie Peoples MD;  Location: [facility] ENDOSCOPY;  Service: Gastroenterology;  Laterality: N/A;  Post-    EYE SURGERY  2-6 & 2-    left & right eye    TURP / TRANSURETHRAL INCISION / DRAINAGE PROSTATE      UPPER ENDOSCOPIC ULTRASOUND W/ FNA N/A 01/23/2024    Procedure: ESOPHAGOGASTRODUODENOSCOPY WITH ULTRASOUND AND FINE NEEDLE ASPIRATION of pancreatic head lesion;  Surgeon: Ollie Peoples MD;  Location: [facility] ENDOSCOPY;  Service: Gastroenterology;  Laterality: N/A;    UPPER ENDOSCOPIC ULTRASOUND W/ FNA N/A 03/12/2024    Procedure: ENDOSCOPIC ULTRASOUND WITH FINE NEEDLE ASPIRATION;  Surgeon: Ollie Peoples MD;  Location: [facility] ENDOSCOPY;  Service: Gastroenterology;  Laterality: N/A;  Post-       Family History:    Family History   Problem Relation Age of Onset    Heart disease Mother     Hypertension Father     Hearing loss Father     Brain cancer Father     Heart disease Brother     Diabetes Brother     Heart attack Maternal Grandfather        Social History:    Social History     Socioeconomic History    Marital status:    Tobacco Use    Smoking status: Never     Passive exposure: Never    Smokeless tobacco: Never    Tobacco comments:     never used   Vaping Use    Vaping status: Never Used   Substance and Sexual Activity    Alcohol use: No    Drug use: No    Sexual activity: Not Currently     Partners: Female     Birth control/protection: None       Allergies:   Allergies   Allergen Reactions    Codeine Nausea And Vomiting    Tylenol  With Codeine #3 [Acetaminophen-Codeine] Nausea Only and GI Intolerance       Medications:   No current facility-administered medications for this visit.  No current outpatient medications on file.    Facility-Administered Medications Ordered in Other Visits:     ceFAZolin 2000 mg IVPB in 100 mL NS (MBP), 2,000 mg, Intravenous, Once, Laurel Aguirre MD    sodium chloride 0.9 % flush 3 mL, 3 mL, Intravenous, Q12H, Laurel Aguirre MD    sodium chloride 0.9 % flush 3-10 mL, 3-10 mL, Intravenous, PRN, Laurel Aguirre MD    sodium chloride 0.9 % infusion 40 mL, 40 mL, Intravenous, PRN, Laurel Aguirre MD    sodium chloride 0.9 % infusion, 100 mL/hr, Intravenous, Continuous, Laurel Aguirre MD    Radiology/Endoscopy:    CT abdomen/pelvis 5/20/2025  Impression:  1. Increasing hepatic masses in hepatic segment 8 and 4A consistent with malignancy. The inferior most right hepatic lobe lesion is contiguous with the cephalad margin of the gallbladder which demonstrates abnormal masslike nodular thickening. Findings   could reflect sequelae of primary gallbladder malignancy with hepatic metastases versus primary peripheral mass forming cholangiocarcinoma given history of elevated tumor markers. Metastatic disease from other extrahepatic primary is possible.     2. Abnormal and enlarging figueroa hepatic adenopathy consistent with shan metastases. Peritoneal nodularity in the pelvis is highly suspicious for early carcinomatosis.     3. Partially calcified soft tissue nodule in the right lower quadrant suspicious for neuroendocrine tumor, either arising from a loop of ileum or possibly the tip of the appendix. Smaller calcified soft tissue nodules in the adjacent central mesentery   suggest metastatic disease.     4. Heterogeneous possibly enhancing left upper pole renal mass without significant change from 2024. Differential includes primary renal neoplasm versus complex cyst. Numerous additional renal lesions favor simple cysts and  hemorrhagic/proteinaceous   cysts. Nonemergent multiphase abdominal MRI could be considered for more accurate characterization.     5. Cholelithiasis without findings of acute cholecystitis.     6. Pancreatic calcifications consistent with chronic pancreatitis. No active inflammation or obvious primary pancreatic mass.     7. Numerous additional chronic/ancillary findings as above.    CT chest 6/10/2025  Impression:  1. No findings of intrathoracic metastatic disease.  2. Hepatic metastatic lesions at segment 8 and segment 4A as seen on the recent abdominal CT with findings again concerning for mass involving the gallbladder.  3. Enlarged portacaval lymph nodes consistent with known metastatic adenopathy.  4. Multiple renal cysts with suspected enhancing 2.1 cm left upper pole renal lesion measuring 2.1 cm which again may relate to renal cell carcinoma, unchanged.  5. Coronary artery disease and additional chronic findings above.    Labs:    Recent labs reviewed    Review of Systems:   As noted above in HPI    Physical Exam:   No acute distress, alert  Nonlabored respirations  Right neck region with prior scars from central line  Left upper extremity with AV fistula/graft    Assessment and Plan:  Gabriel De Souza is a 80 y.o. 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  Plan to start chemotherapy.    - We discussed port placement along with associate risk, benefits, alternatives  - Risk discussed include but are not limited to bleeding, infection, vascular injury requiring further surgical intervention, pneumothorax  - Patient expressed understanding of everything discussed and is agreeable to proceed with port placement; will schedule     Laurel Aguirre MD  General Surgery

## 2025-07-31 NOTE — ANESTHESIA PREPROCEDURE EVALUATION
Anesthesia Evaluation     NPO Solid Status: > 8 hours  NPO Liquid Status: > 8 hours           Airway   Mallampati: II  TM distance: >3 FB  Neck ROM: full  No difficulty expected  Dental - normal exam     Pulmonary - normal exam   Cardiovascular - normal exam    (+) hypertension well controlled, valvular problems/murmurs, CAD, angina, DVT, hyperlipidemia      Neuro/Psych  (+) numbness  GI/Hepatic/Renal/Endo    (+) liver disease, renal disease- dialysis, diabetes mellitus type 2, thyroid problem     Musculoskeletal     Abdominal  - normal exam    Bowel sounds: normal.   Substance History      OB/GYN          Other      history of cancer active                Anesthesia Plan    ASA 4     MAC   total IV anesthesia  intravenous induction     Anesthetic plan, risks, benefits, and alternatives have been provided, discussed and informed consent has been obtained with: patient.  Pre-procedure education provided  Plan discussed with CRNA.    CODE STATUS:

## 2025-07-31 NOTE — ANESTHESIA POSTPROCEDURE EVALUATION
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        Post Anesthesia Care and Evaluation    Patient location during evaluation: PACU  Patient participation: complete - patient participated  Level of consciousness: awake  Pain scale: See nurse's notes for pain score.  Pain management: adequate    Airway patency: patent  Anesthetic complications: No anesthetic complications  PONV Status: none  Cardiovascular status: acceptable  Respiratory status: acceptable and spontaneous ventilation  Hydration status: acceptable    Comments: Patient seen and examined postoperatively; vital signs stable; SpO2 greater than or equal to 90%; cardiopulmonary status stable; nausea/vomiting adequately controlled; pain adequately controlled; no apparent anesthesia complications; patient discharged from anesthesia care when discharge criteria were met

## 2025-08-01 ENCOUNTER — TELEPHONE (OUTPATIENT)
Dept: SURGERY | Facility: CLINIC | Age: 80
End: 2025-08-01
Payer: MEDICARE

## 2025-08-01 LAB
PD-L1 BY 22C3 TISS IMSTN DOC: NEGATIVE
TEMPUS PD-L1 (22C3) COMBINED POSITIVE SCORE: <1
TEMPUS PD-L1 (22C3) TUMOR PROPORTION SCORE: <1 %
TEMPUS PORTAL: NORMAL

## 2025-08-01 NOTE — TELEPHONE ENCOUNTER
Call placed to patient to follow up after surgery, reports sore. Advised okay to use OTC Tylenol/ ibuprofen and ice to area. Advised we did not need to see him in the office unless issues with incision site or accessing the port. Patient expressed understanding of all discussed.

## 2025-08-06 LAB
DNA RANGE(S) EXAMINED NAR: NORMAL
GENE DIS ANL INTERP-IMP: POSITIVE
GENE DIS ASSESSED: NORMAL
GENE MUT TESTED BLD/T: 26.8 M/MB
PD-L1 BY 22C3 TISS IMSTN DOC: NEGATIVE
REASON FOR STUDY: NORMAL
TEMPUS GERMLINE NOTE: NORMAL
TEMPUS LCA: NORMAL
TEMPUS MSI NOTE: NORMAL
TEMPUS PD-L1 (22C3) COMBINED POSITIVE SCORE: <1
TEMPUS PD-L1 (22C3) TUMOR PROPORTION SCORE: <1 %
TEMPUS PORTAL: NORMAL
TEMPUS THERAPY1: NORMAL
TEMPUS THERAPYCOUNT: 1
TEMPUS TRIALCOUNT: 3
TEMPUS TRIALMATCHES1: NORMAL
TEMPUS TRIALMATCHES2: NORMAL
TEMPUS TRIALMATCHES3: NORMAL
TEMPUS XR RESULT 1: NORMAL

## 2025-08-07 ENCOUNTER — OFFICE VISIT (OUTPATIENT)
Dept: ONCOLOGY | Facility: CLINIC | Age: 80
End: 2025-08-07
Payer: MEDICARE

## 2025-08-07 ENCOUNTER — APPOINTMENT (OUTPATIENT)
Dept: LAB | Facility: HOSPITAL | Age: 80
End: 2025-08-07
Payer: MEDICARE

## 2025-08-07 VITALS
OXYGEN SATURATION: 96 % | WEIGHT: 192 LBS | RESPIRATION RATE: 18 BRPM | DIASTOLIC BLOOD PRESSURE: 62 MMHG | TEMPERATURE: 97.2 F | SYSTOLIC BLOOD PRESSURE: 126 MMHG | HEIGHT: 73 IN | HEART RATE: 107 BPM | BODY MASS INDEX: 25.45 KG/M2

## 2025-08-07 DIAGNOSIS — C22.0 HEPATOCELLULAR CARCINOMA: Primary | ICD-10-CM

## 2025-08-07 DIAGNOSIS — R97.8 OTHER ABNORMAL TUMOR MARKERS: ICD-10-CM

## 2025-08-07 LAB
ALBUMIN SERPL-MCNC: 2.6 G/DL (ref 3.5–5.2)
ALBUMIN/GLOB SERPL: 0.9 G/DL
ALP SERPL-CCNC: 315 U/L (ref 39–117)
ALT SERPL W P-5'-P-CCNC: 16 U/L (ref 1–41)
ANION GAP SERPL CALCULATED.3IONS-SCNC: 18.9 MMOL/L (ref 5–15)
AST SERPL-CCNC: 23 U/L (ref 1–40)
BASOPHILS # BLD AUTO: 0.01 10*3/MM3 (ref 0–0.2)
BASOPHILS NFR BLD AUTO: 0 % (ref 0–1.5)
BILIRUB SERPL-MCNC: 0.4 MG/DL (ref 0–1.2)
BUN SERPL-MCNC: 51.7 MG/DL (ref 8–23)
BUN/CREAT SERPL: 12.4 (ref 7–25)
CALCIUM SPEC-SCNC: 9.4 MG/DL (ref 8.6–10.5)
CANCER AG19-9 SERPL-ACNC: 1204 U/ML
CHLORIDE SERPL-SCNC: 94 MMOL/L (ref 98–107)
CO2 SERPL-SCNC: 23.1 MMOL/L (ref 22–29)
CREAT SERPL-MCNC: 4.16 MG/DL (ref 0.76–1.27)
DEPRECATED RDW RBC AUTO: 52.7 FL (ref 37–54)
EGFRCR SERPLBLD CKD-EPI 2021: 13.8 ML/MIN/1.73
EOSINOPHIL # BLD AUTO: 0.02 10*3/MM3 (ref 0–0.4)
EOSINOPHIL NFR BLD AUTO: 0.1 % (ref 0.3–6.2)
ERYTHROCYTE [DISTWIDTH] IN BLOOD BY AUTOMATED COUNT: 15.7 % (ref 12.3–15.4)
GLOBULIN UR ELPH-MCNC: 3 GM/DL
GLUCOSE SERPL-MCNC: 119 MG/DL (ref 65–99)
HCT VFR BLD AUTO: 30.6 % (ref 37.5–51)
HGB BLD-MCNC: 10.3 G/DL (ref 13–17.7)
HOLD SPECIMEN: NORMAL
HOLD SPECIMEN: NORMAL
IMM GRANULOCYTES # BLD AUTO: 0.19 10*3/MM3 (ref 0–0.05)
IMM GRANULOCYTES NFR BLD AUTO: 0.8 % (ref 0–0.5)
LYMPHOCYTES # BLD AUTO: 1.03 10*3/MM3 (ref 0.7–3.1)
LYMPHOCYTES NFR BLD AUTO: 4.2 % (ref 19.6–45.3)
MCH RBC QN AUTO: 31.1 PG (ref 26.6–33)
MCHC RBC AUTO-ENTMCNC: 33.7 G/DL (ref 31.5–35.7)
MCV RBC AUTO: 92.4 FL (ref 79–97)
MONOCYTES # BLD AUTO: 1.43 10*3/MM3 (ref 0.1–0.9)
MONOCYTES NFR BLD AUTO: 5.8 % (ref 5–12)
NEUTROPHILS NFR BLD AUTO: 22 10*3/MM3 (ref 1.7–7)
NEUTROPHILS NFR BLD AUTO: 89.1 % (ref 42.7–76)
PLATELET # BLD AUTO: 376 10*3/MM3 (ref 140–450)
PMV BLD AUTO: 10.7 FL (ref 6–12)
POTASSIUM SERPL-SCNC: 4.3 MMOL/L (ref 3.5–5.2)
PROT SERPL-MCNC: 5.6 G/DL (ref 6–8.5)
RBC # BLD AUTO: 3.31 10*6/MM3 (ref 4.14–5.8)
SODIUM SERPL-SCNC: 136 MMOL/L (ref 136–145)
WBC NRBC COR # BLD AUTO: 24.68 10*3/MM3 (ref 3.4–10.8)

## 2025-08-07 PROCEDURE — 85025 COMPLETE CBC W/AUTO DIFF WBC: CPT | Performed by: INTERNAL MEDICINE

## 2025-08-07 PROCEDURE — 1159F MED LIST DOCD IN RCRD: CPT | Performed by: INTERNAL MEDICINE

## 2025-08-07 PROCEDURE — 36415 COLL VENOUS BLD VENIPUNCTURE: CPT

## 2025-08-07 PROCEDURE — 80053 COMPREHEN METABOLIC PANEL: CPT | Performed by: INTERNAL MEDICINE

## 2025-08-07 PROCEDURE — 86301 IMMUNOASSAY TUMOR CA 19-9: CPT | Performed by: INTERNAL MEDICINE

## 2025-08-07 PROCEDURE — 3078F DIAST BP <80 MM HG: CPT | Performed by: INTERNAL MEDICINE

## 2025-08-07 PROCEDURE — 1125F AMNT PAIN NOTED PAIN PRSNT: CPT | Performed by: INTERNAL MEDICINE

## 2025-08-07 PROCEDURE — 99214 OFFICE O/P EST MOD 30 MIN: CPT | Performed by: INTERNAL MEDICINE

## 2025-08-07 PROCEDURE — 3074F SYST BP LT 130 MM HG: CPT | Performed by: INTERNAL MEDICINE

## 2025-08-07 PROCEDURE — 1160F RVW MEDS BY RX/DR IN RCRD: CPT | Performed by: INTERNAL MEDICINE

## 2025-08-08 DIAGNOSIS — C23 GALLBLADDER CANCER: ICD-10-CM

## 2025-08-08 DIAGNOSIS — G89.3 CANCER ASSOCIATED PAIN: ICD-10-CM

## 2025-08-08 DIAGNOSIS — C23 GALLBLADDER CANCER: Primary | ICD-10-CM

## 2025-08-08 RX ORDER — HYDROCODONE BITARTRATE AND ACETAMINOPHEN 5; 325 MG/1; MG/1
1 TABLET ORAL EVERY 6 HOURS PRN
Qty: 90 TABLET | Refills: 0 | Status: CANCELLED | OUTPATIENT
Start: 2025-08-08

## 2025-08-08 RX ORDER — HYDROCODONE BITARTRATE AND ACETAMINOPHEN 5; 325 MG/1; MG/1
1 TABLET ORAL EVERY 6 HOURS PRN
Qty: 90 TABLET | Refills: 0 | Status: SHIPPED | OUTPATIENT
Start: 2025-08-08

## 2025-08-26 LAB
LAB AP CASE REPORT: NORMAL
LAB AP DIAGNOSIS COMMENT: NORMAL
Lab: NORMAL
Lab: NORMAL
PATH REPORT.ADDENDUM SPEC: NORMAL
PATH REPORT.FINAL DX SPEC: NORMAL
PATH REPORT.GROSS SPEC: NORMAL

## (undated) DEVICE — ANESTH CIRCUIT 60IN 3LTR-LF: Brand: MEDLINE INDUSTRIES, INC.

## (undated) DEVICE — UNDYED BRAIDED (POLYGLACTIN 910), SYNTHETIC ABSORBABLE SUTURE: Brand: COATED VICRYL

## (undated) DEVICE — SUREFIT, DUAL DISPERSIVE ELECTRODE, CONTACT QUALITY MONITOR: Brand: SUREFIT

## (undated) DEVICE — GW SENSR DUALFLX NITNL STR .035 3X150CM

## (undated) DEVICE — SUT SILK 3/0 SH CR8 18IN C013D

## (undated) DEVICE — BG ISOL DRWSTR INVISISHIELD 20X20IN

## (undated) DEVICE — ST ACC MICROPUNCTURE STFF/CANN PLAT/TP 4F 21G 40CM

## (undated) DEVICE — SOLUTION,WATER,IRRIGATION,1000ML,STERILE: Brand: MEDLINE

## (undated) DEVICE — MARKR SKIN 2TP W/RULR

## (undated) DEVICE — SUT PROLN 6/0 BV1 D/A 30IN 8709H

## (undated) DEVICE — KT SURG TURNOVER 050

## (undated) DEVICE — GLV SURG BIOGEL LTX PF 8 1/2

## (undated) DEVICE — PENCL HND ROCKRSWTCH HOLSTR EZ CLEAN TP CRD 10FT

## (undated) DEVICE — PK ENDO GI 50

## (undated) DEVICE — CATH URETRL OPN/END 5F70CM

## (undated) DEVICE — GLV SURG SIGNATURE ESSENTIAL PF LTX SZ7

## (undated) DEVICE — SOL IRR H2O BO 1000ML STRL

## (undated) DEVICE — PREP SPRY PVPI 10P 2OZ

## (undated) DEVICE — ANTIBACTERIAL UNDYED BRAIDED (POLYGLACTIN 910), SYNTHETIC ABSORBABLE SUTURE: Brand: COATED VICRYL

## (undated) DEVICE — C-ARM: Brand: UNBRANDED

## (undated) DEVICE — SOL IRRG H2O BG 3000ML STRL

## (undated) DEVICE — BITEBLOCK ENDO W/STRAP 60F A/ LF DISP

## (undated) DEVICE — SNAR POLYP HOTSNARE/BRAIDED OVL/MINI 7F 2.8X10MM 230CM 1P/U

## (undated) DEVICE — GLOVE,SURG,SENSICARE SLT,LF,PF,6: Brand: MEDLINE

## (undated) DEVICE — THE STERILE CAMERA HANDLE COVER IS FOR USE WITH THE STERIS SURGICAL LIGHTING AND VISUALIZATION SYSTEMS.

## (undated) DEVICE — SUT VIC 3/0 SH 27IN J416H

## (undated) DEVICE — DRSNG SURESITE WNDW 4X4.5

## (undated) DEVICE — SPHINCTEROTOME: Brand: DREAMTOME™ RX 44

## (undated) DEVICE — SOL IRRIG NACL 1000ML

## (undated) DEVICE — GW GLIDEWIRE STD ANGL .035IN 3X180CM

## (undated) DEVICE — Device

## (undated) DEVICE — PK MINOR LAPAROTOMY 50

## (undated) DEVICE — SUT PROLN 3/0 8832H

## (undated) DEVICE — MICRO TIP WIPE: Brand: DEVON

## (undated) DEVICE — BG DRN URINE W/ANTIREFLUX TOWER LL 2000ML LF

## (undated) DEVICE — RETRIEVAL BALLOON CATHETER: Brand: EXTRACTOR™ PRO RX

## (undated) DEVICE — BALN ENDO FOR EG/3630UR

## (undated) DEVICE — PK MINOR VASC 50

## (undated) DEVICE — PROVE COVER: Brand: UNBRANDED

## (undated) DEVICE — PK CYSTO 50

## (undated) DEVICE — VI-DRAPE ISOLATION BAG: Brand: CONVERTORS

## (undated) DEVICE — DEV POSITION LK AND BIOP CAP SYS

## (undated) DEVICE — BALN EXTRCTPRO RX RETRV 3L DIST 9TO12MM 200CM

## (undated) DEVICE — SLV SCD CALF HEMOFORCE DVT THERP REPROC MD

## (undated) DEVICE — SYR CONTRL LUERLOK 10CC

## (undated) DEVICE — Device: Brand: DISPOSABLE ELECTROSURGICAL SNARE

## (undated) DEVICE — SOL H20 STRL 1000ML

## (undated) DEVICE — DEV INFL COMPAK W/ACCESSPLUS IN4530

## (undated) DEVICE — POSTN ARM CRDL LAMIN PK/2

## (undated) DEVICE — SUT PROLN 3/0 SH D/A 36IN 8522H

## (undated) DEVICE — 3M™ IOBAN™ 2 ANTIMICROBIAL INCISE DRAPE 6650EZ: Brand: IOBAN™ 2

## (undated) DEVICE — SYS BIOP SHARKCORE FNB W/NDL 25G

## (undated) DEVICE — DRAPE,HAND,STERILE: Brand: MEDLINE

## (undated) DEVICE — GOWN,REINFRCE,POLY,SIRUS,BREATH SLV,XXLG: Brand: MEDLINE

## (undated) DEVICE — GAUZE,SPONGE,2"X2",8PLY,STERILE,LF,2'S: Brand: MEDLINE

## (undated) DEVICE — SPNG LAP PREWSH SFTPK 18X18IN STRL PK/5

## (undated) DEVICE — DEV TORQ GW .010TO.038IN

## (undated) DEVICE — ADHS SKIN PREMIERPRO EXOFIN TOPICAL HI/VISC .5ML

## (undated) DEVICE — PAD, GROUNDING, UNIVERSAL, SPLIT, 9': Brand: MEDLINE

## (undated) DEVICE — THE STERILE LIGHT HANDLE COVER IS USED WITH STERIS SURGICAL LIGHTING AND VISUALIZATION SYSTEMS.